# Patient Record
Sex: MALE | Race: WHITE | Employment: OTHER | URBAN - METROPOLITAN AREA
[De-identification: names, ages, dates, MRNs, and addresses within clinical notes are randomized per-mention and may not be internally consistent; named-entity substitution may affect disease eponyms.]

---

## 2018-07-30 ENCOUNTER — HOSPITAL ENCOUNTER (EMERGENCY)
Age: 78
Discharge: HOME | End: 2018-07-30
Attending: EMERGENCY MEDICINE
Payer: MEDICARE

## 2018-07-30 ENCOUNTER — APPOINTMENT (OUTPATIENT)
Dept: CT IMAGING | Age: 78
End: 2018-07-30
Attending: EMERGENCY MEDICINE
Payer: MEDICARE

## 2018-07-30 VITALS
HEIGHT: 68 IN | WEIGHT: 160 LBS | HEART RATE: 66 BPM | RESPIRATION RATE: 18 BRPM | OXYGEN SATURATION: 98 % | BODY MASS INDEX: 24.25 KG/M2 | SYSTOLIC BLOOD PRESSURE: 210 MMHG | DIASTOLIC BLOOD PRESSURE: 94 MMHG | TEMPERATURE: 97.6 F

## 2018-07-30 DIAGNOSIS — N20.0 KIDNEY STONE: Primary | ICD-10-CM

## 2018-07-30 LAB
ALBUMIN SERPL-MCNC: 3.9 G/DL (ref 3.5–5)
ALBUMIN/GLOB SERPL: 1.1 {RATIO} (ref 1.1–2.2)
ALP SERPL-CCNC: 56 U/L (ref 45–117)
ALT SERPL-CCNC: 23 U/L (ref 12–78)
ANION GAP SERPL CALC-SCNC: 9 MMOL/L (ref 5–15)
APPEARANCE UR: CLEAR
AST SERPL-CCNC: 19 U/L (ref 15–37)
BACTERIA URNS QL MICRO: NEGATIVE /HPF
BASOPHILS # BLD: 0 K/UL (ref 0–0.1)
BASOPHILS NFR BLD: 0 % (ref 0–1)
BILIRUB SERPL-MCNC: 0.7 MG/DL (ref 0.2–1)
BILIRUB UR QL: NEGATIVE
BUN SERPL-MCNC: 16 MG/DL (ref 6–20)
BUN/CREAT SERPL: 10 (ref 12–20)
CALCIUM SERPL-MCNC: 8.9 MG/DL (ref 8.5–10.1)
CHLORIDE SERPL-SCNC: 101 MMOL/L (ref 97–108)
CO2 SERPL-SCNC: 25 MMOL/L (ref 21–32)
COLOR UR: ABNORMAL
CREAT SERPL-MCNC: 1.54 MG/DL (ref 0.7–1.3)
DIFFERENTIAL METHOD BLD: ABNORMAL
EOSINOPHIL # BLD: 0 K/UL (ref 0–0.4)
EOSINOPHIL NFR BLD: 0 % (ref 0–7)
EPITH CASTS URNS QL MICRO: ABNORMAL /LPF
ERYTHROCYTE [DISTWIDTH] IN BLOOD BY AUTOMATED COUNT: 13.1 % (ref 11.5–14.5)
GLOBULIN SER CALC-MCNC: 3.6 G/DL (ref 2–4)
GLUCOSE SERPL-MCNC: 295 MG/DL (ref 65–100)
GLUCOSE UR STRIP.AUTO-MCNC: >1000 MG/DL
HCT VFR BLD AUTO: 43.4 % (ref 36.6–50.3)
HGB BLD-MCNC: 14.7 G/DL (ref 12.1–17)
HGB UR QL STRIP: NEGATIVE
HYALINE CASTS URNS QL MICRO: ABNORMAL /LPF (ref 0–5)
IMM GRANULOCYTES # BLD: 0 K/UL (ref 0–0.04)
IMM GRANULOCYTES NFR BLD AUTO: 0 % (ref 0–0.5)
KETONES UR QL STRIP.AUTO: NEGATIVE MG/DL
LEUKOCYTE ESTERASE UR QL STRIP.AUTO: NEGATIVE
LIPASE SERPL-CCNC: 156 U/L (ref 73–393)
LYMPHOCYTES # BLD: 1.2 K/UL (ref 0.8–3.5)
LYMPHOCYTES NFR BLD: 12 % (ref 12–49)
MCH RBC QN AUTO: 29.8 PG (ref 26–34)
MCHC RBC AUTO-ENTMCNC: 33.9 G/DL (ref 30–36.5)
MCV RBC AUTO: 87.9 FL (ref 80–99)
MONOCYTES # BLD: 0.6 K/UL (ref 0–1)
MONOCYTES NFR BLD: 6 % (ref 5–13)
NEUTS SEG # BLD: 8 K/UL (ref 1.8–8)
NEUTS SEG NFR BLD: 82 % (ref 32–75)
NITRITE UR QL STRIP.AUTO: NEGATIVE
NRBC # BLD: 0 K/UL (ref 0–0.01)
NRBC BLD-RTO: 0 PER 100 WBC
PH UR STRIP: 5 [PH] (ref 5–8)
PLATELET # BLD AUTO: 176 K/UL (ref 150–400)
PMV BLD AUTO: 10.9 FL (ref 8.9–12.9)
POTASSIUM SERPL-SCNC: 4.1 MMOL/L (ref 3.5–5.1)
PROT SERPL-MCNC: 7.5 G/DL (ref 6.4–8.2)
PROT UR STRIP-MCNC: NEGATIVE MG/DL
RBC # BLD AUTO: 4.94 M/UL (ref 4.1–5.7)
RBC #/AREA URNS HPF: ABNORMAL /HPF (ref 0–5)
SODIUM SERPL-SCNC: 135 MMOL/L (ref 136–145)
SP GR UR REFRACTOMETRY: 1.02 (ref 1–1.03)
UR CULT HOLD, URHOLD: NORMAL
UROBILINOGEN UR QL STRIP.AUTO: 0.2 EU/DL (ref 0.2–1)
WBC # BLD AUTO: 9.8 K/UL (ref 4.1–11.1)
WBC URNS QL MICRO: ABNORMAL /HPF (ref 0–4)

## 2018-07-30 PROCEDURE — 36415 COLL VENOUS BLD VENIPUNCTURE: CPT | Performed by: EMERGENCY MEDICINE

## 2018-07-30 PROCEDURE — 96375 TX/PRO/DX INJ NEW DRUG ADDON: CPT

## 2018-07-30 PROCEDURE — 85025 COMPLETE CBC W/AUTO DIFF WBC: CPT | Performed by: EMERGENCY MEDICINE

## 2018-07-30 PROCEDURE — 81001 URINALYSIS AUTO W/SCOPE: CPT | Performed by: EMERGENCY MEDICINE

## 2018-07-30 PROCEDURE — 83690 ASSAY OF LIPASE: CPT | Performed by: EMERGENCY MEDICINE

## 2018-07-30 PROCEDURE — 74011250636 HC RX REV CODE- 250/636: Performed by: EMERGENCY MEDICINE

## 2018-07-30 PROCEDURE — 80053 COMPREHEN METABOLIC PANEL: CPT | Performed by: EMERGENCY MEDICINE

## 2018-07-30 PROCEDURE — 99284 EMERGENCY DEPT VISIT MOD MDM: CPT

## 2018-07-30 PROCEDURE — 96374 THER/PROPH/DIAG INJ IV PUSH: CPT

## 2018-07-30 PROCEDURE — 74176 CT ABD & PELVIS W/O CONTRAST: CPT

## 2018-07-30 RX ORDER — KETOROLAC TROMETHAMINE 30 MG/ML
15 INJECTION, SOLUTION INTRAMUSCULAR; INTRAVENOUS
Status: COMPLETED | OUTPATIENT
Start: 2018-07-30 | End: 2018-07-30

## 2018-07-30 RX ORDER — HYDROMORPHONE HYDROCHLORIDE 2 MG/ML
0.5 INJECTION, SOLUTION INTRAMUSCULAR; INTRAVENOUS; SUBCUTANEOUS
Status: DISCONTINUED | OUTPATIENT
Start: 2018-07-30 | End: 2018-07-30 | Stop reason: HOSPADM

## 2018-07-30 RX ORDER — HYDROCODONE BITARTRATE AND ACETAMINOPHEN 5; 325 MG/1; MG/1
1 TABLET ORAL
Qty: 4 TAB | Refills: 0 | Status: ON HOLD | OUTPATIENT
Start: 2018-07-30 | End: 2020-02-04

## 2018-07-30 RX ORDER — ONDANSETRON 2 MG/ML
4 INJECTION INTRAMUSCULAR; INTRAVENOUS
Status: DISCONTINUED | OUTPATIENT
Start: 2018-07-30 | End: 2018-07-30 | Stop reason: HOSPADM

## 2018-07-30 RX ADMIN — KETOROLAC TROMETHAMINE 15 MG: 30 INJECTION, SOLUTION INTRAMUSCULAR at 12:54

## 2018-07-30 RX ADMIN — ONDANSETRON 4 MG: 2 INJECTION INTRAMUSCULAR; INTRAVENOUS at 12:54

## 2018-07-30 RX ADMIN — HYDROMORPHONE HYDROCHLORIDE 0.5 MG: 2 INJECTION, SOLUTION INTRAMUSCULAR; INTRAVENOUS; SUBCUTANEOUS at 12:55

## 2018-07-30 NOTE — ED NOTES
Patient's o2 sats 86%, 2 liters of oxygen placed on patient. Heart rate in the 40's non sustained patient asymptomatic atrial fibrillation, MD aware.

## 2018-07-30 NOTE — ED PROVIDER NOTES
HPI Comments: 65 y.o. male with past medical history significant for stroke and a-fib who presents from home with chief complaint of abd pain. Pt had a sudden onset of R-sided abd pain ~3 hours ago while driving which radiates down into his groin and has been worsening since onset. He states that it \"feels like if (he) could urinate it would be fine\" and he notes associated nausea. Pt had a small output of urine on arrival. No hx of kidney stones. Pt denies fever or vomiting. There are no other acute medical concerns at this time. Social hx: no tobacco use; (+) EtOH use (beer every other day) PCP: Chastity Sánhcez MD 
 
Note written by Jess Capellan, as dictated by Americo Samuels MD 12:30 PM 
 
The history is provided by the patient. No  was used. Past Medical History:  
Diagnosis Date  Stroke ([facility] [address] History reviewed. No pertinent surgical history. History reviewed. No pertinent family history. Social History Social History  Marital status: SINGLE Spouse name: N/A  
 Number of children: N/A  
 Years of education: N/A Occupational History  Not on file. Social History Main Topics  Smoking status: Not on file  Smokeless tobacco: Not on file  Alcohol use Not on file  Drug use: Not on file  Sexual activity: Not on file Other Topics Concern  Not on file Social History Narrative ALLERGIES: Ampicillin Review of Systems Constitutional: Negative for fever. HENT: Negative for congestion. Respiratory: Negative for shortness of breath. Cardiovascular: Negative for chest pain. Gastrointestinal: Positive for abdominal pain and nausea. Negative for vomiting. All other systems reviewed and are negative. Vitals:  
 07/30/18 1106 07/30/18 1254 BP: (!) 210/94 Pulse: 73 Resp: 18 Temp: 97.6 °F (36.4 °C) SpO2: 92% (!) 86% Weight: 72.6 kg (160 lb) Height: 5' 8\" (1.727 m) Physical Exam  
Constitutional: He is oriented to person, place, and time. He appears well-developed and well-nourished. No distress. HENT:  
Head: Normocephalic and atraumatic. Eyes: Conjunctivae are normal.  
Neck: Neck supple. No tracheal deviation present. Cardiovascular: Normal rate, regular rhythm and normal heart sounds. Pulmonary/Chest: Effort normal and breath sounds normal. No respiratory distress. Abdominal: Soft. He exhibits no distension. There is tenderness in the right lower quadrant. There is guarding (involuntary) and CVA tenderness (mild, R-sided). There is no rebound. Musculoskeletal: Normal range of motion. Neurological: He is alert and oriented to person, place, and time. Skin: Skin is warm and dry. Psychiatric: He has a normal mood and affect. Nursing note and vitals reviewed. Note written by Jess Bhakta, as dictated by Thais Poole MD 1:21 PM 
 
MDM 
 
65 y.o. male presents with RLQ pain and right back pain starting suddenly today. Feeling of incomplete urination but bladder is empty. No significant hydro and no hematuria. Clinically c/w stone so CT ordered and shows 2mm calculus in bladder. Pt no longer in pain. Recommended collection with filter and f/u with urology routinely to analyze. ED Course Procedures Emergency Focused Ultrasound Exam 
Limited ultrasound of the Bladder. Performed and interpreted by Thais Poole MD 
Indication: evaluation for urinary retention Transverse and Sagittal views of the bladder are obtained and calculations are performed to determine an estimated bladder volume for signs of post-renal obstruction. Findings: Bladder is not full, 45cc Interpretation: no evidence of outlet obstruction Emergency Focused Ultrasound Exam 
Limited Retroperitoneal Ultrasound of Kidneys Performed and interpreted by Thais Poole MD 
Focused abdominal ultrasound with both kidneys imaged in transverse and longitudinal planes in real-time. Indication: flank pain Findings: bilateral kidneys present, no shadowing, no anechoic areas Interpretation: no hydronephrosis visualized. no stones or cysts visualized

## 2018-07-30 NOTE — DISCHARGE INSTRUCTIONS
Kidney Stone: Care Instructions  Your Care Instructions    Kidney stones are formed when salts, minerals, and other substances normally found in the urine clump together. They can be as small as grains of sand or, rarely, as large as golf balls. While the stone is traveling through the ureter, which is the tube that carries urine from the kidney to the bladder, you will probably feel pain. The pain may be mild or very severe. You may also have some blood in your urine. As soon as the stone reaches the bladder, any intense pain should go away. If a stone is too large to pass on its own, you may need a medical procedure to help you pass the stone. The doctor has checked you carefully, but problems can develop later. If you notice any problems or new symptoms, get medical treatment right away. Follow-up care is a key part of your treatment and safety. Be sure to make and go to all appointments, and call your doctor if you are having problems. It's also a good idea to know your test results and keep a list of the medicines you take. How can you care for yourself at home? · Drink plenty of fluids, enough so that your urine is light yellow or clear like water. If you have kidney, heart, or liver disease and have to limit fluids, talk with your doctor before you increase the amount of fluids you drink. · Take pain medicines exactly as directed. Call your doctor if you think you are having a problem with your medicine. ¨ If the doctor gave you a prescription medicine for pain, take it as prescribed. ¨ If you are not taking a prescription pain medicine, ask your doctor if you can take an over-the-counter medicine. Read and follow all instructions on the label. · Your doctor may ask you to strain your urine so that you can collect your kidney stone when it passes. You can use a kitchen strainer or a tea strainer to catch the stone. Store it in a plastic bag until you see your doctor again.   Preventing future kidney stones  Some changes in your diet may help prevent kidney stones. Depending on the cause of your stones, your doctor may recommend that you:  · Drink plenty of fluids, enough so that your urine is light yellow or clear like water. If you have kidney, heart, or liver disease and have to limit fluids, talk with your doctor before you increase the amount of fluids you drink. · Limit coffee, tea, and alcohol. Also avoid grapefruit juice. · Do not take more than the recommended daily dose of vitamins C and D.  · Avoid antacids such as Gaviscon, Maalox, Mylanta, or Tums. · Limit the amount of salt (sodium) in your diet. · Eat a balanced diet that is not too high in protein. · Limit foods that are high in a substance called oxalate, which can cause kidney stones. These foods include dark green vegetables, rhubarb, chocolate, wheat bran, nuts, cranberries, and beans. When should you call for help? Call your doctor now or seek immediate medical care if:    · You cannot keep down fluids.     · Your pain gets worse.     · You have a fever or chills.     · You have new or worse pain in your back just below your rib cage (the flank area).     · You have new or more blood in your urine.    Watch closely for changes in your health, and be sure to contact your doctor if:    · You do not get better as expected. Where can you learn more? Go to http://tracey-magdalena.info/. Enter K944 in the search box to learn more about \"Kidney Stone: Care Instructions. \"  Current as of: May 12, 2017  Content Version: 11.7  © 3375-9340 [employer]. Care instructions adapted under license by [employer] (which disclaims liability or warranty for this information). If you have questions about a medical condition or this instruction, always ask your healthcare professional. [address] any warranty or liability for your use of this information.

## 2019-08-01 ENCOUNTER — APPOINTMENT (OUTPATIENT)
Dept: CT IMAGING | Age: 79
End: 2019-08-01
Attending: EMERGENCY MEDICINE
Payer: MEDICARE

## 2019-08-01 ENCOUNTER — HOSPITAL ENCOUNTER (EMERGENCY)
Age: 79
Discharge: HOME | End: 2019-08-02
Attending: EMERGENCY MEDICINE
Payer: MEDICARE

## 2019-08-01 DIAGNOSIS — N21.0 BLADDER STONES: Primary | ICD-10-CM

## 2019-08-01 DIAGNOSIS — E86.0 DEHYDRATION: ICD-10-CM

## 2019-08-01 LAB
ALBUMIN SERPL-MCNC: 3.9 G/DL (ref 3.5–5)
ALBUMIN/GLOB SERPL: 1 {RATIO} (ref 1.1–2.2)
ALP SERPL-CCNC: 67 U/L (ref 45–117)
ALT SERPL-CCNC: 22 U/L (ref 12–78)
AMYLASE SERPL-CCNC: 39 U/L (ref 25–115)
ANION GAP SERPL CALC-SCNC: 10 MMOL/L (ref 5–15)
AST SERPL-CCNC: 25 U/L (ref 15–37)
BASOPHILS # BLD: 0 K/UL (ref 0–0.1)
BASOPHILS NFR BLD: 0 % (ref 0–1)
BILIRUB SERPL-MCNC: 1 MG/DL (ref 0.2–1)
BUN SERPL-MCNC: 19 MG/DL (ref 6–20)
BUN/CREAT SERPL: 12 (ref 12–20)
CALCIUM SERPL-MCNC: 9 MG/DL (ref 8.5–10.1)
CHLORIDE SERPL-SCNC: 102 MMOL/L (ref 97–108)
CO2 SERPL-SCNC: 23 MMOL/L (ref 21–32)
CREAT SERPL-MCNC: 1.61 MG/DL (ref 0.7–1.3)
DIFFERENTIAL METHOD BLD: ABNORMAL
EOSINOPHIL # BLD: 0 K/UL (ref 0–0.4)
EOSINOPHIL NFR BLD: 0 % (ref 0–7)
ERYTHROCYTE [DISTWIDTH] IN BLOOD BY AUTOMATED COUNT: 12.9 % (ref 11.5–14.5)
GLOBULIN SER CALC-MCNC: 3.9 G/DL (ref 2–4)
GLUCOSE SERPL-MCNC: 243 MG/DL (ref 65–100)
HCT VFR BLD AUTO: 44.9 % (ref 36.6–50.3)
HGB BLD-MCNC: 15.4 G/DL (ref 12.1–17)
IMM GRANULOCYTES # BLD AUTO: 0 K/UL (ref 0–0.04)
IMM GRANULOCYTES NFR BLD AUTO: 0 % (ref 0–0.5)
LIPASE SERPL-CCNC: 79 U/L (ref 73–393)
LYMPHOCYTES # BLD: 1 K/UL (ref 0.8–3.5)
LYMPHOCYTES NFR BLD: 10 % (ref 12–49)
MCH RBC QN AUTO: 29.4 PG (ref 26–34)
MCHC RBC AUTO-ENTMCNC: 34.3 G/DL (ref 30–36.5)
MCV RBC AUTO: 85.7 FL (ref 80–99)
MONOCYTES # BLD: 0.7 K/UL (ref 0–1)
MONOCYTES NFR BLD: 8 % (ref 5–13)
NEUTS SEG # BLD: 8.1 K/UL (ref 1.8–8)
NEUTS SEG NFR BLD: 82 % (ref 32–75)
NRBC # BLD: 0 K/UL (ref 0–0.01)
NRBC BLD-RTO: 0 PER 100 WBC
PLATELET # BLD AUTO: 206 K/UL (ref 150–400)
PMV BLD AUTO: 11.3 FL (ref 8.9–12.9)
POTASSIUM SERPL-SCNC: 4.4 MMOL/L (ref 3.5–5.1)
PROT SERPL-MCNC: 7.8 G/DL (ref 6.4–8.2)
RBC # BLD AUTO: 5.24 M/UL (ref 4.1–5.7)
SODIUM SERPL-SCNC: 135 MMOL/L (ref 136–145)
WBC # BLD AUTO: 9.9 K/UL (ref 4.1–11.1)

## 2019-08-01 PROCEDURE — 96361 HYDRATE IV INFUSION ADD-ON: CPT

## 2019-08-01 PROCEDURE — 99283 EMERGENCY DEPT VISIT LOW MDM: CPT

## 2019-08-01 PROCEDURE — 36415 COLL VENOUS BLD VENIPUNCTURE: CPT

## 2019-08-01 PROCEDURE — 80053 COMPREHEN METABOLIC PANEL: CPT

## 2019-08-01 PROCEDURE — 96375 TX/PRO/DX INJ NEW DRUG ADDON: CPT

## 2019-08-01 PROCEDURE — 82150 ASSAY OF AMYLASE: CPT

## 2019-08-01 PROCEDURE — 74011250636 HC RX REV CODE- 250/636: Performed by: EMERGENCY MEDICINE

## 2019-08-01 PROCEDURE — 85025 COMPLETE CBC W/AUTO DIFF WBC: CPT

## 2019-08-01 PROCEDURE — 74176 CT ABD & PELVIS W/O CONTRAST: CPT

## 2019-08-01 PROCEDURE — 83690 ASSAY OF LIPASE: CPT

## 2019-08-01 PROCEDURE — 96374 THER/PROPH/DIAG INJ IV PUSH: CPT

## 2019-08-01 RX ORDER — ONDANSETRON 2 MG/ML
8 INJECTION INTRAMUSCULAR; INTRAVENOUS
Status: COMPLETED | OUTPATIENT
Start: 2019-08-01 | End: 2019-08-01

## 2019-08-01 RX ORDER — KETOROLAC TROMETHAMINE 30 MG/ML
30 INJECTION, SOLUTION INTRAMUSCULAR; INTRAVENOUS
Status: DISCONTINUED | OUTPATIENT
Start: 2019-08-01 | End: 2019-08-01

## 2019-08-01 RX ORDER — KETOROLAC TROMETHAMINE 30 MG/ML
15 INJECTION, SOLUTION INTRAMUSCULAR; INTRAVENOUS
Status: COMPLETED | OUTPATIENT
Start: 2019-08-01 | End: 2019-08-01

## 2019-08-01 RX ADMIN — KETOROLAC TROMETHAMINE 15 MG: 30 INJECTION, SOLUTION INTRAMUSCULAR at 23:14

## 2019-08-01 RX ADMIN — SODIUM CHLORIDE 1000 ML: 900 INJECTION, SOLUTION INTRAVENOUS at 23:20

## 2019-08-01 RX ADMIN — ONDANSETRON 8 MG: 2 INJECTION INTRAMUSCULAR; INTRAVENOUS at 23:17

## 2019-08-02 VITALS
RESPIRATION RATE: 18 BRPM | HEART RATE: 62 BPM | OXYGEN SATURATION: 95 % | SYSTOLIC BLOOD PRESSURE: 193 MMHG | HEIGHT: 68 IN | WEIGHT: 150 LBS | DIASTOLIC BLOOD PRESSURE: 84 MMHG | BODY MASS INDEX: 22.73 KG/M2 | TEMPERATURE: 98.5 F

## 2019-08-02 LAB
APPEARANCE UR: CLEAR
BACTERIA URNS QL MICRO: NEGATIVE /HPF
BILIRUB UR QL: NEGATIVE
COLOR UR: ABNORMAL
EPITH CASTS URNS QL MICRO: ABNORMAL /LPF
GLUCOSE UR STRIP.AUTO-MCNC: 500 MG/DL
HGB UR QL STRIP: ABNORMAL
HYALINE CASTS URNS QL MICRO: ABNORMAL /LPF (ref 0–5)
KETONES UR QL STRIP.AUTO: NEGATIVE MG/DL
LEUKOCYTE ESTERASE UR QL STRIP.AUTO: NEGATIVE
NITRITE UR QL STRIP.AUTO: NEGATIVE
PH UR STRIP: 5 [PH] (ref 5–8)
PROT UR STRIP-MCNC: 30 MG/DL
RBC #/AREA URNS HPF: ABNORMAL /HPF (ref 0–5)
SP GR UR REFRACTOMETRY: 1.02 (ref 1–1.03)
UA: UC IF INDICATED,UAUC: ABNORMAL
UROBILINOGEN UR QL STRIP.AUTO: 0.2 EU/DL (ref 0.2–1)
WBC URNS QL MICRO: ABNORMAL /HPF (ref 0–4)

## 2019-08-02 PROCEDURE — 81001 URINALYSIS AUTO W/SCOPE: CPT

## 2019-08-02 RX ORDER — NAPROXEN 500 MG/1
500 TABLET ORAL 2 TIMES DAILY WITH MEALS
Qty: 20 TAB | Refills: 0 | Status: ON HOLD | OUTPATIENT
Start: 2019-08-02 | End: 2020-02-04

## 2019-08-02 RX ORDER — ONDANSETRON 8 MG/1
8 TABLET, ORALLY DISINTEGRATING ORAL
Qty: 20 TAB | Refills: 0 | Status: ON HOLD | OUTPATIENT
Start: 2019-08-02 | End: 2020-02-04

## 2019-08-02 NOTE — DISCHARGE INSTRUCTIONS
Patient Education        Dehydration: Care Instructions  Your Care Instructions  Dehydration happens when your body loses too much fluid. This might happen when you do not drink enough water or you lose large amounts of fluids from your body because of diarrhea, vomiting, or sweating. Severe dehydration can be life-threatening. Water and minerals called electrolytes help put your body fluids back in balance. Learn the early signs of fluid loss, and drink more fluids to prevent dehydration. Follow-up care is a key part of your treatment and safety. Be sure to make and go to all appointments, and call your doctor if you are having problems. It's also a good idea to know your test results and keep a list of the medicines you take. How can you care for yourself at home? · To prevent dehydration, drink plenty of fluids, enough so that your urine is light yellow or clear like water. Choose water and other caffeine-free clear liquids until you feel better. If you have kidney, heart, or liver disease and have to limit fluids, talk with your doctor before you increase the amount of fluids you drink. · If you do not feel like eating or drinking, try taking small sips of water, sports drinks, or other rehydration drinks. · Get plenty of rest.  To prevent dehydration  · Add more fluids to your diet and daily routine, unless your doctor has told you not to. · During hot weather, drink more fluids. Drink even more fluids if you exercise a lot. Stay away from drinks with alcohol or caffeine. · Watch for the symptoms of dehydration. These include:  ? A dry, sticky mouth. ? Dark yellow urine, and not much of it. ? Dry and sunken eyes. ? Feeling very tired. · Learn what problems can lead to dehydration. These include:  ? Diarrhea, fever, and vomiting. ? Any illness with a fever, such as pneumonia or the flu. ?  Activities that cause heavy sweating, such as endurance races and heavy outdoor work in hot or humid weather. ? Alcohol or drug use or problems caused by quitting their use (withdrawal). ? Certain medicines, such as cold and allergy pills (antihistamines), diet pills (diuretics), and laxatives. ? Certain diseases, such as diabetes, cancer, and heart or kidney disease. When should you call for help? Call 911 anytime you think you may need emergency care. For example, call if:    · You passed out (lost consciousness).    Call your doctor now or seek immediate medical care if:    · You are confused and cannot think clearly.     · You are dizzy or lightheaded, or you feel like you may faint.     · You have signs of needing more fluids. You have sunken eyes and a dry mouth, and you pass only a little dark urine.     · You cannot keep fluids down.    Watch closely for changes in your health, and be sure to contact your doctor if:    · You are not making tears.     · Your skin is very dry and sags slowly back into place after you pinch it.     · Your mouth and eyes are very dry. Where can you learn more? Go to http://tracey-magdalena.info/. Enter A212 in the search box to learn more about \"Dehydration: Care Instructions. \"  Current as of: September 23, 2018  Content Version: 12.1  © 0091-7197 [employer]. Care instructions adapted under license by [employer] (which disclaims liability or warranty for this information). If you have questions about a medical condition or this instruction, always ask your healthcare professional. [place] [address] any warranty or liability for your use of this information. Patient Education        Oral Rehydration: Care Instructions  Your Care Instructions    Dehydration occurs when your body loses too much water. This can happen if you do not drink enough fluids or lose a lot of fluid due to diarrhea, vomiting, or sweating. Being dehydrated can cause health problems and can even be life-threatening.   To replace lost fluids, you need to drink liquid that contains special chemicals called electrolytes. Electrolytes keep your body working well. Plain water does not have electrolytes. You also need to rest to prevent more fluid loss. Replacing water and electrolytes (oral rehydration) completely takes about 36 hours. But you should feel better within a few hours. Follow-up care is a key part of your treatment and safety. Be sure to make and go to all appointments, and call your doctor if you are having problems. It's also a good idea to know your test results and keep a list of the medicines you take. How can you care for yourself at home? · Take frequent sips of a drink such as Gatorade, Powerade, or other rehydration drinks that your doctor suggests. These replace both fluid and important chemicals (electrolytes) you need for balance in your blood. · Drink 2 quarts of cool liquid over 2 to 4 hours. You should have at least 10 glasses of liquid a day to replace lost fluid. If you have kidney, heart, or liver disease and have to limit fluids, talk with your doctor before you increase the amount of fluids you drink. · Make your own drink. Measure everything carefully. The drink may not work well or may even be harmful if the amounts are off. ? 1 quart water  ? ½ teaspoon salt  ? 6 teaspoons sugar  · Do not drink liquid with caffeine, such as coffee and maine. · Do not drink any alcohol. It can make you dehydrated. · Drink plenty of fluids, enough so that your urine is light yellow or clear like water. If you have kidney, heart, or liver disease and have to limit fluids, talk with your doctor before you increase the amount of fluids you drink. When should you call for help? Call 911 anytime you think you may need emergency care. For example, call if:    · You have signs of severe dehydration, such as:  ?  You are confused or unable to stay awake.  ? You passed out (lost consciousness).    Call your doctor now or seek immediate medical care if:    · You still have signs of dehydration. You have sunken eyes and a dry mouth, and you pass only a little dark urine.     · You are dizzy or lightheaded, or you feel like you may faint.     · You are not able to keep down fluids.    Watch closely for changes in your health, and be sure to contact your doctor if:    · You do not get better as expected. Where can you learn more? Go to http://tracey-magdalena.info/. Enter I040 in the search box to learn more about \"Oral Rehydration: Care Instructions. \"  Current as of: September 23, 2018  Content Version: 12.1  © 9502-3811 [employer]. Care instructions adapted under license by [employer] (which disclaims liability or warranty for this information). If you have questions about a medical condition or this instruction, always ask your healthcare professional. [address] any warranty or liability for your use of this information.

## 2019-08-02 NOTE — ED TRIAGE NOTES
Pt reports waking up this morning with right flank pain. Abdominal pain, bloated, nausea x 3 days. LBM 7/29/19. Unable to keep anything down. Denies fever.

## 2019-08-02 NOTE — ED PROVIDER NOTES
77 y.o. male with past medical history significant for stroke, AFIB, kidney stone, presents ambulatory to the ED accompanied by a relative with chief complaint of right flank pain. Patient reports onset of right flank pain that began when he woke up this morning at 0500. The pain radiates to the right side of the abdomen and is exacerbated with palpation. Patient rates his current level of discomfort as a 10/10 in severity. He additionally complains of accompanying abdominal \"bloating\", nausea, vomiting, and inability to tolerate PO intake. Patient reports that he does not feel nauseous until he tries to eat or drink something, and then it \"comes back up. \" He also endorses decrease in urine output and constipation, with his last bowel movement 3 days ago. Patient reports that his symptoms are similar to when he had a kidney stone approximately one year ago. He specifically denies history of abdominal surgery, CAD, pulmonary disease, diarrhea, or fevers. There are no other acute medical concerns at this time. Chart Review indicates that patient was seen in the ED 7/30/18 for similar symptoms. CT abd/pelvis on that date showed a 2 mm calculus within the urinary bladder likely representative of a recently passed ureteral calculus, given the mild right-sided hydronephrosis and hydroureter. No obstructing urinary tract calculi was evident. There was also a 2 mm nonobstructing calculus at the upper pole of the right kidney. Social Hx: former Tobacco use (quit 2004), yes EtOH use (one glass of wine/evening), no Illicit Drug use PCP: All Bello MD 
 
Note written by Raymond Ferreira, as dictated by Taryn Kenny MD 10:51 PM  
 
 
The history is provided by the patient, medical records and a relative. No  was used. Past Medical History:  
Diagnosis Date  Atrial fibrillation ([address]  Stroke ([address] No past surgical history on file. No family history on file. Social History Socioeconomic History  Marital status: SINGLE Spouse name: Not on file  Number of children: Not on file  Years of education: Not on file  Highest education level: Not on file Occupational History  Not on file Social Needs  Financial resource strain: Not on file  Food insecurity:  
  Worry: Not on file Inability: Not on file  Transportation needs:  
  Medical: Not on file Non-medical: Not on file Tobacco Use  Smoking status: Former Smoker  Smokeless tobacco: Former User Quit date: 2004 Substance and Sexual Activity  Alcohol use: Yes Comment: beer every other day  Drug use: No  
 Sexual activity: Not on file Lifestyle  Physical activity:  
  Days per week: Not on file Minutes per session: Not on file  Stress: Not on file Relationships  Social connections:  
  Talks on phone: Not on file Gets together: Not on file Attends Bahai service: Not on file Active member of club or organization: Not on file Attends meetings of clubs or organizations: Not on file Relationship status: Not on file  Intimate partner violence:  
  Fear of current or ex partner: Not on file Emotionally abused: Not on file Physically abused: Not on file Forced sexual activity: Not on file Other Topics Concern  Not on file Social History Narrative  Not on file ALLERGIES: Ampicillin Review of Systems Constitutional: Negative for fever. HENT: Negative for congestion. Respiratory: Negative for shortness of breath. Gastrointestinal: Positive for abdominal distention (\"bloating\"), abdominal pain, constipation, nausea and vomiting. Negative for diarrhea. Genitourinary: Positive for decreased urine volume and flank pain. Neurological: Negative for headaches. All other systems reviewed and are negative. Vitals: 08/01/19 2058 08/02/19 0013 08/02/19 0037 BP: 193/84 Pulse: 62 Resp: 18 Temp: 98.5 °F (36.9 °C) SpO2: 92% 90% 95% Weight: 68 kg (150 lb) Height: 5' 8\" (1.727 m) Physical Exam  
Nursing note and vitals reviewed. CONSTITUTIONAL: Well-appearing; well-nourished; in no apparent distress HEAD: Normocephalic; atraumatic EYES: PERRL; EOM intact; conjunctiva and sclera are clear bilaterally. ENT: No rhinorrhea; normal pharynx with no tonsillar hypertrophy; mucous membranes pink/moist, no erythema, no exudate. NECK: Supple; non-tender; no cervical lymphadenopathy CARD: Normal S1, S2; no murmurs, rubs, or gallops. Regular rate and rhythm. RESP: Normal respiratory effort; breath sounds clear and equal bilaterally; no wheezes, rhonchi, or rales. ABD: Normal bowel sounds; non-distended; non-tender; no palpable organomegaly, no masses, no bruits. Back Exam: Normal inspection; no vertebral point tenderness, no CVA tenderness. Normal range of motion. EXT: Normal ROM in all four extremities; non-tender to palpation; no swelling or deformity; distal pulses are normal, no edema. SKIN: Warm; dry; no rash. NEURO:Alert and oriented x 3, coherent, MORENITA-XII grossly intact, sensory and motor are non-focal. 
 
 
MDM Number of Diagnoses or Management Options Bladder stones:  
Dehydration:  
Diagnosis management comments: Assessment: 25-year-old male who presents with right flank pain and abdominal pain rule out obstructive uropathy, pancreatitis, colitis, electrolyte abnormality and dehydration Plan: Lab, IV fluid, antiemetic and analgesia/CT scan of the abdomen and pelvis, p.o. challenge, serial exam/monitor and reevaluate */ Monitor and Reevaluate. Amount and/or Complexity of Data Reviewed Clinical lab tests: ordered and reviewed Tests in the radiology section of CPT®: ordered and reviewed Tests in the medicine section of CPT®: reviewed and ordered Discussion of test results with the performing providers: yes Decide to obtain previous medical records or to obtain history from someone other than the patient: yes Obtain history from someone other than the patient: yes Review and summarize past medical records: yes Discuss the patient with other providers: yes Independent visualization of images, tracings, or specimens: yes Risk of Complications, Morbidity, and/or Mortality Presenting problems: moderate Diagnostic procedures: moderate Management options: moderate Procedures Progress Note:  
Pt has been reexamined by Gabriele Aguilar MD. Pt is feeling much better. Symptoms have improved. All available results have been reviewed with pt and any available family. Pt understands sx, dx, and tx in ED. Care plan has been outlined and questions have been answered. Pt is ready to go home. Will send home on a bladder stone and dehydration instruction. Prescription Zofran and naproxen. Oral rehydration instruction. Outpatient referral with PCP as needed.  Written by Gabriele Aguilar MD,12:39 AM

## 2020-02-02 ENCOUNTER — HOSPITAL ENCOUNTER (EMERGENCY)
Age: 80
Discharge: HOME | End: 2020-02-02
Attending: EMERGENCY MEDICINE | Admitting: EMERGENCY MEDICINE
Payer: MEDICARE

## 2020-02-02 VITALS
SYSTOLIC BLOOD PRESSURE: 114 MMHG | WEIGHT: 155 LBS | BODY MASS INDEX: 23.49 KG/M2 | HEIGHT: 68 IN | TEMPERATURE: 97.8 F | OXYGEN SATURATION: 93 % | DIASTOLIC BLOOD PRESSURE: 86 MMHG | HEART RATE: 64 BPM

## 2020-02-02 DIAGNOSIS — R04.0 EPISTAXIS: Primary | ICD-10-CM

## 2020-02-02 PROCEDURE — 75810000284 HC CNTRL NASAL HEMORHRAGE SIMPLE

## 2020-02-02 PROCEDURE — 74011250637 HC RX REV CODE- 250/637: Performed by: EMERGENCY MEDICINE

## 2020-02-02 PROCEDURE — 99284 EMERGENCY DEPT VISIT MOD MDM: CPT

## 2020-02-02 RX ORDER — AMOXICILLIN AND CLAVULANATE POTASSIUM 875; 125 MG/1; MG/1
1 TABLET, FILM COATED ORAL 2 TIMES DAILY
Qty: 10 TAB | Refills: 0 | Status: ON HOLD | OUTPATIENT
Start: 2020-02-02 | End: 2020-02-04

## 2020-02-02 RX ORDER — WATER FOR INJECTION,STERILE
VIAL (ML) INJECTION
Status: DISCONTINUED
Start: 2020-02-02 | End: 2020-02-02 | Stop reason: HOSPADM

## 2020-02-02 RX ORDER — OXYMETAZOLINE HCL 0.05 %
2 SPRAY, NON-AEROSOL (ML) NASAL
Status: COMPLETED | OUTPATIENT
Start: 2020-02-02 | End: 2020-02-02

## 2020-02-02 RX ADMIN — Medication 2 SPRAY: at 08:14

## 2020-02-02 NOTE — ED NOTES
Patient does not appear to be in any acute distress/shows no evidence of clinical instability at this time. Provider has reviewed discharge instructions with the patient/family. The patient/family verbalized understanding instructions as well as need for follow up for any further symptoms. 
  
Discharge papers given, education provided, and any questions answered. Patient discharged by provider. No bleeding att his time

## 2020-02-02 NOTE — DISCHARGE INSTRUCTIONS
Patient Education        Nosebleeds: Care Instructions  Your Care Instructions    Nosebleeds are common, especially if you have colds or allergies. Many things can cause a nosebleed. Some nosebleeds stop on their own with pressure. Others need packing. Some get cauterized (sealed). If you have gauze or other packing materials in your nose, you will need to follow up with your doctor to have the packing removed. You may need more treatment if you get nosebleeds a lot. The doctor has checked you carefully, but problems can develop later. If you notice any problems or new symptoms, get medical treatment right away. Follow-up care is a key part of your treatment and safety. Be sure to make and go to all appointments, and call your doctor if you are having problems. It's also a good idea to know your test results and keep a list of the medicines you take. How can you care for yourself at home? · If you get another nosebleed:  ? Sit up and tilt your head slightly forward. This keeps blood from going down your throat. ? Use your thumb and index finger to pinch your nose shut for 10 minutes. Use a clock. Do not check to see if the bleeding has stopped before the 10 minutes are up. If the bleeding has not stopped, pinch your nose shut for another 10 minutes. ? When the bleeding has stopped, try not to pick, rub, or blow your nose for 12 hours. Avoiding these things helps keep your nose from bleeding again. · If your doctor prescribed antibiotics, take them as directed. Do not stop taking them just because you feel better. You need to take the full course of antibiotics. To prevent nosebleeds  · Do not blow your nose too hard. · Try not to lift or strain after a nosebleed. · Raise your head on a pillow while you sleep. · Put a thin layer of a saline- or water-based nasal gel, such as NasoGel, inside your nose. Put it on the septum, which divides your nostrils.  This will prevent dryness that can cause nosebleeds. · Use a vaporizer or humidifier to add moisture to your bedroom. Follow the directions for cleaning the machine. · Do not use aspirin, ibuprofen (Advil, Motrin), or naproxen (Aleve) for 36 to 48 hours after a nosebleed unless your doctor tells you to. You can use acetaminophen (Tylenol) for pain relief. · Talk to your doctor about stopping any other medicines you are taking. Some medicines may make you more likely to get a nosebleed. · Do not use cold medicines or nasal sprays without first talking to your doctor. They can make your nose dry. When should you call for help? Call 911 anytime you think you may need emergency care. For example, call if:    · You passed out (lost consciousness).    Call your doctor now or seek immediate medical care if:    · You get another nosebleed and your nose is still bleeding after you have applied pressure 3 times for 10 minutes each time (30 minutes total).     · There is a lot of blood running down the back of your throat even after you pinch your nose and tilt your head forward.     · You have a fever.     · You have sinus pain.    Watch closely for changes in your health, and be sure to contact your doctor if:    · You get nosebleeds often, even if they stop.     · You do not get better as expected. Where can you learn more? Go to http://tracey-magdalena.info/. Enter S156 in the search box to learn more about \"Nosebleeds: Care Instructions. \"  Current as of: June 26, 2019  Content Version: 12.2  © 3035-6713 [employer]. Care instructions adapted under license by [employer] (which disclaims liability or warranty for this information). If you have questions about a medical condition or this instruction, always ask your healthcare professional. [address] any warranty or liability for your use of this information.

## 2020-02-02 NOTE — ED NOTES
Pt is now bleeding out of right nostril and around nasal clamp and spitting blood. Clamp still in placed. Dr. Maddie Zuluaga notified. EENT cart at bedside

## 2020-02-02 NOTE — ED NOTES
Dr. Ruthie Moreland at bedside. Placed Rhinorocket in left nostril. Bleeding controlled at this time.

## 2020-02-02 NOTE — ED TRIAGE NOTES
Patient arrives to ED for complaints of epistaxis that started about 4 am 
 
Patient takes aspirin and plavix daily

## 2020-02-02 NOTE — ED NOTES
Rhinorocket was removed per pt request, nose cont to bleed att his time. Dr. Lupillo Juárez notified and at bedside to insert another Rhinorocket in the left nostril

## 2020-02-02 NOTE — ED PROVIDER NOTES
The history is provided by the patient. No  was used. Epistaxis This is a recurrent problem. The current episode started 3 to 5 hours ago. The problem occurs rarely. The problem has not changed since onset. The problem is associated with anticoagulants. The bleeding has been from the left nare. He has tried applying pressure for the symptoms. The treatment provided no relief. His past medical history does not include bleeding disorder, colds, sinus problems, frequent nosebleeds, cancer, HTN, allergies or trauma. Past Medical History:  
Diagnosis Date  Atrial fibrillation ([facility] [address]  Stroke ([facility] No past surgical history on file. No family history on file. Social History Socioeconomic History  Marital status: SINGLE Spouse name: Not on file  Number of children: Not on file  Years of education: Not on file  Highest education level: Not on file Occupational History  Not on file Social Needs  Financial resource strain: Not on file  Food insecurity:  
  Worry: Not on file Inability: Not on file  Transportation needs:  
  Medical: Not on file Non-medical: Not on file Tobacco Use  Smoking status: Former Smoker  Smokeless tobacco: Former User Quit date: 2004 Substance and Sexual Activity  Alcohol use: Yes Comment: beer every other day  Drug use: No  
 Sexual activity: Not on file Lifestyle  Physical activity:  
  Days per week: Not on file Minutes per session: Not on file  Stress: Not on file Relationships  Social connections:  
  Talks on phone: Not on file Gets together: Not on file Attends Voodoo service: Not on file Active member of club or organization: Not on file Attends meetings of clubs or organizations: Not on file Relationship status: Not on file  Intimate partner violence:  
  Fear of current or ex partner: Not on file Emotionally abused: Not on file Physically abused: Not on file Forced sexual activity: Not on file Other Topics Concern  Not on file Social History Narrative  Not on file ALLERGIES: Ampicillin and Iodine Review of Systems Constitutional: Negative for activity change, chills and fever. HENT: Positive for nosebleeds. Negative for sore throat, trouble swallowing and voice change. Eyes: Negative for visual disturbance. Respiratory: Negative for shortness of breath. Cardiovascular: Negative for chest pain and palpitations. Gastrointestinal: Negative for abdominal pain, constipation, diarrhea and nausea. Genitourinary: Negative for difficulty urinating, dysuria, hematuria and urgency. Musculoskeletal: Negative for back pain, neck pain and neck stiffness. Skin: Negative for color change. Allergic/Immunologic: Negative for immunocompromised state. Neurological: Negative for dizziness, seizures, syncope, weakness, light-headedness, numbness and headaches. Psychiatric/Behavioral: Negative for behavioral problems, confusion, hallucinations, self-injury and suicidal ideas. Vitals:  
 02/02/20 7995 BP: 162/72 Pulse: 64 Temp: 97.8 °F (36.6 °C) SpO2: 95% Weight: 70.3 kg (155 lb) Height: 5' 8\" (1.727 m) Physical Exam 
Vitals signs and nursing note reviewed. Constitutional:   
   General: He is not in acute distress. Appearance: He is well-developed. He is not diaphoretic. HENT:  
   Head: Atraumatic. Nose:  
   Left Nostril: Epistaxis present. Neck:  
   Trachea: No tracheal deviation. Cardiovascular:  
   Comments: Warm and well perfused Pulmonary:  
   Effort: Pulmonary effort is normal. No respiratory distress. Musculoskeletal: Normal range of motion. Skin: 
   General: Skin is warm and dry. Neurological:  
   Mental Status: He is alert.   
   Coordination: Coordination normal.  
Psychiatric:     
 Behavior: Behavior normal.     
   Thought Content: Thought content normal.     
   Judgment: Judgment normal.  
 
  
 
MDM This is a 18-year-old male with past medical history, review of systems, physical exam as above, presenting with complaints of epistaxis. Patient states bleeding began approximately 4 hours prior to arrival, from the left nare. He endorses a similar episode 2 weeks ago, that resolved spontaneously. He endorses the use of aspirin and Plavix, denies digital manipulation. He states he has been providing pressure, without significant improvement. Physical exam is remarkable for well-appearing elderly male, in no acute distress, noted to have self packed his left nare, mild blood evidence in the posterior pharynx, patient no acute distress, with mild hypertension. Suspect combination of dry air, hypertension, anticoagulants, with likely anterior epistaxis. Plan to provide pressure, and observe prior to attempting placement of nasal tampon. We will reassess, and make a disposition. Procedures 8:22 AM 
Rhino Rocket placed. 10:06 AM 
Nasal tampon removed at patient's request, started to rebleed, nasal tampon replaced. Will discharge home with oral antibiotics for prophylaxis, ENT follow-up, return precautions given.

## 2020-02-04 ENCOUNTER — APPOINTMENT (OUTPATIENT)
Dept: CT IMAGING | Age: 80
DRG: 244 | End: 2020-02-04
Attending: NURSE PRACTITIONER
Payer: MEDICARE

## 2020-02-04 ENCOUNTER — HOSPITAL ENCOUNTER (INPATIENT)
Age: 80
LOS: 2 days | Discharge: HOME | DRG: 244 | End: 2020-02-06
Attending: EMERGENCY MEDICINE | Admitting: INTERNAL MEDICINE
Payer: MEDICARE

## 2020-02-04 DIAGNOSIS — I63.9 CEREBROVASCULAR ACCIDENT (CVA), UNSPECIFIED MECHANISM (HCC): ICD-10-CM

## 2020-02-04 DIAGNOSIS — I45.9 HEART BLOCK: ICD-10-CM

## 2020-02-04 DIAGNOSIS — H53.9 VISUAL AURA: ICD-10-CM

## 2020-02-04 DIAGNOSIS — R42 VERTIGO: Primary | ICD-10-CM

## 2020-02-04 DIAGNOSIS — R26.81 UNSTEADY GAIT: ICD-10-CM

## 2020-02-04 PROBLEM — I48.91 A-FIB (HCC): Status: ACTIVE | Noted: 2020-02-04

## 2020-02-04 PROBLEM — Z86.73 HISTORY OF CVA (CEREBROVASCULAR ACCIDENT): Status: ACTIVE | Noted: 2020-02-04

## 2020-02-04 PROBLEM — H53.8 BLURRY VISION: Status: ACTIVE | Noted: 2020-02-04

## 2020-02-04 PROBLEM — I44.1 SECOND DEGREE AV BLOCK: Status: ACTIVE | Noted: 2020-02-04

## 2020-02-04 LAB
ANION GAP SERPL CALC-SCNC: 8 MMOL/L (ref 5–15)
BASOPHILS # BLD: 0 K/UL (ref 0–0.1)
BASOPHILS NFR BLD: 0 % (ref 0–1)
BUN SERPL-MCNC: 32 MG/DL (ref 6–20)
BUN/CREAT SERPL: 20 (ref 12–20)
CALCIUM SERPL-MCNC: 8.8 MG/DL (ref 8.5–10.1)
CHLORIDE SERPL-SCNC: 102 MMOL/L (ref 97–108)
CO2 SERPL-SCNC: 27 MMOL/L (ref 21–32)
COMMENT, HOLDF: NORMAL
CREAT SERPL-MCNC: 1.59 MG/DL (ref 0.7–1.3)
DIFFERENTIAL METHOD BLD: ABNORMAL
EOSINOPHIL # BLD: 0.1 K/UL (ref 0–0.4)
EOSINOPHIL NFR BLD: 1 % (ref 0–7)
ERYTHROCYTE [DISTWIDTH] IN BLOOD BY AUTOMATED COUNT: 13.3 % (ref 11.5–14.5)
GLUCOSE BLD STRIP.AUTO-MCNC: 127 MG/DL (ref 65–100)
GLUCOSE SERPL-MCNC: 118 MG/DL (ref 65–100)
HCT VFR BLD AUTO: 35.9 % (ref 36.6–50.3)
HGB BLD-MCNC: 12.1 G/DL (ref 12.1–17)
IMM GRANULOCYTES # BLD AUTO: 0 K/UL (ref 0–0.04)
IMM GRANULOCYTES NFR BLD AUTO: 0 % (ref 0–0.5)
INR PPP: 1 (ref 0.9–1.1)
LYMPHOCYTES # BLD: 2.2 K/UL (ref 0.8–3.5)
LYMPHOCYTES NFR BLD: 25 % (ref 12–49)
MCH RBC QN AUTO: 30.6 PG (ref 26–34)
MCHC RBC AUTO-ENTMCNC: 33.7 G/DL (ref 30–36.5)
MCV RBC AUTO: 90.9 FL (ref 80–99)
MONOCYTES # BLD: 1 K/UL (ref 0–1)
MONOCYTES NFR BLD: 12 % (ref 5–13)
NEUTS SEG # BLD: 5.3 K/UL (ref 1.8–8)
NEUTS SEG NFR BLD: 62 % (ref 32–75)
NRBC # BLD: 0 K/UL (ref 0–0.01)
NRBC BLD-RTO: 0 PER 100 WBC
PLATELET # BLD AUTO: 195 K/UL (ref 150–400)
PMV BLD AUTO: 11.7 FL (ref 8.9–12.9)
POTASSIUM SERPL-SCNC: 4 MMOL/L (ref 3.5–5.1)
PROTHROMBIN TIME: 10.3 SEC (ref 9–11.1)
RBC # BLD AUTO: 3.95 M/UL (ref 4.1–5.7)
SAMPLES BEING HELD,HOLD: NORMAL
SERVICE CMNT-IMP: ABNORMAL
SODIUM SERPL-SCNC: 137 MMOL/L (ref 136–145)
WBC # BLD AUTO: 8.5 K/UL (ref 4.1–11.1)

## 2020-02-04 PROCEDURE — 65660000000 HC RM CCU STEPDOWN

## 2020-02-04 PROCEDURE — 85610 PROTHROMBIN TIME: CPT

## 2020-02-04 PROCEDURE — 99218 HC RM OBSERVATION: CPT

## 2020-02-04 PROCEDURE — 93005 ELECTROCARDIOGRAM TRACING: CPT

## 2020-02-04 PROCEDURE — 85025 COMPLETE CBC W/AUTO DIFF WBC: CPT

## 2020-02-04 PROCEDURE — 74011250637 HC RX REV CODE- 250/637: Performed by: EMERGENCY MEDICINE

## 2020-02-04 PROCEDURE — 99285 EMERGENCY DEPT VISIT HI MDM: CPT

## 2020-02-04 PROCEDURE — 70450 CT HEAD/BRAIN W/O DYE: CPT

## 2020-02-04 PROCEDURE — 80048 BASIC METABOLIC PNL TOTAL CA: CPT

## 2020-02-04 PROCEDURE — 74011250636 HC RX REV CODE- 250/636: Performed by: INTERNAL MEDICINE

## 2020-02-04 PROCEDURE — 82962 GLUCOSE BLOOD TEST: CPT

## 2020-02-04 RX ORDER — ASPIRIN 325 MG
325 TABLET ORAL
Status: COMPLETED | OUTPATIENT
Start: 2020-02-04 | End: 2020-02-04

## 2020-02-04 RX ORDER — ACETAMINOPHEN 650 MG/1
650 SUPPOSITORY RECTAL
Status: DISCONTINUED | OUTPATIENT
Start: 2020-02-04 | End: 2020-02-06 | Stop reason: HOSPADM

## 2020-02-04 RX ORDER — ENOXAPARIN SODIUM 100 MG/ML
40 INJECTION SUBCUTANEOUS EVERY 24 HOURS
Status: DISCONTINUED | OUTPATIENT
Start: 2020-02-04 | End: 2020-02-06 | Stop reason: HOSPADM

## 2020-02-04 RX ORDER — LISINOPRIL 10 MG/1
10 TABLET ORAL
COMMUNITY

## 2020-02-04 RX ORDER — ASPIRIN 81 MG/1
81 TABLET ORAL DAILY
COMMUNITY
End: 2020-02-21

## 2020-02-04 RX ORDER — SIMVASTATIN 40 MG/1
40 TABLET, FILM COATED ORAL
COMMUNITY

## 2020-02-04 RX ORDER — ACETAMINOPHEN 325 MG/1
650 TABLET ORAL
Status: DISCONTINUED | OUTPATIENT
Start: 2020-02-04 | End: 2020-02-06 | Stop reason: HOSPADM

## 2020-02-04 RX ADMIN — ENOXAPARIN SODIUM 40 MG: 40 INJECTION SUBCUTANEOUS at 20:53

## 2020-02-04 RX ADMIN — ASPIRIN 325 MG: 325 TABLET, FILM COATED ORAL at 16:38

## 2020-02-04 NOTE — ED NOTES
TRANSFER - OUT REPORT: 
 
Verbal report given to moi pimentel(name) on Doneen Loser  being transferred to 504(unit) for routine progression of care Report consisted of patients Situation, Background, Assessment and  
Recommendations(SBAR). Information from the following report(s) SBAR, ED Summary and MAR was reviewed with the receiving nurse. Lines:  
Peripheral IV 02/04/20 Left Antecubital (Active) Site Assessment Clean, dry, & intact 2/4/2020  2:50 PM  
Phlebitis Assessment 0 2/4/2020  2:50 PM  
Infiltration Assessment 0 2/4/2020  2:50 PM  
Dressing Status Clean, dry, & intact 2/4/2020  2:50 PM  
Dressing Type Transparent 2/4/2020  2:50 PM  
Hub Color/Line Status Green 2/4/2020  2:50 PM  
  
 
Opportunity for questions and clarification was provided. Patient transported with: 
 Registered nurse

## 2020-02-04 NOTE — ED PROVIDER NOTES
Pt c/o dizziness with vision change that began at approximately 1200 today. Pt has a right sided facial droop. Pt notes he was playing golf at the time of onset. Pt is taking Plavix and Aspirin. Previous hx of stroke. Code S level 1 called in triage. I have evaluated the patient as the Provider in Triage. I have reviewed His vital signs and the triage nurse assessment. I have talked with the patient and any available family and advised that I am the provider in triage and have ordered the appropriate study to initiate their work up based on the clinical presentation during my assessment. I have advised that the patient will be accommodated in the Main ED as soon as possible. I have also requested to contact the triage nurse or myself immediately if the patient experiences any changes in their condition during this brief waiting period. Note written by Jess Mcfarlane, as dictated by Layo Orellana DNP 2:32 PM 
 
Patient is a 56-year-old male who presents with vertigo that began prior to arrival.  He was evaluated after a code stroke was called in triage per the NP. During my evaluation, patient is asymptomatic. Denies any focal neurological deficits. Reports he did not notice his facial droop until it was mentioned to him in triage today. Family at bedside who denies his face looks any different. Reports that symptoms today were associated with head movement. No recent illness, no recent travel, no recent sick contacts. Does report that he was seen by ENT recently for epistaxis subsequent procedure. Past Medical History:  
Diagnosis Date  Atrial fibrillation ([facility] [address]  Stroke ([facility] [address] No past surgical history on file. No family history on file. Social History Socioeconomic History  Marital status: SINGLE Spouse name: Not on file  Number of children: Not on file  Years of education: Not on file  Highest education level: Not on file Occupational History  Not on file Social Needs  Financial resource strain: Not on file  Food insecurity:  
  Worry: Not on file Inability: Not on file  Transportation needs:  
  Medical: Not on file Non-medical: Not on file Tobacco Use  Smoking status: Former Smoker  Smokeless tobacco: Former User Quit date: 2004 Substance and Sexual Activity  Alcohol use: Yes Comment: beer every other day  Drug use: No  
 Sexual activity: Not on file Lifestyle  Physical activity:  
  Days per week: Not on file Minutes per session: Not on file  Stress: Not on file Relationships  Social connections:  
  Talks on phone: Not on file Gets together: Not on file Attends Buddhist service: Not on file Active member of club or organization: Not on file Attends meetings of clubs or organizations: Not on file Relationship status: Not on file  Intimate partner violence:  
  Fear of current or ex partner: Not on file Emotionally abused: Not on file Physically abused: Not on file Forced sexual activity: Not on file Other Topics Concern  Not on file Social History Narrative  Not on file ALLERGIES: Ampicillin and Iodine Review of Systems Constitutional: Negative for chills and fever. HENT: Negative for congestion, drooling, ear discharge, ear pain, facial swelling, nosebleeds, postnasal drip, rhinorrhea, sinus pressure, tinnitus and voice change. Eyes: Negative for pain and itching. Respiratory: Negative for choking and stridor. Cardiovascular: Negative for leg swelling. Gastrointestinal: Negative for abdominal pain and rectal pain. Endocrine: Negative for heat intolerance and polyphagia. Genitourinary: Negative for enuresis and genital sores. Musculoskeletal: Negative for arthralgias and joint swelling. Skin: Negative for color change. Allergic/Immunologic: Negative for immunocompromised state. Neurological: Positive for dizziness and facial asymmetry. Negative for tremors, syncope, speech difficulty, weakness, light-headedness, numbness and headaches. Hematological: Negative for adenopathy. Psychiatric/Behavioral: Negative for dysphoric mood and sleep disturbance. There were no vitals filed for this visit. Physical Exam 
Vitals signs and nursing note reviewed. Constitutional:   
   General: He is not in acute distress. Appearance: Normal appearance. He is well-developed. He is not ill-appearing or toxic-appearing. HENT:  
   Head: Normocephalic and atraumatic. Right Ear: External ear normal.  
   Left Ear: External ear normal.  
   Nose: Nose normal.  
   Mouth/Throat:  
   Mouth: Mucous membranes are moist.  
Eyes:  
   Extraocular Movements: Extraocular movements intact. Conjunctiva/sclera: Conjunctivae normal.  
   Pupils: Pupils are equal, round, and reactive to light. Comments: No nystagmus. Neck: Musculoskeletal: Normal range of motion and neck supple. Cardiovascular:  
   Rate and Rhythm: Regular rhythm. Pulses: Normal pulses. Heart sounds: Normal heart sounds. Pulmonary:  
   Effort: Pulmonary effort is normal. No respiratory distress. Breath sounds: Normal breath sounds. Abdominal:  
   General: There is no distension. Palpations: Abdomen is soft. Tenderness: There is no abdominal tenderness. Musculoskeletal: Normal range of motion. General: No deformity. Skin: 
   General: Skin is warm and dry. Neurological:  
   Mental Status: He is alert and oriented to person, place, and time. Cranial Nerves: No cranial nerve deficit. Sensory: No sensory deficit. Motor: No weakness. Coordination: Coordination normal.  
   Gait: Gait normal.  
Psychiatric:     
   Behavior: Behavior normal.  
 
  
 
MDM Number of Diagnoses or Management Options Cerebrovascular accident (CVA), unspecified mechanism [facility]):  
Vertigo:  
Diagnosis management comments: Patient did not believe his face looked any different at time of my evaluation. No other focal neurological deficits noted. Unable to reproduce vertigo symptoms that patient describes from earlier in the day. No evidence of acute abnormalities on CT scan of head. Patient is allergic to iodine, will hold on CTA until discussion with tele neurology. Patient resting in no distress at this time. Per discussion with neurology, he believes that this is peripheral in etiology. Likely BPPV. Meclizine as needed if symptoms recur. Given history of CVA, would like patient admitted for further work-up with MRI and MRA. Discussed with patient and family member at bedside who agreed with plan for admission for further studies. Amount and/or Complexity of Data Reviewed Discussion of test results with the performing providers: yes Decide to obtain previous medical records or to obtain history from someone other than the patient: yes Obtain history from someone other than the patient: yes Review and summarize past medical records: yes Discuss the patient with other providers: yes Independent visualization of images, tracings, or specimens: yes Critical Care Total time providing critical care: 30-74 minutes Patient Progress Patient progress: stable ED Course as of Feb 04 1646 Tue Feb 04, 2020 1646 Spoke with Dr Azevedo Cos- teleneurology. Recommend MRI brain, MRA of head and neck - no contrast needed as pt allergic to IV dye. Would like admitted for further work up. [AL] ED Course User Index [AL] Keaton Hidalgo MD  
 
 
Procedures Hospitalist Perfect Serve for Admission 4:47 PM 
 
ED Room Number: ER10/10 Patient Name and age:  Ulysses Forester [de-identified] y.o.  male Working Diagnosis: 1. Vertigo 2. Cerebrovascular accident (CVA), unspecified mechanism ([facility] [address] Readmission: no 
 Isolation Requirements:  no 
Recommended Level of Care:  telemetry Code Status:  Full Code Department:[facility] ED - [phone] Other: pt with intermittent vertigo. teleneurology thinks peripheral vertigo but would like admission for further workup. Allergic to IV dye. Recommend MRI brain, MRA head and neck without contrast for further work up. Patient is being admitted to the hospital.  The results of their tests and reasons for their admission have been discussed with them and/or available family. They convey agreement and understanding for the need to be admitted and for their admission diagnosis.

## 2020-02-04 NOTE — Clinical Note
TRANSFER - OUT REPORT:     Verbal report given to: Shun RN. Report consisted of patient's Situation, Background, Assessment and   Recommendations(SBAR). Opportunity for questions and clarification was provided. Patient transported with a Cardiac Cath Tech / Patient Care Tech. Patient transported to: [facility]

## 2020-02-04 NOTE — ED TRIAGE NOTES
Pt brought in by grandkong for sudden dizziness with vision change while playing golf that started at 1200 today. +right facial droop. Code Stroke called. FSBS 127. H/o CVA and TIA.

## 2020-02-04 NOTE — H&P
[facility] [address] [facility], [facility] [address] 
[phone] Admission History and Physical 
 
 
NAME:  Marck Verdugo :   1940 MRN:  [id number] PCP:  Elliot Hillman MD  
 
Date/Time:  2020 Subjective: CHIEF COMPLAINT: Unsteady gait, blurry vision, bilateral neck tingling HISTORY OF PRESENT ILLNESS:    
Mr. Kate Ledezma is a [de-identified] y.o.  male who is admitted with unsteady gait. Mr. Kate Ledezma with past medical history of CVA, atrial fibrillation presented to ER complaining of feeling of dizzy blurry vision tingling on both sides of the neck and unsteady gait which started today. Patient was playing golf this afternoon and after swinging she felt dizzy and unsteady in his gait. Later on noted to have blurry vision and seeing some sparkles. Denies weakness, slurred speech. Patient was evaluated by telemetry neurology and recommended admission and CVA work-up. Past Medical History:  
Diagnosis Date  Atrial fibrillation ([facility] [address]  Stroke ([facility] [address] No past surgical history on file. Social History Tobacco Use  Smoking status: Former Smoker  Smokeless tobacco: Former User Quit date:  Substance Use Topics  Alcohol use: Yes Comment: beer every other day Family History Adopted: Yes Allergies Allergen Reactions  Ampicillin Nausea and Vomiting  Iodine Hives, Itching and Nausea and Vomiting Prior to Admission medications Medication Sig Start Date End Date Taking? Authorizing Provider  
amoxicillin-clavulanate (AUGMENTIN) 875-125 mg per tablet Take 1 Tab by mouth two (2) times a day for 5 days. 20  Shayan Rodriguez MD  
naproxen (NAPROSYN) 500 mg tablet Take 1 Tab by mouth two (2) times daily (with meals).  19   Henna Rankin MD  
ondansetron (ZOFRAN ODT) 8 mg disintegrating tablet Take 1 Tab by mouth every eight (8) hours as needed for Nausea. 8/2/19   Jose Tomlinson MD  
HYDROcodone-acetaminophen [facility]) 5-325 mg per tablet Take 1 Tab by mouth every six (6) hours as needed for Pain. Max Daily Amount: 4 Tabs. 7/30/18   Guerda Blevins MD  
dipyridamole-aspirin (AGGRENOX) 200-25 mg per SR capsule Take 1 Cap by mouth two (2) times a day. Jon Vyas MD  
clopidogrel (PLAVIX) 75 mg tablet Take  by mouth daily. Jon Vyas MD  
SIMVASTATIN PO Take  by mouth. Jon Vyas MD  
LISINOPRIL PO Take  by mouth. Jon Vyas MD  
cholecalciferol (VITAMIN D3) 1,000 unit tablet Take  by mouth daily. Jon Vays MD  
 
 
 
Review of Systems: 
(bold if positive, if negative) Gen:  Eyes:  ENT:  CVS:  Pulm:  GI:   
:   
MS:  Skin:  Psych:  Endo:   
Hem:  Renal:   
Neuro:  tingling,  
 
 
  
Objective: VITALS:   
Vital signs reviewed; most recent are: 
 
Visit Vitals /45 Pulse 67 Resp 23 Wt 73.3 kg (161 lb 9.6 oz) SpO2 91% BMI 24.57 kg/m² SpO2 Readings from Last 6 Encounters:  
02/04/20 91% 02/02/20 93% 08/02/19 95% 07/30/18 98% 03/27/13 93% No intake or output data in the 24 hours ending 02/04/20 8596 Exam:  
 
Physical Exam: 
 
Gen:  Well-developed, well-nourished, in no acute distress HEENT:  Pink conjunctivae, PERRL, hearing intact to voice, moist mucous membranes Neck:  Supple, without masses, thyroid non-tender Resp:  No accessory muscle use, clear breath sounds without wheezes rales or rhonchi 
Card:  Grade 2/6 systolic murmur, normal S1, S2 without thrills, bruits or peripheral edema Abd:  Soft, non-tender, non-distended, normoactive bowel sounds are present, no palpable organomegaly and no detectable hernias Lymph:  No cervical or inguinal adenopathy Musc:  No cyanosis or clubbing Skin:  No rashes or ulcers, skin turgor is good Neuro:  Cranial nerves are grossly intact, no focal motor weakness, follows commands appropriately Psych:  Good insight, oriented to person, place and time, alert Labs: 
 
Recent Labs 02/04/20 
1550 WBC 8.5 HGB 12.1 HCT 35.9*  
 Recent Labs 02/04/20 
1550   
K 4.0  
 CO2 27 * BUN 32* CREA 1.59* CA 8.8 Lab Results Component Value Date/Time Glucose (POC) 127 (H) 02/04/2020 02:34 PM  
 Glucose (POC) 105 09/22/2010 11:37 AM  
 
No results for input(s): PH, PCO2, PO2, HCO3, FIO2 in the last 72 hours. Recent Labs 02/04/20 
1550 INR 1.0 Telemetry reviewed:   normal sinus rhythm Assessment/Plan: 1. Blurry vision (2/4/2020)/ Unsteady gait (2/4/2020). History of CVA (cerebrovascular accident) (2/4/2020). Admit to remote telemetry. Check MRI and MRA of the brain, neck. Continue aspirin and Plavix. Check lipid panel and A1c. Consult neurology and PT/OT 2. Chronic A-fib ([address] (2/4/2020). Not on rate controlling or anticoagulant. 3.  Hyperlipidemia. Continue statin and check lipid panel. Addendum: 
telemetry called and informed us bradycardia and second degree block noted. Check 12 lead EKG. Transfer to telemetry and consult cardiology Previous medical records reviewed Risk of deterioration: medium Total time spent with patient: 50 Minutes Care Plan discussed with: Patient, Family, Nursing Staff and >50% of time spent in counseling and coordination of care Discussed:  Care Plan Prophylaxis:  Lovenox Probable Disposition:  Home w/Family 
        
___________________________________________________ Attending Physician: Berna Ramon MD

## 2020-02-04 NOTE — Clinical Note
A Bovie was used. Blend setting: blend. Mode: monopolar. Coagulation Settin. Cut Settin. Site (pad location): lateral thigh. Laterality: left.

## 2020-02-04 NOTE — Clinical Note
TRANSFER - IN REPORT:     Verbal report received from: Benjy Smith. Report consisted of patient's Situation, Background, Assessment and   Recommendations(SBAR). Opportunity for questions and clarification was provided. Assessment completed upon patient's arrival to unit and care assumed. Patient transported with a Cardiac Cath Tech / Patient Care Tech.

## 2020-02-05 ENCOUNTER — HOSPITAL ENCOUNTER (OUTPATIENT)
Dept: MRI IMAGING | Age: 80
Discharge: HOME | DRG: 244 | End: 2020-02-05
Attending: INTERNAL MEDICINE | Admitting: INTERNAL MEDICINE
Payer: MEDICARE

## 2020-02-05 ENCOUNTER — APPOINTMENT (OUTPATIENT)
Dept: NON INVASIVE DIAGNOSTICS | Age: 80
DRG: 244 | End: 2020-02-05
Attending: INTERNAL MEDICINE
Payer: MEDICARE

## 2020-02-05 ENCOUNTER — APPOINTMENT (OUTPATIENT)
Dept: MRI IMAGING | Age: 80
DRG: 244 | End: 2020-02-05
Attending: INTERNAL MEDICINE
Payer: MEDICARE

## 2020-02-05 LAB
ATRIAL RATE: 79 BPM
ATRIAL RATE: 81 BPM
AV VELOCITY RATIO: 0.27
AV VTI RATIO: 0.3
CALCULATED P AXIS, ECG09: 53 DEGREES
CALCULATED R AXIS, ECG10: 14 DEGREES
CALCULATED R AXIS, ECG10: 21 DEGREES
CALCULATED T AXIS, ECG11: 27 DEGREES
CALCULATED T AXIS, ECG11: 39 DEGREES
CHOLEST SERPL-MCNC: 136 MG/DL
DIAGNOSIS, 93000: NORMAL
DIAGNOSIS, 93000: NORMAL
ECHO AO ASC DIAM: 3.64 CM
ECHO AO ROOT DIAM: 3.05 CM
ECHO AV AREA PEAK VELOCITY: 1.1 CM2
ECHO AV AREA VTI: 1.3 CM2
ECHO AV AREA/BSA PEAK VELOCITY: 0.6 CM2/M2
ECHO AV AREA/BSA VTI: 0.7 CM2/M2
ECHO AV MEAN GRADIENT: 26.4 MMHG
ECHO AV MEAN VELOCITY: 2.33 M/S
ECHO AV PEAK GRADIENT: 57.9 MMHG
ECHO AV PEAK VELOCITY: 380.38 CM/S
ECHO AV VTI: 70.26 CM
ECHO EST RA PRESSURE: 8 MMHG
ECHO IVC SNIFF: 2.38 CM
ECHO LA AREA 4C: 14.7 CM2
ECHO LA MAJOR AXIS: 4.21 CM
ECHO LA TO AORTIC ROOT RATIO: 1.38
ECHO LA VOL 2C: 33.53 ML (ref 18–58)
ECHO LA VOL 4C: 35.17 ML (ref 18–58)
ECHO LA VOL BP: 36.1 ML (ref 18–58)
ECHO LA VOL/BSA BIPLANE: 19.34 ML/M2 (ref 16–28)
ECHO LA VOLUME INDEX A2C: 17.96 ML/M2 (ref 16–28)
ECHO LA VOLUME INDEX A4C: 18.84 ML/M2 (ref 16–28)
ECHO LV E' LATERAL VELOCITY: 8.02 CENTIMETER/SECOND
ECHO LV E' SEPTAL VELOCITY: 8.1 CENTIMETER/SECOND
ECHO LV EDV TEICHHOLZ: 0.52 ML
ECHO LV ESV TEICHHOLZ: 0.24 ML
ECHO LV INTERNAL DIMENSION DIASTOLIC: 4.44 CM (ref 4.2–5.9)
ECHO LV INTERNAL DIMENSION SYSTOLIC: 3.2 CM
ECHO LV IVSD: 1.13 CM (ref 0.6–1)
ECHO LV MASS 2D: 175.7 G (ref 88–224)
ECHO LV MASS INDEX 2D: 94.1 G/M2 (ref 49–115)
ECHO LV POSTERIOR WALL DIASTOLIC: 0.89 CM (ref 0.6–1)
ECHO LVOT DIAM: 2.28 CM
ECHO LVOT PEAK GRADIENT: 4.3 MMHG
ECHO LVOT PEAK VELOCITY: 104.13 CM/S
ECHO LVOT SV: 91.1 ML
ECHO LVOT VTI: 22.33 CM
ECHO MV A VELOCITY: 93.54 CM/S
ECHO MV AREA PHT: 2.9 CM2
ECHO MV E DECELERATION TIME (DT): 260.5 MS
ECHO MV E VELOCITY: 80.29 CM/S
ECHO MV E/A RATIO: 0.86
ECHO MV PRESSURE HALF TIME (PHT): 75.6 MS
ECHO PULMONARY ARTERY SYSTOLIC PRESSURE (PASP): 41.8 MMHG
ECHO PV MAX VELOCITY: 104.72 CM/S
ECHO PV PEAK GRADIENT: 4.4 MMHG
ECHO RIGHT VENTRICULAR SYSTOLIC PRESSURE (RVSP): 41.8 MMHG
ECHO RV INTERNAL DIMENSION: 5.22 CM
ECHO RV TAPSE: 2.27 CM (ref 1.5–2)
ECHO TV REGURGITANT MAX VELOCITY: 290.76 CM/S
ECHO TV REGURGITANT PEAK GRADIENT: 33.8 MMHG
EST. AVERAGE GLUCOSE BLD GHB EST-MCNC: 189 MG/DL
HBA1C MFR BLD: 8.2 % (ref 4–5.6)
HDLC SERPL-MCNC: 43 MG/DL
HDLC SERPL: 3.2 {RATIO} (ref 0–5)
LDLC SERPL CALC-MCNC: 46.8 MG/DL (ref 0–100)
LIPID PROFILE,FLP: ABNORMAL
LVFS 2D: 27.91 %
LVOT MG: 2.39 MMHG
LVOT MV: 0.72 CM/S
LVSV (TEICH): 25.95 ML
MV DEC SLOPE: 3.08
PULMONARY ARTERY END DIASTOLIC PRESSURE: 9 MMHG
PULMONARY ARTERY MEAN PRESURE: 19.9 MMHG
PV END DIASTOLIC VELOCITY: 0.5 MMHG
Q-T INTERVAL, ECG07: 386 MS
Q-T INTERVAL, ECG07: 418 MS
QRS DURATION, ECG06: 96 MS
QRS DURATION, ECG06: 98 MS
QTC CALCULATION (BEZET), ECG08: 388 MS
QTC CALCULATION (BEZET), ECG08: 410 MS
TRIGL SERPL-MCNC: 231 MG/DL (ref ?–150)
VENTRICULAR RATE, ECG03: 58 BPM
VENTRICULAR RATE, ECG03: 61 BPM
VLDLC SERPL CALC-MCNC: 46.2 MG/DL

## 2020-02-05 PROCEDURE — 83036 HEMOGLOBIN GLYCOSYLATED A1C: CPT

## 2020-02-05 PROCEDURE — 65660000000 HC RM CCU STEPDOWN

## 2020-02-05 PROCEDURE — 97165 OT EVAL LOW COMPLEX 30 MIN: CPT

## 2020-02-05 PROCEDURE — 97161 PT EVAL LOW COMPLEX 20 MIN: CPT

## 2020-02-05 PROCEDURE — 97535 SELF CARE MNGMENT TRAINING: CPT

## 2020-02-05 PROCEDURE — 80061 LIPID PANEL: CPT

## 2020-02-05 PROCEDURE — 36415 COLL VENOUS BLD VENIPUNCTURE: CPT

## 2020-02-05 PROCEDURE — 70551 MRI BRAIN STEM W/O DYE: CPT

## 2020-02-05 PROCEDURE — 70547 MR ANGIOGRAPHY NECK W/O DYE: CPT

## 2020-02-05 PROCEDURE — 77010033678 HC OXYGEN DAILY

## 2020-02-05 PROCEDURE — 97116 GAIT TRAINING THERAPY: CPT

## 2020-02-05 PROCEDURE — 74011250637 HC RX REV CODE- 250/637: Performed by: INTERNAL MEDICINE

## 2020-02-05 PROCEDURE — 93306 TTE W/DOPPLER COMPLETE: CPT

## 2020-02-05 PROCEDURE — 70544 MR ANGIOGRAPHY HEAD W/O DYE: CPT

## 2020-02-05 PROCEDURE — 74011250637 HC RX REV CODE- 250/637: Performed by: NURSE PRACTITIONER

## 2020-02-05 RX ORDER — LISINOPRIL 5 MG/1
10 TABLET ORAL DAILY
Status: DISCONTINUED | OUTPATIENT
Start: 2020-02-05 | End: 2020-02-06 | Stop reason: HOSPADM

## 2020-02-05 RX ORDER — ASPIRIN 81 MG/1
81 TABLET ORAL DAILY
Status: DISCONTINUED | OUTPATIENT
Start: 2020-02-05 | End: 2020-02-06 | Stop reason: HOSPADM

## 2020-02-05 RX ORDER — SIMVASTATIN 20 MG/1
40 TABLET, FILM COATED ORAL
Status: DISCONTINUED | OUTPATIENT
Start: 2020-02-05 | End: 2020-02-05

## 2020-02-05 RX ORDER — ATORVASTATIN CALCIUM 20 MG/1
40 TABLET, FILM COATED ORAL
Status: DISCONTINUED | OUTPATIENT
Start: 2020-02-05 | End: 2020-02-06 | Stop reason: HOSPADM

## 2020-02-05 RX ORDER — CLOPIDOGREL BISULFATE 75 MG/1
75 TABLET ORAL
Status: DISCONTINUED | OUTPATIENT
Start: 2020-02-05 | End: 2020-02-06 | Stop reason: HOSPADM

## 2020-02-05 RX ADMIN — ASPIRIN 81 MG: 81 TABLET, COATED ORAL at 08:25

## 2020-02-05 RX ADMIN — LISINOPRIL 10 MG: 5 TABLET ORAL at 08:25

## 2020-02-05 RX ADMIN — ATORVASTATIN CALCIUM 40 MG: 20 TABLET, FILM COATED ORAL at 21:46

## 2020-02-05 NOTE — PROGRESS NOTES
PHYSICAL THERAPY EVALUATION/DISCHARGE Patient: Boo Lott (66 y.o. male) Date: 2/5/2020 Primary Diagnosis: Vertigo [R42] Blurry vision [H53.8] Second degree AV block [I44.1] Procedure(s) (LRB): 
INSERT PPM DUAL (N/A) Precautions: universal    
 
 
ASSESSMENT Based on the objective data described below, the patient presents with independent with ambulation without assistive device and independent with all functional mobility. Patient at his base line level of function. Reviewed sign and symptoms of stroke with the patient and verbalized understanding. Reviewed all safety precaution and home exercise program with the patient, verbalized understanding, clear to go home per Physical Therapy perspective. Skilled physical therapy is not indicated at this time. Functional Outcome Measure: The patient scored Total: 56/56 on the Field Balance Assessment which is indicative of low fall risk. Other factors to consider for discharge: none Further skilled acute physical therapy is not indicated at this time. PLAN : 
Recommendation for discharge: (in order for the patient to meet his/her long term goals) No skilled physical therapy/ follow up rehabilitation needs identified at this time. This discharge recommendation: 
Has been made in collaboration with the attending provider and/or case management IF patient discharges home will need the following DME: none SUBJECTIVE:  
Patient stated I feel fine now.  OBJECTIVE DATA SUMMARY:  
HISTORY:   
Past Medical History:  
Diagnosis Date  Atrial fibrillation ([facility] [address]  Stroke ([facility] [address] No past surgical history on file. Prior level of function: Independent community ambulator without assistive device. Personal factors and/or comorbidities impacting plan of care:  
 
Home Situation Home Environment: Private residence # Steps to Enter: 2 One/Two Story Residence: One story Living Alone: No 
 Support Systems: Family member(s), Friends \ neighbors Patient Expects to be Discharged to[de-identified] Private residence Current DME Used/Available at Home: None EXAMINATION/PRESENTATION/DECISION MAKING:  
Critical Behavior: 
Neurologic State: Alert, Eyes open spontaneously Orientation Level: Oriented X4 Cognition: Appropriate decision making, Appropriate for age attention/concentration, Appropriate safety awareness, Follows commands Hearing: Auditory Auditory Impairment: Hard of hearing, bilateral 
 
Range Of Motion: 
AROM: Within functional limits PROM: Within functional limits Strength:   
Strength: Within functional limits Tone & Sensation:  
  
  
  
  
  
  
  
  
  
   
Coordination: 
Coordination: Within functional limits Vision:  
  
Functional Mobility: 
Bed Mobility: 
Rolling: Independent Supine to Sit: Independent Sit to Supine: Independent Scooting: Independent Transfers: 
Sit to Stand: Independent Stand to Sit: Independent Stand Pivot Transfers: Independent Bed to Chair: Independent Balance:  
Sitting: Intact Standing: Intact; Without support Ambulation/Gait Training: 
Distance (ft): 200 Feet (ft) Ambulation - Level of Assistance: Independent Gait Description (WDL): Within defined limits Therapeutic Exercises:  
 Instructed patient to continue active range of motion exercise on both legs while up on chair or on bed. Functional Measure Field Balance Test: 
 
Sitting to Standin Standing Unsupported: 4 Sitting with Back Unsupported: 4 Standing to Sittin Transfers: 4 Standing Unsupported with Eyes Closed: 4 Standing Unsupported with Feet Together: 4 Reach Forward with Outstretched Arm: 4  Object: 4 Turn to Look Over Shoulders: 4 Turn 360 Degrees: 4 Alternate Foot on Step/Stool: 4 Standing Unsupported One Foot in Front: 4 Stand on One Le Total: 56/56 56=Maximum possible score;  
0-20=High fall risk 21-40=Moderate fall risk 41-56=Low fall risk Physical Therapy Evaluation Charge Determination History Examination Presentation Decision-Making LOW Complexity : Zero comorbidities / personal factors that will impact the outcome / POC LOW Complexity : 1-2 Standardized tests and measures addressing body structure, function, activity limitation and / or participation in recreation  LOW Complexity : Stable, uncomplicated  Other outcome measures ryder balance  LOW Based on the above components, the patient evaluation is determined to be of the following complexity level: LOW Pain Ratin/10 Activity Tolerance:  
Good Please refer to the flowsheet for vital signs taken during this treatment. After treatment patient left in no apparent distress:  
Sitting in chair, Call bell within reach and Caregiver / family present COMMUNICATION/EDUCATION:  
The patients plan of care was discussed with: Occupational Therapist and Registered Nurse. Patient was educated regarding his deficit(s) of facial droop as this relates to his diagnosis of TIA. He demonstrated Excellent understanding as evidenced by recall. Patient and/or family was verbally educated on the BE FAST acronym for signs/symptoms of CVA and TIA. Informed patient to refer to the Stroke Binder for further BE FAST information. All questions answered with patient indicating good understanding. Fall prevention education was provided and the patient/caregiver indicated understanding. Thank you for this referral. 
Celeste Canela PT,WCC. Time Calculation: 28 mins

## 2020-02-05 NOTE — PROGRESS NOTES
1800: Notified by telemetry that patient is in 2nd AV block type 1 and 2. Message passed along to Dr. Shabnam Stern; MD placed transfer orders for telemetry. 1820: EKG done. Faxed to Dr. Shabnam Stern 1825: Report given to Allen Salcedo on [facility]. 1830: Notified by telemetry that patient is creeping into 3rd degree heart block. Strip shows extra P waves. Dr. Shabnam Stern notified. Cardiology consult made stat. 1835: Pt connected to defibrillator and pads. 1945: Spoke to Dr. Dominguez Damon 2010: Pt transferred to [facility], room 334. Report given to Audrey Irizarry.

## 2020-02-05 NOTE — ROUTINE PROCESS
Bedside and Verbal shift change report given to Imer Solis RN (oncoming nurse) by Liz Rodriguez RN (offgoing nurse). Report included the following information SBAR, Kardex, Intake/Output, MAR, Accordion, Recent Results, Cardiac Rhythm 2nd AVB Mobitz2 and Alarm Parameters .

## 2020-02-05 NOTE — PROGRESS NOTES
Reason for Admission:   Vertigo, blurry vision, second degree AV block RUR Score:        11 Plan for utilizing home health:      Per PT/OT recommendations. Pt has no prior HH hx. 
                 
Current Advanced Directive/Advance Care Plan: Not on file. Pt has deferred at this time. Transition of Care Plan:                   
CM met with pt to begin d/c planning. Pt lives with his grandson, Colton Granger, and teenage great grandson in a one story house with two entry steps. PTA pt reports being independent with ADLs, and he drives. Pt does not own any DME at home. He has a good support system. Preferred pharmacy is [facility] in [place]. Pt was provided information on [facility]. His PCP is Dr. Damián Dunbar. 1. Pacemaker insertion planned for 7 am on 2/6/20 2. PT/OT to evaluate 3. CM will follow and assist with any d/c needs as identified. 4. Pt will d/c home when medically stable with outpatient f/u Care Management Interventions PCP Verified by CM: Yes(Dr. Damián Dunbar) Palliative Care Criteria Met (RRAT>21 & CHF Dx)?: No 
Discharge Durable Medical Equipment: No 
Physical Therapy Consult: Yes Occupational Therapy Consult: Yes Speech Therapy Consult: No 
Current Support Network: Relative's Home Confirm Follow Up Transport: Family Discharge Location Discharge Placement: Home Carol Ojeda LCSW

## 2020-02-05 NOTE — PROGRESS NOTES
1900: Report given by YASMANI Brand. Will give report to night shift nurse. Room remains dirty patient will arrive once room is cleaned.

## 2020-02-05 NOTE — PROGRESS NOTES
Problem: Falls - Risk of 
Goal: *Absence of Falls Description Document Johanny Solorio Fall Risk and appropriate interventions in the flowsheet. Outcome: Progressing Towards Goal 
Note: Fall Risk Interventions: 
  
 
Mentation Interventions: Adequate sleep, hydration, pain control, Bed/chair exit alarm, Door open when patient unattended, Evaluate medications/consider consulting pharmacy, Eyeglasses and hearing aids, Increase mobility, More frequent rounding, Reorient patient, Room close to nurse's station, Toileting rounds, Update white board Medication Interventions: Assess postural VS orthostatic hypotension, Bed/chair exit alarm, Evaluate medications/consider consulting pharmacy, Patient to call before getting OOB, Teach patient to arise slowly Problem: Patient Education: Go to Patient Education Activity Goal: Patient/Family Education Outcome: Progressing Towards Goal 
  
Problem: Patient Education: Go to Patient Education Activity Goal: Patient/Family Education Outcome: Progressing Towards Goal 
  
Problem: TIA/CVA Stroke: 0-24 hours Goal: Off Pathway (Use only if patient is Off Pathway) Outcome: Progressing Towards Goal 
Goal: Activity/Safety Outcome: Progressing Towards Goal 
Goal: Consults, if ordered Outcome: Progressing Towards Goal 
Goal: Diagnostic Test/Procedures Outcome: Progressing Towards Goal 
Goal: Nutrition/Diet Outcome: Progressing Towards Goal 
Goal: Discharge Planning Outcome: Progressing Towards Goal 
Goal: Medications Outcome: Progressing Towards Goal 
Goal: Respiratory Outcome: Progressing Towards Goal 
Goal: Treatments/Interventions/Procedures Outcome: Progressing Towards Goal 
Goal: Minimize risk of bleeding post-thrombolytic infusion Outcome: Progressing Towards Goal 
Goal: Monitor for complications post-thrombolytic infusion Outcome: Progressing Towards Goal 
Goal: Psychosocial 
Outcome: Progressing Towards Goal 
Goal: *Hemodynamically stable Outcome: Progressing Towards Goal 
Goal: *Neurologically stable Description Absence of additional neurological deficits Outcome: Progressing Towards Goal 
Goal: *Verbalizes anxiety and depression are reduced or absent Outcome: Progressing Towards Goal 
Goal: *Absence of Signs of Aspiration on Current Diet Outcome: Progressing Towards Goal 
Goal: *Absence of deep venous thrombosis signs and symptoms(Stroke Metric) Outcome: Progressing Towards Goal 
Goal: *Ability to perform ADLs and demonstrates progressive mobility and function Outcome: Progressing Towards Goal 
Goal: *Stroke education started(Stroke Metric) Outcome: Progressing Towards Goal 
Goal: *Dysphagia screen performed(Stroke Metric) Outcome: Progressing Towards Goal 
Goal: *Rehab consulted(Stroke Metric) Outcome: Progressing Towards Goal

## 2020-02-05 NOTE — CONSULTS
[facility]: [address] [facility] [address] [facility] [phone] Name:   Sammy Mao Medical record #: [id number] Admission Date: 2/4/2020 Who Consulted: Margarito Moreland MD  
 
Reason for Consult: TIA/CVA workup HISTORY OF PRESENT ILLNESS:  
 
This is a [de-identified] y.o. male who is admitted for TIA/CVA workup. Patient presented on 2/4 with unsteady gait, blurred vision, dizziness and bilateral neck tingling that started while playing golf. Patient denied weakness or slurred speech. Patient has a history of Afib and is currently not on anticoagulation. The Neurology Service is asked to evaluate for CVA workup. Neuro-imaging:  
 
CT Head:  
INDICATION: dizziness, right facial droop 
  
COMPARISON: None. 
  
CONTRAST: None. 
  
TECHNIQUE: Unenhanced CT of the head was performed using 5 mm images. Brain and 
bone windows were generated. CT dose reduction was achieved through use of a 
standardized protocol tailored for this examination and automatic exposure 
control for dose modulation.   
  
FINDINGS: 
Vertebral vascular calcifications. Sulcal and ventricular prominence. . There is 
no intracranial hemorrhage, extra-axial collection, mass, mass effect or midline 
shift. The basilar cisterns are open. No acute infarct is identified. The bone 
windows demonstrate no abnormalities. The visualized portions of the paranasal 
sinuses and mastoid air cells are clear. 
  
IMPRESSION: No acute cranial process MRI: ordered, pending 
  
 
EKG:  Sinus rhythm with 2nd degree AV block Care Plan discussed with: 
Patient x Family x  
RN Care Manager Consultant/Specialist:    
 
 
Thank you for allowing the Neurology Service the pleasure of participating in the care of your patient. This patient will be discussed with my collaborating care team physician,  Dr. Serina Schmitt and he may have further recommendations regarding this patient's care Ulysses Resides, AGACNP student 
==================== Attending Attestation:  
 
 
 
NEUROLOGY ATTENDING ADDENDUM: 
 
February 5, 2020 Pt personally seen and examined. Chart reviewed [de-identified] y.o. male with hx of prior Stroke (2004/ [place], [place]), TIA (few years later). Discussed with pt and Grandson who was with him at time of symptoms. They were playing golf and patient says at some point during the game he was looking down at the golf ball and it looked like it was \"swirling\" a little and had sparkling, colorful lights. When he stood up, looked ahead, he could see the trees on the golf course but instead of seeing leaves, saw similar colorful blotches. He continued playing but had progressive unsteadiness, not spinning, when he was walking the course and ultimately needed to hold onto golf cart to walk straight. He then develped some warm/ hot sensation up both sides of his head and decided he needed to come to ER for evaluation. Pt/ grandson denies having any facial droop, slurred speech, extremity numbness or weakness. He says symptoms were resolved when he got to ER. Since being here he was found to have 2nd AV block/ bradycardia and plans are in place to do pacemaker implant in the morning. There was some discussion of a new dx of A fib during this admission (pt denies any prior dx) but I discussed with his RN and am told that he doesn't have A Fib, he has the 2nd degree AV block. MRI Brain, MRA Brain and MRA Neck completed. I reviewed the images on PACS. MRI Brain shows minimal chronic small vessel ischemic changes, no hydrocephalus, no evidence of remote/ old stroke, no diffusion abnormalities to suggest recent (acute or subacute) stroke.   Prelim MRA Brain and MRA neck show no flow no flow in distal right MCA, right MCA filling via communicating artery collaterals, no evidence of intracranial flow-limiting stenosis or intracranial occlusion, no aneurysm, multifocal atherosclerosis. MRA neck shows unchagned right ICA occlusion (seen on MRA Neck in 2010). Left ICA with mild stenosis, bilateral vertebrals patent. Patient Vitals for the past 24 hrs: 
 Temp Pulse Resp BP SpO2  
02/05/20 1625 98.2 °F (36.8 °C) 60 13 132/53 92 % 02/05/20 1506  (!) 57     
02/05/20 1237    90/60   
02/05/20 1206    90/60   
02/05/20 1030 98 °F (36.7 °C) (!) 47 13 90/60 92 % 02/05/20 0745 98 °F (36.7 °C) 62 13 117/51 92 % 02/05/20 0706  60     
02/05/20 0530  69 (!) 33 116/67 92 % 02/05/20 0500  (!) 54 21 122/49 93 % 02/05/20 0430 97.8 °F (36.6 °C) 65 25 118/47 92 % 02/05/20 0400  67 18 114/55 92 % 02/05/20 0330  (!) 52 17 100/42 (!) 88 % 02/05/20 0300  (!) 53 17 104/48 95 % 02/05/20 0230  (!) 55 17 107/41 95 % 02/05/20 0200  (!) 52 20 (!) 89/46   
02/05/20 0100  (!) 51 18 101/58 95 % 02/05/20 0030  (!) 54 17 92/54 97 % 02/05/20 0000    91/65   
02/04/20 2343 97.8 °F (36.6 °C) (!) 57 19 103/52 96 % 02/04/20 2330  (!) 55 18 96/49   
02/04/20 2327  (!) 53     
02/04/20 2300  (!) 52 17 117/50 96 % 02/04/20 2230  (!) 47 18 92/52 (!) 89 % 02/04/20 2200  63 21 110/54 92 % 02/04/20 2130  (!) 55 19 109/55 92 % 02/04/20 2100  (!) 51 20 99/62 95 % 02/04/20 2030 98.6 °F (37 °C) (!) 58 18 114/47 94 % 02/04/20 2016  (!) 52    General:   Alert, cooperative, no acute distress. Lungs:   Not examined Heart: 
Abdominal:  Not examiend Not examined NEUROLOGICAL EXAM: 
  
Mental Status: Oriented to time, place and person. Fully attentive. No aphasia. Full fund of knowledge. Normal recent and remote memory. Cranial Nerves:   Visual Fields:  normal in all quadrants in both eyes. EOM: no nystagmus. Facial movements:  symmetric, no ptosis Facial sensation:  intact to LT on both sides. Hearing:  normal.   
  
Language:  no dysarthria, no aphasia, normal fluency, normal repetition. Tongue: midline. Soft palate: not examined  SCMs: normal, symmetric. Reflexes:   1+ biceps, 3+ left patellar, 2+ right patellar, 1+ achilles (bilateral) Sensory:   LT, prick intact in all extremities Cerebellar:  No resting, no postural tremors, normal finger nose finger. No pronator drift Motor:           LUExt: 5/ 5               RUExt: 5/ 5 LLExt: 5/ 5                RLExt: 5/ 5 Gait:   Not tested Impression/ Plan 
 
[de-identified] y.o. male with episode of visual changes (colorful spots in vision, not seeing stars/ white spot) in both sides of vision, while playing golf. Then developed unsteady gait followed by warm sensation on both sides of head. Denies having any headache with this. Denies any prior hx of seizure. 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 D/w him that I can't account for the unsteady gait and it may have been due to his bradycardia. No acute stroke seen on Brain MRI. MRA Brain and Neck shows OLD right ICA occlusion (dx in 2010) for which the patient has been taking ASA + Plavix. Pt to have pacemaker implant in AM. No further neurology workup planned Thank you for the consultation. Signed By: Aster Wagoner MD   
 February 5, 2020 Assessment/Plan: 1. Transient Ischemic Attack, r/o Stroke: · Continue ASA 81 mg · Will need ASA at discharge · Continue Clopidogrel 75 mg HS · Neurochecks:  Every 4 hours · Blood Sugar Goal:  140-180 · BP Goal: Less than 180/105 for 24 hours · Telemetry for at least 24 hours · Change Simvastatin 40 mg to Atorvastatin 40 mg daily- LDL 46.8 Stroke work up · A1C: 8.2 · LDL:  46.8 · MRI: pending · Echo: complete, results pending Risk factors for stroke include:   HTN, CVA,  HLD, Tobacco use (former) , ETOH: 12 pk beer/ month wine daily with dinner , physical inactivity · Discussed with patient, about diet, decreasing red meat and excessive fat · Discussed alcohol intake, states he has 1/2 glass of wine daily and occasional beer consumption · Discussed with patient about regular exercise and states he plays golf 2x/wk · Discussed signs/symptoms of stroke and when to call 911 
· 2. Mobility:  
· Has  been OOB. · PT/OT signed off 3. Diet:   
· Does not need SLP · Regular cardiac diet 4. VTE Prophylaxes: · Lovenox Review of Systems: 10 point ROS was performed. Pertinent positives listed in HPI. Negative ROS is as follows. Pt denies: angina, palpitations, paresthesias, weakness, vision loss, slurred speech, aphasia, confusion, fever, chills, falls, headache, diplopia, back pain, neck pain, prior episodes of vertigo, hallucinations, new medications or dosage changes. Physical Exam 
 
 
Allergies: Allergies Allergen Reactions  Ampicillin Nausea and Vomiting  Iodine Hives, Itching and Nausea and Vomiting Outpatient Meds No current facility-administered medications on file prior to encounter. Current Outpatient Medications on File Prior to Encounter Medication Sig Dispense Refill  lisinopril (PRINIVIL, ZESTRIL) 10 mg tablet Take 10 mg by mouth daily.  simvastatin (ZOCOR) 40 mg tablet Take 40 mg by mouth nightly.  aspirin delayed-release 81 mg tablet Take 81 mg by mouth daily.  clopidogrel (PLAVIX) 75 mg tablet Take 75 mg by mouth nightly. Inpatient Meds Current Facility-Administered Medications Medication Dose Route Frequency Provider Last Rate Last Dose  aspirin delayed-release tablet 81 mg  81 mg Oral DAILY Bob Sosa MD   81 mg at 02/05/20 0825  clopidogreL (PLAVIX) tablet 75 mg  75 mg Oral QHS Bob Sosa MD      
 lisinopril (PRINIVIL, ZESTRIL) tablet 10 mg  10 mg Oral DAILY Ian Love Ellison MD   10 mg at 02/05/20 0825  
 atorvastatin (LIPITOR) tablet 40 mg  40 mg Oral QHS Xiomara REAVES NP      
 acetaminophen (TYLENOL) tablet 650 mg  650 mg Oral Q4H PRN Bob Sosa MD      
 Or  
 acetaminophen (TYLENOL) solution 650 mg  650 mg Per NG tube Q4H PRN Bob Sosa MD      
 Or  
 acetaminophen (TYLENOL) suppository 650 mg  650 mg Rectal Q4H PRN Bob Sosa MD      
 enoxaparin (LOVENOX) injection 40 mg  40 mg SubCUTAneous Q24H Flory Harrington MD   Stopped at 02/05/20 2100 Past Medical History:  
Diagnosis Date  Atrial fibrillation ([facility] [address]  Stroke ([facility] [address] No past surgical history on file. family history is not on file. He was adopted. reports that he has quit smoking. He quit smokeless tobacco use about 16 years ago. He reports current alcohol use. He reports that he does not use drugs. Lab Results (last 24 hrs) Recent Results (from the past 24 hour(s)) LIPID PANEL Collection Time: 02/05/20 12:42 AM  
Result Value Ref Range LIPID PROFILE Cholesterol, total 136 <200 MG/DL Triglyceride 231 (H) <150 MG/DL  
 HDL Cholesterol 43 MG/DL  
 LDL, calculated 46.8 0 - 100 MG/DL VLDL, calculated 46.2 MG/DL  
 CHOL/HDL Ratio 3.2 0.0 - 5.0 HEMOGLOBIN A1C WITH EAG Collection Time: 02/05/20 12:42 AM  
Result Value Ref Range Hemoglobin A1c 8.2 (H) 4.0 - 5.6 % Est. average glucose 189 mg/dL ECHO ADULT COMPLETE Collection Time: 02/05/20 12:38 PM  
Result Value Ref Range LA Volume 36.1 18 - 58 mL  
 LV E' Lateral Velocity 8.02 centimeter/second LV E' Septal Velocity 8.10 centimeter/second Tapse 2.27 (A) 1.5 - 2.0 cm Ao Root D 3.05 cm  
 AO ASC D 3.64 cm Aortic Valve Systolic Peak Velocity 453.98 cm/s AoV VTI 70.26 cm Aortic Valve Area by Continuity of Peak Velocity 1.1 cm2 Aortic Valve Area by Continuity of VTI 1.3 cm2 AoV PG 57.9 mmHg  LVIDd 4.44 4.2 - 5.9 cm  
 LVPWd 0.89 0.6 - 1.0 cm  
 LVIDs 3.20 cm IVSd 1.13 (A) 0.6 - 1.0 cm  
 LVOT d 2.28 cm  
 LVOT Peak Velocity 104.13 cm/s LVOT Peak Gradient 4.3 mmHg LVOT VTI 22.33 cm  
 MVA (PHT) 2.9 cm2  
 MV A Terrance 93.54 cm/s  
 MV E Terrance 80.29 cm/s  
 MV E/A 0.86 Left Atrium to Aortic Root Ratio 1.38   
 RVIDd 5.22 cm Aortic Valve Systolic Mean Gradient 49.9 mmHg Inferior Vena Cava Diameter Sniffing 2.38 cm  
 LA Vol 4C 35.17 18 - 58 mL  
 LA Vol 2C 33.53 18 - 58 mL  
 LA Area 4C 14.7 cm2 LV Mass .7 88 - 224 g LV Mass AL Index 94.1 49 - 115 g/m2 RVSP 41.8 mmHg Est. RA Pressure 8.0 mmHg Mitral Valve E Wave Deceleration Time 260.5 ms  
 Mitral Valve Pressure Half-time 75.6 ms Left Atrium Major Axis 4.21 cm Triscuspid Valve Regurgitation Peak Gradient 33.8 mmHg Pulmonic Valve Max Velocity 104.72 cm/s LVOT SV 91.1 ml  
 TR Max Velocity 290.76 cm/s  
 LA Vol Index 19.34 16 - 28 ml/m2 PASP 41.8 mmHg LA Vol Index 17.96 16 - 28 ml/m2 LA Vol Index 18.84 16 - 28 ml/m2 KARINE/BSA Pk Terrance 0.6 cm2/m2 KARINE/BSA VTI 0.7 cm2/m2 Left Ventricular Fractional Shortening by 2D 03.910846714 % Left Ventricular Outflow Tract Mean Gradient 2.3153244344107 mmHg Left Ventricular Outflow Tract Mean Velocity 3.28001904046284 cm/s PV End Diastolic Velocity 0.5 mmHg Mitral Valve Deceleration Tarrant 5.2390768228545 AV Velocity Ratio 0.27 AV VTI Ratio 0.3 Aortic valve mean velocity 2.3720792140819 m/s Pulmonary Artery Mean Presure 19.9 mmHg PI End Diastolic Pressure 9.0 mmHg Left Ventricular End Diastolic Volume by Teichholz Method 5.6901563505 mL Left Ventricular End Systolic Volume by Teichholz Method 0.62868730422 mL Left Ventricular Stroke Volume by Teichholz Method 15.520173537 mL  
 PV peak gradient 4.4 mmHg

## 2020-02-05 NOTE — CONSULTS
HISTORY OF PRESENTING ILLNESS Christine Lezama is a [de-identified] y.o. male with history of paroxysmal atrial fibrillation, stroke presenting with blurred vision/unsteady gait who is undergoing neurological evaluation. Telemetry has shown sinus bradycardia as well as episodes of second-degree type I AV block with junctional escape rhythm as well as rhythm strips highly suspicious for A-V dissociation. His heart rate has been bradycardic as well at times with heart rates in the 40s to 50s. Currently he is not on anticoagulation. An echocardiogram is pending. ACTIVE PROBLEM LIST Patient Active Problem List  
 Diagnosis Date Noted  Blurry vision 02/04/2020  Unsteady gait 02/04/2020  A-fib ([facility] [address] 02/04/2020  
 History of CVA (cerebrovascular accident) 02/04/2020  Second degree AV block 02/04/2020 MEDICATIONS Current Facility-Administered Medications Medication Dose Route Frequency  aspirin delayed-release tablet 81 mg  81 mg Oral DAILY  clopidogreL (PLAVIX) tablet 75 mg  75 mg Oral QHS  lisinopril (PRINIVIL, ZESTRIL) tablet 10 mg  10 mg Oral DAILY  simvastatin (ZOCOR) tablet 40 mg  40 mg Oral QHS  acetaminophen (TYLENOL) tablet 650 mg  650 mg Oral Q4H PRN Or  
 acetaminophen (TYLENOL) solution 650 mg  650 mg Per NG tube Q4H PRN Or  
 acetaminophen (TYLENOL) suppository 650 mg  650 mg Rectal Q4H PRN  
 enoxaparin (LOVENOX) injection 40 mg  40 mg SubCUTAneous Q24H  
  
 
 
 PAST MEDICAL HISTORY Past Medical History:  
Diagnosis Date  Atrial fibrillation ([facility] [address]  Stroke ([facility] [address] PAST SURGICAL HISTORY No past surgical history on file. ALLERGIES Allergies Allergen Reactions  Ampicillin Nausea and Vomiting  Iodine Hives, Itching and Nausea and Vomiting FAMILY HISTORY Family History Adopted: Yes  
 negative for cardiac disease SOCIAL HISTORY Social History Socioeconomic History  Marital status: SINGLE Spouse name: Not on file  Number of children: Not on file  Years of education: Not on file  Highest education level: Not on file Tobacco Use  Smoking status: Former Smoker  Smokeless tobacco: Former User Quit date: 2004 Substance and Sexual Activity  Alcohol use: Yes Comment: beer every other day  Drug use: No  
 
 
 
MEDICATIONS Current Facility-Administered Medications Medication Dose Route Frequency  aspirin delayed-release tablet 81 mg  81 mg Oral DAILY  clopidogreL (PLAVIX) tablet 75 mg  75 mg Oral QHS  lisinopril (PRINIVIL, ZESTRIL) tablet 10 mg  10 mg Oral DAILY  simvastatin (ZOCOR) tablet 40 mg  40 mg Oral QHS  acetaminophen (TYLENOL) tablet 650 mg  650 mg Oral Q4H PRN Or  
 acetaminophen (TYLENOL) solution 650 mg  650 mg Per NG tube Q4H PRN Or  
 acetaminophen (TYLENOL) suppository 650 mg  650 mg Rectal Q4H PRN  
 enoxaparin (LOVENOX) injection 40 mg  40 mg SubCUTAneous Q24H I have reviewed the nurses notes, vitals, problem list, allergy list, medical history, family, social history and medications. REVIEW OF SYMPTOMS General: Pt denies excessive weight gain or loss. Pt is able to conduct ADL's HEENT: Denies blurred vision, headaches, hearing loss, epistaxis and difficulty swallowing. Respiratory: Denies cough, congestion, shortness of breath, LARA, wheezing or stridor. Cardiovascular: Denies precordial pain, palpitations, edema or PND Gastrointestinal: Denies poor appetite, indigestion, abdominal pain or blood in stool Genitourinary: Denies hematuria, dysuria, increased urinary frequency Musculoskeletal: Denies joint pain or swelling from muscles or joints Neurologic: Denies tremor, paresthesias, headache, or sensory motor disturbance Psychiatric: Denies confusion, insomnia, depression Integumentray: Denies rash, itching or ulcers. Hematologic: Denies easy bruising, bleeding PHYSICAL EXAMINATION Vitals:  
 02/05/20 0530 02/05/20 8214 02/05/20 5453 02/05/20 0745 BP: 116/67   117/51 Pulse: 69  60 62 Resp: (!) 33   13 Temp:    98 °F (36.7 °C) SpO2: 92%   92% Weight:  158 lb 11.7 oz (72 kg) General: Well developed, in no acute distress. HEENT: No jaundice, oral mucosa moist, no oral ulcers Neck: Supple, no stiffness, no lymphadenopathy, supple Heart:  Normal S1/S2 negative S3 or S4. Regular, no murmur, gallop or rub, no jugular venous distention Respiratory: Clear bilaterally x 4, no wheezing or rales Abdomen:   Soft, non-tender, bowel sounds are active.  
Extremities:  No edema, normal cap refill, no cyanosis. Musculoskeletal: No clubbing, no deformities Neuro: A&Ox3, speech clear, gait stable, cooperative, no focal neurologic deficits Skin: Skin color is normal. No rashes or lesions. Non diaphoretic, moist. 
Vascular: 2+ pulses symmetric in all extremities DIAGNOSTIC DATA Telemetry: Sinus rhythm with episodes of Wenckebach as well as A-V dissociation LABORATORY DATA Lab Results Component Value Date/Time WBC 8.5 02/04/2020 03:50 PM  
 Hemoglobin (POC) 13.3 09/21/2010 12:56 PM  
 HGB 12.1 02/04/2020 03:50 PM  
 Hematocrit (POC) 39 09/21/2010 12:56 PM  
 HCT 35.9 (L) 02/04/2020 03:50 PM  
 PLATELET 785 79/66/0588 03:50 PM  
 MCV 90.9 02/04/2020 03:50 PM  
  
Lab Results Component Value Date/Time  Sodium 137 02/04/2020 03:50 PM  
 Potassium 4.0 02/04/2020 03:50 PM  
 Chloride 102 02/04/2020 03:50 PM  
 CO2 27 02/04/2020 03:50 PM  
 Anion gap 8 02/04/2020 03:50 PM  
 Glucose 118 (H) 02/04/2020 03:50 PM  
 BUN 32 (H) 02/04/2020 03:50 PM  
 Creatinine 1.59 (H) 02/04/2020 03:50 PM  
 BUN/Creatinine ratio 20 02/04/2020 03:50 PM  
 GFR est AA 51 (L) 02/04/2020 03:50 PM  
 GFR est non-AA 42 (L) 02/04/2020 03:50 PM  
 Calcium 8.8 02/04/2020 03:50 PM  
 Bilirubin, total 1.0 08/01/2019 09:23 PM  
 AST (SGOT) 25 08/01/2019 09:23 PM  
 Alk. phosphatase 67 08/01/2019 09:23 PM  
 Protein, total 7.8 08/01/2019 09:23 PM  
 Albumin 3.9 08/01/2019 09:23 PM  
 Globulin 3.9 08/01/2019 09:23 PM  
 A-G Ratio 1.0 (L) 08/01/2019 09:23 PM  
 ALT (SGPT) 22 08/01/2019 09:23 PM  
  
 
 
 ASSESSMENT 1. Bradycardia A. Symptomatic 2. CVA 3. Atrial fibrillation A. Paroxysmal 
 B. CHADSVASC 4 
4. Dyslipidemia PLAN Recommend dual-chamber pacemaker implantation. Hopeful for procedure tomorrow at 7 AM.  Will confirm EP lab availability. N.p.o. after midnight. Consider anticoagulation post procedure for CVA risk reduction. Thank you, Alex Jones MD and Dr. Nickolas Chang for involving me in the care of this extraordinarily pleasant male. Please do not hesitate to contact me for further questions/concerns. Miley Nelson MD 
Cardiac Electrophysiology / Cardiology [address] [place] [address], [address] [place] [address], [address] [place] 
[phone] / [phone] Fax   [phone] / [phone] Fax

## 2020-02-05 NOTE — PROGRESS NOTES
[facility]: MED RECONCILIATION Comments/Recommendations:  
Patient able to confirm name, , allergies, and preferred pharmacy Patient reports compliance to medications Medications added:  
 
Aspirin 81mg PO daily Medications removed: 
 
Augmentin 875/125 - 1 tab PO BID Vitamin D3 1000 units PO daily Aggrenox 200/25 - 1 cap PO BID Norco 5/325 - 1 tab PO Q6hrs prn Naproxen 500mg PO BID with meals Zofran ODT 8mg PO Q8hrs prn Medications adjusted: 
 
None Information obtained from: Patient, Rx Query Allergies: Ampicillin and Iodine Prior to Admission Medications:  
Prior to Admission Medications Prescriptions Last Dose Informant Taking?  
aspirin delayed-release 81 mg tablet 2020 at am  Yes Sig: Take 81 mg by mouth daily. clopidogrel (PLAVIX) 75 mg tablet 2/3/2020 at pm  Yes Sig: Take 75 mg by mouth nightly. lisinopril (PRINIVIL, ZESTRIL) 10 mg tablet 2020 at am  Yes Sig: Take 10 mg by mouth daily. simvastatin (ZOCOR) 40 mg tablet 2/3/2020 at pm  Yes Sig: Take 40 mg by mouth nightly. Facility-Administered Medications: None Theo White, Pharm. D. [address] [place] [address] Dr Reva De La Garza

## 2020-02-05 NOTE — PROGRESS NOTES
OCCUPATIONAL THERAPY EVALUATION/DISCHARGE Patient: Mary Alexander (39 y.o. male) Date: 2/5/2020 Primary Diagnosis: Vertigo [R42] Blurry vision [H53.8] Second degree AV block [I44.1] Procedure(s) (LRB): 
INSERT PPM DUAL (N/A) Precautions:     
 
ASSESSMENT Based on the objective data described below, the patient presents at baseline with self-care and mobility. No deficits noted with vision, weakness, coordination or balance. Feel pt is safe to return home once medically cleared. Pt educated on BE FAST and given hand-out. No skilled OT services noted at this time. Current Level of Function (ADLs/self-care): independent Functional Outcome Measure: The patient scored 66/66 on the Fugl-Hudson outcome measure which is indicative of no impairment. Other factors to consider for discharge: none ntoed PLAN : 
Recommend with staff:  
 
Recommendation for discharge: (in order for the patient to meet his/her long term goals) No skilled occupational therapy/ follow up rehabilitation needs identified at this time. This discharge recommendation: A follow-up discussion with the attending provider and/or case management is planned IF patient discharges home will need the following DME: none SUBJECTIVE:  
Patient stated I feel fine.  OBJECTIVE DATA SUMMARY:  
HISTORY:  
Past Medical History:  
Diagnosis Date  Atrial fibrillation ([facility] [address]  Stroke ([facility] [address] No past surgical history on file. Prior Level of Function/Environment/Context: independent, pt plays golf Expanded or extensive additional review of patient history:  
Home Situation Home Environment: Private residence # Steps to Enter: 2 One/Two Story Residence: One story Living Alone: No 
Support Systems: Family member(s), Friends \ neighbors Patient Expects to be Discharged to[de-identified] Private residence Current DME Used/Available at Home: None EXAMINATION OF PERFORMANCE DEFICITS: 
Cognitive/Behavioral Status: Neurologic State: Alert Orientation Level: Oriented X4 Cognition: Appropriate decision making Safety/Judgement: Awareness of environment Skin: intact Edema: none noted Hearing: Auditory Auditory Impairment: Hard of hearing, bilateral 
 
Vision/Perceptual:   
    
   intact Range of Motion: 
 
AROM: Within functional limits PROM: Within functional limits Strength: 
 
Strength: Within functional limits Coordination: 
Coordination: Within functional limits Fine Motor Skills-Upper: Left Intact; Right Intact Gross Motor Skills-Upper: Left Intact; Right Intact Tone & Sensation: 
intact Balance: 
Sitting: Intact Standing: Intact; Without support Functional Mobility and Transfers for ADLs: 
Bed Mobility: 
Rolling: Independent Supine to Sit: Independent Sit to Supine: Independent Scooting: Independent Transfers: 
Sit to Stand: Independent Stand to Sit: Independent Bed to Chair: Independent Bathroom Mobility: Independent Toilet Transfer : Total assistance ADL Assessment: 
Feeding: Independent Oral Facial Hygiene/Grooming: Independent Bathing: Independent Upper Body Dressing: Independent Lower Body Dressing: Independent Toileting: Independent ADL Intervention and task modifications: 
  
 
Cognitive Retraining Safety/Judgement: Awareness of environment Functional Measure: 
Fugl-Hudson Assessment of Motor Recovery after Stroke:  
 
Reflex Activity Flexors/Biceps/Fingers: Can be elicited Extensors/Triceps: Can be elicited Reflex Subtotal: 4 Volitional Movement Within Synergies Shoulder Retraction: Full Shoulder Elevation: Full Shoulder Abduction (90 degrees): Full Shoulder External Rotation: Full Elbow Flexion: Full Forearm Supination: Full Shoulder Adduction/Internal Rotation: Full Elbow Extension: Full Forearm Pronation: Full Subtotal: 18 
 
 Volitional Movement Mixing Synergies Hand to Lumbar Spine: Full Shoulder Flexion (0-90 degrees): Full Pronation-Supination: Full Subtotal: 6 Volitional Movement With Little or No Synergy Shoulder Abduction (0-90 degrees): Full Shoulder Flexion ( degrees): Full Pronation/Supination: Full Subtotal : 6 Normal Reflex Activity Biceps, Triceps, Finger Flexors: Full Subtotal : 2 Upper Extremity Total  
Upper Extremity Total: 36 Wrist 
Stability at 15 Degree Dorsiflexion: Full Repeated Dorsiflexion/ Volar Flexion: Full Stability at 15 Degree Dorsiflexion: Full Repeated Dorsiflexion/ Volar Flexion: Full Circumduction: Full Wrist Total: 10 Hand Mass Flexion: Full Mass Extension: Full Grasp A: Full Grasp B: Full Grasp C: Full Grasp D: Full Grasp E: Full Hand Total: 14 
 
Coordination/Speed Tremor: None Dysmetria: None Time: <1s Coordination/Speed Total : 6 Total A-D Total A-D (Motor Function): 22/77 This is a reliable/valid measure of arm function after a neurological event. It has established value to characterize functional status and for measuring spontaneous and therapy-induced recovery; tests proximal and distal motor functions. Fugl-Hudson Assessment  UE scores recorded between five and 30 days post neurologic event can be used to predict UE recovery at six months post neurologic event. Severe = 0-21 points Moderately Severe = 22-33 points Moderate = 34-47 points Mild = 48-66 points HECTOR Guthrie, FRANKO Freitas, & CRISTY Mathews (1992). Measurement of motor recovery after stroke: Outcome assessment and sample size requirements. Stroke, 23, pp. 4068-3883.  
-------------------------------------------------------------------------------------------------------------------------------------------------------------------- MCID: 
Stroke: Kayli Caballero et al, 2001; n = 171; mean age 79 (6) years; assessed within 16 (12) days of stroke, Acute Stroke) FMA Motor Scores from Admission to Discharge 10 point increase in FMA Upper Extremity = 1.5 change in discharge FIM  
10 point increase in FMA Lower Extremity = 1.9 change in discharge FIM 
MDC:  
Stroke:  
Treasure Kern et al, 2008, n = 14, mean age = 59.9 (14.6) years, assessed on average 14 (6.5) months post stroke, Chronic Stroke) FMA = 5.2 points for the Upper Extremity portion of the assessment Occupational Therapy Evaluation Charge Determination History Examination Decision-Making LOW Complexity : Brief history review  LOW Complexity : 1-3 performance deficits relating to physical, cognitive , or psychosocial skils that result in activity limitations and / or participation restrictions  LOW Complexity : No comorbidities that affect functional and no verbal or physical assistance needed to complete eval tasks Based on the above components, the patient evaluation is determined to be of the following complexity level: LOW Pain Rating: 
No c/o of pain Activity Tolerance:  
Good Please refer to the flowsheet for vital signs taken during this treatment. After treatment patient left in no apparent distress:   
sitting on EOB 
 
COMMUNICATION/EDUCATION:  
The patients plan of care was discussed with: Physical Therapist and Registered Nurse. Thank you for this referral. 
PETR Olea/L Time Calculation: 23 mins

## 2020-02-05 NOTE — PROGRESS NOTES
[facility] [address] 
[address] [facility][1], [address] [place] [address] 
[phone] Medical Progress Note NAME: Kiersten Joseph :  1940 MRM:  [id number] Date/Time: 2020  3:32 PM 
 
 
  
Subjective: Chief Complaint:  F/u blurred vision ROS: 
(bold if positive, if negative) Tolerating PT  Tolerating Diet Objective:  
 
 
Vitals:  
 
 
  
Last 24hrs VS reviewed since prior progress note. Most recent are: 
 
Visit Vitals BP 90/60 Pulse (!) 57 Temp 98 °F (36.7 °C) Resp 13 Ht 5' 8\" (1.727 m) Wt 73.3 kg (161 lb 9.6 oz) SpO2 92% BMI 24.57 kg/m² SpO2 Readings from Last 6 Encounters:  
20 92% 20 93% 19 95% 18 98% 13 93% O2 Flow Rate (L/min): 2 l/min Intake/Output Summary (Last 24 hours) at 2020 1532 Last data filed at 2020 1350 Gross per 24 hour Intake 600 ml Output 750 ml Net -150 ml Exam:  
 
Physical Exam: 
 
Gen:  Well-developed, well-nourished, in no acute distress HEENT:  Pink conjunctivae, PERRL, hearing intact to voice, moist mucous membranes Neck:  Supple, without masses, thyroid non-tender Resp:  No accessory muscle use, clear breath sounds without wheezes rales or rhonchi 
Card:  No murmurs, normal S1, S2 without thrills, bruits or peripheral edema Abd:  Soft, non-tender, non-distended, normoactive bowel sounds are present Musc:  No cyanosis or clubbing Skin:  No rashes or ulcers, skin turgor is good Neuro:  Cranial nerves 3-12 are grossly intact,  strength is 5/5 bilaterally and dorsi / plantarflexion is 5/5 bilaterally, follows commands appropriately Psych:  Good insight, oriented to person, place and time, alert Medications Reviewed: (see below) Lab Data Reviewed: (see below) 
 
______________________________________________________________________ Medications:  
 
Current Facility-Administered Medications Medication Dose Route Frequency  aspirin delayed-release tablet 81 mg  81 mg Oral DAILY  clopidogreL (PLAVIX) tablet 75 mg  75 mg Oral QHS  lisinopril (PRINIVIL, ZESTRIL) tablet 10 mg  10 mg Oral DAILY  atorvastatin (LIPITOR) tablet 40 mg  40 mg Oral QHS  acetaminophen (TYLENOL) tablet 650 mg  650 mg Oral Q4H PRN Or  
 acetaminophen (TYLENOL) solution 650 mg  650 mg Per NG tube Q4H PRN Or  
 acetaminophen (TYLENOL) suppository 650 mg  650 mg Rectal Q4H PRN  
 enoxaparin (LOVENOX) injection 40 mg  40 mg SubCUTAneous Q24H Lab Review:  
 
Recent Labs 02/04/20 
1550 WBC 8.5 HGB 12.1 HCT 35.9*  
 Recent Labs 02/04/20 
1550   
K 4.0  
 CO2 27 * BUN 32* CREA 1.59* CA 8.8 INR 1.0 No components found for: [place] Assessment / Plan:  
 
Blurry vision / Unsteady gait / History of CVA (cerebrovascular accident): Check MRI and MRA of the brain, neck. Continue aspirin and Plavix. Check lipid panel and A1c. Consult neurology and PT/OT 
  
Bradycardia: plan for ppm tomorrow CKD3: suspect he may have CKD; last lab work was in 2018 Type 2 diabetes mellitus: new diagnosis. A1c 8.2. Will need an oral agent prior to discharge. Chronic A-fib ([address] (2/4/2020). Not on rate controlling or anticoagulant.  
  
  Hyperlipidemia. Continue statin and check lipid panel. Total time spent with patient: 30 Minutes Care Plan discussed with: Patient Discussed:  Care Plan Prophylaxis:  Lovenox Disposition:  Home w/Family 
        
___________________________________________________ Attending Physician: Martín Khan MD

## 2020-02-05 NOTE — PROGRESS NOTES
Bedside shift change report given to Katie Pettit (oncoming nurse) by Elyssa Newton (offgoing nurse). Report included the following information SBAR, Kardex, ED Summary, Intake/Output, MAR, Recent Results and Cardiac Rhythm 2nd degree heart block. 1730 Pt was taken to MRI. 1845 Pt returned from MRI. Bedside shift change report given to Jluis Wynn (oncoming nurse) by Franck Kimbroguh (offgoing nurse). Report included the following information SBAR, Kardex, ED Summary, Intake/Output, MAR, Recent Results and Cardiac Rhythm Sinus matias with 2nd degree heart block.

## 2020-02-06 ENCOUNTER — APPOINTMENT (OUTPATIENT)
Dept: GENERAL RADIOLOGY | Age: 80
DRG: 244 | End: 2020-02-06
Attending: INTERNAL MEDICINE
Payer: MEDICARE

## 2020-02-06 VITALS
RESPIRATION RATE: 18 BRPM | BODY MASS INDEX: 24.49 KG/M2 | DIASTOLIC BLOOD PRESSURE: 70 MMHG | HEART RATE: 88 BPM | OXYGEN SATURATION: 98 % | WEIGHT: 161.6 LBS | TEMPERATURE: 98.4 F | HEIGHT: 68 IN | SYSTOLIC BLOOD PRESSURE: 136 MMHG

## 2020-02-06 PROBLEM — E11.9 TYPE 2 DIABETES MELLITUS WITHOUT COMPLICATION (HCC): Chronic | Status: ACTIVE | Noted: 2020-02-06

## 2020-02-06 PROBLEM — Z86.73 HISTORY OF CVA (CEREBROVASCULAR ACCIDENT): Chronic | Status: ACTIVE | Noted: 2020-02-04

## 2020-02-06 PROCEDURE — 33208 INSRT HEART PM ATRIAL & VENT: CPT | Performed by: INTERNAL MEDICINE

## 2020-02-06 PROCEDURE — A4565 SLINGS: HCPCS | Performed by: INTERNAL MEDICINE

## 2020-02-06 PROCEDURE — C1785 PMKR, DUAL, RATE-RESP: HCPCS | Performed by: INTERNAL MEDICINE

## 2020-02-06 PROCEDURE — 0JH606Z INSERTION OF PACEMAKER, DUAL CHAMBER INTO CHEST SUBCUTANEOUS TISSUE AND FASCIA, OPEN APPROACH: ICD-10-PCS | Performed by: INTERNAL MEDICINE

## 2020-02-06 PROCEDURE — 02HK3JZ INSERTION OF PACEMAKER LEAD INTO RIGHT VENTRICLE, PERCUTANEOUS APPROACH: ICD-10-PCS | Performed by: INTERNAL MEDICINE

## 2020-02-06 PROCEDURE — C1898 LEAD, PMKR, OTHER THAN TRANS: HCPCS | Performed by: INTERNAL MEDICINE

## 2020-02-06 PROCEDURE — 74011000250 HC RX REV CODE- 250: Performed by: INTERNAL MEDICINE

## 2020-02-06 PROCEDURE — 77010033678 HC OXYGEN DAILY

## 2020-02-06 PROCEDURE — C1893 INTRO/SHEATH, FIXED,NON-PEEL: HCPCS | Performed by: INTERNAL MEDICINE

## 2020-02-06 PROCEDURE — 74011000272 HC RX REV CODE- 272: Performed by: INTERNAL MEDICINE

## 2020-02-06 PROCEDURE — 77030033138 HC SUT PGA STRATFX J&J -B: Performed by: INTERNAL MEDICINE

## 2020-02-06 PROCEDURE — 77030037713 HC CLOSR DEV INCIS ZIP STRY -B: Performed by: INTERNAL MEDICINE

## 2020-02-06 PROCEDURE — 74011250636 HC RX REV CODE- 250/636: Performed by: INTERNAL MEDICINE

## 2020-02-06 PROCEDURE — 71045 X-RAY EXAM CHEST 1 VIEW: CPT

## 2020-02-06 PROCEDURE — 77030018673: Performed by: INTERNAL MEDICINE

## 2020-02-06 PROCEDURE — 99153 MOD SED SAME PHYS/QHP EA: CPT | Performed by: INTERNAL MEDICINE

## 2020-02-06 PROCEDURE — 99152 MOD SED SAME PHYS/QHP 5/>YRS: CPT | Performed by: INTERNAL MEDICINE

## 2020-02-06 PROCEDURE — 77030038613 HC SUT PDS STRATA SPIRL J&J -B: Performed by: INTERNAL MEDICINE

## 2020-02-06 PROCEDURE — 77030018729 HC ELECTRD DEFIB PAD CARD -B: Performed by: INTERNAL MEDICINE

## 2020-02-06 PROCEDURE — 74011250637 HC RX REV CODE- 250/637: Performed by: INTERNAL MEDICINE

## 2020-02-06 PROCEDURE — 02H63JZ INSERTION OF PACEMAKER LEAD INTO RIGHT ATRIUM, PERCUTANEOUS APPROACH: ICD-10-PCS | Performed by: INTERNAL MEDICINE

## 2020-02-06 DEVICE — PACEMAKER
Type: IMPLANTABLE DEVICE | Status: FUNCTIONAL
Brand: ACCOLADE™ MRI EL DR

## 2020-02-06 DEVICE — ENDOCARDIAL STEROID-ELUTING MR CONDITIONAL STYLET DELIVERED PACE/SENSE LEAD
Type: IMPLANTABLE DEVICE | Status: FUNCTIONAL
Brand: INGEVITY™ MRI

## 2020-02-06 RX ORDER — DOXYCYCLINE 100 MG/1
100 TABLET ORAL 2 TIMES DAILY
Qty: 14 TAB | Refills: 0 | Status: SHIPPED | OUTPATIENT
Start: 2020-02-06 | End: 2020-02-13

## 2020-02-06 RX ORDER — LIDOCAINE HYDROCHLORIDE AND EPINEPHRINE 10; 10 MG/ML; UG/ML
30 INJECTION, SOLUTION INFILTRATION; PERINEURAL
Status: DISCONTINUED | OUTPATIENT
Start: 2020-02-06 | End: 2020-02-06 | Stop reason: HOSPADM

## 2020-02-06 RX ORDER — HYDROCODONE BITARTRATE AND ACETAMINOPHEN 5; 325 MG/1; MG/1
1 TABLET ORAL
Status: DISCONTINUED | OUTPATIENT
Start: 2020-02-06 | End: 2020-02-06 | Stop reason: HOSPADM

## 2020-02-06 RX ORDER — CEFAZOLIN SODIUM 1 G/3ML
INJECTION, POWDER, FOR SOLUTION INTRAMUSCULAR; INTRAVENOUS AS NEEDED
Status: DISCONTINUED | OUTPATIENT
Start: 2020-02-06 | End: 2020-02-06 | Stop reason: HOSPADM

## 2020-02-06 RX ORDER — MIDAZOLAM HYDROCHLORIDE 1 MG/ML
INJECTION, SOLUTION INTRAMUSCULAR; INTRAVENOUS AS NEEDED
Status: DISCONTINUED | OUTPATIENT
Start: 2020-02-06 | End: 2020-02-06 | Stop reason: HOSPADM

## 2020-02-06 RX ORDER — GENTAMICIN SULFATE 80 MG/100ML
80 INJECTION, SOLUTION INTRAVENOUS ONCE
Status: COMPLETED | OUTPATIENT
Start: 2020-02-06 | End: 2020-02-06

## 2020-02-06 RX ORDER — BUPIVACAINE HYDROCHLORIDE 5 MG/ML
10 INJECTION, SOLUTION EPIDURAL; INTRACAUDAL ONCE
Status: COMPLETED | OUTPATIENT
Start: 2020-02-06 | End: 2020-02-06

## 2020-02-06 RX ORDER — FENTANYL CITRATE 50 UG/ML
INJECTION, SOLUTION INTRAMUSCULAR; INTRAVENOUS AS NEEDED
Status: DISCONTINUED | OUTPATIENT
Start: 2020-02-06 | End: 2020-02-06 | Stop reason: HOSPADM

## 2020-02-06 RX ORDER — HEPARIN SODIUM 200 [USP'U]/100ML
INJECTION, SOLUTION INTRAVENOUS
Status: COMPLETED | OUTPATIENT
Start: 2020-02-06 | End: 2020-02-06

## 2020-02-06 RX ORDER — SODIUM CHLORIDE 0.9 % (FLUSH) 0.9 %
5-40 SYRINGE (ML) INJECTION AS NEEDED
Status: DISCONTINUED | OUTPATIENT
Start: 2020-02-06 | End: 2020-02-06 | Stop reason: HOSPADM

## 2020-02-06 RX ORDER — CEFAZOLIN SODIUM 1 G/3ML
2 INJECTION, POWDER, FOR SOLUTION INTRAMUSCULAR; INTRAVENOUS ONCE
Status: DISCONTINUED | OUTPATIENT
Start: 2020-02-06 | End: 2020-02-06

## 2020-02-06 RX ORDER — ACETAMINOPHEN 325 MG/1
650 TABLET ORAL
Status: DISCONTINUED | OUTPATIENT
Start: 2020-02-06 | End: 2020-02-06 | Stop reason: HOSPADM

## 2020-02-06 RX ORDER — ONDANSETRON 2 MG/ML
INJECTION INTRAMUSCULAR; INTRAVENOUS AS NEEDED
Status: DISCONTINUED | OUTPATIENT
Start: 2020-02-06 | End: 2020-02-06 | Stop reason: HOSPADM

## 2020-02-06 RX ORDER — DIPHENHYDRAMINE HYDROCHLORIDE 50 MG/ML
INJECTION, SOLUTION INTRAMUSCULAR; INTRAVENOUS AS NEEDED
Status: DISCONTINUED | OUTPATIENT
Start: 2020-02-06 | End: 2020-02-06 | Stop reason: HOSPADM

## 2020-02-06 RX ORDER — SODIUM CHLORIDE 0.9 % (FLUSH) 0.9 %
5-40 SYRINGE (ML) INJECTION EVERY 8 HOURS
Status: DISCONTINUED | OUTPATIENT
Start: 2020-02-06 | End: 2020-02-06 | Stop reason: HOSPADM

## 2020-02-06 RX ORDER — ONDANSETRON 2 MG/ML
4 INJECTION INTRAMUSCULAR; INTRAVENOUS
Status: DISCONTINUED | OUTPATIENT
Start: 2020-02-06 | End: 2020-02-06 | Stop reason: HOSPADM

## 2020-02-06 RX ADMIN — ASPIRIN 81 MG: 81 TABLET, COATED ORAL at 10:26

## 2020-02-06 RX ADMIN — LISINOPRIL 10 MG: 5 TABLET ORAL at 10:26

## 2020-02-06 NOTE — PROGRESS NOTES
Spiritual Care Partner Volunteer visited patient in [facility] on 2/6/20. Documented by:Chaplain Barry Beth., MS., [address] ([id number])

## 2020-02-06 NOTE — PROGRESS NOTES
Carlos Eduardo [facility] [place] [address] 
[address] 
[phone] Medical Progress Note NAME: William Hudson :  1940 MRM:  [id number] Date/Time: 2020  8:49 AM 
 
 
  
Subjective: Chief Complaint:  Pain: chest, sore, at PM site, with swelling ROS: 
(bold if positive, if negative) Chest Pain Tolerating PT  Tolerating Diet Objective:  
 
 
Vitals:  
 
 
  
Last 24hrs VS reviewed since prior progress note. Most recent are: 
 
Visit Vitals /70 (BP 1 Location: Right arm, BP Patient Position: At rest) Pulse 71 Temp 98 °F (36.7 °C) Resp 18 Ht 5' 8\" (1.727 m) Wt 73.3 kg (161 lb 9.6 oz) SpO2 95% BMI 24.57 kg/m² SpO2 Readings from Last 6 Encounters:  
20 95% 20 93% 19 95% 18 98% 13 93% O2 Flow Rate (L/min): 2 l/min Intake/Output Summary (Last 24 hours) at 2020 1004 Last data filed at 2020 9016 Gross per 24 hour Intake 640 ml Output 450 ml Net 190 ml Exam:  
 
Physical Exam: 
 
Gen:  Well-developed, well-nourished, elderly, in no acute distress HEENT:  Pink conjunctivae, PERRL, hearing intact to voice, moist mucous membranes Neck:  Supple, without masses, thyroid non-tender Resp:  No accessory muscle use, clear breath sounds without wheezes rales or rhonchi 
Card:  No murmurs, normal S1, S2 without thrills, bruits or peripheral edema Abd:  Soft, non-tender, non-distended, normoactive bowel sounds are present, no palpable organomegaly and no detectable hernias Lymph:  No cervical or inguinal adenopathy Musc:  No cyanosis or clubbing Skin:  No rashes or ulcers, skin turgor is good Neuro:  Cranial nerves are grossly intact, no focal motor weakness, follows commands appropriately Psych:  Good insight, oriented to person, place and time, alert Telemetry reviewed:   paced Medications Reviewed: (see below) Lab Data Reviewed: (see below) 
 
______________________________________________________________________ Medications:  
 
Current Facility-Administered Medications Medication Dose Route Frequency  lidocaine-EPINEPHrine (XYLOCAINE) 1 %-1:100,000 injection 300 mg  30 mL SubCUTAneous Multiple  ceFAZolin (ANCEF) 2 g in sterile water (preservative free) 20 mL IV syringe  2 g IntraVENous ONCE  
 sodium chloride (NS) flush 5-40 mL  5-40 mL IntraVENous Q8H  
 sodium chloride (NS) flush 5-40 mL  5-40 mL IntraVENous PRN  
 acetaminophen (TYLENOL) tablet 650 mg  650 mg Oral Q4H PRN  
 HYDROcodone-acetaminophen (NORCO) 5-325 mg per tablet 1 Tab  1 Tab Oral Q4H PRN  
 ondansetron (ZOFRAN) injection 4 mg  4 mg IntraVENous Q4H PRN  
 aspirin delayed-release tablet 81 mg  81 mg Oral DAILY  clopidogreL (PLAVIX) tablet 75 mg  75 mg Oral QHS  lisinopril (PRINIVIL, ZESTRIL) tablet 10 mg  10 mg Oral DAILY  atorvastatin (LIPITOR) tablet 40 mg  40 mg Oral QHS  acetaminophen (TYLENOL) tablet 650 mg  650 mg Oral Q4H PRN Or  
 acetaminophen (TYLENOL) solution 650 mg  650 mg Per NG tube Q4H PRN Or  
 acetaminophen (TYLENOL) suppository 650 mg  650 mg Rectal Q4H PRN  
 enoxaparin (LOVENOX) injection 40 mg  40 mg SubCUTAneous Q24H Lab Review:  
 
Recent Labs 02/04/20 
1550 WBC 8.5 HGB 12.1 HCT 35.9*  
 Recent Labs 02/04/20 
1550   
K 4.0  
 CO2 27 * BUN 32* CREA 1.59* CA 8.8 INR 1.0 Lab Results Component Value Date/Time Glucose (POC) 127 (H) 02/04/2020 02:34 PM  
 Glucose (POC) 105 09/22/2010 11:37 AM  
 Glucose (POC) 112 (H) 09/22/2010 07:31 AM  
 Glucose (POC) 176 (H) 09/21/2010 09:33 PM  
 Glucose (POC) 167 (H) 09/21/2010 12:56 PM  
 
No results for input(s): PH, PCO2, PO2, HCO3, FIO2 in the last 72 hours. Recent Labs 02/04/20 
1550 INR 1.0 No results found for: SDES No results found for: CULT Assessment: Principal Problem: 
  A-fib ([address] (2/4/2020) Active Problems: 
  Blurry vision (2/4/2020) Unsteady gait (2/4/2020) History of CVA (cerebrovascular accident) (2/4/2020) Second degree AV block (2/4/2020) Type 2 diabetes mellitus without complication ([address] (5/5/6336) Plan:  
 
Principal Problem: 
  A-fib ([address] (2/4/2020)/Second degree AV block (2/4/2020) 
 - s/p PPM 
 - not on anticoagulation, not recommended by EP 
 - CXR okay, home later after lunch if no further hematoma development Active Problems: 
  DM 2 without complications 
 - send out on metformin Blurry vision (2/4/2020)/Unsteady gait (2/4/2020) - no CVA on MRI 
 - cleared by PT/OT 
 - suspect due to rhythm issues 
 - no further work up per Neurology History of CVA (cerebrovascular accident) (2/4/2020) - continue ASA/Plavix Total time spent in patient care: 35 minutes Care Plan discussed with: Patient, Family, Care Manager, Nursing Staff and Biju Lutz NP Discussed:  Code Status, Care Plan and D/C Planning Prophylaxis:  Lovenox Disposition:  Home w/Family 
        
___________________________________________________ Attending Physician: Rudy Bolivar MD

## 2020-02-06 NOTE — ADVANCED PRACTICE NURSE
Pt examined upon return from EP lab. Visit Vitals /70 (BP 1 Location: Right arm, BP Patient Position: At rest) Pulse 71 Temp 98 °F (36.7 °C) Resp 18 Ht 5' 8\" (1.727 m) Wt 161 lb 9.6 oz (73.3 kg) SpO2 95% BMI 24.57 kg/m² No pain at [facility] iste Xray w/o issue. Site flat, non tender. No bleeding or ecchymosis. Home later today Post procedure abx e scribed to pharmacy of record. Updated Dr. Viki Love.

## 2020-02-06 NOTE — DISCHARGE INSTRUCTIONS
HOSPITALIST DISCHARGE INSTRUCTIONS  NAME: Aminta Frost   :  1940   MRN:  [id number]     Date/Time:  2020 12:21 PM    ADMIT DATE: 2020     DISCHARGE DATE: 2020     DISCHARGE DIAGNOSIS:  Slow heart rate    MEDICATIONS:  · It is important that you take the medication exactly as they are prescribed. · Keep your medication in the bottles provided by the pharmacist and keep a list of the medication names, dosages, and times to be taken in your wallet. · Do not take other medications without consulting your doctor. Pain Management: per above medications    What to do at Home    Recommended diet:  Cardiac Diet    Recommended activity: See surgical instructions    If you experience any of the following symptoms then please call your primary care physician or return to the emergency room if you cannot get hold of your doctor:  Fever, chills, nausea, vomiting, diarrhea, change in mentation, falling, bleeding, shortness of breath    Follow Up: Follow-up Information     Follow up With Specialties Details Why Contact Harl Carrel, MD Cardiology In 2 weeks office will call with date/time of follow up.  [address] [place] [address] 99 [address] Mago Martin MD Internal Medicine Go on 2020 For hospital follow up appointment at 1:10PM Jasvir Reyna [facility]  [phone]      [facility], In-Home Clinical Assessments   [facility]  [phone]            Information obtained by :  I understand that if any problems occur once I am at home I am to contact my physician. I understand and acknowledge receipt of the instructions indicated above.                                                                                                                                            Physician's or R.N.'s Signature Date/Time                                                                                                                                              Patient or Representative Signature                                                          Date/Time              Pacemaker  Discharge Instructions    Please make sure you have received your Temporary Pacemaker identification card with your discharge instructions      MEDICATIONS         Take only the medications prescribed to you at discharge. ACTIVITY         Return to your normal activity, except as noted below. o Do not lift anything heavier than 10 pounds for 4 weeks with the affected arm. This is how long it takes the muscles to heal, and the leads inside your heart to stabilize their position. o Do not reach above your head with the affected arm for 4 weeks, doing so increases the risk of lead dislodgement.    o It is, however, important to move the affected arm to prevent shoulder stiffness and locking. o Avoid tight clothes or unnecessary pressure over your incision (such as bra straps or seat belts). If it is tender or sensitive to clothing, cover the incision with a soft dressing or pad.  o Questions about driving are individualized and should be discussed with one of the EP Physicians prior to discharge. SHOWERING         Leave the bandage over your incision for 14 days after the Pacemaker implant. You bandage will be removed in clinic in 14 days.  It is important to keep the bandaged area clean and dry. You may shower around the site until the bandage is removed in clinic. Thereafter, you may shower after the bandage is removed, washing it gently with soap and water. Do not apply any lotions, powders, or perfumes to the incision line.  Avoid submerging your incision in water (tub baths, hot tubs, or swimming) for four weeks.  Underneath the dressing.   o If you have white steri-strips over your incision (underneath the gauze dressing), they will curl up at the end and fall off, usually within 10 days. Do not pull them off.  - OR -   o You may have a different type of closure for the incision. If Dermabond Adhesive was used to close your incision, you will receive a separate instruction sheet. DISCHARGE PRECAUTIONS         Record your temperature every day, at the same time, for 3 weeks after your implant. A temperature of 100.5 F, or higher, can be the first sign of infection. This should be reported to your Doctor immediately.  You can have an MRI after 6 weeks. You must be aware that any strong magnet or magnetic field can affect your Pacemaker. In general, be careful of metal detectors, heavy machinery, and any area where arc-welding is performed. Avoid metal detectors such as the ones in security checkpoints at [facility] or [address] [place] [address]. When approaching a security checkpoint show your Pacemaker ID Card to security personnel and ask to be hand searched.  Always tell your doctor or dentist that you have a Pacemaker. Antibiotics may be prescribed before certain procedures.  If you use a cell phone, hold it on the opposite side from where your Pacemaker is implanted.  Your temporary identification will be given to you with these instructions. Keep your Pacemaker card in your wallet or on your person at all times. You should receive your permanent card in 8 weeks. If you do not receive your permanent card, please call the office at [phone]. TAKING YOUR PULSE         Take your pulse the same time every day, preferably in the morning.  Sit down and rest for 5 minutes prior to taking your pulse.  Take your pulse for 1 full minute, use a clock or stop watch with a second hand.  To feel your pulse, use the first two fingers of one hand; place them on the thumb side of the wrist of the opposite hand. The pulse will be steady, regular and throbbing.        Call the EP Lab Doctors if your pulse is less than 40 beats per minute. SYMPTOMS THAT NEED TO BE REPORTED IMMEDIATELY         Temperature more than 100.4 F     Redness or warmth at the incision site, or pain for longer than the first 5 days after the implant.  Drainage from the incision site.  Swelling around the incision site.  Shortness of breath.  Rapid heart rate or palpitations.  Dizziness, lightheadedness, fainting.  Slow pulse below 40 beats per minute.  REMEMBER: If you feel something is an emergency or cannot be handled over the phone, call 911 or go to the closest emergency room. Justin Sal MD  Cardiac Electrophysiology / Cardiology    [address]  [address] [facility], [address] [facility], [address], [facility] [address]         Heriberto Banegas  [phone] / [phone] Fax       [phone] / [phone] Fax    [address] Hospital/ED Visit Follow-Up Instructions/Information    You may have a in home follow up visit set up with Piotr Colmenares or may wish to contact Piotr Colmenares to set-up a visit:    What are we? [facility] is an in-home urgent care service staffed with emergency trained medical teams. We come to your home in a vehicle stocked with medical supplies and technology. An ER physician is always available if needed. When? As a part of your hospital follow-up, an appointment has been/ or can be set up for us to come see you. Usually, this will be 24-72 hours after you leave the hospital or as needed. [facility] is open 9am-9pm, 7 days a week, 365 days a year, including holidays. Why? We know that you cannot always get to your doctor after being in the hospital and that your doctor is not always available when you need them.  Once your workup is complete, we'll call in your prescriptions, update your family doctor, and handle billing with your insurance so you can focus on feeling better, faster without leaving home. How much? We accept most major health insurance plans, including Medicaid, Medicare, and Medicare Advantage [address] [facility], [facility] [place], [employer], and [employer]. We also accept: credit, debit, health savings account (HSA), health reimbursement account (HRA) and flexible spending account (FSA) payments. [employer]'s prices compare to conventional urgent care facilities, but we bring the care to you. How to reach us? Getting care is easy- use our mobile gasper ([employer]), website ([email or web address]) or call us [phone].

## 2020-02-06 NOTE — PROGRESS NOTES
Plan: 1. The plan is for pt.to likely be discharged home today. 2. \"Grandson\" in pt.'s room will transport pt home when pt is discharged. 3.Pt has no skilled therapy needs identified. 4.Typically,pt golfs,cuts his own grass and is a veery active [de-identified]year old. 5. Information on [facility] placed on pt.'s AVS. 6.I am requesting for CM specialist to please make a PCP follow-up for pt. 7.Second Medicare letter signed by pt and placed in pt.'s chart to be scanned into his EMR. 8.Pt will also follow-up with Dr Joie Butcher . 9. For any additional discharge needs,please call me @ [phone] Yobany Carballo

## 2020-02-06 NOTE — DISCHARGE SUMMARY
Physician Discharge Summary Patient ID: Venancio Leavitt 
[id number] 
79 y.o. 
1940 Admit date: 2/4/2020 Discharge date: 2/6/2020 Admission Diagnoses: Vertigo [R42] Blurry vision [H53.8] Second degree AV block [I44.1] Discharge Diagnoses:  Principal Diagnosis A-fib ([address] Principal Problem: 
  A-fib ([address] (2/4/2020) Active Problems: 
  Blurry vision (2/4/2020) Unsteady gait (2/4/2020) History of CVA (cerebrovascular accident) (2/4/2020) Second degree AV block (2/4/2020) Type 2 diabetes mellitus without complication ([address] (0/3/1603) Resolved Problems: 
Problem List as of 2/6/2020 Date Reviewed: 2/4/2020 Codes Class Noted - Resolved Type 2 diabetes mellitus without complication (HCC) (Chronic) ICD-10-CM: E11.9 ICD-9-CM: 250.00  2/6/2020 - Present Blurry vision ICD-10-CM: H53.8 ICD-9-CM: 368.8  2/4/2020 - Present Unsteady gait ICD-10-CM: R26.81 
ICD-9-CM: 781.2  2/4/2020 - Present * (Principal) A-fib [facility]) ICD-10-CM: I48.91 
ICD-9-CM: 427.31  2/4/2020 - Present History of CVA (cerebrovascular accident) (Chronic) ICD-10-CM: K33.18 
ICD-9-CM: V12.54  2/4/2020 - Present Second degree AV block ICD-10-CM: I44.1 ICD-9-CM: 426.13  2/4/2020 - Present RESOLVED: Vertigo ICD-10-CM: U83 ICD-9-CM: 780.4  2/4/2020 - 2/4/2020 Hospital Course:  
Mr. Katelynn Mccullough was admitted to the Hospitalist Service on the 3rd floor for treatment of symptomatic bradycardia. He had persistent bradycardia. He was evaluated by EP and underwent PPM without problems. He was evaluated by Neurology for unsteady gait and blurred vision, stroke work up was not revealing and symptoms were attributed to his bradycardia. He was discharged home on 2/6/2020 in improved condition. PCP: Lance Tiwari MD 
 
Consults: Cardiology and Neurology Discharge Exam: See my Progress Note from today. Disposition: home Patient Instructions:  
Current Discharge Medication List  
  
START taking these medications Details  
doxycycline monohydrate (ADOXA) 100 mg tablet Take 1 Tab by mouth two (2) times a day for 7 days. Indications: post PPM 
Qty: 14 Tab, Refills: 0 CONTINUE these medications which have NOT CHANGED Details  
lisinopril (PRINIVIL, ZESTRIL) 10 mg tablet Take 10 mg by mouth daily. simvastatin (ZOCOR) 40 mg tablet Take 40 mg by mouth nightly. aspirin delayed-release 81 mg tablet Take 81 mg by mouth daily. clopidogrel (PLAVIX) 75 mg tablet Take 75 mg by mouth nightly. Activity: Activity as tolerated, restrictions per EP Diet: Cardiac Diet Wound Care: Keep wound clean and dry Follow-up Information Follow up With Specialties Details Why Contact Info Varinder Goel MD Cardiology In 2 weeks office will call with date/time of follow up.  [address] [place] [address] [address] [place] [address] 
[phone] Margaret Dey MD Internal Medicine Go on 2/18/2020 For hospital follow up appointment at 1:10PM [address] [place] [address] 
[phone] [facility], In-Home Clinical Assessments   [facility] That Comes To Your Home [email or web address] [phone]  
  
 
 
35 minutes were spent on this discharge.  
 
Signed: 
Margarita Bejarano MD 
2/6/2020 
12:22 PM

## 2020-02-06 NOTE — PROGRESS NOTES
I have reviewed discharge instructions with the patient. The patient verbalized understanding. Discharge medications reviewed with patient and appropriate educational materials and side effects teaching were provided. Pacemaker education provided to patient and PM card given to patient. Current Discharge Medication List  
  
START taking these medications Details  
doxycycline monohydrate (ADOXA) 100 mg tablet Take 1 Tab by mouth two (2) times a day for 7 days. Indications: post PPM 
Qty: 14 Tab, Refills: 0 CONTINUE these medications which have NOT CHANGED Details  
lisinopril (PRINIVIL, ZESTRIL) 10 mg tablet Take 10 mg by mouth daily. simvastatin (ZOCOR) 40 mg tablet Take 40 mg by mouth nightly. aspirin delayed-release 81 mg tablet Take 81 mg by mouth daily. clopidogrel (PLAVIX) 75 mg tablet Take 75 mg by mouth nightly.

## 2020-02-06 NOTE — PROGRESS NOTES
0759 AM 
 
 
TRANSFER - IN REPORT: 
 
Verbal report received from [facility] RN(name) on David Wilson  being received from EP(unit) for routine progression of care Report consisted of patients Situation, Background, Assessment and  
Recommendations(SBAR). Information from the following report(s) Procedure Summary was reviewed with the receiving nurse. Opportunity for questions and clarification was provided. Assessment completed upon patients arrival to unit and care assumed. 9785 Patient arrived with pressure dressing - RN from procedure verbalized patient had some 'puffiness' at site. After assessment in CLPO pressure dressing removed Small hematoma noted just distal to incision site. Manual pressure held for 10 min 2556 Hematoma resolved Site soft Placed 5lb sandbag to site VSS Monitoring 9:00 AM 
 
Site intact Soft Non tender CXR complete Granddaughter at bedside and updated 9:01 AM 
 
TRANSFER - OUT REPORT: 
 
Verbal report given to [facility] RN on David Wilson  being transferred to [facility](unit) for routine progression of care Report consisted of patients Situation, Background, Assessment and  
Recommendations(SBAR). Information from the following report(s) Procedure Summary and Cardiac Rhythm PACED - PVCs was reviewed with the receiving nurse. Lines:  
Peripheral IV 02/04/20 Left Antecubital (Active) Site Assessment Clean, dry, & intact 2/6/2020  8:21 AM  
Phlebitis Assessment 0 2/6/2020  8:21 AM  
Infiltration Assessment 0 2/6/2020  8:21 AM  
Dressing Status Clean 2/6/2020  8:21 AM  
Dressing Type Transparent 2/6/2020  8:21 AM  
Hub Color/Line Status Green;Capped 2/6/2020  2:22 AM  
Action Taken Open ports on tubing capped 2/5/2020  4:00 PM  
Alcohol Cap Used Yes 2/6/2020  2:22 AM  
  
 
Opportunity for questions and clarification was provided. Patient transported with: 
 Monitor Registered Nurse

## 2020-02-06 NOTE — PROCEDURES
Cardiac Electrophysiology Report PATIENT INFORMATION Patient Name: Samreen Gilbert  MRN: [id number]                 Study Date: 2020 YOB: 1940   Age: [de-identified] y.o. Gender: male Procedure:  Ann Damon Referring Physician:  Shane Kyle MD and Dr. Krishna Reynoso STAFF Duty Name Electrophysiologist Shelton Gray MD  
Monitor Kori Luo RN Circulator Emile Martines RN  
 
 
PATIENT HISTORY Samreen Gilbert is a [de-identified] y.o. male with history of paroxysmal atrial fibrillation, stroke/carotid occlusion presenting with blurred vision/unsteady gait who is undergoing neurological evaluation. Telemetry has shown sinus bradycardia as well as episodes of second-degree type I AV block with junctional escape rhythm as well as rhythm strips highly suspicious for A-V dissociation. His heart rate has been bradycardic as well at times with heart rates in the 40s to 50s. Currently he is not on anticoagulation. He is now referred for pacemaker implantation. PROCEDURE The patient was brought to the Cardiac Electrophysiology laboratory in a post-absorptive, fasting state. Informed consent was obtained. A peripheral IV was in place. Continuous electrocardiographic, blood pressure, O2 saturation and  CO2 monitoring was initiated. Intravenous antibiotics were administered pre-operatively. Self-adhesive cardioversion patches were positioned on the chest.  Conscious sedation was effectuated according to protocol. The patient was then prepped and draped in the usual sterile fashion. A left subclavian venogram was performed and demonstrated patency of the vein. A 50/50 mixture of lidocaine (1%) with epinephrine and bupivicaine (0.5%) was utilized for local anesthesia. An incision was performed medial to the left delto-pectoral groove.  Sharp and blunt dissection was carried down to the level of the pre-pectoralis fascia. Hemostasis was maintained with electrocautery. Extra-thoracic, subclavian venous access was obtained twice and sheaths were placed using the modified Seldinger technique. Permanent pace/sense leads were advanced under fluoroscopic guidance and positioned in the right atrium and ventricle where stable pacing and sensing characteristics were encountered. The sheaths were peeled away and the leads were anchored to the pre-pectoralis fascia using O-ethibond non-absorbable suture material. A device pocket was then fashioned and flushed copiously with antibiotic solution. A pacemaker generator was connected to the leads and the generator was placed into the pocket. The device was secured to the pocket using O-ethibond, non-absorbable suture material. The pocket was then closed in three layers using 2-O PDS, 3-O monocryl absorbable suture material and a zipline. The skin was closed using a sub-cuticular technique. A bio-occlusive dressing were applied to the skin. The patient remained hemodynamically stable, tolerated the procedure well and was transferred in stable condition. There were no immediate complications encountered during the procedure. There was minimal blood loss and no specimen were removed. LEAD & GENERATOR DATA  Model # Serial # 29 [facility] [id number] Atrial Lead [place] [id number] Ventricular Lead [place] [id number] PACE/SENSE DATA Sensed Wave (mV) Threshold (V) Impedance (Ohms) Atrium 2.5 1.5 649 Ventricle 12.2 0.5 1266 FINAL PROGRAMMING Mode Lower Rate (ppm) Upper Rate (ppm) DDDR 60 130 MEDICATION SUMMARY Medication Route Unit Total  
Gentamycin IV mg 80 Ancef IV grams 2 Fentanyl IV micrograms 50 Versed IV grams 2 RADIOLOGY SUMMARY Total  
Fluoro Time (minutes) 2.7 Dose Area Product (mGy) 32 CONCLUSIONS 1. Successful implantation of a dual-chamber pacemaker. 2. Doxycycline 100 mg po tid x 7 days 3. CXR now 4. Wound check in EP clinic in 14 days. 5. Follow up in EP clinic in 3 month or earlier if necessary. 6. Follow up with  Lance Tiwari MD and Dr. Jasiel Wylie as scheduled. Thank you, Lance Tiwari MD and Dr. Ángela Patel for involving me in the care of this extraordinarily pleasant male. Gavin Acosta MD 
Cardiac Electrophysiology / Cardiology [address], [address] [place], [facility] [address] Betty De La Rosa.        Heriberto Banegas 
[phone] / [phone] Fax      [phone] / [phone] Fax

## 2020-02-06 NOTE — PROGRESS NOTES
Bedside and Verbal shift change report given to Yayo Hooper RN (oncoming nurse) by Esvin Alanis RN (offgoing nurse). Report included the following information SBAR and Kardex.

## 2020-02-06 NOTE — PROGRESS NOTES
Bedside and Verbal shift change report given to Bonnie Aparicio, RN (oncoming nurse) by Marvin De Anda RN (offgoing nurse). Report included the following information SBAR, Kardex, MAR and Recent Results. 0700: Patient in cath lab

## 2020-02-06 NOTE — PROGRESS NOTES
TRANSFER - IN REPORT: 
 
Verbal report received from Timi Rhodes RN(name) on Mary Alexander  being received from Cath Lab(unit) for routine progression of care Report consisted of patients Situation, Background, Assessment and  
Recommendations(SBAR). Information from the following report(s) SBAR, Kardex, Procedure Summary, MAR and Recent Results was reviewed with the receiving nurse. Opportunity for questions and clarification was provided. Assessment completed upon patients arrival to unit and care assumed. 0930: Patient resting quietly in bed. Assessed cath insertion site with transferring RN. Bruising noted around site flat. Oxygen sats 83% on room air put on 3L NC sats 95%

## 2020-02-06 NOTE — ADT AUTH CERT NOTES
attending pn 2/6 by Krsitina Cuevas  
 
   
Review Status Review Entered In Primary 2/6/2020 12:26  
   
Criteria Review Assessment: 
  
Principal Problem: 
  A-fib ([address] (2/4/2020) 
  Active Problems: 
  Blurry vision (2/4/2020) 
  
  Unsteady gait (2/4/2020) 
  History of CVA (cerebrovascular accident) (2/4/2020) 
  Second degree AV block (2/4/2020)   
  Type 2 diabetes mellitus without complication ([address] (0/5/8732)   
  
  
  
Plan: 
  
Principal Problem: 
  A-fib (HCC) (2/4/2020)/Second degree AV block (2/4/2020) 
            - s/p PPM 
            - not on anticoagulation, not recommended by EP 
            - CXR okay, home later after lunch if no further hematoma development 
  
Active Problems: 
  DM 2 without complications 
            - send out on metformin 
  
  Blurry vision (2/4/2020)/Unsteady gait (2/4/2020) - no CVA on MRI 
            - cleared by PT/OT 
            - suspect due to rhythm issues 
            - no further work up per Neurology 
  
  History of CVA (cerebrovascular accident) (2/4/2020) - continue ASA/Plavix 
  
  
Total time spent in patient care: 35 minutes Care Plan discussed with: Patient, Family, Care Manager, Nursing Staff and Nitesh Araujo NP 
  
Discussed:  Code Status, Care Plan and D/C Planning 
  
Prophylaxis:  Lovenox 
  
Disposition:  Home w/Family 
                                                                                     
   
   
Pacemaker Procedure 2/6 - report by Kristina Cuevas  
 
   
Review Status Review Entered In Primary 2/6/2020 12:24  
   
Criteria Review Cardiac Electrophysiology Report 
  
  
PATIENT INFORMATION 
  
Patient Name: Ada Rehman                   MRN: [id number]                          Study Date: 2/6/2020 
  
 YOB: 1940                        Age: [de-identified] y.o. Gender: male   
  
Procedure:  Neeraj Cheng 
  
Referring Physician:  Baron Vyas MD and Dr. Manuel Peralta 
Duty     Name Electrophysiologist      Cyrus Ferguson MD 
Monitor            Shirlene Ulloa RN Circulator         Dinh Gee, YASMANI 
  
  
PATIENT HISTORY 
  
Michelle kate [de-identified] y. o. male with history of paroxysmal atrial fibrillation, stroke/carotid occlusion presenting with blurred vision/unsteady gait who is undergoing neurological evaluation.  Telemetry has shown sinus bradycardia as well as episodes of second-degree type I AV block with junctional escape rhythm as well as rhythm strips highly suspicious for A-V dissociation.  His heart rate has been bradycardic as well at times with heart rates in the 40s to 50s.  Currently he is not on anticoagulation. He is now referred for pacemaker implantation.  
  
  
PROCEDURE 
  
The patient was brought to the Cardiac Electrophysiology laboratory in a post-absorptive, fasting state. Informed consent was obtained. A peripheral IV was in place. Continuous electrocardiographic, blood pressure, O2 saturation and  CO2 monitoring was initiated. Intravenous antibiotics were administered pre-operatively. Self-adhesive cardioversion patches were positioned on the chest.  Conscious sedation was effectuated according to protocol. The patient was then prepped and draped in the usual sterile fashion. A left subclavian venogram was performed and demonstrated patency of the vein. A 50/50 mixture of lidocaine (1%) with epinephrine and bupivicaine (0.5%) was utilized for local anesthesia. An incision was performed medial to the left delto-pectoral groove. Sharp and blunt dissection was carried down to the level of the pre-pectoralis fascia.  Hemostasis was maintained with electrocautery. Extra-thoracic, subclavian venous access was obtained twice and sheaths were placed using the modified Seldinger technique. Permanent pace/sense leads were advanced under fluoroscopic guidance and positioned in the right atrium and ventricle where stable pacing and sensing characteristics were encountered. The sheaths were peeled away and the leads were anchored to the pre-pectoralis fascia using O-ethibond non-absorbable suture material. A device pocket was then fashioned and flushed copiously with antibiotic solution. A pacemaker generator was connected to the leads and the generator was placed into the pocket. The device was secured to the pocket using O-ethibond, non-absorbable suture material. The pocket was then closed in three layers using 2-O PDS, 3-O monocryl absorbable suture material and a zipline. The skin was closed using a sub-cuticular technique. A bio-occlusive dressing were applied to the skin. The patient remained hemodynamically stable, tolerated the procedure well and was transferred in stable condition. There were no immediate complications encountered during the procedure. There was minimal blood loss and no specimen were removed.   
  
  
LEAD & GENERATOR DATA 
  
                Model #           Serial # 10 [facility]    [id number] Atrial Lead       [address]         4248    [id number] Ventricular Lead          [employer]         1935    [id number] 
  
  
PACE/SENSE DATA 
  
             Sensed Wave (mV)     Threshold (V)  Impedance (Ohms) Atrium  2.5       1.5       649 Ventricle          12.2     0.5       1266 
  
  
FINAL PROGRAMMING 
  
Mode   Lower Rate (ppm)       Upper Rate (ppm) DDDR  60        130 
  
  
MEDICATION SUMMARY 
  
Medication       Route   Unit      Total 
Gentamycin     IV         mg       80 Ancef   IV         grams  2 Fentanyl          IV         micrograms     50 Versed IV         grams  2 
   
  RADIOLOGY SUMMARY 
  
             Total 
Fluoro Time (minutes) 2.7 Dose Area Product (mGy)      32 
  
  
CONCLUSIONS 
  
1. Successful implantation of a dual-chamber pacemaker. 2. Doxycycline 100 mg po tid x 7 days 3. CXR now 4. Wound check in EP clinic in 14 days. 5. Follow up in EP clinic in 3 month or earlier if necessary. 6. Follow up with  Dwayne Martin MD and Dr. Ned Morales as scheduled.  
  
  
 Thank you, Dwayne Martin MD and Dr. Sanjuan Curling for involving me in the care of this extraordinarily pleasant male.  
Ebony Lopez MD 
Cardiac Electrophysiology / Cardiology

## 2020-02-10 ENCOUNTER — TELEPHONE (OUTPATIENT)
Dept: CARDIOLOGY CLINIC | Age: 80
End: 2020-02-10

## 2020-02-10 NOTE — TELEPHONE ENCOUNTER
Returned patient call, ID verified using two patient identifiers. Advised patient that per LAITH Abreu NP he may remove his sling, and once he has completed his course of antibiotics he may have an occasional alcoholic beverage. Patient states he has some bruising and swelling at implant site. Dressing is intact per patient. Suggested patient use ice packs to site for 10 minutes at a time 5-6 times per day. Reviewed arm restrictions with patient. He will contact the office with any further questions or concerns.

## 2020-02-10 NOTE — TELEPHONE ENCOUNTER
Patient had a pacemaker put it with Dr. Phillip Millan and has a few after care questions. Such as how long is he to wear a sling, when is he able to consume alcohol and a few other concerns. Please advise.     Phone: [phone]

## 2020-02-12 ENCOUNTER — TELEPHONE (OUTPATIENT)
Dept: CARDIOLOGY CLINIC | Age: 80
End: 2020-02-12

## 2020-02-12 NOTE — TELEPHONE ENCOUNTER
Patient stated that at about 5:30 this morning he bent over and his nose started to bleed and was previously advised to call Dr. Chalo Yee if this happened again. Please advise.     Phone #: [phone]  Thanks

## 2020-02-12 NOTE — TELEPHONE ENCOUNTER
Called patient, ID verified using two patient identifiers. Patient states his hose has been bleeding since 5:30 am this morning, bleeding has slowed with use of Afrin nasal spray and applying pressure. He has seen Dr. Lily Saleem and had cauterization done, he plans to call Dr. Lily Saleem this am @ 8:15 am when his office opens. He wanted Dr. Ramin Benitez to be aware of nose bleed. Informed patient that I would relay information to Dr. Ramin Benitez. Patient will keep us updated on what Dr. Lily Saleem advises.

## 2020-02-21 ENCOUNTER — TELEPHONE (OUTPATIENT)
Dept: CARDIOLOGY CLINIC | Age: 80
End: 2020-02-21

## 2020-02-21 ENCOUNTER — CLINICAL SUPPORT (OUTPATIENT)
Dept: CARDIOLOGY CLINIC | Age: 80
End: 2020-02-21

## 2020-02-21 ENCOUNTER — OFFICE VISIT (OUTPATIENT)
Dept: CARDIOLOGY CLINIC | Age: 80
End: 2020-02-21

## 2020-02-21 DIAGNOSIS — I48.0 PAROXYSMAL ATRIAL FIBRILLATION (HCC): ICD-10-CM

## 2020-02-21 DIAGNOSIS — Z95.0 PACEMAKER: Primary | ICD-10-CM

## 2020-02-21 DIAGNOSIS — Z95.0 CARDIAC PACEMAKER IN SITU: Primary | ICD-10-CM

## 2020-02-21 DIAGNOSIS — Z51.89 VISIT FOR WOUND CHECK: ICD-10-CM

## 2020-02-21 NOTE — PROGRESS NOTES
Patient presents for wound check post-device implantation (dual chamber pacemaker). The dressing was removed and the site was inspected. The site appeared to be well-healing without ecchymosis/tenderness/erythema. Denies pain, fevers, discharge. Device interrogation demonstrates normal functioning, with episodes of AF, longest lasting 48 hours with controlled ventricular rates. 91% . Plan:    Stop plavix/ASA. Start Eliqius 2.5mg (creatinine and age) for adequate stroke risk reduction and follow up as planned. Continue follow up in device clinic as planned.        Sharath Inman NP

## 2020-02-21 NOTE — TELEPHONE ENCOUNTER
Called patient, ID verified using two patient identifiers. Advised patient that per LAITH Montano NP she would like him to contact his insurance company to see what anticoagulant they will cover and  she will be glad to change the prescription. Patient will contact his insurance company and call the office on Monday with his findings.

## 2020-02-21 NOTE — TELEPHONE ENCOUNTER
Patient states that his medications were changed at his appointment today and he went to  the new medication and it is nearly $200 a month. Patient states that he can not afford that and would like to know if there is something else he can take. Please advise.     Phone: [phone]

## 2020-02-24 NOTE — TELEPHONE ENCOUNTER
Called patient, ID verified using two patient identifiers. Patient states he contacted his insurance company and Eliquis and Xarelto cost about the same. So he has called [facility] [place] and they have sent him the forms to fill out for patient assistance. We will provide patient with samples of Eliquis 2.5 mg when he is in the office on Wednesday 2/26/20. He will complete his portion of the patient assistance forms and bring it with him to his appointment and  I will complete our part and have LAITH Lopez NP sign it. Patient verbalizes understanding and is agreeable to this plan.

## 2020-02-26 ENCOUNTER — DOCUMENTATION ONLY (OUTPATIENT)
Dept: CARDIOLOGY CLINIC | Age: 80
End: 2020-02-26

## 2020-03-10 ENCOUNTER — DOCUMENTATION ONLY (OUTPATIENT)
Dept: CARDIOLOGY CLINIC | Age: 80
End: 2020-03-10

## 2020-03-10 NOTE — PROGRESS NOTES
Received letter from [employer] patient assistance foundation stating that patient is not eligible for assistance with his Eliquis because the product is covered by insurance. Application Case # [id number].

## 2020-03-11 ENCOUNTER — TELEPHONE (OUTPATIENT)
Dept: CARDIOLOGY CLINIC | Age: 80
End: 2020-03-11

## 2020-03-11 NOTE — TELEPHONE ENCOUNTER
Called patient, ID verified using two patient identifiers. Patient states he was not approved for patient assistance for his Eliquis. It would cost him $196 for 90 day supply and he can't afford that. I ask him to call his insurance and see if the cost of either Xarelto or Pradaxa would be more affordable for him, patient states he will call tomorrow and let us know what he finds out. He is aware that Coumadin is also an option but he prefers to use that as a last resort.

## 2020-03-11 NOTE — TELEPHONE ENCOUNTER
Patient is calling to see if an alternative medication for Eliquis can be prescribed due to the cost. He stated that he was not approved for financial assistance and cannot afford to pay for Eliquis. Please advise.     Phone #: [phone]  Thanks

## 2020-03-12 ENCOUNTER — TELEPHONE (OUTPATIENT)
Dept: CARDIOLOGY CLINIC | Age: 80
End: 2020-03-12

## 2020-03-12 NOTE — TELEPHONE ENCOUNTER
Patient was calling back to let you know that he spoke with the insurance and that the copay for xerelto 20 mg once daily would be $16.50 and the pradaxa 110 mg would $46.38 for a 90 day supply. Patient states that he would prefer the xerelto. Please advise.     Phone: [phone]

## 2020-03-13 DIAGNOSIS — I48.0 PAROXYSMAL ATRIAL FIBRILLATION (HCC): Primary | ICD-10-CM

## 2020-03-13 DIAGNOSIS — I48.0 PAROXYSMAL ATRIAL FIBRILLATION (HCC): ICD-10-CM

## 2020-03-13 NOTE — TELEPHONE ENCOUNTER
Nikolay Spangler MD  You 19 minutes ago (11:33 AM)      He needs an updated metabolic panel. If his CrCl is <50 he will need xarelto 15 mg daily; if CrCl > 50, then he will need 20 mg daily         Called patient, ID verified using two patient identifiers. Informed patient that Dr. Sven Yap is ok with him switching to Xarelto but he needs to have labs done to check his kidney function to determine the appropriate dose of Xarelto. Patient agreeable to this plan, lab slip faxed to Dr. Briseida Campos office and patient will go get lab done next Tuesday. Patient verbalizes understanding of all information and is aware that once we receive lab results we will send in prescription for Xarelto.

## 2020-03-19 LAB
BUN SERPL-MCNC: 15 MG/DL (ref 8–27)
BUN/CREAT SERPL: 15 (ref 10–24)
CALCIUM SERPL-MCNC: 9.4 MG/DL (ref 8.6–10.2)
CHLORIDE SERPL-SCNC: 100 MMOL/L (ref 96–106)
CO2 SERPL-SCNC: 24 MMOL/L (ref 20–29)
CREAT SERPL-MCNC: 1.03 MG/DL (ref 0.76–1.27)
GLUCOSE SERPL-MCNC: 176 MG/DL (ref 65–99)
POTASSIUM SERPL-SCNC: 4.3 MMOL/L (ref 3.5–5.2)
SODIUM SERPL-SCNC: 140 MMOL/L (ref 134–144)

## 2020-03-20 NOTE — TELEPHONE ENCOUNTER
Called patient, no answer. Left message to have patient return call to [phone]. BMP results from 3/19/20 reviewed by . Patient to discontinue Eliquis and start Xarelto 20 mg once daily with dinner.

## 2020-03-20 NOTE — TELEPHONE ENCOUNTER
Received call from patient, ID verified using two patient identifiers. Informed patient that Dr. Cristy Moran is recommending Xarelto 20 mg once daily with dinner to replace Eliquis. Patient verbalizes understanding of all information and will  prescription today.

## 2020-03-23 NOTE — TELEPHONE ENCOUNTER
Pt states the insurance told him to speak with Dr. Braxton Hooper about getting on a tier 2 medication. Patient only has enough of Eliquis to last him until 3/26.  Please advise      [facility]:[phone]

## 2020-03-23 NOTE — TELEPHONE ENCOUNTER
Called patient, ID verified using two patient identifiers. Patient states his Xarelto is going to cost him $290 per month. Insurance company is recommending a tier 2 medication. Will provide patient with 30 day free card and samples of Xarelto until able to have Dr. Milind Addison review medication options for patient. Patient agreeable to this plan and will  samples tomorrow. Requested Prescriptions     Signed Prescriptions Disp Refills    rivaroxaban (Xarelto) 20 mg tab tablet 28 Tab 0     Sig: Take 1 Tab by mouth daily (with dinner). Authorizing Provider: Forest Duane     Ordering User: Kia Hearn     Samples provided to patient per verbal order Dr. Phong Juarez.

## 2020-03-24 ENCOUNTER — TELEPHONE (OUTPATIENT)
Dept: CARDIOLOGY CLINIC | Age: 80
End: 2020-03-24

## 2020-03-24 NOTE — TELEPHONE ENCOUNTER
Patient came to office to  Xarelto samples and free 30 day card. I will mail him patient assistance application forms to complete for Xarelto. Patient agreeable to this plan. Prescription printed to send with patient assistance forms.

## 2020-03-25 ENCOUNTER — TELEPHONE (OUTPATIENT)
Dept: CARDIOLOGY CLINIC | Age: 80
End: 2020-03-25

## 2020-03-25 NOTE — TELEPHONE ENCOUNTER
Called [employer] and spoke with Jace,patient ID verified using two patient identifiers. Reviewed cost of anticoagulant medications with her. Patient has not meet his deductible that is why cost of Eliquis and Xarelto were so expensive. He still has $149 of his deductible to meet. Once the deductible is met Eliquis is $47/month, Xarelto is $47/month, Pradaxa is $100/month and Warfarin is $4.86/month. Once patient has met the deductible we can ask for a tier reduction for Eliquis or Xarelto and the cost will be $15-$20/month. Called patient, ID verified using two patient identifiers. Informed him of above information. He currently has samples of Xarelto. He will see how he tolerates the Xarelto compared to the Eliquis and contact our office in a few weeks to let us know which medication he prefers. And then he will  a prescription for either the Eliquis or Xarelto, he states he can afford the one time cost of $190.

## 2020-03-25 NOTE — TELEPHONE ENCOUNTER
----- Message from Anu Ervin MD sent at 3/25/2020  3:03 PM EDT -----  Please have him contact his insurance company to compare cost of coumadin, pradaxa, eliquis and xarelto.   ----- Message -----  From: Seema Langston LPN  Sent: 7/21/3525   4:10 PM EDT  To: Beatriz Iyer NP, Anu Ervin MD    Called patient, ID verified using two patient identifiers. Patient states his Xarelto is going to cost him $290 per month. Insurance company is recommending a tier 2 medication. Will provide patient with 30 day free card and samples of Xarelto until able to have Dr. Nisha Ware review medication options for patient. Patient agreeable to this plan and will  samples tomorrow.    Thank you,  Sammi

## 2020-05-11 ENCOUNTER — TELEPHONE (OUTPATIENT)
Dept: CARDIOLOGY CLINIC | Age: 80
End: 2020-05-11

## 2020-05-13 ENCOUNTER — VIRTUAL VISIT (OUTPATIENT)
Dept: CARDIOLOGY CLINIC | Age: 80
End: 2020-05-13

## 2020-05-13 ENCOUNTER — OFFICE VISIT (OUTPATIENT)
Dept: CARDIOLOGY CLINIC | Age: 80
End: 2020-05-13

## 2020-05-13 DIAGNOSIS — Z95.0 PACEMAKER: Primary | ICD-10-CM

## 2020-05-13 DIAGNOSIS — Z95.0 CARDIAC PACEMAKER IN SITU: Primary | ICD-10-CM

## 2020-05-13 DIAGNOSIS — I44.1 SECOND DEGREE AV BLOCK: ICD-10-CM

## 2020-05-13 DIAGNOSIS — I48.0 PAROXYSMAL ATRIAL FIBRILLATION (HCC): ICD-10-CM

## 2020-05-13 DIAGNOSIS — E11.9 TYPE 2 DIABETES MELLITUS WITHOUT COMPLICATION, WITHOUT LONG-TERM CURRENT USE OF INSULIN (HCC): ICD-10-CM

## 2020-05-13 NOTE — PROGRESS NOTES
VIRTUAL VISIT DOCUMENTATION Pursuant to the emergency declaration under the [facility], [facility] waiver authority and the [employer] Act, this Virtual  Visit was conducted, with patient's consent, to reduce the patient's risk of exposure to COVID-19 and provide continuity of care for an established patient. Services were provided through audio synchronous discussion virtually to substitute for in-person clinic visit. HISTORY OF PRESENTING ILLNESS Jake Bowman is a [de-identified] y.o. male who was seen by synchronous (real-time) audio-video technology on 5/13/2020 with history of paroxysmal atrial fibrillation, stroke/carotid occlusion who had presented with blurred vision/unsteady gait and was undergoing neurological evaluation. 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  His heart rate had been bradycardic as well at times with heart rates in the 40s to 50s.  He underwent dual-chamber pacemaker implantation and now presents for follow-up. Interrogation revealed normal device functioning. He was noted to have an episode of nonsustained ventricular tachycardia lasting up to 15 seconds. However he denies chest pain, fatigue; in fact he reports he has lost weight and has an elevated energy level since his procedure. His incision site has healed well. ACTIVE PROBLEM LIST Patient Active Problem List  
 Diagnosis Date Noted  Type 2 diabetes mellitus without complication ([facility] 63/20/3163  Blurry vision 02/04/2020  Unsteady gait 02/04/2020  A-fib ([address] 02/04/2020  
 History of CVA (cerebrovascular accident) 02/04/2020  Second degree AV block 02/04/2020 PAST MEDICAL HISTORY Past Medical History:  
Diagnosis Date  Atrial fibrillation ([address]  Stroke ([facility] PAST SURGICAL HISTORY Past Surgical History:  
Procedure Laterality Date  [place] NEW/RPLCMT PRM PM W/TRANSV ELTRD ATRIAL&VENT N/A 2/6/2020 INSERT PPM DUAL performed by Sincere Friedman MD at [address] [facility] LAB ALLERGIES Allergies Allergen Reactions  Ampicillin Nausea and Vomiting  Iodine Hives, Itching and Nausea and Vomiting FAMILY HISTORY Family History Adopted: Yes  
 negative for cardiac disease SOCIAL HISTORY Social History Socioeconomic History  Marital status: SINGLE Spouse name: Not on file  Number of children: Not on file  Years of education: Not on file  Highest education level: Not on file Tobacco Use  Smoking status: Former Smoker  Smokeless tobacco: Former User Quit date: 2004 Substance and Sexual Activity  Alcohol use: Yes Comment: beer every other day  Drug use: No  
 
 
 
MEDICATIONS Current Outpatient Medications Medication Sig  
 rivaroxaban (Xarelto) 20 mg tab tablet Take 1 Tab by mouth daily (with dinner).  lisinopril (PRINIVIL, ZESTRIL) 10 mg tablet Take 10 mg by mouth daily.  simvastatin (ZOCOR) 40 mg tablet Take 40 mg by mouth nightly. No current facility-administered medications for this visit. I have reviewed the nurses notes, vitals, problem list, allergy list, medical history, family, social history and medications. REVIEW OF SYMPTOMS General: Pt denies excessive weight gain or loss. Pt is able to conduct ADL's HEENT: Denies blurred vision, headaches, hearing loss, epistaxis and difficulty swallowing. Respiratory: Denies cough, congestion, shortness of breath, LARA, wheezing or stridor. Cardiovascular: Denies precordial pain, palpitations, edema or PND Gastrointestinal: Denies poor appetite, indigestion, abdominal pain or blood in stool Genitourinary: Denies hematuria, dysuria, increased urinary frequency Musculoskeletal: Denies joint pain or swelling from muscles or joints Neurologic: Denies tremor, paresthesias, headache, or sensory motor disturbance Psychiatric: Denies confusion, insomnia, depression Integumentray: Denies rash, itching or ulcers. Hematologic: Denies easy bruising, bleeding PHYSICAL EXAMINATION Due to this being a TeleHealth evaluation, PE was not performed DIAGNOSTIC DATA LABORATORY DATA Lab Results Component Value Date/Time WBC 8.5 02/04/2020 03:50 PM  
 Hemoglobin (POC) 13.3 09/21/2010 12:56 PM  
 HGB 12.1 02/04/2020 03:50 PM  
 Hematocrit (POC) 39 09/21/2010 12:56 PM  
 HCT 35.9 (L) 02/04/2020 03:50 PM  
 PLATELET 257 16/31/9102 03:50 PM  
 MCV 90.9 02/04/2020 03:50 PM  
  
Lab Results Component Value Date/Time Sodium 140 03/18/2020 08:59 AM  
 Potassium 4.3 03/18/2020 08:59 AM  
 Chloride 100 03/18/2020 08:59 AM  
 CO2 24 03/18/2020 08:59 AM  
 Anion gap 8 02/04/2020 03:50 PM  
 Glucose 176 (H) 03/18/2020 08:59 AM  
 BUN 15 03/18/2020 08:59 AM  
 Creatinine 1.03 03/18/2020 08:59 AM  
 BUN/Creatinine ratio 15 03/18/2020 08:59 AM  
 GFR est AA 79 03/18/2020 08:59 AM  
 GFR est non-AA 68 03/18/2020 08:59 AM  
 Calcium 9.4 03/18/2020 08:59 AM  
 Bilirubin, total 1.0 08/01/2019 09:23 PM  
 AST (SGOT) 25 08/01/2019 09:23 PM  
 Alk. phosphatase 67 08/01/2019 09:23 PM  
 Protein, total 7.8 08/01/2019 09:23 PM  
 Albumin 3.9 08/01/2019 09:23 PM  
 Globulin 3.9 08/01/2019 09:23 PM  
 A-G Ratio 1.0 (L) 08/01/2019 09:23 PM  
 ALT (SGPT) 22 08/01/2019 09:23 PM  
  
 
 
 ASSESSMENT 1. Pacemaker A. [employer] B. Dual chamber C. Left sided 2. Bradycardia 3. Diabetes mellitus 4. Atrial fibrillation A. Paroxysmal 
5. NSVT ICD-10-CM ICD-9-CM 1. Pacemaker Z95.0 V45.01   
2. Paroxysmal atrial fibrillation (HCC) I48.0 427.31   
3.  Type 2 diabetes mellitus without complication, without long-term current use of insulin (HCC) E11.9 250.00   
4. Second degree AV block I44.1 426.13 No orders of the defined types were placed in this encounter. PLAN Continue follow-up in device clinic We discussed the expected course, resolution and complications of the diagnosis(es) in detail. Medication risks, benefits, costs, interactions, and alternatives were discussed as indicated. I advised him to contact the office if his condition worsens, changes or fails to improve as anticipated. He expressed understanding with the diagnosis(es) and plan FOLLOW-UP  
 
1 year Patient was made aware and verbalized understanding that an appointment will be scheduled for them for a virtual visit and/or office visit within the above time frame. Patient understanding his/her responsibility to call and change time/date if he/she so chooses. Thank you, Kamryn Kruse MD and Dr. Jayden Saxena for allowing me to participate in the care of this extraordinarily pleasant male. Please do not hesitate to contact me for further questions/concerns. Christine Haywood MD 
Cardiac Electrophysiology / Cardiology [address] [place] [address], [address] [place] [address], [address] 
[phone] / [phone] Fax  [phone] / [phone] Fax Greater than 20 minutes was spent in direct audio patient care, planning and chart review. This visit was conducted using [employer] telemedicine services. 77

## 2020-05-14 ENCOUNTER — TELEPHONE (OUTPATIENT)
Dept: CARDIOLOGY CLINIC | Age: 80
End: 2020-05-14

## 2020-05-14 NOTE — TELEPHONE ENCOUNTER
Called patient, ID verified using two patient identifiers. Informed patient that per [employer] Patient [employer] he does not qualify for any assistance with his Xarelto. Because he has insurance coverage for the Xarelto he does not meet the eligibility requirements for assistance at this time. Patient verbalizes understanding of all information. We discussed Coumadin/Warfarin as an affordable option, patient aware of dietary restrictions and need for monitoring of PT/INR if he decides to switch to Coumadin. Patient states he will try to find a way to afford the Xarelto. He will contact the office with any further questions or concerns.

## 2020-08-20 ENCOUNTER — OFFICE VISIT (OUTPATIENT)
Dept: CARDIOLOGY CLINIC | Age: 80
End: 2020-08-20
Payer: MEDICARE

## 2020-08-20 DIAGNOSIS — Z95.0 PACEMAKER: Primary | ICD-10-CM

## 2020-08-20 PROCEDURE — 93296 REM INTERROG EVL PM/IDS: CPT

## 2020-08-20 PROCEDURE — 93294 REM INTERROG EVL PM/LDLS PM: CPT

## 2020-09-02 ENCOUNTER — TELEPHONE (OUTPATIENT)
Dept: CARDIOLOGY CLINIC | Age: 80
End: 2020-09-02

## 2020-09-02 NOTE — TELEPHONE ENCOUNTER
Patient stated that when he was golfing this morning he started to feel dizzy. He checked his pulse and it was 62. Please advise.     Phone #: [phone]  Thanks

## 2020-09-03 ENCOUNTER — TELEPHONE (OUTPATIENT)
Dept: CARDIOLOGY CLINIC | Age: 80
End: 2020-09-03

## 2020-09-03 NOTE — TELEPHONE ENCOUNTER
PTIDx2 stated he had 2 episodes of dizziness yesterday during golf. States he drinks only water while golfing. He is feeling better today and will call back if dizziness returns.

## 2020-12-04 ENCOUNTER — TELEPHONE (OUTPATIENT)
Dept: CARDIOLOGY CLINIC | Age: 80
End: 2020-12-04

## 2020-12-04 NOTE — TELEPHONE ENCOUNTER
Pt. Called to discuss results letter. He just wanted to make sure he was ok. Informed him if remote showed concerning results he would be called. He expressed appreciation. No further action.

## 2021-01-01 ENCOUNTER — APPOINTMENT (OUTPATIENT)
Dept: CT IMAGING | Age: 81
DRG: 603 | End: 2021-01-01
Attending: PHYSICIAN ASSISTANT
Payer: MEDICARE

## 2021-01-01 ENCOUNTER — TELEPHONE (OUTPATIENT)
Dept: CARDIOLOGY CLINIC | Age: 81
End: 2021-01-01

## 2021-01-01 ENCOUNTER — APPOINTMENT (OUTPATIENT)
Dept: VASCULAR SURGERY | Age: 81
DRG: 603 | End: 2021-01-01
Attending: INTERNAL MEDICINE
Payer: MEDICARE

## 2021-01-01 ENCOUNTER — OFFICE VISIT (OUTPATIENT)
Dept: CARDIOLOGY CLINIC | Age: 81
End: 2021-01-01
Payer: MEDICARE

## 2021-01-01 ENCOUNTER — CLINICAL SUPPORT (OUTPATIENT)
Dept: CARDIOLOGY CLINIC | Age: 81
End: 2021-01-01
Payer: MEDICARE

## 2021-01-01 ENCOUNTER — APPOINTMENT (OUTPATIENT)
Dept: GENERAL RADIOLOGY | Age: 81
DRG: 603 | End: 2021-01-01
Attending: PHYSICIAN ASSISTANT
Payer: MEDICARE

## 2021-01-01 ENCOUNTER — HOSPITAL ENCOUNTER (EMERGENCY)
Age: 81
Discharge: HOME | End: 2021-11-19
Attending: EMERGENCY MEDICINE
Payer: MEDICARE

## 2021-01-01 ENCOUNTER — HOSPITAL ENCOUNTER (INPATIENT)
Age: 81
LOS: 1 days | Discharge: HOME | DRG: 603 | End: 2021-11-26
Attending: STUDENT IN AN ORGANIZED HEALTH CARE EDUCATION/TRAINING PROGRAM | Admitting: INTERNAL MEDICINE
Payer: MEDICARE

## 2021-01-01 ENCOUNTER — OFFICE VISIT (OUTPATIENT)
Dept: CARDIOLOGY CLINIC | Age: 81
End: 2021-01-01

## 2021-01-01 ENCOUNTER — DOCUMENTATION ONLY (OUTPATIENT)
Dept: CARDIOLOGY CLINIC | Age: 81
End: 2021-01-01

## 2021-01-01 VITALS
OXYGEN SATURATION: 95 % | WEIGHT: 162.4 LBS | RESPIRATION RATE: 16 BRPM | HEART RATE: 80 BPM | BODY MASS INDEX: 24.61 KG/M2 | HEIGHT: 68 IN | DIASTOLIC BLOOD PRESSURE: 62 MMHG | SYSTOLIC BLOOD PRESSURE: 108 MMHG

## 2021-01-01 VITALS
OXYGEN SATURATION: 93 % | TEMPERATURE: 97.7 F | SYSTOLIC BLOOD PRESSURE: 113 MMHG | DIASTOLIC BLOOD PRESSURE: 73 MMHG | RESPIRATION RATE: 18 BRPM | HEIGHT: 68 IN | WEIGHT: 162 LBS | HEART RATE: 88 BPM | BODY MASS INDEX: 24.55 KG/M2

## 2021-01-01 VITALS
DIASTOLIC BLOOD PRESSURE: 90 MMHG | TEMPERATURE: 97.7 F | HEART RATE: 92 BPM | HEIGHT: 68 IN | OXYGEN SATURATION: 95 % | SYSTOLIC BLOOD PRESSURE: 159 MMHG | WEIGHT: 162 LBS | BODY MASS INDEX: 24.55 KG/M2 | RESPIRATION RATE: 18 BRPM

## 2021-01-01 DIAGNOSIS — Z95.0 CARDIAC PACEMAKER IN SITU: Primary | ICD-10-CM

## 2021-01-01 DIAGNOSIS — I44.1 SECOND DEGREE AV BLOCK: ICD-10-CM

## 2021-01-01 DIAGNOSIS — I47.29 NSVT (NONSUSTAINED VENTRICULAR TACHYCARDIA): ICD-10-CM

## 2021-01-01 DIAGNOSIS — L03.115 CELLULITIS OF RIGHT LOWER LEG: Primary | ICD-10-CM

## 2021-01-01 DIAGNOSIS — E11.9 TYPE 2 DIABETES MELLITUS WITHOUT COMPLICATION, WITHOUT LONG-TERM CURRENT USE OF INSULIN (HCC): ICD-10-CM

## 2021-01-01 DIAGNOSIS — I48.0 PAROXYSMAL ATRIAL FIBRILLATION (HCC): ICD-10-CM

## 2021-01-01 DIAGNOSIS — L03.115 CELLULITIS OF RIGHT LOWER EXTREMITY: Primary | ICD-10-CM

## 2021-01-01 LAB
ANION GAP SERPL CALC-SCNC: 3 MMOL/L (ref 5–15)
ANION GAP SERPL CALC-SCNC: 6 MMOL/L (ref 5–15)
APPEARANCE SNV: ABNORMAL
BACTERIA SPEC CULT: NORMAL
BACTERIA SPEC CULT: NORMAL
BASOPHILS # BLD: 0 K/UL (ref 0–0.1)
BASOPHILS # BLD: 0 K/UL (ref 0–0.1)
BASOPHILS # BLD: 0.1 K/UL (ref 0–0.1)
BASOPHILS NFR BLD: 0 % (ref 0–1)
BASOPHILS NFR BLD: 1 % (ref 0–1)
BASOPHILS NFR BLD: 1 % (ref 0–1)
BNP SERPL-MCNC: 573 PG/ML
BODY FLD TYPE: NORMAL
BUN SERPL-MCNC: 14 MG/DL (ref 6–20)
BUN SERPL-MCNC: 15 MG/DL (ref 6–20)
BUN SERPL-MCNC: 25 MG/DL (ref 6–20)
BUN/CREAT SERPL: 15 (ref 12–20)
BUN/CREAT SERPL: 16 (ref 12–20)
CALCIUM SERPL-MCNC: 8.5 MG/DL (ref 8.5–10.1)
CALCIUM SERPL-MCNC: 9.4 MG/DL (ref 8.5–10.1)
CHLORIDE SERPL-SCNC: 104 MMOL/L (ref 97–108)
CHLORIDE SERPL-SCNC: 106 MMOL/L (ref 97–108)
CO2 SERPL-SCNC: 24 MMOL/L (ref 21–32)
CO2 SERPL-SCNC: 28 MMOL/L (ref 21–32)
COLOR SNV: ABNORMAL
COMMENT, HOLDF: NORMAL
COMMENT, HOLDF: NORMAL
CREAT SERPL-MCNC: 0.88 MG/DL (ref 0.7–1.3)
CREAT SERPL-MCNC: 1.02 MG/DL (ref 0.7–1.3)
CREAT SERPL-MCNC: 1.21 MG/DL (ref 0.7–1.3)
CRP SERPL-MCNC: 3.34 MG/DL (ref 0–0.6)
CRP SERPL-MCNC: 4.77 MG/DL (ref 0–0.6)
CRYSTALS FLD MICRO: NORMAL
DIFFERENTIAL METHOD BLD: ABNORMAL
EOSINOPHIL # BLD: 0 K/UL (ref 0–0.4)
EOSINOPHIL # BLD: 0.1 K/UL (ref 0–0.4)
EOSINOPHIL # BLD: 0.3 K/UL (ref 0–0.4)
EOSINOPHIL NFR BLD: 0 % (ref 0–7)
EOSINOPHIL NFR BLD: 1 % (ref 0–7)
EOSINOPHIL NFR BLD: 5 % (ref 0–7)
EOSINOPHIL NFR FLD MANUAL: 0 %
ERYTHROCYTE [DISTWIDTH] IN BLOOD BY AUTOMATED COUNT: 13.2 % (ref 11.5–14.5)
ERYTHROCYTE [DISTWIDTH] IN BLOOD BY AUTOMATED COUNT: 13.2 % (ref 11.5–14.5)
ERYTHROCYTE [DISTWIDTH] IN BLOOD BY AUTOMATED COUNT: 13.3 % (ref 11.5–14.5)
ERYTHROCYTE [SEDIMENTATION RATE] IN BLOOD: 37 MM/HR (ref 0–20)
GLUCOSE BLD STRIP.AUTO-MCNC: 114 MG/DL (ref 65–117)
GLUCOSE BLD STRIP.AUTO-MCNC: 119 MG/DL (ref 65–117)
GLUCOSE BLD STRIP.AUTO-MCNC: 138 MG/DL (ref 65–117)
GLUCOSE BLD STRIP.AUTO-MCNC: 138 MG/DL (ref 65–117)
GLUCOSE BLD STRIP.AUTO-MCNC: 147 MG/DL (ref 65–117)
GLUCOSE BLD STRIP.AUTO-MCNC: 150 MG/DL (ref 65–117)
GLUCOSE BLD STRIP.AUTO-MCNC: 154 MG/DL (ref 65–117)
GLUCOSE BLD STRIP.AUTO-MCNC: 155 MG/DL (ref 65–117)
GLUCOSE BLD STRIP.AUTO-MCNC: 156 MG/DL (ref 65–117)
GLUCOSE BLD STRIP.AUTO-MCNC: 157 MG/DL (ref 65–117)
GLUCOSE BLD STRIP.AUTO-MCNC: 168 MG/DL (ref 65–117)
GLUCOSE BLD STRIP.AUTO-MCNC: 171 MG/DL (ref 65–117)
GLUCOSE BLD STRIP.AUTO-MCNC: 171 MG/DL (ref 65–117)
GLUCOSE BLD STRIP.AUTO-MCNC: 194 MG/DL (ref 65–117)
GLUCOSE BLD STRIP.AUTO-MCNC: 224 MG/DL (ref 65–117)
GLUCOSE SERPL-MCNC: 155 MG/DL (ref 65–100)
GLUCOSE SERPL-MCNC: 159 MG/DL (ref 65–100)
GRAM STN SPEC: NORMAL
GRAM STN SPEC: NORMAL
HCT VFR BLD AUTO: 37.7 % (ref 36.6–50.3)
HCT VFR BLD AUTO: 41.5 % (ref 36.6–50.3)
HCT VFR BLD AUTO: 43.5 % (ref 36.6–50.3)
HGB BLD-MCNC: 12.7 G/DL (ref 12.1–17)
HGB BLD-MCNC: 14.1 G/DL (ref 12.1–17)
HGB BLD-MCNC: 14.6 G/DL (ref 12.1–17)
IMM GRANULOCYTES # BLD AUTO: 0.1 K/UL (ref 0–0.04)
IMM GRANULOCYTES NFR BLD AUTO: 1 % (ref 0–0.5)
LYMPHOCYTES # BLD: 1.5 K/UL (ref 0.8–3.5)
LYMPHOCYTES # BLD: 1.6 K/UL (ref 0.8–3.5)
LYMPHOCYTES # BLD: 1.8 K/UL (ref 0.8–3.5)
LYMPHOCYTES NFR BLD: 18 % (ref 12–49)
LYMPHOCYTES NFR BLD: 22 % (ref 12–49)
LYMPHOCYTES NFR BLD: 23 % (ref 12–49)
LYMPHOCYTES NFR FLD: 72 %
MCH RBC QN AUTO: 29.6 PG (ref 26–34)
MCH RBC QN AUTO: 29.7 PG (ref 26–34)
MCH RBC QN AUTO: 29.9 PG (ref 26–34)
MCHC RBC AUTO-ENTMCNC: 33.6 G/DL (ref 30–36.5)
MCHC RBC AUTO-ENTMCNC: 33.7 G/DL (ref 30–36.5)
MCHC RBC AUTO-ENTMCNC: 34 G/DL (ref 30–36.5)
MCV RBC AUTO: 87.9 FL (ref 80–99)
MCV RBC AUTO: 88.1 FL (ref 80–99)
MCV RBC AUTO: 88.3 FL (ref 80–99)
MESOTHL CELL NFR FLD: 24 %
MONOCYTES # BLD: 0.7 K/UL (ref 0–1)
MONOCYTES # BLD: 1 K/UL (ref 0–1)
MONOCYTES # BLD: 1 K/UL (ref 0–1)
MONOCYTES NFR BLD: 10 % (ref 5–13)
MONOCYTES NFR BLD: 11 % (ref 5–13)
MONOCYTES NFR BLD: 12 % (ref 5–13)
MONOS+MACROS NFR FLD: 4 %
NEUTROPHILS NFR FLD: 0 %
NEUTS SEG # BLD: 4 K/UL (ref 1.8–8)
NEUTS SEG # BLD: 5.1 K/UL (ref 1.8–8)
NEUTS SEG # BLD: 6.2 K/UL (ref 1.8–8)
NEUTS SEG NFR BLD: 60 % (ref 32–75)
NEUTS SEG NFR BLD: 65 % (ref 32–75)
NEUTS SEG NFR BLD: 68 % (ref 32–75)
NRBC # BLD: 0 K/UL (ref 0–0.01)
NRBC BLD-RTO: 0 PER 100 WBC
OTHER CELL,FOTHC: 0 %
PLATELET # BLD AUTO: 238 K/UL (ref 150–400)
PLATELET # BLD AUTO: 243 K/UL (ref 150–400)
PLATELET # BLD AUTO: 292 K/UL (ref 150–400)
PMV BLD AUTO: 10.3 FL (ref 8.9–12.9)
PMV BLD AUTO: 10.5 FL (ref 8.9–12.9)
PMV BLD AUTO: 9.9 FL (ref 8.9–12.9)
POTASSIUM SERPL-SCNC: 4 MMOL/L (ref 3.5–5.1)
POTASSIUM SERPL-SCNC: 4.4 MMOL/L (ref 3.5–5.1)
RBC # BLD AUTO: 4.27 M/UL (ref 4.1–5.7)
RBC # BLD AUTO: 4.72 M/UL (ref 4.1–5.7)
RBC # BLD AUTO: 4.94 M/UL (ref 4.1–5.7)
RBC # SNV: >100 /CU MM
SAMPLES BEING HELD,HOLD: NORMAL
SAMPLES BEING HELD,HOLD: NORMAL
SERVICE CMNT-IMP: ABNORMAL
SERVICE CMNT-IMP: NORMAL
SERVICE CMNT-IMP: NORMAL
SODIUM SERPL-SCNC: 135 MMOL/L (ref 136–145)
SODIUM SERPL-SCNC: 136 MMOL/L (ref 136–145)
SPECIMEN SOURCE FLD: ABNORMAL
TOTAL CELLS COUNTED SPEC: 25
URATE SERPL-MCNC: 5.4 MG/DL (ref 3.5–7.2)
VANCOMYCIN SERPL-MCNC: 17.2 UG/ML
VANCOMYCIN SERPL-MCNC: 8.3 UG/ML
WBC # BLD AUTO: 6.7 K/UL (ref 4.1–11.1)
WBC # BLD AUTO: 8 K/UL (ref 4.1–11.1)
WBC # BLD AUTO: 8.9 K/UL (ref 4.1–11.1)
WBC # SNV: 60 /CU MM (ref 0–150)

## 2021-01-01 PROCEDURE — 36415 COLL VENOUS BLD VENIPUNCTURE: CPT

## 2021-01-01 PROCEDURE — G8427 DOCREV CUR MEDS BY ELIG CLIN: HCPCS | Performed by: NURSE PRACTITIONER

## 2021-01-01 PROCEDURE — G0378 HOSPITAL OBSERVATION PER HR: HCPCS

## 2021-01-01 PROCEDURE — 74011250637 HC RX REV CODE- 250/637: Performed by: EMERGENCY MEDICINE

## 2021-01-01 PROCEDURE — 84550 ASSAY OF BLOOD/URIC ACID: CPT

## 2021-01-01 PROCEDURE — 96376 TX/PRO/DX INJ SAME DRUG ADON: CPT

## 2021-01-01 PROCEDURE — 73610 X-RAY EXAM OF ANKLE: CPT

## 2021-01-01 PROCEDURE — 74011636637 HC RX REV CODE- 636/637: Performed by: INTERNAL MEDICINE

## 2021-01-01 PROCEDURE — 96375 TX/PRO/DX INJ NEW DRUG ADDON: CPT

## 2021-01-01 PROCEDURE — 85025 COMPLETE CBC W/AUTO DIFF WBC: CPT

## 2021-01-01 PROCEDURE — 82962 GLUCOSE BLOOD TEST: CPT

## 2021-01-01 PROCEDURE — 74011250636 HC RX REV CODE- 250/636: Performed by: INTERNAL MEDICINE

## 2021-01-01 PROCEDURE — 97165 OT EVAL LOW COMPLEX 30 MIN: CPT | Performed by: OCCUPATIONAL THERAPIST

## 2021-01-01 PROCEDURE — 74011000250 HC RX REV CODE- 250: Performed by: INTERNAL MEDICINE

## 2021-01-01 PROCEDURE — 83880 ASSAY OF NATRIURETIC PEPTIDE: CPT

## 2021-01-01 PROCEDURE — 93280 PM DEVICE PROGR EVAL DUAL: CPT | Performed by: INTERNAL MEDICINE

## 2021-01-01 PROCEDURE — 74011250636 HC RX REV CODE- 250/636: Performed by: STUDENT IN AN ORGANIZED HEALTH CARE EDUCATION/TRAINING PROGRAM

## 2021-01-01 PROCEDURE — 80202 ASSAY OF VANCOMYCIN: CPT

## 2021-01-01 PROCEDURE — 85652 RBC SED RATE AUTOMATED: CPT

## 2021-01-01 PROCEDURE — 74011250637 HC RX REV CODE- 250/637: Performed by: INTERNAL MEDICINE

## 2021-01-01 PROCEDURE — 82565 ASSAY OF CREATININE: CPT

## 2021-01-01 PROCEDURE — 99282 EMERGENCY DEPT VISIT SF MDM: CPT

## 2021-01-01 PROCEDURE — 86140 C-REACTIVE PROTEIN: CPT

## 2021-01-01 PROCEDURE — 93971 EXTREMITY STUDY: CPT

## 2021-01-01 PROCEDURE — 84520 ASSAY OF UREA NITROGEN: CPT

## 2021-01-01 PROCEDURE — G8420 CALC BMI NORM PARAMETERS: HCPCS | Performed by: NURSE PRACTITIONER

## 2021-01-01 PROCEDURE — 97116 GAIT TRAINING THERAPY: CPT

## 2021-01-01 PROCEDURE — 89060 EXAM SYNOVIAL FLUID CRYSTALS: CPT

## 2021-01-01 PROCEDURE — G8536 NO DOC ELDER MAL SCRN: HCPCS | Performed by: NURSE PRACTITIONER

## 2021-01-01 PROCEDURE — 87070 CULTURE OTHR SPECIMN AEROBIC: CPT

## 2021-01-01 PROCEDURE — 74011000250 HC RX REV CODE- 250: Performed by: STUDENT IN AN ORGANIZED HEALTH CARE EDUCATION/TRAINING PROGRAM

## 2021-01-01 PROCEDURE — 80048 BASIC METABOLIC PNL TOTAL CA: CPT

## 2021-01-01 PROCEDURE — 93296 REM INTERROG EVL PM/IDS: CPT | Performed by: INTERNAL MEDICINE

## 2021-01-01 PROCEDURE — 1101F PT FALLS ASSESS-DOCD LE1/YR: CPT | Performed by: NURSE PRACTITIONER

## 2021-01-01 PROCEDURE — 96365 THER/PROPH/DIAG IV INF INIT: CPT

## 2021-01-01 PROCEDURE — 89050 BODY FLUID CELL COUNT: CPT

## 2021-01-01 PROCEDURE — 97161 PT EVAL LOW COMPLEX 20 MIN: CPT

## 2021-01-01 PROCEDURE — 99284 EMERGENCY DEPT VISIT MOD MDM: CPT

## 2021-01-01 PROCEDURE — 93296 REM INTERROG EVL PM/IDS: CPT | Performed by: NURSE PRACTITIONER

## 2021-01-01 PROCEDURE — 65270000029 HC RM PRIVATE

## 2021-01-01 PROCEDURE — 93294 REM INTERROG EVL PM/LDLS PM: CPT | Performed by: INTERNAL MEDICINE

## 2021-01-01 PROCEDURE — 93294 REM INTERROG EVL PM/LDLS PM: CPT | Performed by: NURSE PRACTITIONER

## 2021-01-01 PROCEDURE — 96366 THER/PROPH/DIAG IV INF ADDON: CPT

## 2021-01-01 PROCEDURE — G8432 DEP SCR NOT DOC, RNG: HCPCS | Performed by: NURSE PRACTITIONER

## 2021-01-01 PROCEDURE — 99214 OFFICE O/P EST MOD 30 MIN: CPT | Performed by: NURSE PRACTITIONER

## 2021-01-01 PROCEDURE — 97535 SELF CARE MNGMENT TRAINING: CPT | Performed by: OCCUPATIONAL THERAPIST

## 2021-01-01 PROCEDURE — 73700 CT LOWER EXTREMITY W/O DYE: CPT

## 2021-01-01 RX ORDER — VANCOMYCIN 1.75 GRAM/500 ML IN 0.9 % SODIUM CHLORIDE INTRAVENOUS
1750
Status: COMPLETED | OUTPATIENT
Start: 2021-01-01 | End: 2021-01-01

## 2021-01-01 RX ORDER — PREDNISONE 20 MG/1
TABLET ORAL
Qty: 9 TABLET | Refills: 0 | Status: SHIPPED | OUTPATIENT
Start: 2021-01-01

## 2021-01-01 RX ORDER — COLCHICINE 0.6 MG/1
0.6 TABLET ORAL DAILY
Status: DISCONTINUED | OUTPATIENT
Start: 2021-01-01 | End: 2021-01-01

## 2021-01-01 RX ORDER — TRAMADOL HYDROCHLORIDE 50 MG/1
50 TABLET ORAL
Qty: 30 TABLET | Refills: 0 | Status: SHIPPED | OUTPATIENT
Start: 2021-01-01 | End: 2021-01-01

## 2021-01-01 RX ORDER — ACETAMINOPHEN 325 MG/1
650 TABLET ORAL
Status: DISCONTINUED | OUTPATIENT
Start: 2021-01-01 | End: 2021-01-01 | Stop reason: HOSPADM

## 2021-01-01 RX ORDER — CLOTRIMAZOLE AND BETAMETHASONE DIPROPIONATE 10; .64 MG/G; MG/G
CREAM TOPICAL 2 TIMES DAILY
COMMUNITY

## 2021-01-01 RX ORDER — PREDNISONE 20 MG/1
40 TABLET ORAL
Status: DISCONTINUED | OUTPATIENT
Start: 2021-01-01 | End: 2021-01-01 | Stop reason: HOSPADM

## 2021-01-01 RX ORDER — RIVAROXABAN 20 MG/1
TABLET, FILM COATED ORAL
Qty: 30 TAB | Refills: 0 | OUTPATIENT
Start: 2021-01-01

## 2021-01-01 RX ORDER — SODIUM CHLORIDE 0.9 % (FLUSH) 0.9 %
5-40 SYRINGE (ML) INJECTION AS NEEDED
Status: DISCONTINUED | OUTPATIENT
Start: 2021-01-01 | End: 2021-01-01 | Stop reason: HOSPADM

## 2021-01-01 RX ORDER — DOXYCYCLINE HYCLATE 100 MG
100 TABLET ORAL 2 TIMES DAILY
Qty: 14 TABLET | Refills: 0 | Status: SHIPPED | OUTPATIENT
Start: 2021-01-01 | End: 2021-01-01

## 2021-01-01 RX ORDER — DEXTROSE 50 % IN WATER (D50W) INTRAVENOUS SYRINGE
12.5-25 AS NEEDED
Status: DISCONTINUED | OUTPATIENT
Start: 2021-01-01 | End: 2021-01-01 | Stop reason: HOSPADM

## 2021-01-01 RX ORDER — HYDROCODONE BITARTRATE AND ACETAMINOPHEN 5; 325 MG/1; MG/1
1 TABLET ORAL
Status: DISCONTINUED | OUTPATIENT
Start: 2021-01-01 | End: 2021-01-01 | Stop reason: HOSPADM

## 2021-01-01 RX ORDER — INSULIN LISPRO 100 [IU]/ML
INJECTION, SOLUTION INTRAVENOUS; SUBCUTANEOUS
Status: DISCONTINUED | OUTPATIENT
Start: 2021-01-01 | End: 2021-01-01 | Stop reason: HOSPADM

## 2021-01-01 RX ORDER — LANOLIN ALCOHOL/MO/W.PET/CERES
65 CREAM (GRAM) TOPICAL
COMMUNITY
End: 2021-01-01

## 2021-01-01 RX ORDER — FAMOTIDINE 20 MG/1
20 TABLET, FILM COATED ORAL 2 TIMES DAILY
Status: DISCONTINUED | OUTPATIENT
Start: 2021-01-01 | End: 2021-01-01 | Stop reason: HOSPADM

## 2021-01-01 RX ORDER — DIPHENHYDRAMINE HCL 25 MG
25 CAPSULE ORAL ONCE
Status: COMPLETED | OUTPATIENT
Start: 2021-01-01 | End: 2021-01-01

## 2021-01-01 RX ORDER — DOXYCYCLINE HYCLATE 100 MG
100 TABLET ORAL
Status: COMPLETED | OUTPATIENT
Start: 2021-01-01 | End: 2021-01-01

## 2021-01-01 RX ORDER — COLCHICINE 0.6 MG/1
0.6 TABLET ORAL ONCE
Status: COMPLETED | OUTPATIENT
Start: 2021-01-01 | End: 2021-01-01

## 2021-01-01 RX ORDER — RIVAROXABAN 20 MG/1
TABLET, FILM COATED ORAL
Qty: 90 TAB | Refills: 3 | Status: SHIPPED | OUTPATIENT
Start: 2021-01-01 | End: 2021-01-01

## 2021-01-01 RX ORDER — DIPHENHYDRAMINE HCL 25 MG
25 CAPSULE ORAL
Status: DISCONTINUED | OUTPATIENT
Start: 2021-01-01 | End: 2021-01-01 | Stop reason: HOSPADM

## 2021-01-01 RX ORDER — MAGNESIUM SULFATE 100 %
4 CRYSTALS MISCELLANEOUS AS NEEDED
Status: DISCONTINUED | OUTPATIENT
Start: 2021-01-01 | End: 2021-01-01 | Stop reason: HOSPADM

## 2021-01-01 RX ORDER — NALOXONE HYDROCHLORIDE 0.4 MG/ML
0.4 INJECTION, SOLUTION INTRAMUSCULAR; INTRAVENOUS; SUBCUTANEOUS AS NEEDED
Status: DISCONTINUED | OUTPATIENT
Start: 2021-01-01 | End: 2021-01-01 | Stop reason: HOSPADM

## 2021-01-01 RX ORDER — MELATONIN
1000 DAILY
COMMUNITY
End: 2021-01-01

## 2021-01-01 RX ORDER — LISINOPRIL 5 MG/1
10 TABLET ORAL
Status: DISCONTINUED | OUTPATIENT
Start: 2021-01-01 | End: 2021-01-01 | Stop reason: HOSPADM

## 2021-01-01 RX ORDER — COLCHICINE 0.6 MG/1
0.6 CAPSULE ORAL ONCE
Status: DISCONTINUED | OUTPATIENT
Start: 2021-01-01 | End: 2021-01-01

## 2021-01-01 RX ORDER — CEPHALEXIN 500 MG/1
500 CAPSULE ORAL 4 TIMES DAILY
Qty: 28 CAPSULE | Refills: 0 | Status: SHIPPED | OUTPATIENT
Start: 2021-01-01 | End: 2021-01-01

## 2021-01-01 RX ORDER — ONDANSETRON 2 MG/ML
4 INJECTION INTRAMUSCULAR; INTRAVENOUS
Status: DISCONTINUED | OUTPATIENT
Start: 2021-01-01 | End: 2021-01-01 | Stop reason: HOSPADM

## 2021-01-01 RX ORDER — METRONIDAZOLE 250 MG/1
500 TABLET ORAL EVERY 12 HOURS
Status: DISCONTINUED | OUTPATIENT
Start: 2021-01-01 | End: 2021-01-01 | Stop reason: HOSPADM

## 2021-01-01 RX ORDER — DOXYCYCLINE 100 MG/1
100 TABLET ORAL 2 TIMES DAILY WITH MEALS
Qty: 10 TABLET | Refills: 0 | Status: SHIPPED | OUTPATIENT
Start: 2021-01-01 | End: 2021-01-01

## 2021-01-01 RX ORDER — KETOROLAC TROMETHAMINE 30 MG/ML
15 INJECTION, SOLUTION INTRAMUSCULAR; INTRAVENOUS EVERY 6 HOURS
Status: DISCONTINUED | OUTPATIENT
Start: 2021-01-01 | End: 2021-01-01

## 2021-01-01 RX ORDER — DOXYCYCLINE 100 MG/1
100 TABLET ORAL 2 TIMES DAILY WITH MEALS
Status: ON HOLD | COMMUNITY
End: 2021-01-01 | Stop reason: SDUPTHER

## 2021-01-01 RX ORDER — METRONIDAZOLE 500 MG/100ML
500 INJECTION, SOLUTION INTRAVENOUS EVERY 12 HOURS
Status: DISCONTINUED | OUTPATIENT
Start: 2021-01-01 | End: 2021-01-01

## 2021-01-01 RX ORDER — SODIUM CHLORIDE 0.9 % (FLUSH) 0.9 %
5-40 SYRINGE (ML) INJECTION EVERY 8 HOURS
Status: DISCONTINUED | OUTPATIENT
Start: 2021-01-01 | End: 2021-01-01 | Stop reason: HOSPADM

## 2021-01-01 RX ORDER — GLIPIZIDE 5 MG/1
5 TABLET ORAL
COMMUNITY
Start: 2021-01-01

## 2021-01-01 RX ADMIN — METRONIDAZOLE 500 MG: 500 INJECTION, SOLUTION INTRAVENOUS at 22:07

## 2021-01-01 RX ADMIN — Medication 10 ML: at 06:22

## 2021-01-01 RX ADMIN — VANCOMYCIN HYDROCHLORIDE 750 MG: 750 INJECTION, POWDER, LYOPHILIZED, FOR SOLUTION INTRAVENOUS at 10:01

## 2021-01-01 RX ADMIN — Medication 10 ML: at 14:09

## 2021-01-01 RX ADMIN — WATER 2 G: 1 INJECTION INTRAMUSCULAR; INTRAVENOUS; SUBCUTANEOUS at 08:44

## 2021-01-01 RX ADMIN — DIPHENHYDRAMINE HYDROCHLORIDE 25 MG: 25 CAPSULE ORAL at 10:50

## 2021-01-01 RX ADMIN — WATER 2 G: 1 INJECTION INTRAMUSCULAR; INTRAVENOUS; SUBCUTANEOUS at 20:35

## 2021-01-01 RX ADMIN — KETOROLAC TROMETHAMINE 15 MG: 30 INJECTION, SOLUTION INTRAMUSCULAR at 14:34

## 2021-01-01 RX ADMIN — WATER 2 G: 1 INJECTION INTRAMUSCULAR; INTRAVENOUS; SUBCUTANEOUS at 20:07

## 2021-01-01 RX ADMIN — METRONIDAZOLE 500 MG: 250 TABLET ORAL at 08:45

## 2021-01-01 RX ADMIN — PREDNISONE 40 MG: 20 TABLET ORAL at 08:45

## 2021-01-01 RX ADMIN — Medication 10 ML: at 22:06

## 2021-01-01 RX ADMIN — METRONIDAZOLE 500 MG: 500 INJECTION, SOLUTION INTRAVENOUS at 14:08

## 2021-01-01 RX ADMIN — Medication 5 ML: at 13:26

## 2021-01-01 RX ADMIN — COLCHICINE 0.6 MG: 0.6 TABLET, FILM COATED ORAL at 17:01

## 2021-01-01 RX ADMIN — WATER 2 G: 1 INJECTION INTRAMUSCULAR; INTRAVENOUS; SUBCUTANEOUS at 09:07

## 2021-01-01 RX ADMIN — Medication 10 ML: at 14:25

## 2021-01-01 RX ADMIN — METRONIDAZOLE 500 MG: 250 TABLET ORAL at 09:32

## 2021-01-01 RX ADMIN — KETOROLAC TROMETHAMINE 15 MG: 30 INJECTION, SOLUTION INTRAMUSCULAR at 00:02

## 2021-01-01 RX ADMIN — METRONIDAZOLE 500 MG: 250 TABLET ORAL at 20:38

## 2021-01-01 RX ADMIN — Medication 10 ML: at 20:38

## 2021-01-01 RX ADMIN — VANCOMYCIN HYDROCHLORIDE 1750 MG: 10 INJECTION, POWDER, LYOPHILIZED, FOR SOLUTION INTRAVENOUS at 11:37

## 2021-01-01 RX ADMIN — VANCOMYCIN HYDROCHLORIDE 750 MG: 750 INJECTION, POWDER, LYOPHILIZED, FOR SOLUTION INTRAVENOUS at 20:07

## 2021-01-01 RX ADMIN — VANCOMYCIN HYDROCHLORIDE 1250 MG: 1.25 INJECTION, POWDER, LYOPHILIZED, FOR SOLUTION INTRAVENOUS at 20:34

## 2021-01-01 RX ADMIN — METRONIDAZOLE 500 MG: 250 TABLET ORAL at 20:34

## 2021-01-01 RX ADMIN — KETOROLAC TROMETHAMINE 15 MG: 30 INJECTION, SOLUTION INTRAMUSCULAR at 17:15

## 2021-01-01 RX ADMIN — Medication 10 ML: at 05:52

## 2021-01-01 RX ADMIN — WATER 2 G: 1 INJECTION INTRAMUSCULAR; INTRAVENOUS; SUBCUTANEOUS at 09:31

## 2021-01-01 RX ADMIN — Medication 10 ML: at 22:00

## 2021-01-01 RX ADMIN — WATER 2 G: 1 INJECTION INTRAMUSCULAR; INTRAVENOUS; SUBCUTANEOUS at 08:45

## 2021-01-01 RX ADMIN — WATER 2 G: 1 INJECTION INTRAMUSCULAR; INTRAVENOUS; SUBCUTANEOUS at 11:36

## 2021-01-01 RX ADMIN — WATER 2 G: 1 INJECTION INTRAMUSCULAR; INTRAVENOUS; SUBCUTANEOUS at 20:38

## 2021-01-01 RX ADMIN — RIVAROXABAN 20 MG: 20 TABLET, FILM COATED ORAL at 17:01

## 2021-01-01 RX ADMIN — KETOROLAC TROMETHAMINE 15 MG: 30 INJECTION, SOLUTION INTRAMUSCULAR at 17:01

## 2021-01-01 RX ADMIN — METRONIDAZOLE 500 MG: 250 TABLET ORAL at 20:07

## 2021-01-01 RX ADMIN — RIVAROXABAN 20 MG: 20 TABLET, FILM COATED ORAL at 17:15

## 2021-01-01 RX ADMIN — VANCOMYCIN HYDROCHLORIDE 1250 MG: 1.25 INJECTION, POWDER, LYOPHILIZED, FOR SOLUTION INTRAVENOUS at 12:35

## 2021-01-01 RX ADMIN — FAMOTIDINE 20 MG: 20 TABLET, FILM COATED ORAL at 10:52

## 2021-01-01 RX ADMIN — COLCHICINE 0.6 MG: 0.6 TABLET, FILM COATED ORAL at 10:00

## 2021-01-01 RX ADMIN — WATER 2 G: 1 INJECTION INTRAMUSCULAR; INTRAVENOUS; SUBCUTANEOUS at 22:06

## 2021-01-01 RX ADMIN — Medication 10 ML: at 17:15

## 2021-01-01 RX ADMIN — INSULIN LISPRO 2 UNITS: 100 INJECTION, SOLUTION INTRAVENOUS; SUBCUTANEOUS at 17:01

## 2021-01-01 RX ADMIN — COLCHICINE 0.6 MG: 0.6 TABLET, FILM COATED ORAL at 08:45

## 2021-01-01 RX ADMIN — METRONIDAZOLE 500 MG: 500 INJECTION, SOLUTION INTRAVENOUS at 09:07

## 2021-01-01 RX ADMIN — KETOROLAC TROMETHAMINE 15 MG: 30 INJECTION, SOLUTION INTRAMUSCULAR at 05:20

## 2021-01-01 RX ADMIN — RIVAROXABAN 20 MG: 20 TABLET, FILM COATED ORAL at 17:14

## 2021-01-01 RX ADMIN — COLCHICINE 0.6 MG: 0.6 TABLET, FILM COATED ORAL at 09:32

## 2021-01-01 RX ADMIN — RIVAROXABAN 20 MG: 20 TABLET, FILM COATED ORAL at 22:06

## 2021-01-01 RX ADMIN — LISINOPRIL 10 MG: 5 TABLET ORAL at 20:38

## 2021-01-01 RX ADMIN — DOXYCYCLINE HYCLATE 100 MG: 100 TABLET, COATED ORAL at 13:12

## 2021-01-14 ENCOUNTER — TELEPHONE (OUTPATIENT)
Dept: CARDIOLOGY CLINIC | Age: 81
End: 2021-01-14

## 2021-01-14 NOTE — TELEPHONE ENCOUNTER
Patient calling to state he has been exposed to covid and is quarantined, would like to know if there is anything he needs to do or Dr. Emily Denver needs to do regarding his device.     Phone: [phone]

## 2021-01-14 NOTE — TELEPHONE ENCOUNTER
Returned call to patient, ID verified using two patient identifiers. Patient is in quarantine due to being exposed to a friend who has tested positive for COVID-19. Advised patient that there is nothing special he needs to do just keep his remote box plugged in so that his home monitoring continues and follow up as scheduled. Patient states he will contact the office if he test positive for COVID-19.

## 2021-01-15 RX ORDER — RIVAROXABAN 20 MG/1
TABLET, FILM COATED ORAL
Qty: 90 TAB | Refills: 0 | Status: SHIPPED | OUTPATIENT
Start: 2021-01-15 | End: 2021-01-01

## 2021-02-25 NOTE — LETTER
3/9/2021 8:26 AM 
 
Mr. Corina Pedro [address] [facility] Dear Patient, We have received your recent remote monitor check of your implanted device scheduled on  2/25/2021. Your remaining estimated battery life is   and your device is working normally & appropriately. Your next remote monitor check is scheduled for 9/2/2021. Your next clinic/office check is scheduled for Wednesday, 5/26/2021 at 1:30pm. 
 
Please be advised that due to [address] there is NO  PARKING. We kindly request that you keep this in mind when planning your arrival time. Thank you for helping us keep timely appointments for everyone. If you have any questions, please call the Pacemaker/ICD clinic at the [place] location at [phone]. Sincerely, 
 
Nedra TUBBS, RN Cardiac Device Clinic Coordinator [facility] [address] [place] [address] 
[phone]

## 2021-04-22 NOTE — TELEPHONE ENCOUNTER
Patient calling about prescription for zarelto and stated the pharmacy did not receive it, please contact him back at [phone].

## 2021-04-22 NOTE — TELEPHONE ENCOUNTER
Requested Prescriptions     Signed Prescriptions Disp Refills    Xarelto 20 mg tab tablet 90 Tab 3     Sig: TAKE 1 TABLET BY MOUTH ONCE DAILY WITH SUPPER     Authorizing Provider: Noel Granger User: Sapna Rinaldi     Last office visit 5/13/20, next office visit 5/26/21. Refills per verbal order from Dr. Yohana Lincoln. Called patient, ID verified using two patient identifiers. Informed patient that the prescription request was received at 8pm last night while no one was in the office. The refill has been sent and received by his pharmacy. Patient verbalized understanding and will call with any other questions.

## 2021-05-28 NOTE — PROGRESS NOTES
HISTORY OF PRESENTING ILLNESS      Jose Smith is a 79 y.o. male  with history of paroxysmal atrial fibrillation, stroke/carotid occlusion who had presented with blurred vision/unsteady gait and was undergoing neurological evaluation. 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 His heart rate had been bradycardic as well at times with heart rates in the 40s to 50s. He underwent dual-chamber pacemaker and experienced symptom improved post procedure. He was last seen in 5/2020. Device interrogation shows normal functioning with 3%, rate controlled AF.  He is tolerating his Xarelto, discussed importance of taking this with a meal.       PAST MEDICAL HISTORY     Past Medical History:   Diagnosis Date    Atrial fibrillation ([address]     Stroke [facility])            PAST SURGICAL HISTORY     Past Surgical History:   Procedure Laterality Date    [place] INS NEW/RPLCMT PRM PM W/TRANSV ELTRD ATRIAL&VENT N/A 2/6/2020    INSERT PPM DUAL performed by Teodora Gray MD at [facility]          ALLERGIES     Allergies   Allergen Reactions    Ampicillin Nausea and Vomiting    Iodine Hives, Itching and Nausea and Vomiting          FAMILY HISTORY     Family History   Adopted: Yes    negative for cardiac disease       SOCIAL HISTORY     Social History     Socioeconomic History    Marital status: SINGLE     Spouse name: Not on file    Number of children: Not on file    Years of education: Not on file    Highest education level: Not on file   Tobacco Use    Smoking status: Former Smoker    Smokeless tobacco: Former User     Quit date: 2004   Substance and Sexual Activity    Alcohol use: Yes     Comment: beer every other day    Drug use: No     Social Determinants of Health     Financial Resource Strain:     Difficulty of Paying Living Expenses:    Food Insecurity:     [place]ried About [address] [facility] the Last Year:    [address] in the Last Year:    Transportation Needs:     Lack of Transportation (Medical):  Lack of Transportation (Non-Medical):    Physical Activity:     Days of Exercise per Week:     Minutes of Exercise per Session:    Stress:     Feeling of Stress :    Social Connections:     Frequency of Communication with Friends and Family:     Frequency of Social Gatherings with Friends and Family:     Attends Orthodox Services:     Active Member of Clubs or Organizations:     Attends Club or Organization Meetings:     Marital Status:          MEDICATIONS     Current Outpatient Medications   Medication Sig    rivaroxaban (Xarelto) 20 mg tab tablet TAKE 1 TABLET BY MOUTH ONCE DAILY WITH SUPPER    lisinopril (PRINIVIL, ZESTRIL) 10 mg tablet Take 10 mg by mouth daily.  simvastatin (ZOCOR) 40 mg tablet Take 40 mg by mouth nightly. No current facility-administered medications for this visit. I have reviewed the nurses notes, vitals, problem list, allergy list, medical history, family, social history and medications. REVIEW OF SYMPTOMS      General: Pt denies excessive weight gain or loss. Pt is able to conduct ADL's  HEENT: Denies blurred vision, headaches, hearing loss, epistaxis and difficulty swallowing. Respiratory: Denies cough, congestion, shortness of breath, LARA, wheezing or stridor. Cardiovascular: Denies precordial pain, palpitations, edema or PND  Gastrointestinal: Denies poor appetite, indigestion, abdominal pain or blood in stool  Genitourinary: Denies hematuria, dysuria, increased urinary frequency  Musculoskeletal: Denies joint pain or swelling from muscles or joints  Neurologic: Denies tremor, paresthesias, headache, or sensory motor disturbance  Psychiatric: Denies confusion, insomnia, depression  Integumentray: Denies rash, itching or ulcers.   Hematologic: Denies easy bruising, bleeding       PHYSICAL EXAMINATION      Vitals: see vitals section  General: Well developed, in no acute distress. HEENT: No jaundice, oral mucosa moist, no oral ulcers  Neck: Supple, no stiffness, no lymphadenopathy, supple  Heart:  Normal S1/S2 negative S3 or S4. Regular, no murmur, gallop or rub, no jugular venous distention  Respiratory: Clear bilaterally x 4, no wheezing or rales  Abdomen:   Soft, non-tender, bowel sounds are active. Extremities:  No edema, normal cap refill, no cyanosis. Musculoskeletal: No clubbing, no deformities  Neuro: A&Ox3, speech clear, gait stable, cooperative, no focal neurologic deficits  Skin: Skin color is normal. No rashes or lesions. Non diaphoretic, moist.  Vascular: 2+ pulses symmetric in all extremities       DIAGNOSTIC DATA      EKG:        LABORATORY DATA      Lab Results   Component Value Date/Time    WBC 8.5 02/04/2020 03:50 PM    Hemoglobin (POC) 13.3 09/21/2010 12:56 PM    HGB 12.1 02/04/2020 03:50 PM    Hematocrit (POC) 39 09/21/2010 12:56 PM    HCT 35.9 (L) 02/04/2020 03:50 PM    PLATELET 049 91/68/3989 03:50 PM    MCV 90.9 02/04/2020 03:50 PM      Lab Results   Component Value Date/Time    Sodium 140 03/18/2020 08:59 AM    Potassium 4.3 03/18/2020 08:59 AM    Chloride 100 03/18/2020 08:59 AM    CO2 24 03/18/2020 08:59 AM    Anion gap 8 02/04/2020 03:50 PM    Glucose 176 (H) 03/18/2020 08:59 AM    BUN 15 03/18/2020 08:59 AM    Creatinine 1.03 03/18/2020 08:59 AM    BUN/Creatinine ratio 15 03/18/2020 08:59 AM    GFR est AA 79 03/18/2020 08:59 AM    GFR est non-AA 68 03/18/2020 08:59 AM    Calcium 9.4 03/18/2020 08:59 AM    Bilirubin, total 1.0 08/01/2019 09:23 PM    Alk. phosphatase 67 08/01/2019 09:23 PM    Protein, total 7.8 08/01/2019 09:23 PM    Albumin 3.9 08/01/2019 09:23 PM    Globulin 3.9 08/01/2019 09:23 PM    A-G Ratio 1.0 (L) 08/01/2019 09:23 PM    ALT (SGPT) 22 08/01/2019 09:23 PM           ASSESSMENT      1.  Pacemaker              [employer]              B. Dual chamber              C. Left sided  2. Bradycardia  3. Diabetes mellitus  4. Atrial fibrillation              A. Paroxysmal  5. NSVT          PLAN     Continue to monitor per device clinic, continue anticoagulation and additional medical therapy. He has upcoming GI workup (colonoscopy/endoscopy) for which he can hold Xarelto for 48 hours prior. ICD-10-CM ICD-9-CM    1. Cardiac pacemaker in situ  Z95.0 V45.01    2. Paroxysmal atrial fibrillation (HCC)  I48.0 427.31    3. Second degree AV block  I44.1 426.13    4. Type 2 diabetes mellitus without complication, without long-term current use of insulin (HCC)  E11.9 250.00    5. NSVT (nonsustained ventricular tachycardia) ([facility])  I47.2 427.1      No orders of the defined types were placed in this encounter. FOLLOW-UP   1 year    Thank you, Elenore Frankel, MD and Dr. Jose Salmon for allowing me to participate in the care of this extraordinarily pleasant male. Please do not hesitate to contact me for further questions/concerns.        MARLYS Moon [facility] [address].  [address] [place] [address], [facility], [address] [place] [address], [facility]  [phone] / [phone] Fax   [phone] / [phone] Fax

## 2021-05-28 NOTE — LETTER
5/28/2021 Patient: Keaton Mobley YOB: 1940 Date of Visit: 5/28/2021 Marlys Thompson MD 
[address] [address] Via Fax: [phone] Dear Marlys Thompson MD, Thank you for referring Mr. Axel Dunbar to [address] [place] [address] for evaluation. My notes for this consultation are attached. If you have questions, please do not hesitate to call me. I look forward to following your patient along with you. Sincerely, Ibeth Sánchez NP

## 2021-05-28 NOTE — PROGRESS NOTES
Room EP 3  Visit Vitals  /62 (BP 1 Location: Left upper arm, BP Patient Position: Sitting)   Pulse 80   Resp 16   Ht 5' 8\" (1.727 m)   Wt 162 lb 6.4 oz (73.7 kg)   SpO2 95%   BMI 24.69 kg/m²     Chest pain: no  Shortness of breath: no  Edema: no  Palpitations, Skipped beats, Rapid heartbeat: no  Dizziness: no  Fatigue:no     New diagnosis/Surgeries: dark stools having a colonoscopy  ER/Hospitalizations: no    Refills:no

## 2021-07-21 NOTE — TELEPHONE ENCOUNTER
Patient requesting a call back today in regards to a medication price increase.      PHONE: [phone] none

## 2021-07-21 NOTE — TELEPHONE ENCOUNTER
Returned patient call, ID verified using two patient identifiers. Patient called because he is currently in the donut hole with medicare and he currently can't afford his Xarelto. He states that [facility] said the Rebekah Armstrong will cost him almost $300 for a 90 day supply and he just can't afford that. He said he's previously tried eliquis but that make him itch all over. [employer] website and phone number provided to the patient to see if he qualifies for their assistance. Patient states he currently only has 4 tablets left. Advised patient that if we have samples in the office I'm sure we could provide him with some until he gets it figured out. Patient stated he will call back once he contacts [employer]. Will await his call.

## 2021-07-22 NOTE — TELEPHONE ENCOUNTER
Raven Starr 6 hours ago (9:26 AM)   [facility]     Patient called and would like a call back from the nurse about some Xarelto samples medication. Please give him a call back.        Phone [phone]        Returned patient call, ID verified using two patient identifiers. Patient states he currently working with Social Security to see if they can provide some assistance. Notified patient that I can have sample at the office for him to . Patient verbalized understanding and will come by the office tomorrow to pick them up. Requested Prescriptions     Signed Prescriptions Disp Refills    rivaroxaban (Xarelto) 20 mg tab tablet 28 Tablet 0     Sig: Take 1 Tablet by mouth daily. Authorizing Provider: Apollo Luis     Ordering User: Vinh DIAZ     Samples per verbal order from Dr. Mary Ellen Noble.

## 2021-07-22 NOTE — TELEPHONE ENCOUNTER
Patient called and would like a call back from the nurse about some Xarelto samples medication. Please give him a call back.       Phone [phone]

## 2021-09-02 NOTE — LETTER
9/3/2021 8:49 AM    Mr. Hayder Villagran  [address] [facility] [address]      Dear Mr. Hayder Villagran,    We have received your recent remote monitor check of your implanted device on  9/2/2021. Your remaining estimated battery life is 11 years and your device is working normally & appropriately. Your next remote monitor check is scheduled for  12/9/2021. You do not need to report to the office as this occurs during the night from your home. Please make sure to keep your home monitor plugged into power and within 10 feet of where you sleep. Your next clinic/office check is scheduled for Thursday, 6/2/2022 at 9:00 am.  You will have your device checked then see the provider. Please bring a complete list of your medications with strengths and dosages to this appointment. Also, be prepared to discuss any symptoms you may be having since your last visit. Please plan to arrive 10 minutes early to allow time for check-in. [employer] is COSED once again, due to [address]. If you have difficulty walking from the parking lot, please arrange to have someone drop you off to allow time to check into the office. You are allowed to have one visitor accompany you to your appointment and no children under the age of 13 are allowed. Masks are mandatory while in the building. If you have any questions, please call the Pacemaker/ICD clinic at the [facility] location at [phone]. We appreciate you staying remotely connected! Sincerely,    Tabitha BUSBYN, RN  Cardiac Device Clinic Coordinator  [facility].S. [address].  [address], [id number] [place] [address]  [phone]

## 2021-11-19 NOTE — DISCHARGE INSTRUCTIONS
Thank you for allowing us to provide you with medical care today. We realize that you have many choices for your emergency care needs. We thank you for choosing [facility]. Please choose us in the future for any continued health care needs. We hope we addressed all of your medical concerns. We strive to provide excellent quality care in the Emergency Department. Anything less than excellent does not meet our expectations. The exam and treatment you received in the Emergency Department were for an emergent problem and are not intended as complete care. It is important that you follow up with a doctor, nurse practitioner, or physician's assistant for ongoing care. If your symptoms worsen or you do not improve as expected and you are unable to reach your usual health care provider, you should return to the Emergency Department. We are available 24 hours a day. Take this sheet with you when you go to your follow-up visit. If you have any problem arranging the follow-up visit, contact the Emergency Department immediately. Make an appointment your family doctor for follow up of this visit. Return to the ER if you are unable to be seen in a timely manner.

## 2021-11-19 NOTE — ED TRIAGE NOTES
Pt arrives to ED with complaints of RLE pain, redness, swelling and fever that started Wednesday. Pt able to ambulate but is in pain. Pt reports diarrhea but denies vomiting. PT denies CP, SOB. Pt was told to come here by PCP for possible cellulitis.

## 2021-11-19 NOTE — TELEPHONE ENCOUNTER
Patient called because he went to get his prescription Xarelto and it was $380.00 and he said he can't afford that every 90 days. Patient also said that the insurance company won't pay for the medicine until 12/12/21. He also said that he is not out of the donut hole $6000 more to spend in order to come out. Patient would like to discuss a new medicine with the provider that doesn't cost as much. Patient said he only has 2 tablets left.     [phone]

## 2021-11-19 NOTE — ED PROVIDER NOTES
58-year-old female history of A. fib, stroke presents to the emergency department chief complaint right lower extremity swelling, erythema, fever. The symptoms began 2 days ago. No nausea or vomiting. He was advised by his doctor to come to the emergency department for evaluation for potential cellulitis. He has been dealing with dry cracked skin on his foot that his podiatrist has been helping him with. The history is provided by the patient and medical records. Leg Pain   This is a new problem. The current episode started 2 days ago. The problem occurs constantly. The problem has been gradually worsening. The pain is present in the right lower leg. The pain is moderate. Pertinent negatives include no numbness, full range of motion, no stiffness, no tingling, no itching, no back pain and no neck pain. He has tried nothing for the symptoms. Leg Swelling   Pertinent negatives include no numbness, full range of motion, no stiffness, no tingling, no itching, no back pain and no neck pain.         Past Medical History:   Diagnosis Date    Atrial fibrillation ([address]     Stroke [facility])        Past Surgical History:   Procedure Laterality Date    [place] INS NEW/RPLCMT PRM PM W/TRANSV ELTRD ATRIAL&VENT N/A 2020    INSERT PPM DUAL performed by Kimmy Trotter MD at [facility]         Family History:   Adopted: Yes       Social History     Socioeconomic History    Marital status: SINGLE     Spouse name: Not on file    Number of children: Not on file    Years of education: Not on file    Highest education level: Not on file   Occupational History    Not on file   Tobacco Use    Smoking status: Former Smoker     Packs/day: 3.00     Years: 50.00     Pack years: 150.00     Quit date: 2004     Years since quittin.4    Smokeless tobacco: Former User     Quit date:    Substance and Sexual Activity    Alcohol use: Yes     Comment: beer every other day    Drug use: No    Sexual activity: Not on file   Other Topics Concern    Not on file   Social History Narrative    Not on file     Social Determinants of Health     Financial Resource Strain:     Difficulty of Paying Living Expenses: Not on file   Food Insecurity:     Worried About [address] in the Last Year: Not on file    Ciera [employer] Food in the Last Year: Not on file   Transportation Needs:     Lack of Transportation (Medical): Not on file    Lack of Transportation (Non-Medical): Not on file   Physical Activity:     Days of Exercise per Week: Not on file    Minutes of Exercise per Session: Not on file   Stress:     Feeling of Stress : Not on file   Social Connections:     Frequency of Communication with Friends and Family: Not on file    Frequency of Social Gatherings with Friends and Family: Not on file    Attends Religion Services: Not on file    Active Member of [address] [place] [address] or Organizations: Not on file    Attends Club or Organization Meetings: Not on file    Marital Status: Not on file   Intimate Partner Violence:     Fear of Current or Ex-Partner: Not on file    Emotionally Abused: Not on file    Physically Abused: Not on file    Sexually Abused: Not on file   Housing Stability:     Unable to Pay for Housing in the Last Year: Not on file    Number of [place] in the Last Year: Not on file    Unstable Housing in the Last Year: Not on file         ALLERGIES: Ampicillin and Iodine    Review of Systems   Constitutional: Negative for fatigue and fever. HENT: Negative for sneezing and sore throat. Respiratory: Negative for cough and shortness of breath. Cardiovascular: Positive for leg swelling. Negative for chest pain. Gastrointestinal: Negative for abdominal pain, diarrhea, nausea and vomiting. Genitourinary: Negative for difficulty urinating and dysuria. Musculoskeletal: Negative for arthralgias, back pain, myalgias, neck pain and stiffness. Skin: Positive for rash.  Negative for color change and itching. Neurological: Negative for tingling, weakness, numbness and headaches. Psychiatric/Behavioral: Negative for agitation and behavioral problems. Vitals:    11/19/21 1233   BP: 113/73   Pulse: 88   Resp: 18   Temp: 97.7 °F (36.5 °C)   SpO2: 93%   Weight: 73.5 kg (162 lb)   Height: 5' 8\" (1.727 m)            Physical Exam  Vitals and nursing note reviewed. Constitutional:       General: He is not in acute distress. Appearance: Normal appearance. He is well-developed. He is not ill-appearing, toxic-appearing or diaphoretic. HENT:      Head: Normocephalic and atraumatic. Nose: Nose normal.      Mouth/Throat:      Mouth: Mucous membranes are moist.      Pharynx: Oropharynx is clear. Eyes:      Extraocular Movements: Extraocular movements intact. Conjunctiva/sclera: Conjunctivae normal.      Pupils: Pupils are equal, round, and reactive to light. Cardiovascular:      Rate and Rhythm: Normal rate and regular rhythm. Pulses: Normal pulses. Heart sounds: No murmur heard. Pulmonary:      Effort: Pulmonary effort is normal. No respiratory distress. Breath sounds: Normal breath sounds. No wheezing. Chest:      Chest wall: No mass or tenderness. Abdominal:      General: There is no distension. Palpations: Abdomen is soft. Tenderness: There is no abdominal tenderness. There is no guarding or rebound. Musculoskeletal:         General: No swelling, tenderness, deformity or signs of injury. Normal range of motion. Cervical back: Normal range of motion and neck supple. No rigidity. No muscular tenderness. Right lower leg: No tenderness. Edema (Swelling in the anterior portion of the right lower leg with overlying erythematous changes and tenderness with dry cracked skin on the foot. There is no focal abscess) present. Left lower leg: No tenderness. No edema. Skin:     General: Skin is warm and dry.       Capillary Refill: Capillary refill takes less than 2 seconds. Neurological:      General: No focal deficit present. Mental Status: He is alert and oriented to person, place, and time. Psychiatric:         Mood and Affect: Mood normal.         Behavior: Behavior normal.          MDM  Number of Diagnoses or Management Options  Diagnosis management comments: 79-year-old male presents as above with cellulitis of the right lower extremity. Appropriate for trial of outpatient therapy. Will prescribe doxycycline with instructions to follow-up with primary care, return if needed.          Procedures

## 2021-11-19 NOTE — TELEPHONE ENCOUNTER
Returned patient call, ID verified using two patient identifiers. Advised patient that we can provide him with samples of Xarelto 20 mg until he is able to get his medication on 12/12/21. Patient states he is still having a difficult time affording the $ 380 every 3 months for the Xarelto. He still ha $4000 to pay out of pocket. Also advised the patient that I would provide a brochure for the [employer] program that may be able to assist him with the cost of his Xarelto as well. Patient will  samples and brochure on Monday 12/22/21. Also suggested that patient discuss more affordable options with his insurance company. Patient verbalizes understanding of all information. Requested Prescriptions     Signed Prescriptions Disp Refills    rivaroxaban (Xarelto) 20 mg tab tablet 28 Tablet 0     Sig: Take 1 Tablet by mouth daily (with dinner). Authorizing Provider: Symone Blanchard     Ordering User: Adelso MORE     Samples provided per verbal order Dr. Grant Parr.

## 2021-11-22 PROBLEM — L03.90 CELLULITIS: Status: ACTIVE | Noted: 2021-01-01

## 2021-11-22 NOTE — PROGRESS NOTES
[address] Dosing Services: Antimicrobial Stewardship Daily Doc 11/22/2021    Consult for antibiotic dosing of Vancomycin + cefepime+ flagyl by Dr. Stefanie Jimenez  Indication: Cellulitis  Day of Therapy: 1  - OP Tx failure doxycycline 100 mg Q12 hrs    Ht Readings from Last 1 Encounters:   11/22/21 172.7 cm (68\")        Wt Readings from Last 1 Encounters:   11/22/21 73.5 kg (162 lb)      Vancomycin therapy:  Current maintenance dose: vancomycin 1750 mg (~23 mg/kg) loading dose  Last level: Initial dosing  Dose calculated to approximate a           a. Target AUC/GUERO of 400-500          b. Trough of 10-15 mcg/mL     Plan: Will start vancomycin 1250 mg Q124 hours maintenance dose for predicted  Css trough 14.1 based on empiric calculated kinetics. Plan for a steady state level prior to 3rd dose. Dose administration notes:   Doses given appropriately as scheduled    Date Dose & Interval Measured (mcg/mL) Extrapolated (mcg/mL)   ? ? ? ?   ? ? ? ?   ? ? ? ? Non-Kinetic Antimicrobial Dosing Regimen:   Current Regimen:  Cefepime- consult to dose  Recommendation: Will start cefepime 2 g Q12 hrs per protocol for CrCl 30-60 ml/min  Dose administration notes:   Doses given appropriately as scheduled    Non-Kinetic Antimicrobial Dosing Regimen:   Current Regimen: Metronidazole- consult to dose  Recommendation: Will start flagyl 500 mg Q12 hrs per protocol based on indication  Dose administration notes:   Doses given appropriately as scheduled    Other Antimicrobial   (not dosed by pharmacist) CTX 2 g x 1 ER   Cultures None   Serum Creatinine Lab Results   Component Value Date/Time    Creatinine 1.02 11/22/2021 11:23 AM    Creatinine (POC) 0.8 09/21/2010 12:56 PM         Creatinine Clearance Estimated Creatinine Clearance: 54 mL/min (based on SCr of 1.02 mg/dL).      Temp Temp: 97.3 °F (36.3 °C)       WBC Lab Results   Component Value Date/Time    WBC 8.0 11/22/2021 11:23 AM        Procalcitonin No results found for: PCT     For Antifungals, Metronidazole and Nafcillin: Lab Results   Component Value Date/Time    ALT (SGPT) 22 08/01/2019 09:23 PM    AST (SGOT) 25 08/01/2019 09:23 PM    Alk.  phosphatase 67 08/01/2019 09:23 PM    Bilirubin, total 1.0 08/01/2019 09:23 PM        Pharmacist Maddy Grover, PHARMD

## 2021-11-22 NOTE — PROGRESS NOTES
CARE MANAGEMENT INITIAL ASSESSEMENT      NAME:   Cornelius Cordova   :     1940   MRN:     [id number]       Emergency Contact:  Extended Emergency Contact Information  Primary Emergency Contact: [address] Phone: [phone]  Mobile Phone: [phone]  Relation: Other Relative    Advance Directive:  Full Code, does not have an advance directive. Yesica Coy Healthcare Decision Maker: Sruthi Jackson princess- [phone]    Reason for Admission:  Mr. Jennifer Chew is a 79 y.o. male with history that includes AFIB and CVA  who was emergently admitted for:  cellulitis    Patient Active Problem List   Diagnosis Code    Blurry vision H53.8    Unsteady gait R26.81    A-fib ([facility] [address] I48.91    History of CVA (cerebrovascular accident) Z80.78    Second degree AV block I44.1    Type 2 diabetes mellitus without complication ([facility] [address] Y20.3    Cellulitis L03.90       Assessment: In person with patient. RUR:  N/A OBS  Risk Level:  N/A  Value-based purchasing:   No  Bundle patient:  No    Residency:  Private residence  Exterior Steps:  2  Interior Steps:  None    Lives With:  Other family member(s) grandson and great grandson    Prior functioning:  Independent.   Patient requires assistance with:  N/A    Prior DME required:  None    DME available:  None    Rehab history:  None    Discharge Concerns:  None      Insurer:  Payor: Ashley Sood / Plan: [facility] [employer] MEDICARE CHOICE PPO/PFFS / Product Type: Managed Care Medicare /     PCP: Gypsy Jaimes MD   Name of Practice:  [facility] [address]    Address:         [address] [place] [address]   Phone:          [phone]   Current patient: Yes   Approximate date of last visit: 21   Access to virtual PCP visits:   No    Pharmacy:  [facility]           [address], [place]           [phone]    **Pt denies any problems obtaining/taking medications**     COVID-19 vaccination status:  Fully vaccinated in March    DC Transport:  Family      Transition of care plan:  Home with outpatient follow-up     Comments:   Pt admitted as OBS on 11/22/21 for right lower leg cellulitis. CM met with Pt to complete initial assessment. Pt states that he lives at home with his grandson and great grandson. Pt has no hx of HH, home O2, or equipment. Pt is independent with ADLs and ambulation. Pt denies any usual problems with either, however, Pt reports pain with ambulation for the past 3 days. Discharge plan is for Pt to return home. Pt states he will drive himself home.  _____________________________________  Ronaldoeileen Connellid, [address] [place] [address] Drive Management  11/22/2021   4:33 PM      Care Management Interventions  PCP Verified by CM:  Yes Eder Correa MD)  Mode of Transport at Discharge: Self  Transition of Care Consult (CM Consult): Discharge Planning  MyChart Signup: No  Discharge Durable Medical Equipment: No  Physical Therapy Consult: Yes  Occupational Therapy Consult: Yes  Speech Therapy Consult: No  Support Systems: Other Family Member(s)  Confirm Follow Up Transport: Self  Discharge Location  Discharge Placement: Home with outpatient services

## 2021-11-22 NOTE — PROGRESS NOTES
OCCUPATIONAL THERAPY EVALUATION/DISCHARGE  Patient: Haleigh Godoy (69 y.o. male)  Date: 11/22/2021  Primary Diagnosis: Cellulitis [L03.90]       Precautions:   Fall    ASSESSMENT  Based on the objective data described below, the patient presents with good safety awareness, no LOB, but with difficulty WBing on RLE and at an overall supervision to mod I level with LE ADLs, toileting and functional mobility following admission for worsening RLE cellulitis. Pt with improved mobility and decreased pain using RW. Prior to admission pt was independent, active, weight lifting/exer several times a week and driving. Based on above no further skilled OT required at this time. Educated pt and family member on home safety and ADL/IADL modifications to accommodate for RLE pain and decreased mobility and both verbalized good understanding. Current Level of Function (ADLs/self-care): supervision to mod I level with LE ADLs, toileting and functional mobility     Functional Outcome Measure: The patient scored 65/100 on the Barthel Index outcome measure. Other factors to consider for discharge: see above     PLAN :  Recommend with staff: amb with RW to and from bathroom for toileting and up to chair for all meals    Recommendation for discharge: (in order for the patient to meet his/her long term goals)  No skilled occupational therapy/ follow up rehabilitation needs identified at this time. This discharge recommendation:  Has not yet been discussed the attending provider and/or case management    IF patient discharges home will need the following DME: TBD       SUBJECTIVE:   Patient stated I am ok, my leg just really hurts.     OBJECTIVE DATA SUMMARY:   HISTORY:   Past Medical History:   Diagnosis Date    Atrial fibrillation ([address]     Stroke [facility])      Past Surgical History:   Procedure Laterality Date    [place] INS NEW/RPLCMT PRM PM W/TRANSV ELTRD ATRIAL&VENT N/A 2/6/2020    INSERT PPM DUAL performed by Ivette Estrada MD RODRIGO at [facility] LAB       Prior Level of Function/Environment/Context:  independent, active, weight lifting/exer several times a week and driving  Expanded or extensive additional review of patient history:   Home Situation  Home Environment: Private residence  # Steps to Enter: 2  Rails to Enter: No  One/Two Story Residence: One story  Living Alone: No  Support Systems: Child(ron), Other Family Member(s) (grandson and great grandson live with pt)  Patient Expects to be Discharged to[de-identified] House  Current DME Used/Available at Home: Grab bars (rubber bath mat)  Tub or Shower Type: Tub/Shower combination    Hand dominance: Right    EXAMINATION OF PERFORMANCE DEFICITS:  Cognitive/Behavioral Status:  Neurologic State: Alert; Appropriate for age  Orientation Level: Oriented X4  Cognition: Appropriate decision making; Appropriate for age attention/concentration; Appropriate safety awareness; Follows commands  Perception: Appears intact  Perseveration: No perseveration noted  Safety/Judgement: Awareness of environment; Driving appropriateness; Fall prevention; Good awareness of safety precautions; Home safety; Insight into deficits    Hearing: Auditory  Auditory Impairment: Hard of hearing, bilateral, Hearing aid(s)  Hearing Aids/Status: Bilateral, At bedside, With patient    Vision/Perceptual:    Acuity: Within Defined Limits    Corrective Lenses: Glasses    Range of Motion:  AROM: Within functional limits  PROM: Within functional limits                      Strength:  Strength: Within functional limits                Coordination:  Coordination: Within functional limits  Fine Motor Skills-Upper: Left Intact; Right Intact    Gross Motor Skills-Upper: Left Intact; Right Intact    Tone & Sensation:  Tone: Normal                         Balance:  Sitting: Intact  Standing: Intact;  With support (RW)    Functional Mobility and Transfers for ADLs:  Bed Mobility:  Rolling: Modified independent  Supine to Sit: Modified independent  Sit to Supine: Modified independent  Scooting: Modified independent    Transfers:  Sit to Stand: Modified independent  Stand to Sit: Modified independent  Bed to Chair: Modified independent  Bathroom Mobility: Supervision/set up (to [facility]- supervision amb with RW to bathroom)  Toilet Transfer : Modified independent  Assistive Device : Gait Belt; Walker, rolling    ADL Assessment and Lea:  Feeding: Independent    Oral Facial Hygiene/Grooming: Supervision (standing at sink)    Bathing: Modified independent    Upper Body Dressing: Independent    Lower Body Dressing: Modified independent    Toileting: Modified independent    Cognitive Retraining  Safety/Judgement: Awareness of environment; Driving appropriateness; Fall prevention; Good awareness of safety precautions; Home safety; Insight into deficits    Educated pt and family member on home safety and ADL/IADL modifications to accommodate for RLE pain and decreased mobility and both verbalized good understanding. Functional Measure:    Barthel Index:  Bathin  Bladder: 10  Bowels: 10  Groomin  Dressing: 10  Feeding: 10  Mobility: 0  Stairs: 0  Toilet Use: 5  Transfer (Bed to Chair and Back): 10  Total: 65/100      The Barthel ADL Index: Guidelines  1. The index should be used as a record of what a patient does, not as a record of what a patient could do. 2. The main aim is to establish degree of independence from any help, physical or verbal, however minor and for whatever reason. 3. The need for supervision renders the patient not independent. 4. A patient's performance should be established using the best available evidence. Asking the patient, friends/relatives and nurses are the usual sources, but direct observation and common sense are also important. However direct testing is not needed. 5. Usually the patient's performance over the preceding 24-48 hours is important, but occasionally longer periods will be relevant.   6. Middle categories imply that the patient supplies over 50 per cent of the effort. 7. Use of aids to be independent is allowed. Score Interpretation (from [facility])    Independent   60-79 Minimally independent   40-59 Partially dependent   20-39 Very dependent   <20 Totally dependent     -Mynor Otero., Barthel, D.W. (1965). Functional evaluation: the Barthel Index. [address] ([address] (). The Barthel activities of daily living index: self-reporting versus actual performance in the old (> or = 75 years). Journal of [address] [place] [address] 45(7), [facility], JIDANIA, Elli Tiwari., Neri Dukes. (1999). Measuring the change in disability after inpatient rehabilitation; comparison of the responsiveness of the Barthel Index and Functional Weakley Measure. Journal of Neurology, Neurosurgery, and Psychiatry, 66(4), 939-129. Maylin Vigil, N.J.A, BREANNE Rollins, & Inez Vasquez, M.A. (2004) Assessment of post-stroke quality of life in cost-effectiveness studies: The usefulness of the Barthel Index and the EuroQoL-5D.  Quality of Life Research, 15, 91-66       Occupational Therapy Evaluation Charge Determination   History Examination Decision-Making   LOW Complexity : Brief history review  LOW Complexity : 1-3 performance deficits relating to physical, cognitive , or psychosocial skils that result in activity limitations and / or participation restrictions  LOW Complexity : No comorbidities that affect functional and no verbal or physical assistance needed to complete eval tasks       Based on the above components, the patient evaluation is determined to be of the following complexity level: LOW   Pain Ratin/10    Activity Tolerance:   Fair    After treatment patient left in no apparent distress:    Supine in bed, Call bell within reach and Caregiver / family present    COMMUNICATION/EDUCATION:   The patients plan of care was discussed with: Registered nurse and Case management.      Thank you for this referral.  Ortiz High, OT  Time Calculation: 23 mins

## 2021-11-22 NOTE — H&P
[address], [place], [address]  [phone]    Admission History and Physical      NAME:  Daphney Bassett   :   1940   MRN:  [id number]     PCP:  Js Kunz MD     Date/Time:  2021         Subjective:     CHIEF COMPLAINT: \"My leg just wasn't getting better\"     HISTORY OF PRESENT ILLNESS:     Mr. Judy Najera is a 79 y.o.  male with PMH of a-fib, CVA on Xarelto admitted for RLE cellulitis. Pt states he presenting with fevers/chills and RLE erythema to the ED. Was given PO doxy and discharged to home. Despite several days of PO doxy, no improvement. Denies fevers/chills currently     Past Medical History:   Diagnosis Date    Atrial fibrillation ([facility] [address]     St[facility])         Past Surgical History:   Procedure Laterality Date    [place] INS NEW/RPLCMT PRM PM W/TRANSV ELTRD ATRIAL&VENT N/A 2020    INSERT PPM DUAL [facility]       Social History     Tobacco Use    Smoking status: Former Smoker     Packs/day: 3.00     Years: 50.00     Pack years: 150.00     Quit date: 2004     Years since quittin.4    Smokeless tobacco: Former User     Quit date:    Substance Use Topics    Alcohol use: Yes     Comment: beer every other day        Family History   Adopted: Yes        Allergies   Allergen Reactions    Ampicillin Nausea and Vomiting    Iodine Hives, Itching and Nausea and Vomiting        Prior to Admission medications    Medication Sig Start Date End Date Taking? Authorizing Provider   doxycycline (ADOXA) 100 mg tablet Take 100 mg by mouth two (2) times daily (with meals). Yes Provider, Historical   clotrimazole-betamethasone (LOTRISONE) topical cream Apply  to affected area two (2) times a day. Yes Provider, Historical   rivaroxaban (Xarelto) 20 mg tab tablet Take 1 Tablet by mouth daily (with dinner).  21  Yes Harinder Stout MD   glipiZIDE (GLUCOTROL) 5 mg tablet Take 5 mg by mouth daily (with dinner). 5/7/21  Yes Provider, Historical   lisinopril (PRINIVIL, ZESTRIL) 10 mg tablet Take 10 mg by mouth nightly. Yes Provider, Historical   simvastatin (ZOCOR) 40 mg tablet Take 40 mg by mouth nightly. Yes Provider, Historical         Review of Systems:  (bold if positive, if negative)    Gen:  Eyes:  ENT:  CVS:  Pulm:  GI:    :    MS:  Skin: see below Psych:  Endo:    Hem:  Renal:    Neuro:     RLE swelling, warmth, tenderness        Objective:      VITALS:    Vital signs reviewed; most recent are:    Visit Vitals  /67 (BP 1 Location: Left upper arm, BP Patient Position: Supine)   Pulse 96   Temp 97.3 °F (36.3 °C)   Resp 24   Ht 5' 8\" (1.727 m)   Wt 73.5 kg (162 lb)   SpO2 92%   BMI 24.63 kg/m²     SpO2 Readings from Last 6 Encounters:   11/22/21 92%   11/19/21 93%   05/28/21 95%   02/06/20 98%   02/02/20 93%   08/02/19 95%        No intake or output data in the 24 hours ending 11/22/21 1641         Exam:     Physical Exam:    Gen:  Well-developed, well-nourished, in no acute distress  HEENT:  Pink conjunctivae, PERRL, hearing intact to voice, moist mucous membranes  Neck:  Supple, without masses, thyroid non-tender  Resp:  No accessory muscle use, clear breath sounds without wheezes rales or rhonchi  Card:  No murmurs, normal S1, S2 without thrills, bruits or peripheral edema  Abd:  Soft, non-tender, non-distended, normoactive bowel sounds are present, no palpable organomegaly  Lymph:  No cervical adenopathy  Musc:  No cyanosis or clubbing  Skin:  No rashes or ulcers, skin turgor is good  Neuro:  Cranial nerves 3-12 are grossly intact,  strength is 5/5 bilaterally, dorsi / plantarflexion strength is 5/5 bilaterally, follows commands appropriately  Psych:  Alert with good insight. Oriented to person, place, and time  RLE with diffuse soft tissue swelling, warmth and erythema. Mild skin denudation on the plantar aspect of the R foot.         Labs:    Recent Labs 11/22/21  1123   WBC 8.0   HGB 14.1   HCT 41.5        Recent Labs     11/22/21  1123   *   K 4.0      CO2 28   *   BUN 15   CREA 1.02   CA 9.4     No components found for: GLPOC  No results for input(s): PH, PCO2, PO2, HCO3, FIO2 in the last 72 hours. No results for input(s): INR, INREXT in the last 72 hours. Assessment/Plan:     Cellulitis - RLE. No e/o DVT. No overt ulcerations to suspect osteomyelitis. No recent trauma   -continue IV antibiotics   -elevated RLE   -dopplers negative for DVT   -consider gentle diuresis if in the AM if no improvement in swelling with elevation and antibiotics    A-fib   -not on rate controlling meds  -continue Xarelto     H/o CVA   -on Xarelto as above   -PT. OT while in house   -on statin; resume at discharge     DM   -ISS   -BG checks AC TID and qHS   -hold glipizide in house due to potential for hypoglycemia   -continue ACE I     Surrogate decision maker: Son     Total time spent with patient: [phone] discussed with: Patient and Family    Discussed:  Care Plan    Prophylaxis:  Xarelto     Probable Disposition:  Home w/Family           ___________________________________________________    Attending Physician: Antolin Canales MD

## 2021-11-22 NOTE — PROGRESS NOTES
[facility]: MED RECONCILIATION    Comments/Recommendations:   PTA medications confirmed with patient at bedside who is a good historian. Note patient currently receiving xarelto 20 mg tablets as sample from his cardiologist until Rx coverage 12/12/21. He has had difficulty affording both xarelto and apixaban in the past due to insurance changes and high copay. Doxy last dose this AM  Patient takes maintenance meds in the evening  Patient counseled to use hospital supply for medications (home xarelto at bedside w/ belongings)    Information obtained from: Patient, RxQuery, Chart review (outpatient cardiology notes)    Allergies: Ampicillin and Iodine    Prior to Admission Medications:     Medication Documentation Review Audit       Reviewed by MARISOL GoldenD (Pharmacist) on 11/22/21 at 1227      Medication Sig Documenting Provider Last Dose Status Taking? clotrimazole-betamethasone (LOTRISONE) topical cream Apply  to affected area two (2) times a day. Provider, Historical 11/21/2021 Active Yes   doxycycline (ADOXA) 100 mg tablet Take 100 mg by mouth two (2) times daily (with meals). Provider, Historical 11/22/2021 AM Active Yes   glipiZIDE (GLUCOTROL) 5 mg tablet Take 5 mg by mouth daily (with dinner). Provider, Historical 11/21/2021 Active Yes   lisinopril (PRINIVIL, ZESTRIL) 10 mg tablet Take 10 mg by mouth nightly. Provider, Historical 11/21/2021 Active Yes   rivaroxaban (Xarelto) 20 mg tab tablet Take 1 Tablet by mouth daily (with dinner). Lg Maynard MD 11/21/2021 Active Yes   simvastatin (ZOCOR) 40 mg tablet Take 40 mg by mouth nightly.  Provider, Historical 11/21/2021 Active Yes                  Ila Hemphill, PHARMD   Contact: [phone]

## 2021-11-22 NOTE — ED TRIAGE NOTES
Pt arrives to ED for worsening right lower leg cellulitis . Pt was seen here Thursday and sent home on doxycyline but states it has gotten worse. Per pt, it has been ongoing for a few weeks but \"low grade\". Denies fevers currently.

## 2021-11-22 NOTE — ED PROVIDER NOTES
Chief Complaint   Patient presents with    Skin Infection     This is an 79-year-old male with a history of atrial fibrillation on Xarelto, he has a pacemaker in place, presents today with worsening pain, redness and swelling in his right leg that started just over a week ago. He was seen in the department 3 days ago for right lower extremity cellulitis and started on doxycycline which he has been taking without any improvement, the swelling and pain have progressed to involve the more proximal right leg and knee as well as the ankle and foot. No weakness or numbness in the distal right lower extremity. He denies any antecedent trauma. No analgesics taken prior to arrival.  No history of venous thromboembolic events or history of cellulitis requiring hospitalization in the past.  Denies any history of diabetes. Has had intermittent fevers at home, no chest pain, shortness of breath, or vomiting, has had a sore throat and some diarrhea. No other systemic complaints. Pain is moderate in nature, worse with weightbearing.       Past Medical History:   Diagnosis Date    Atrial fibrillation ([address]     Stroke [facility])        Past Surgical History:   Procedure Laterality Date    NC INS NEW/RPLCMT PRM PM W/TRANSV ELTRD ATRIAL&VENT N/A 2020    INSERT PPM DUAL performed by Otis Mcallister MD at [facility] LAB         Family History:   Adopted: Yes       Social History     Socioeconomic History    Marital status: SINGLE     Spouse name: Not on file    Number of children: Not on file    Years of education: Not on file    Highest education level: Not on file   Occupational History    Not on file   Tobacco Use    Smoking status: Former Smoker     Packs/day: 3.00     Years: 50.00     Pack years: 150.00     Quit date: 2004     Years since quittin.4    Smokeless tobacco: Former User     Quit date:    Substance and Sexual Activity    Alcohol use: Yes     Comment: beer every other day    Drug use: No    Sexual activity: Not on file   Other Topics Concern    Not on file   Social History Narrative    Not on file     Social Determinants of Health     Financial Resource Strain:     Difficulty of Paying Living Expenses: Not on file   Food Insecurity:     Worried About Running Out of Food in the Last Year: Not on file    Ciera of Food in the Last Year: Not on file   Transportation Needs:     Lack of Transportation (Medical): Not on file    Lack of Transportation (Non-Medical): Not on file   Physical Activity:     Days of Exercise per Week: Not on file    Minutes of Exercise per Session: Not on file   Stress:     Feeling of Stress : Not on file   Social Connections:     Frequency of Communication with Friends and Family: Not on file    Frequency of Social Gatherings with Friends and Family: Not on file    Attends Mandaen Services: Not on file    Active Member of [address] [place] [address] [employer] or Organizations: Not on file    Attends Club or Organization Meetings: Not on file    Marital Status: Not on file   Intimate Partner Violence:     Fear of Current or Ex-Partner: Not on file    Emotionally Abused: Not on file    Physically Abused: Not on file    Sexually Abused: Not on file   Housing Stability:     Unable to Pay for Housing in the Last Year: Not on file    Number of [place] in the Last Year: Not on file    Unstable Housing in the Last Year: Not on file         ALLERGIES: Ampicillin and Iodine    Review of Systems   Constitutional: Positive for fever. HENT: Negative for rhinorrhea. Eyes: Negative for redness. Respiratory: Negative for shortness of breath. Cardiovascular: Negative for chest pain. Gastrointestinal: Positive for diarrhea. Negative for abdominal pain. Genitourinary: Negative for difficulty urinating. Musculoskeletal: Positive for myalgias. Skin: Positive for color change and rash. Neurological: Negative for headaches. Psychiatric/Behavioral: Negative for confusion. Vitals:    11/22/21 1111   BP: 116/80   Pulse: 80   Resp: 18   Temp: 97.3 °F (36.3 °C)   SpO2: 95%   Weight: 73.5 kg (162 lb)   Height: 5' 8\" (1.727 m)            Physical Exam  General:  Awake and alert, NAD  HEENT:  NC/AT, equal pupils, moist mucous membranes  Neck:   Normal inspection, full range of motion  Cardiac:  RRR, no murmurs  Respiratory:  Clear bilaterally, no wheezes, rales, rhonchi  Abdomen:  Soft and nontender, nondistended  Extremities: Warm and well perfused, +erythema, warmth to palpation and tenderness across the pretibial soft tissue of the right leg with soft tissue swelling throughout the right leg down to the foot and ankle, no necrotic change or areas of fluctuance or induration to suggest abscess, compartments soft throughout, NV intact distally  Neuro:  Moving all extremities symmetrically without gross motor deficit  Skin:   See above    RESULTS  Recent Results (from the past 12 hour(s))   SAMPLES BEING HELD    Collection Time: 11/22/21 11:23 AM   Result Value Ref Range    SAMPLES BEING HELD 2SST,1RED     COMMENT        Add-on orders for these samples will be processed based on acceptable specimen integrity and analyte stability, which may vary by analyte. CBC WITH AUTOMATED DIFF    Collection Time: 11/22/21 11:23 AM   Result Value Ref Range    WBC 8.0 4.1 - 11.1 K/uL    RBC 4.72 4.10 - 5.70 M/uL    HGB 14.1 12.1 - 17.0 g/dL    HCT 41.5 36.6 - 50.3 %    MCV 87.9 80.0 - 99.0 FL    MCH 29.9 26.0 - 34.0 PG    MCHC 34.0 30.0 - 36.5 g/dL    RDW 13.2 11.5 - 14.5 %    PLATELET 744 437 - 293 K/uL    MPV 10.3 8.9 - 12.9 FL    NRBC 0.0 0  WBC    ABSOLUTE NRBC 0.00 0.00 - 0.01 K/uL    NEUTROPHILS 65 32 - 75 %    LYMPHOCYTES 22 12 - 49 %    MONOCYTES 12 5 - 13 %    EOSINOPHILS 0 0 - 7 %    BASOPHILS 0 0 - 1 %    IMMATURE GRANULOCYTES 1 (H) 0.0 - 0.5 %    ABS. NEUTROPHILS 5.1 1.8 - 8.0 K/UL    ABS. LYMPHOCYTES 1.8 0.8 - 3.5 K/UL    ABS. MONOCYTES 1.0 0.0 - 1.0 K/UL    ABS.  EOSINOPHILS 0.0 0.0 - 0.4 K/UL    ABS. BASOPHILS 0.0 0.0 - 0.1 K/UL    ABS. IMM. GRANS. 0.1 (H) 0.00 - 0.04 K/UL    DF AUTOMATED     METABOLIC PANEL, BASIC    Collection Time: 11/22/21 11:23 AM   Result Value Ref Range    Sodium 135 (L) 136 - 145 mmol/L    Potassium 4.0 3.5 - 5.1 mmol/L    Chloride 104 97 - 108 mmol/L    CO2 28 21 - 32 mmol/L    Anion gap 3 (L) 5 - 15 mmol/L    Glucose 155 (H) 65 - 100 mg/dL    BUN 15 6 - 20 MG/DL    Creatinine 1.02 0.70 - 1.30 MG/DL    BUN/Creatinine ratio 15 12 - 20      GFR est AA >60 >60 ml/min/1.73m2    GFR est non-AA >60 >60 ml/min/1.73m2    Calcium 9.4 8.5 - 10.1 MG/DL        IMAGING  No results found. Procedures - none unless documented below    ED course: Labs reviewed. Failed outpatient therapy with doxycycline, returns with worsening erythema, pain and soft tissue swelling in the right leg. IV Rocephin/vancomycin ordered, needs admission for IV antimicrobials. Takes Xarelto for atrial fibrillation with good compliance, doubt concomitant DVT. Will admit for continued management, discussed with the hospitalist.    Hospitalist Jonnie Serve for Admission  11:21 AM    ED Room Number:   ER20/20  Patient Name and age:  Rusty Paget 79 y.o.  male  Working Diagnosis:     1. Cellulitis of right lower extremity      COVID suspicion:   no  Code Status:    Full Code  Readmission:    N/A  Isolation Requirements:  no  Recommended Level of Care: med/surg  Department:    [address] [place] [address] ED - [phone]  Other:     Failed outpatient therapy with doxycycline, returns with worsening erythema, pain and soft tissue swelling in the right leg. IV Rocephin/vancomycin ordered, needs admission for IV antimicrobials. Takes Xarelto for atrial fibrillation with good compliance, doubt concomitant DVT.

## 2021-11-23 NOTE — PROGRESS NOTES
PHYSICAL THERAPY EVALUATION/DISCHARGE  Patient: Omid Martinez (36 y.o. male)  Date: 11/23/2021  Primary Diagnosis: Cellulitis [L03.90]       Precautions: skin      ASSESSMENT  Based on the objective data described below, the patient presents with distal RLE pain, erythema, and swelling after admission 11/22/21 with RLE cellulitis that has failed outpatient antibiotic treatment. Pt reports working out in home gym several times a week and states he is glad to be ambulatory in hallways today. He benefits for RW in order to offload RLE for pain management however demonstrates independent ambulation with and without device. He scores within low fall risk category and demonstrates appropriate safety awareness. Functional Outcome Measure: The patient scored 28 on the Tinetti outcome measure which is indicative of low fall risk. Other factors to consider for discharge: none additional     Further skilled acute physical therapy is not indicated at this time. PLAN :  Recommendation for discharge: (in order for the patient to meet his/her long term goals)  No skilled physical therapy/ follow up rehabilitation needs identified at this time. This discharge recommendation:  Has been made in collaboration with the attending provider and/or case management    IF patient discharges home will need the following DME: RW for pain management vs. No assistive device       SUBJECTIVE:   Patient stated It feels really good to be walking around.     Pt received ambulatory in room without assistive device, independently managing IV pole, agreeable to PT and cleared by RN.     OBJECTIVE DATA SUMMARY:   HISTORY:    Past Medical History:   Diagnosis Date    Atrial fibrillation ([address]     Stroke [facility])      Past Surgical History:   Procedure Laterality Date    CA INS NEW/RPLCMT PRM PM W/TRANSV ELTRD ATRIAL&VENT N/A 2/6/2020    INSERT PPM DUAL performed by Lila Kennedy MD at [facility] CATH LAB       Prior level of function: independent at community level, drives private vehicle, works out at home gym several times/week. Personal factors and/or comorbidities impacting plan of care: as above    Home Situation  Home Environment: Private residence  # Steps to Enter: 2  Rails to Enter: No  One/Two Story Residence: One story  Living Alone: No  Support Systems: Other Family Member(s)  Patient Expects to be Discharged to[de-identified] House  Current DME Used/Available at Home: Grab bars (rubber bath mat)  Tub or Shower Type: Tub/Shower combination    EXAMINATION/PRESENTATION/DECISION MAKING:   Critical Behavior:  Neurologic State: Alert  Orientation Level: Oriented X4  Cognition: Appropriate decision making  Safety/Judgement: Awareness of environment, Driving appropriateness, Fall prevention, Good awareness of safety precautions, Home safety, Insight into deficits  Hearing: Auditory  Auditory Impairment: Hard of hearing, bilateral, Hearing aid(s)  Hearing Aids/Status: Bilateral, At bedside, With patient  Skin:  Erythema RLE  Edema: +RLE  Range Of Motion:  AROM: Within functional limits           PROM: Within functional limits           Strength:    Strength:  Within functional limits                    Tone & Sensation:   Tone: Normal                              Coordination:  Coordination: Within functional limits     Functional Mobility:  Bed Mobility:  Rolling: Independent  Supine to Sit: Independent  Sit to Supine: Independent  Scooting: Independent  Transfers:  Sit to Stand: Independent  Stand to Sit: Independent        Bed to Chair: Independent              Balance:   Sitting: Intact  Standing: Intact  Ambulation/Gait Training:  Distance (ft): 400 Feet (ft)  Assistive Device: Walker, rolling; Gait belt  Ambulation - Level of Assistance: Modified independent        Gait Abnormalities: Antalgic            40' without assistive device with independence, +managing IV pole                               Functional Measure:  Tinetti test:    Sitting Balance: 1  Arises: 2  Attempts to Rise: 2  Immediate Standing Balance: 2  Standing Balance: 2  Nudged: 2  Eyes Closed: 1  Turn 360 Degrees - Continuous/Discontinuous: 1  Turn 360 Degrees - Steady/Unsteady: 1  Sitting Down: 2  Balance Score: 16 Balance total score  Indication of Gait: 1  R Step Length/Height: 1  L Step Length/Height: 1  R Foot Clearance: 1  L Foot Clearance: 1  Step Symmetry: 1  Step Continuity: 1  Path: 2  Trunk: 2  Walking Time: 1  Gait Score: 12 Gait total score  Total Score: 28/28 Overall total score         Tinetti Tool Score Risk of Falls  <19 = High Fall Risk  19-24 = Moderate Fall Risk  25-28 = Low Fall Risk  Tinetti ME. Performance-Oriented Assessment of Mobility Problems in Elderly Patients. [address]; [id number].  (Scoring Description: PT Bulletin Feb. 10, 1993)    Older adults: Hannah Mancilla et al, 2009; n = 1000 [facility] elderly evaluated with ABC, LUCRECIA, ADL, and IADL)  · Mean LUCRECIA score for males aged 69-68 years = 26.21(3.40)  · Mean LUCRECIA score for females age 69-68 years = 25.16(4.30)  · Mean LUCRECIA score for males over 80 years = 23.29(6.02)  · Mean LUCRECIA score for females over 80 years = 17.20(8.32)            Physical Therapy Evaluation Charge Determination   History Examination Presentation Decision-Making   HIGH Complexity :3+ comorbidities / personal factors will impact the outcome/ POC  HIGH Complexity : 4+ Standardized tests and measures addressing body structure, function, activity limitation and / or participation in recreation  LOW Complexity : Stable, uncomplicated  Other outcome measures barthel  LOW       Based on the above components, the patient evaluation is determined to be of the following complexity level: LOW     Pain Ratin/10 RLe (NPS)  Offered rest, repositioning, elevation, education    Activity Tolerance:   Good      After treatment patient left in no apparent distress:   Supine in bed, Call bell within reach, Side rails x 3 and RLE elevated on 3 ana    COMMUNICATION/EDUCATION:   The patients plan of care was discussed with: Registered nurse and Case management. Fall prevention education was provided and the patient/caregiver indicated understanding., Patient/family have participated as able in goal setting and plan of care. and Patient/family agree to work toward stated goals and plan of care.     Thank you for this referral.  Phillip Dakins, PT, DPT   Time Calculation: 27 mins

## 2021-11-23 NOTE — PROGRESS NOTES
11/23/2021  Case Management Progress Note    11:44 AM  Patient is 79year old male admitted 11/22 with cellulitis  Patient does not have an RUR score due to observation status, letter given on admission  Covid test: none this admission   Chart reviewed--patient discussed at [facility] rounds  Per MD at rounds patient is not quite ready for discharge yet, but likely will be within the next day or two. He did work with PT and has been cleared by them for skilled needs, however noted consult that he could benefit from a walker. Sent referral for this. Per initial assessment patient lives with family and plans to drive himself home on discharge. Transition of Care Plan   1. Continue medical management/treatment  2. Home with family assistance when ready   3. Referral sent for rolling walker  4. Patient will transport self home   5.  CM will continue to follow    SYDNI Jaquez

## 2021-11-23 NOTE — PROGRESS NOTES
TRANSFER - IN REPORT:    Verbal report received from Burak(name) on Edger Paget  being received from ED(unit) for routine progression of care      Report consisted of patients Situation, Background, Assessment and   Recommendations(SBAR). Information from the following report(s) SBAR, Kardex, Intake/Output, MAR and Recent Results was reviewed with the receiving nurse. Opportunity for questions and clarification was provided. Assessment completed upon patients arrival to unit and care assumed.

## 2021-11-23 NOTE — ED NOTES
TRANSFER - OUT REPORT:    Verbal report given to [facility]-ER RN on Edger Paget  being transferred to 5th floor for routine progression of care       Report consisted of patients Situation, Background, Assessment and   Recommendations(SBAR). Information from the following report(s) SBAR, Kardex, ED Summary and MAR was reviewed with the receiving nurse. Lines:   Peripheral IV 11/22/21 Right Antecubital (Active)   Site Assessment Clean, dry, & intact 11/22/21 1132   Dressing Status Clean, dry, & intact 11/22/21 1132   Hub Color/Line Status Pink; Flushed 11/22/21 1132   Action Taken Open ports on tubing capped 11/22/21 1132   Alcohol Cap Used Yes 11/22/21 1132        Opportunity for questions and clarification was provided.       Patient transported with:   [employer]

## 2021-11-23 NOTE — PROGRESS NOTES
Bedside shift change report given to Ezra Loaiza (oncoming nurse) by Matt Reinoso (offgoing nurse). Report included the following information SBAR, Kardex, Intake/Output, MAR and Recent Results.

## 2021-11-23 NOTE — PROGRESS NOTES
[facility] [address]  [address]  [phone]      Medical Progress Note      NAME: Jyotsna Weir   :  1940  MRM:  [id number]    Date/Time: 2021  2:37 PM         Subjective:     Chief Complaint:  \"I'm okay\"     Pt seen and examined. No complaints. RLE less swollen per his report and does appear less erythematous     ROS:  (bold if positive, if negative)           Objective:       Vitals:          Last 24hrs VS reviewed since prior progress note.  Most recent are:    Visit Vitals  /65 (BP 1 Location: Right upper arm, BP Patient Position: At rest)   Pulse 93   Temp 98.2 °F (36.8 °C)   Resp 19   Ht 5' 8\" (1.727 m)   Wt 73.5 kg (162 lb)   SpO2 90%   BMI 24.63 kg/m²     SpO2 Readings from Last 6 Encounters:   21 90%   21 93%   21 95%   20 98%   20 93%   19 95%            Intake/Output Summary (Last 24 hours) at 2021 1437  Last data filed at 2021 1613  Gross per 24 hour   Intake 650 ml   Output 0 ml   Net 650 ml          Exam:     Physical Exam:    Gen:  Well-developed, well-nourished, in no acute distress  HEENT:  Pink conjunctivae, PERRL, hearing intact to voice, moist mucous membranes  Neck:  Supple, without masses, thyroid non-tender  Resp:  No accessory muscle use, clear breath sounds without wheezes rales or rhonchi  Card:  No murmurs, normal S1, S2 without thrills, bruits or peripheral edema  Abd:  Soft, non-tender, non-distended, normoactive bowel sounds are present  Musc:  No cyanosis or clubbing  Skin: RLE erythema, warmth and swelling improving   Neuro:  Cranial nerves 3-12 are grossly intact,  strength is 5/5 bilaterally and dorsi / plantarflexion is 5/5 bilaterally, follows commands appropriately            Medications Reviewed: (see below)    Lab Data Reviewed: (see below)    ______________________________________________________________________    Medications:     Current Facility-Administered Medications   Medication Dose Route Frequency    [START ON 11/24/2021] Vancomycin - Please draw random level with AM lab (11/24/21).  Thank you!!   Other ONCE    metroNIDAZOLE (FLAGYL) tablet 500 mg  500 mg Oral Q12H    sodium chloride (NS) flush 5-40 mL  5-40 mL IntraVENous Q8H    sodium chloride (NS) flush 5-40 mL  5-40 mL IntraVENous PRN    acetaminophen (TYLENOL) tablet 650 mg  650 mg Oral Q4H PRN    HYDROcodone-acetaminophen (NORCO) 5-325 mg per tablet 1 Tablet  1 Tablet Oral Q4H PRN    naloxone (NARCAN) injection 0.4 mg  0.4 mg IntraVENous PRN    ondansetron (ZOFRAN) injection 4 mg  4 mg IntraVENous Q4H PRN    lisinopriL (PRINIVIL, ZESTRIL) tablet 10 mg  10 mg Oral QHS    rivaroxaban (XARELTO) tablet 20 mg  20 mg Oral DAILY WITH DINNER    insulin lispro (HUMALOG) injection   SubCUTAneous AC&HS    glucose chewable tablet 16 g  4 Tablet Oral PRN    dextrose (D50W) injection syrg 12.5-25 g  12.5-25 g IntraVENous PRN    glucagon (GLUCAGEN) injection 1 mg  1 mg IntraMUSCular PRN    vancomycin (VANCOCIN) 1,250 mg in 0.9% sodium chloride 250 mL (VIAL-MATE)  1,250 mg IntraVENous Q24H    cefepime (MAXIPIME) 2 g in sterile water (preservative free) 10 mL IV syringe  2 g IntraVENous Q12H            Lab Review:     Recent Labs     11/23/21  0212 11/22/21  1123   WBC 8.9 8.0   HGB 12.7 14.1   HCT 37.7 41.5    243     Recent Labs     11/23/21  0212 11/22/21  1123    135*   K 4.4 4.0    104   CO2 24 28   * 155*   BUN 14 15   CREA 0.88 1.02   CA 8.5 9.4     No components found for: GLPOC         Assessment / Plan:   RLE cellulitis - improving with IV antibiotics  -continue IV antibiotics  -elevated RLE   -LE dopplers without e/o DVT     H/o afib   -continue Xarelto     DM   -ISS   -BG checks AC TID and qHS   -continue lisinopril for renoprotective effects     Total time spent with patient: 30 895 [address] discussed with: Patient    Discussed: Care Plan    Prophylaxis:  xarelto    Disposition:  Home w/Family           ___________________________________________________    Attending Physician: Duarte Allred MD

## 2021-11-24 NOTE — PROGRESS NOTES
Carlos Eduardo [facility] [place] [address]  [address]  [phone]      Medical Progress Note      NAME: Artemio Abdul   :  1940  MRM:  [id number]    Date/Time: 2021  2:37 PM         Subjective:     Chief Complaint:  \"I'm okay\"     Pt seen and examined. No complaints. ROS:  (bold if positive, if negative)           Objective:       Vitals:          Last 24hrs VS reviewed since prior progress note.  Most recent are:    Visit Vitals  BP 90/60 (BP 1 Location: Left upper arm, BP Patient Position: At rest)   Pulse 79   Temp 98.6 °F (37 °C)   Resp 18   Ht 5' 8\" (1.727 m)   Wt 73.5 kg (162 lb)   SpO2 90%   BMI 24.63 kg/m²     SpO2 Readings from Last 6 Encounters:   21 90%   21 93%   21 95%   20 98%   20 93%   19 95%            Intake/Output Summary (Last 24 hours) at 2021 1556  Last data filed at 2021 0825  Gross per 24 hour   Intake 310 ml   Output 500 ml   Net -190 ml          Exam:     Physical Exam:    Gen:  Well-developed, well-nourished, in no acute distress  HEENT:  Pink conjunctivae, PERRL, hearing intact to voice, moist mucous membranes  Neck:  Supple, without masses, thyroid non-tender  Resp:  No accessory muscle use, clear breath sounds without wheezes rales or rhonchi  Card:  No murmurs, normal S1, S2 without thrills, bruits or peripheral edema  Abd:  Soft, non-tender, non-distended, normoactive bowel sounds are present  Musc:  No cyanosis or clubbing  Skin: RLE erythema, warmth and swelling improving   Neuro:  Cranial nerves 3-12 are grossly intact,  strength is 5/5 bilaterally and dorsi / plantarflexion is 5/5 bilaterally, follows commands appropriately              Medications Reviewed: (see below)    Lab Data Reviewed: (see below)    ______________________________________________________________________    Medications:     Current Facility-Administered Medications   Medication Dose Route Frequency    vancomycin (VANCOCIN) 750 mg in 0.9% sodium chloride 250 mL (VIAL-MATE)  750 mg IntraVENous Q12H    ketorolac (TORADOL) injection 15 mg  15 mg IntraVENous Q6H    colchicine tablet 0.6 mg  0.6 mg Oral DAILY    colchicine (MITIGARE) capsule 0.6 mg  0.6 mg Oral ONCE    metroNIDAZOLE (FLAGYL) tablet 500 mg  500 mg Oral Q12H    sodium chloride (NS) flush 5-40 mL  5-40 mL IntraVENous Q8H    sodium chloride (NS) flush 5-40 mL  5-40 mL IntraVENous PRN    acetaminophen (TYLENOL) tablet 650 mg  650 mg Oral Q4H PRN    HYDROcodone-acetaminophen (NORCO) 5-325 mg per tablet 1 Tablet  1 Tablet Oral Q4H PRN    naloxone (NARCAN) injection 0.4 mg  0.4 mg IntraVENous PRN    ondansetron (ZOFRAN) injection 4 mg  4 mg IntraVENous Q4H PRN    lisinopriL (PRINIVIL, ZESTRIL) tablet 10 mg  10 mg Oral QHS    rivaroxaban (XARELTO) tablet 20 mg  20 mg Oral DAILY WITH DINNER    insulin lispro (HUMALOG) injection   SubCUTAneous AC&HS    glucose chewable tablet 16 g  4 Tablet Oral PRN    dextrose (D50W) injection syrg 12.5-25 g  12.5-25 g IntraVENous PRN    glucagon (GLUCAGEN) injection 1 mg  1 mg IntraMUSCular PRN    cefepime (MAXIPIME) 2 g in sterile water (preservative free) 10 mL IV syringe  2 g IntraVENous Q12H            Lab Review:     Recent Labs     11/23/21  0212 11/22/21  1123   WBC 8.9 8.0   HGB 12.7 14.1   HCT 37.7 41.5    243     Recent Labs     11/23/21  0212 11/22/21  1123    135*   K 4.4 4.0    104   CO2 24 28   * 155*   BUN 14 15   CREA 0.88 1.02   CA 8.5 9.4     No components found for: GLPOC         Assessment / Plan:   RLE cellulitis - improving with IV antibiotics. As cellulitis receeded appears that did have some localization in the bilateral malleolar regions of the RLE. ?gout. Less likely joint infection as has good ROM of ankle.  ?possible the lesion on the pre-tibial region being erythema nodusum  -continue IV antibiotics  -elevated RLE   -LE dopplers without e/o DVT   -xray with effusion  -plans for MRI   -UA not elevated but not always helpful in acute gouty flare.  On IV toradol and colchicine for now   -appreciate ortho's assistance     H/o afib   -continue Xarelto     DM   -ISS   -BG checks AC TID and qHS   -hold lisinopril considering NSAIDs, contrast with MRI, etc     Total time spent with patient: 35  Minutes (evaluated at the bedside twice today)                  Care Plan discussed with: Patient, Ortho PA    Discussed:  Care Plan    Prophylaxis:  xarelto    Disposition:  Home w/Family           ___________________________________________________    Attending Physician: Maksim Kendrick MD

## 2021-11-24 NOTE — PROGRESS NOTES
11/24/2021  Case Management Progress Note    11:15 AM  Patient is 79year old male admitted 11/22 with cellulitis  Patient does not have an RUR score due to observation status, letter given on admission  Covid test: none this admission   Chart reviewed--patient discussed at [facility] rounds  Per MD at rounds patient may be ready for discharge today. He has been approved for a walker, which we will deliver to patient today. No other needs noted, patient is otherwise ok for discharge from CM standpoint. Transition of Care Plan   1. Continue medical management/treatment  2. Home today with family assistance if cleared  3. Patient will transport self home  4.  CM will continue to follow and assist.     SYDNI Ortega

## 2021-11-24 NOTE — PROGRESS NOTES
Bedside and Verbal shift change report given to [facility] (oncoming nurse) by [employer] (offgoing nurse). Report included the following information SBAR, Kardex and MAR.

## 2021-11-24 NOTE — PROGRESS NOTES
[address] Dosing Services: Antimicrobial Stewardship Daily Doc 11/24/2021    Consult for antibiotic dosing of Vancomycin + Cefepime+ Flagyl by Dr. Richter Samples  Indication: Cellulitis  Day of Therapy: 3  - OP Tx failure doxycycline 100 mg Q12 hrs    Ht Readings from Last 1 Encounters:   11/22/21 172.7 cm (68\")        Wt Readings from Last 1 Encounters:   11/22/21 73.5 kg (162 lb)      Vancomycin therapy:  Loading dose:  1750mg IV x 1 (11/22 @ 1137)  Maintenance dose: 1250mg IV Q24hrs   Last level: Random level ~14.5hrs after first maintenance dose = 8.3mcg/ml; Resulted in subtherapeutic AUC = 345    Plan: Increase Vancomycin to 750mg IV Q12hrs for predicted  and Css trough 12.9 based on empiric calculated kinetics. Plan for a steady state level prior to 3rd dose (ordered at 0400, 11/25)   Dose administration notes:   Doses given appropriately as scheduled    Dose calculated to approximate a           a. Target AUC/GUERO of 400-500          b. Trough of 10-15 mcg/mL     Date Dose & Interval Measured (mcg/mL) Extrapolated (mcg/mL)   ? ? ? ?   ? ? ? ?   ? ? ? ? Non-Kinetic Antimicrobial Dosing Regimen:   Current Regimen:  Cefepime - consult to dose --> Continue current dose  Continue 2 gm IV Q12h (CrCl 63 ml/min - borderline)    Non-Kinetic Antimicrobial Dosing Regimen:   Current Regimen: Metronidazole- consult to dose   Continue flagyl 500 mg PO Q12 hrs per protocol based on indication       Other Antimicrobial   (not dosed by pharmacist) CTX 2 g x 1 ER   Cultures None   Serum Creatinine Lab Results   Component Value Date/Time    Creatinine 0.88 11/23/2021 02:12 AM    Creatinine (POC) 0.8 09/21/2010 12:56 PM         Creatinine Clearance Estimated Creatinine Clearance: 62.6 mL/min (based on SCr of 0.88 mg/dL).      Temp Temp: 97.5 °F (36.4 °C)       WBC Lab Results   Component Value Date/Time    WBC 8.9 11/23/2021 02:12 AM        Procalcitonin No results found for: PCT     For Antifungals, Metronidazole and Nafcillin: Lab Results   Component Value Date/Time    ALT (SGPT) 22 08/01/2019 09:23 PM    AST (SGOT) 25 08/01/2019 09:23 PM    Alk.  phosphatase 67 08/01/2019 09:23 PM    Bilirubin, total 1.0 08/01/2019 09:23 PM        Pharmacist Raj Junior

## 2021-11-24 NOTE — PROGRESS NOTES
Bedside and Verbal shift change report given to Ted Fajardo (oncoming nurse) by Edin Mayers (offgoing nurse). Report included the following information SBAR, Kardex and MAR.

## 2021-11-24 NOTE — ROUTINE PROCESS
Pt has pacemaker. Faxed cardiology form to dr nelson. Waiting for it to be filled out and sent back. Mri on hold. Nurse was made aware.

## 2021-11-25 NOTE — PROGRESS NOTES
[address] Dosing Services: Antimicrobial Stewardship Daily Doc 11/25/2021    Consult for antibiotic dosing of Vancomycin + Cefepime+ Flagyl by Dr. Oliver Eddy  Indication: Cellulitis  Day of Therapy: 4  - OP Tx failure doxycycline 100 mg Q12 hrs    Ht Readings from Last 1 Encounters:   11/22/21 172.7 cm (68\")        Wt Readings from Last 1 Encounters:   11/22/21 73.5 kg (162 lb)      Vancomycin therapy:  Loading dose:  1750mg IV x 1 (11/22 @ 1137)  Maintenance dose: 750mg IV Q12hrs     Plan: Adjust to 1250mg every 24 hrs which predicts trough 12.6 mcg/mL or AUC/GUERO 438. Recheck level after 1-2 doses of new regimen    Dose administration notes:   Doses given appropriately as scheduled    Dose calculated to approximate a           a. Target AUC/GUERO of 400-500          b. Trough of 10-15 mcg/mL     Date Dose & Interval Measured (mcg/mL) AUC/GUERO   ?11/25/21 ? 750mg q 12 hrs ?17.2 ?514   ? ? ? ?   ? ? ? ? Thank you,  Clarice Claros, Pharm. D.

## 2021-11-25 NOTE — CONSULTS
ORTHOPEDIC SURGERY CONSULT    Subjective:     Date of Consultation:  2021    Referring Physician:  Dr. Vanessa Muniz is a 79 y.o. male who is being seen for right lower leg and ankle erythema and pain. He has a past medical history of DM-2, Afib, CVA, and second degree heart block with pacemaker. He reports that on Wednesday of last week he developed a cough, fever, and myalgias. The next day he woke up with right lower leg erythema, pain, and swelling. This worsened over the next few days. He was seen at [facility] on  and started on doxycycline. He had worsening swelling and erythema following this and was admitted for IV antibiotics. We have been asked to comment on ankle swelling and concern for gouty arthropathy. He denies injury, history of gout, chronic right ankle pain. He admits to previous ankle sprains in high school. No recent trauma to the right ankle.       Patient Active Problem List    Diagnosis Date Noted    Cellulitis 2021    Type 2 diabetes mellitus without complication ([address]     Blurry vision 2020    Unsteady gait 2020    A-fib ([address] 2020    History of CVA (cerebrovascular accident) 2020    Second degree AV block 2020     Family History   Adopted: Yes      Social History     Tobacco Use    Smoking status: Former Smoker     Packs/day: 3.00     Years: 50.00     Pack years: 150.00     Quit date: 2004     Years since quittin.5    Smokeless tobacco: Former User     Quit date:    Substance Use Topics    Alcohol use: Yes     Comment: beer every other day     Past Medical History:   Diagnosis Date    Atrial fibrillation [facility]     Stroke [facility])       Past Surgical History:   Procedure Laterality Date    TX INS NEW/RPLCMT PRM PM W/TRANSV ELTRD ATRIAL&VENT N/A 2020    INSERT PPM DUAL performed by Donita Langston MD at [address] [place] [address] LAB      Prior to Admission medications    Medication Sig Start Date End Date Taking? Authorizing Provider   doxycycline (ADOXA) 100 mg tablet Take 100 mg by mouth two (2) times daily (with meals). Yes Provider, Historical   clotrimazole-betamethasone (LOTRISONE) topical cream Apply  to affected area two (2) times a day. Yes Provider, Historical   rivaroxaban (Xarelto) 20 mg tab tablet Take 1 Tablet by mouth daily (with dinner). 11/19/21  Yes Maria Ines Mckeon MD   glipiZIDE (GLUCOTROL) 5 mg tablet Take 5 mg by mouth daily (with dinner). 5/7/21  Yes Provider, Historical   lisinopril (PRINIVIL, ZESTRIL) 10 mg tablet Take 10 mg by mouth nightly. Yes Provider, Historical   simvastatin (ZOCOR) 40 mg tablet Take 40 mg by mouth nightly.    Yes Provider, Historical     Current Facility-Administered Medications   Medication Dose Route Frequency    vancomycin (VANCOCIN) 750 mg in 0.9% sodium chloride 250 mL (VIAL-MATE)  750 mg IntraVENous Q12H    ketorolac (TORADOL) injection 15 mg  15 mg IntraVENous Q6H    colchicine tablet 0.6 mg  0.6 mg Oral DAILY    metroNIDAZOLE (FLAGYL) tablet 500 mg  500 mg Oral Q12H    sodium chloride (NS) flush 5-40 mL  5-40 mL IntraVENous Q8H    sodium chloride (NS) flush 5-40 mL  5-40 mL IntraVENous PRN    acetaminophen (TYLENOL) tablet 650 mg  650 mg Oral Q4H PRN    HYDROcodone-acetaminophen (NORCO) 5-325 mg per tablet 1 Tablet  1 Tablet Oral Q4H PRN    naloxone (NARCAN) injection 0.4 mg  0.4 mg IntraVENous PRN    ondansetron (ZOFRAN) injection 4 mg  4 mg IntraVENous Q4H PRN    [Held by provider] lisinopriL (PRINIVIL, ZESTRIL) tablet 10 mg  10 mg Oral QHS    rivaroxaban (XARELTO) tablet 20 mg  20 mg Oral DAILY WITH DINNER    insulin lispro (HUMALOG) injection   SubCUTAneous AC&HS    glucose chewable tablet 16 g  4 Tablet Oral PRN    dextrose (D50W) injection syrg 12.5-25 g  12.5-25 g IntraVENous PRN    glucagon (GLUCAGEN) injection 1 mg  1 mg IntraMUSCular PRN    cefepime (MAXIPIME) 2 g in sterile water (preservative free) 10 mL IV syringe 2 g IntraVENous Q12H     Allergies   Allergen Reactions    Ampicillin Nausea and Vomiting    Iodine Hives, Itching and Nausea and Vomiting        Review of Systems:  Pertinent items are noted in HPI. Objective:     Patient Vitals for the past 8 hrs:   BP Temp Pulse Resp SpO2   21 1943 (!) 115/54 97.3 °F (36.3 °C) (!) 54 18 99 %   21 1526 90/60 98.6 °F (37 °C) 79 18 90 %     Temp (24hrs), Av.1 °F (36.7 °C), Min:97.3 °F (36.3 °C), Max:98.7 °F (37.1 °C)        EXAM: GEN: Well appearing male in NAD  PSYCH:  AAO x 4  MUSC/NEURO:  Right lower leg and ankle with erythema that expands medially and laterally. There is no erythema midline of ankle or into the midfoot. There is erythema to the mid tibial level. There is a hard nodule over the anterolateral knee. There is no knee effusion. Right ankle with effusion. No pain with PROM. No pain with AROM of the ankle. TTP medial malleolus and lateral malleolus. SILT right leg and foot. +DP and PT pulses with BCR of all digits.                  Data Review   Recent Results (from the past 24 hour(s))   GLUCOSE, POC    Collection Time: 21  9:37 PM   Result Value Ref Range    Glucose (POC) 138 (H) 65 - 117 mg/dL    Performed by [employer] (Travel)    VANCOMYCIN, RANDOM    Collection Time: 21  3:13 AM   Result Value Ref Range    Vancomycin, random 8.3 UG/ML   GLUCOSE, POC    Collection Time: 21  6:52 AM   Result Value Ref Range    Glucose (POC) 157 (H) 65 - 117 mg/dL    Performed by [employer] ([employer])    URIC ACID    Collection Time: 21  9:47 AM   Result Value Ref Range    Uric acid 5.4 3.5 - 7.2 MG/DL   SED RATE (ESR)    Collection Time: 21  9:47 AM   Result Value Ref Range    Sed rate, automated 37 (H) 0 - 20 mm/hr   C REACTIVE PROTEIN, QT    Collection Time: 21  9:47 AM   Result Value Ref Range    C-Reactive protein 4.77 (H) 0.00 - 0.60 mg/dL   GLUCOSE, POC    Collection Time: 21 12:16 PM   Result Value Ref Range    Glucose (POC) 155 (H) 65 - 117 mg/dL    Performed by Breanna Cali    GLUCOSE, POC    Collection Time: 11/24/21  4:36 PM   Result Value Ref Range    Glucose (POC) 224 (H) 65 - 117 mg/dL    Performed by Breanna Cali          Assessment/Plan:   A: 1. Right lower leg and ankle cellulitis improving  2. Right ankle gout? P: 1. Continue with IV antibiotics. His PE is not concerning for a septic arthritis especially without pain on AROM or PROM of the ankle. There is an ankle effusion, but likely reactive- gouty arthropathy. I would not recommend arthrocentesis of the ankle today due to risk of seeding the joint. CRP and ESR are elevated, but not at septic arthritis levels. Uric acid serum is normal, which does not exclude a gouty arthropathy. Would recommend MRI, but with pacemaker it would take until Friday the earliest to get this done, so will proceed with CT. If there is a fluid collection would have IR aspirate and then consider I and D. If PE changes and has pain with PROM or AROM of the ankle then would proceed with arthrocentesis of the ankle tomorrow. Toradol and colchicine as ordered for gouty arthropathy. Orthopedics will see in am to check ROM. Discussed case with Dr. Henri Mills who agrees with plan.         Caro Toscano, [place]   Orthopaedic Surgery PA  [address] [facility]

## 2021-11-25 NOTE — PROGRESS NOTES
Orthopedic NP Progress Note  Post Op day: * No surgery found *    November 25, 2021 11:07 AM     Nando Ramos    Attending Physician: Treatment Team: Attending Provider: Shalini Odom MD; Consulting Provider: Shalini Odom MD; Utilization Review: Morales Brown RN; Utilization Review: Shanta No RN; Care Manager: Declan Barrios; Consulting Provider: ALFREDA Najera; Surgeon: Alma Watson MD     Vital Signs:    Patient Vitals for the past 8 hrs:   BP Temp Pulse Resp SpO2   11/25/21 0928     95 %   11/25/21 0909 126/80 98.1 °F (36.7 °C) 88 17 (!) 88 %   11/25/21 0341 120/67 97.4 °F (36.3 °C) 86 16 94 %     BMI (calculated): 24.6 (11/22/21 1111)    Intake/Output:  No intake/output data recorded. 11/23 1901 - 11/25 0700  In: 310 [P.O.:300; I.V.:10]  Out: 500 [Urine:500]    Pain Control:   Pain Assessment  Pain Scale 1: Numeric (0 - 10)  Pain Intensity 1: 0  Pain Location 1: Leg  Pain Orientation 1: Right  Pain Description 1: Aching    LAB:    Recent Labs     11/23/21  0212   HCT 37.7   HGB 12.7     Lab Results   Component Value Date/Time    Sodium 136 11/23/2021 02:12 AM    Potassium 4.4 11/23/2021 02:12 AM    Chloride 106 11/23/2021 02:12 AM    CO2 24 11/23/2021 02:12 AM    Glucose 159 (H) 11/23/2021 02:12 AM    BUN 25 (H) 11/25/2021 12:01 AM    Creatinine 1.21 11/25/2021 12:01 AM    Calcium 8.5 11/23/2021 02:12 AM       Subjective:  Nando Ramos is a 79 y.o. male with LLE cellulitis, denies pain this AM and reports leg is improving. Pt sitting up in chair playing on his computer        Objective: General: alert, cooperative, no distress. Neuro/Vascular: CNS Intact. Sensation stable. Brisk cap refill, 2+ pulses UE/LE  Musculoskeletal:  +ROM UE/LE with mild medial ankle edema/ small reactive ankle effusion,NO pain with ROM of ankle, NTTP, NVI. Erythema to tibia has improved  .     Skin: warm and dry          PT/OT:   Gait:  Gait  Gait Abnormalities: Antalgic  Ambulation - Level of Assistance: Modified independent  Distance (ft): 400 Feet (ft)  Assistive Device: Walker, rolling, Gait belt                   Assessment:    s/p     Active Problems:    Cellulitis (11/22/2021)         Plan:   -  LLE cellulitis - CT reviewed with Dr. Nick Fair, would defer to podiatry for further evaluation. Continue with IV antibiotics. His PE is not concerning for a septic arthritis especially without pain on AROM or PROM of the ankle. No plan for arthrocentesis of the ankle due to risk of seeding the joint and no clinical evidence to suggest septic jt. Ortho will sign off at this time. Dr. Nick Fair aware and agree with plan.      Signed By: Alex Walker NP    Orthopedic Nurse Practitioner

## 2021-11-25 NOTE — PROGRESS NOTES
Bedside and Verbal shift change report given to YASMANI Georges (oncoming nurse) by Ceferino Gray RN (offgoing nurse). Report included the following information SBAR, Kardex, Intake/Output, MAR and Recent Results.

## 2021-11-26 NOTE — PROGRESS NOTES
11/26/2021  Case Management Discharge Note    10:20 AM  Patient is 79year old male admitted 11/22 with cellulitis  Patient does not have an RUR score due to observation status, letter given on admission  Covid test: none this admission   Chart reviewed--patient discussed at [facility] rounds  Per MD patient is discharging today. I delivered his walker on Wednesday, and he does not have any other noted needs from CM standpoint--he is ok for discharge from us. Transition of Care Plan   1. Discharge today, order in   2. Home with family assistance   3. Patient will transport self home  4. Follow up with PCP, specialties as needed  5. Margarita Wyman given   6. OK for discharge from  standpoint.     SYDNI Bowman

## 2021-11-26 NOTE — CONSULTS
The [facility] Podiatric Surgery  H & P Note    Date: November 25, 2021  Patient: Jyotsna Weir  MR #: [id number]  CSN #: [id number]  Attending/Admitting Physician: Dr Nicholas Haley MD    Reason for Consult:  Dr Nicholas Haley MD asked me to see Jyotsna Weir for evaluation and treatment of right foot and ankle pain, redness    History of Present Illness:  Patient is a 79 y.o. male Admitted for worsening redness swelling and pain to the right lower extremity. Vascular studies have been negative for a vascular component. Orthopedics was consult it and did not recommend any further treatment. I was then contacted by the admitting team requesting assistance with management. Radiographs and CT have been ordered. Patient is on empiric antibiotics. ROS:   Constitutional: Negative for fever, chills, weight loss and malaise/fatigue. HENT: Negative for nosebleeds and congestion. Eyes: Negative for blurred vision and double vision. Respiratory: Negative for cough, shortness of breath and wheezing. Cardiovascular: Negative for chest pain, palpitations, claudication and positive for leg swelling. Gastrointestinal: Negative for heartburn, nausea, vomiting, abdominal pain and diarrhea. Genitourinary: Negative for dysuria and urgency. Musculoskeletal: Negative for myalgias and positive for joint pain. Skin: Negative for itching and rash. Positive for redness  Neurological: Negative for dizziness, focal weakness and headaches. Endo/Heme/Allergies: Negative for environmental allergies. Does bruise/bleed easily. Psychiatric/Behavioral: Negative for depression and suicidal ideas. The patient is not nervous/anxious. Blood pressure 90/63, pulse 92, temperature 98.6 °F (37 °C), resp. rate 18, height 5' 8\" (1.727 m), weight 73.5 kg (162 lb), SpO2 94 %.     Intake/Output Summary (Last 24 hours) at 11/25/2021 2201  Last data filed at 11/25/2021 1801  Gross per 24 hour   Intake 250 ml Output    Net 250 ml         Medical History:  Past Medical History:   Diagnosis Date    Atrial fibrillation ([address]     Stroke [facility])      Past Surgical History:   Procedure Laterality Date    MO INS NEW/RPLCMT PRM PM W/TRANSV ELTRD ATRIAL&VENT N/A 2020    INSERT PPM DUAL performed by Andrea Jaffe MD at [facility] CATH LAB     [unfilled]  Allergies   Allergen Reactions    Ampicillin Nausea and Vomiting    Iodine Hives, Itching and Nausea and Vomiting     Family History   Adopted: Yes     Social History     Tobacco Use   Smoking Status Former Smoker    Packs/day: 3.00    Years: 50.00    Pack years: 150.00    Quit date: 2004    Years since quittin.5   Smokeless Tobacco Former User    Quit date:      Social History     Substance and Sexual Activity   Drug Use No     Social History     Substance and Sexual Activity   Alcohol Use Yes    Comment: beer every other day       Labs:  Lab Results   Component Value Date/Time    WBC 6.7 2021 11:15 AM    HGB 14.6 2021 11:15 AM    HCT 43.5 2021 11:15 AM    MCV 88.1 2021 11:15 AM    PLATELET 023  11:15 AM     Lab Results   Component Value Date/Time    Sodium 136 2021 02:12 AM    Potassium 4.4 2021 02:12 AM    Chloride 106 2021 02:12 AM    CO2 24 2021 02:12 AM    BUN 25 (H) 2021 12:01 AM    Calcium 8.5 2021 02:12 AM      Lab Results   Component Value Date/Time    Glucose 159 (H) 2021 02:12 AM     Lab Results   Component Value Date/Time    INR 1.0 2020 03:50 PM    No components found for: PTT  Recent Labs     21  0212   *       Physical Exam:  General appearance: alert, cooperative, no distress, appears stated age    Lower Extremity Exam:  Vascular:    Dorsalis Pedis Pulse: present  Posterior Tibialis Pulse: present  Popliteal and Femoral Pulses: present  Skin Temp: warm toe warm legs to toes right and left  Extremity Edema: non pitting right lower extremity  Varicosities: present right and left    Neurological:  Deep Tendon Reflexes of Achilles and Patellar: intact right and left  Epicritic and Protective Sensations: present  Deep Pain Response: present    Dermatological:  Toenails: mycotic 1-5 right and left    Rash: absent  Erythema:        Calor: noted to right ankle    Musculoskeletal:  Gait and Station: antalgic right lower extremity with hyperpronation  Muscle Strength of Major Muscle Groups:  4/5 right and left lower extremity  Stability: diminished right and left lower extremities  Range of Motion: decreased ankle range of motion in dorsiflexion right and left  Limb Deformities: hyperpronated valgus foot type with valgus joleen position  Pain on palpation lateral and medial right subtalar joint. Imaging Modalities:   CT Right  The bones are osteopenic. 6 chronic ossicles adjacent to the medial  and lateral malleoli are likely related to prior injury. The ankle mortise is  intact. 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 There is a prominent os  trigonum. A small amount of gas within the subtalar joint is likely related to  vacuum phenomenon. There is mild osteophytosis in the subtalar joint. Mild to  moderate osteoarthritis is present in the talonavicular joint. No acute fracture  or dislocation. No evidence of osteomyelitis.     There is diffuse subcutaneous edema surrounding the ankle and visualized foot. No evidence of a focal drainable fluid collection. No subcutaneous emphysema.     IMPRESSION  Diffuse nonspecific subcutaneous edema surrounding the ankle and foot. EXAM: XR ANKLE RT MIN 3 V     INDICATION: Right ankle erythema and pain.     COMPARISON: None.     FINDINGS: Three views of the right ankle demonstrate no fracture or disruption  of the ankle mortise. There is moderate soft tissue swelling and an effusion. Ossicles are noted beneath the fibula and medial malleolus. There is an ankle  effusion.  No erosions are seen.     IMPRESSION     Acute right ankle sprain. No specific plain film evidence for infection. Vascular Studies:  Right lower extremity venous duplex negative for deep venous thrombosis or thrombophlebitis.      Left common femoral vein is thrombus free.     Incidental finding of prominent right groin lymph node.       Note: technically difficult exam due arterial calcification shadowing. Microbiological: pending    Impression:  1. Right Ankle Pain  2. Cellulitis right leg   3. Gout right subtalar joint  4. Synovitis right subtalar joint    Plan:  -Evaluation and medical management of right lower extremity redness pain and swelling. Patient signs and symptoms are consistent with acute gouty arthropathy but there is risk for septic joint and cellulitis with tenosynovitis of the posterior tibial tendon. I have discussed the patient with the admitting team and recommended a joint aspiration. Based on the appearance it's unlikely that there is a joint infection. I recommend the patient be discharged on oral antibiotic therapy as well as oral steroids to cover both and infection as well as gouty arthropathy. Patient will then follow closely in my office to review the results of the joint aspiration. The nature of the finding, probable diagnosis and likely treatment was thoroughly discussed with the patient. The options, risks, complications, alternative treatment as well as some of the differential diagnosis was discussed. The patient was thoroughly informed and all questions were answered. the patient indicated understanding and satisfaction with the discussion.     -Joint aspiration: patient was apprised of the procedure and informed consent was obtained. The right lateral subtalar joint and sinus tarsi were prepped and draped a using usual aseptic fashion. Next a local infiltrated block was performed to the sinus tarsi of the right foot using 0.5% Marcaine plane.  Next using an 18gauge needle and syringe the subtalar joint posterior facet and posterior joint were aspirated. The fluid was then collected and sent for pathology examination. A post procedure compression dressing was applied to the right lower extremity. Randy Tavarez.  JANES Ku FACFAS [facility]  Triple Board Certified Foot & Ankle Specialist  [phone] cell  11/25/2021  10:01 PM

## 2021-11-26 NOTE — PROGRESS NOTES
[address] [place] [address]  [phone]      Medical Progress Note      NAME: Darío Aquino   :  1940  MRM:  [id number]    Date/Time: 2021  2:37 PM         Subjective:     Chief Complaint:  \"I'm okay\"     Pt seen and examined. Despite no changes in antibiotics or therapy or degree of ambulation foot worsened throughout day. PT aware of plans for podiatry eval to determine need for joint aspiration     ROS:  (bold if positive, if negative)           Objective:       Vitals:          Last 24hrs VS reviewed since prior progress note.  Most recent are:    Visit Vitals  BP 90/63 (BP 1 Location: Right upper arm, BP Patient Position: At rest)   Pulse 92   Temp 98.6 °F (37 °C)   Resp 18   Ht 5' 8\" (1.727 m)   Wt 73.5 kg (162 lb)   SpO2 94%   BMI 24.63 kg/m²     SpO2 Readings from Last 6 Encounters:   21 94%   21 93%   21 95%   20 98%   20 93%   19 95%            Intake/Output Summary (Last 24 hours) at 20[address]  Last data filed at 2021 1801  Gross per 24 hour   Intake 250 ml   Output    Net 250 ml          Exam:     Physical Exam:    Gen:  Well-developed, well-nourished, in no acute distress  HEENT:  Pink conjunctivae, PERRL, hearing intact to voice, moist mucous membranes  Neck:  Supple, without masses, thyroid non-tender  Resp:  No accessory muscle use, clear breath sounds without wheezes rales or rhonchi  Card:  No murmurs, normal S1, S2 without thrills, bruits or peripheral edema  Abd:  Soft, non-tender, non-distended, normoactive bowel sounds are present  Musc:  No cyanosis or clubbing  Skin: RLE erythema, warmth and swelling improving   Neuro:  Cranial nerves 3-12 are grossly intact,  strength is 5/5 bilaterally and dorsi / plantarflexion is 5/5 bilaterally, follows commands appropriately    THIS AM         THIS AFTERNOON             Medications Reviewed: (see below)    Lab Data Reviewed: (see below)    ______________________________________________________________________    Medications:     Current Facility-Administered Medications   Medication Dose Route Frequency    vancomycin (VANCOCIN) 1,250 mg in 0.9% sodium chloride 250 mL (VIAL-MATE)  1,250 mg IntraVENous Q24H    ketorolac (TORADOL) injection 15 mg  15 mg IntraVENous Q6H    colchicine tablet 0.6 mg  0.6 mg Oral DAILY    metroNIDAZOLE (FLAGYL) tablet 500 mg  500 mg Oral Q12H    sodium chloride (NS) flush 5-40 mL  5-40 mL IntraVENous Q8H    sodium chloride (NS) flush 5-40 mL  5-40 mL IntraVENous PRN    acetaminophen (TYLENOL) tablet 650 mg  650 mg Oral Q4H PRN    HYDROcodone-acetaminophen (NORCO) 5-325 mg per tablet 1 Tablet  1 Tablet Oral Q4H PRN    naloxone (NARCAN) injection 0.4 mg  0.4 mg IntraVENous PRN    ondansetron (ZOFRAN) injection 4 mg  4 mg IntraVENous Q4H PRN    [Held by provider] lisinopriL (PRINIVIL, ZESTRIL) tablet 10 mg  10 mg Oral QHS    rivaroxaban (XARELTO) tablet 20 mg  20 mg Oral DAILY WITH DINNER    insulin lispro (HUMALOG) injection   SubCUTAneous AC&HS    glucose chewable tablet 16 g  4 Tablet Oral PRN    dextrose (D50W) injection syrg 12.5-25 g  12.5-25 g IntraVENous PRN    glucagon (GLUCAGEN) injection 1 mg  1 mg IntraMUSCular PRN    cefepime (MAXIPIME) 2 g in sterile water (preservative free) 10 mL IV syringe  2 g IntraVENous Q12H            Lab Review:     Recent Labs     11/25/21  1115 11/23/21 0212   WBC 6.7 8.9   HGB 14.6 12.7   HCT 43.5 37.7    238     Recent Labs     11/25/21  0001 11/23/21 0212   NA  --  136   K  --  4.4   CL  --  106   CO2  --  24   GLU  --  159*   BUN 25* 14   CREA 1.21 0.88   CA  --  8.5     No components found for: [place]         Assessment / Plan:   RLE cellulitis - improving with IV antibiotics. As cellulitis receeded appears that did have some localization in the bilateral malleolar regions of the RLE. ?gout.  Less likely joint infection as has good ROM of ankle. ?possible the lesion on the pre-tibial region being erythema nodusum  -continue IV antibiotics  -elevate RLE   -LE dopplers without e/o DVT   -xray with effusion  -unable to obtain MRI due to pacer   -CT showed small joint effusion => defer to podiatry re: aspiration. Considering worsening today despite broad spectrum antibiotics, toradol and cochicine, suspect he would benefit from aspiration  -UA not elevated but not always helpful in acute gouty flare. Was on IV toradol; stopped this evening due to bump in Cr  -continue colchicine for now     H/o afib   -continue Xarelto     DM   -ISS   -BG checks AC TID and qHS   -hold lisinopril     Mild increase in Cr  -toradol stopped on  11/25 at roughly 930 PM to ensure no worsening of renal function    Total time spent with patient: 35  Minutes (evaluated at the bedside twice with updates with family on 2nd visit.  Also discussions with ortho and podiatry as well as RN)                  [address] discussed with: Patient, Ortho PA, RN, podiatry, family     Discussed:  Care Plan    Prophylaxis:  xarelto    Disposition:  Home w/Family           ___________________________________________________    Attending Physician: Layo Loya MD

## 2021-11-26 NOTE — PROGRESS NOTES
Patient stated he has some redness on his torso. Seen some raised redness on upper torso to groin. Patient c/o of mild itching no pain. No distress. Dr. Marge Reynolds seen and examined patient, stated patient cleared to be d/c'd after giving benadryl and famotidine now. Discharge orders in    Discharge education, instructions, and documents given to patient and verbalized understanding. IV cannula aseptically removed. Tip intact. Patient no complaints. Waiting for transport.

## 2021-11-26 NOTE — DISCHARGE INSTRUCTIONS
HOSPITALIST DISCHARGE INSTRUCTIONS  NAME: Darío Aquino   :  1940   MRN:  [id number]     Date/Time:  2021 8:55 AM    ADMIT DATE: 2021     DISCHARGE DATE: 2021     ADMITTING DIAGNOSIS:  Cellulitis   Joint erythema (redness) => no crystals or bacterial growth on tap     DISCHARGE DIAGNOSIS:  As above     MEDICATIONS:     · It is important that you take the medication exactly as they are prescribed. · Keep your medication in the bottles provided by the pharmacist and keep a list of the medication names, dosages, and times to be taken in your wallet. · Do not take other medications without consulting your doctor. Pain Management: per above medications    What to do at Home    Recommended diet:  Diabetic Diet    Recommended activity: Activity as tolerated. Elevated lower extremity when able. Please keep dressing clean and dry     If you experience any of the following symptoms then please call your primary care physician or return to the emergency room if you cannot get hold of your doctor:  Fever, chills, nausea, vomiting, diarrhea, change in mentation, falling, bleeding, shortness of breath, chest pain     Follow Up:  Please follow-up with Dr. Geneva Garcia ([facility]) early next week. Please call to schedule if the office doesn't contact you   Gely Landaverde  Physician  [facility], [address] [place] [address]  Physician    Provider Summary    Title Resident? Provider type   DPM No Physician     Primary Contact Information    Phone Fax E-mail Address   [phone] Not available [address] [facility] [id number]       Specialties          [address] [facility]  IF NEEDED [phone], [phone]    Information obtained by :  I understand that if any problems occur once I am at home I am to contact my physician. I understand and acknowledge receipt of the instructions indicated above. Physician's or R.N.'s Signature                                                                  Date/Time                                                                                                                                              Patient or Representative Signature                                                          Date/Time

## 2021-11-26 NOTE — PROGRESS NOTES
Bedside and Verbal shift change report given to Benoit Moreau (oncoming nurse) by Gonsalo Daly RN (offgoing nurse). Report included the following information SBAR, Kardex, Intake/Output, MAR and Recent Results.

## 2021-11-30 NOTE — PROGRESS NOTES
Received fax from [facility] for MRI order. Per Loren Atkins NP set DOO 90. Faxed to [facility] fax #[phone]. Received confirmation.

## 2021-11-30 NOTE — DISCHARGE SUMMARY
Physician Discharge Summary     Patient ID:  Edger Paget  [id number]  47 y.o.  1940    Admit date: 11/22/2021    Discharge date and time: 11/26/2021    Admission Diagnoses: Cellulitis [L03.90]    Discharge Diagnoses/Hospital Course   Mr. Allison Hall is a 80 yo male with PMH of a-fib, DM admitted for RLE cellulitis. His cellulitis appeared to resolve with IV antibiotics but then seemed to localize to his ankles. His CT showed a small joint effusion and he is s/p joint aspiration. He was evaluated by podiatry who recommended treatment for gout and coverage with antibiotics.  He was placed in an [facility] [address] and will need close podiatry follow-up    PCP: Inessa Galaviz MD     Consults: orthopedic surgery, podiatry     Discharge Exam:  Visit Vitals  BP (!) 159/90   Pulse 92   Temp 97.7 °F (36.5 °C)   Resp 18   Ht 5' 8\" (1.727 m)   Wt 73.5 kg (162 lb)   SpO2 95%   BMI 24.63 kg/m²     Gen:  Well-developed, well-nourished, in no acute distress  HEENT:  Pink conjunctivae, PERRL, hearing intact to voice, moist mucous membranes  Neck:  Supple, without masses, thyroid non-tender  Resp:  No accessory muscle use, clear breath sounds without wheezes rales or rhonchi  Card:  No murmurs, normal S1, S2 without thrills, bruits or peripheral edema  Abd:  Soft, non-tender, non-distended, normoactive bowel sounds are present  Musc:  No cyanosis or clubbing  Skin: RLE erythema, warmth and swelling improving   Neuro:  Cranial nerves 3-12 are grossly intact,  strength is 5/5 bilaterally and dorsi / plantarflexion is 5/5 bilaterally, follows commands appropriately    Disposition: home     Patient Instructions:   Discharge Medication List as of 11/26/2021 10:23 AM      START taking these medications    Details   predniSONE (DELTASONE) 20 mg tablet Please take 40mg starting 11/27 x 2 days then 20mg x 3 days then 10mg x 3 days then stop, Normal, Disp-9 Tablet, R-0      traMADoL (Ultram) 50 mg tablet Take 1 Tablet by mouth every four (4) hours as needed for Pain for up to 7 days. Max Daily Amount: 300 mg., Normal, Disp-30 Tablet, R-0      cephALEXin (Keflex) 500 mg capsule Take 1 Capsule by mouth four (4) times daily for 7 days. , Normal, Disp-28 Capsule, R-0         CONTINUE these medications which have CHANGED    Details   doxycycline (ADOXA) 100 mg tablet Take 1 Tablet by mouth two (2) times daily (with meals) for 5 days. , Normal, Disp-10 Tablet, R-0         CONTINUE these medications which have NOT CHANGED    Details   clotrimazole-betamethasone (LOTRISONE) topical cream Apply  to affected area two (2) times a day., Historical Med      rivaroxaban (Xarelto) 20 mg tab tablet Take 1 Tablet by mouth daily (with dinner). , Sample, Disp-28 Tablet, R-0      glipiZIDE (GLUCOTROL) 5 mg tablet Take 5 mg by mouth daily (with dinner). , Historical Med      lisinopril (PRINIVIL, ZESTRIL) 10 mg tablet Take 10 mg by mouth nightly., Historical Med      simvastatin (ZOCOR) 40 mg tablet Take 40 mg by mouth nightly., Historical Med           Activity as tolerated; elevated LE when not in use  Diet: diabetic diet   Wound Care: keep [place] clean and dry     Follow-up with Saran Zelaya MD   Follow-up Information     Follow up With Specialties Details Why Contact Info    Saran Zelaya MD Internal Medicine   [address]  [phone]            Approximate time spent in patient care on day of discharge: 35 minutes     Signed:  Maksim Kendrick MD  11/30/2021  10:10 AM

## 2022-01-01 ENCOUNTER — APPOINTMENT (OUTPATIENT)
Dept: INTERVENTIONAL RADIOLOGY/VASCULAR | Age: 82
DRG: 871 | End: 2022-01-01
Attending: INTERNAL MEDICINE
Payer: MEDICARE

## 2022-01-01 ENCOUNTER — APPOINTMENT (OUTPATIENT)
Dept: GENERAL RADIOLOGY | Age: 82
DRG: 871 | End: 2022-01-01
Attending: INTERNAL MEDICINE
Payer: MEDICARE

## 2022-01-01 ENCOUNTER — APPOINTMENT (OUTPATIENT)
Dept: GENERAL RADIOLOGY | Age: 82
DRG: 871 | End: 2022-01-01
Attending: NURSE PRACTITIONER
Payer: MEDICARE

## 2022-01-01 ENCOUNTER — APPOINTMENT (OUTPATIENT)
Dept: GENERAL RADIOLOGY | Age: 82
DRG: 871 | End: 2022-01-01
Attending: PHYSICIAN ASSISTANT
Payer: MEDICARE

## 2022-01-01 ENCOUNTER — APPOINTMENT (OUTPATIENT)
Dept: GENERAL RADIOLOGY | Age: 82
DRG: 871 | End: 2022-01-01
Attending: RADIOLOGY
Payer: MEDICARE

## 2022-01-01 ENCOUNTER — APPOINTMENT (OUTPATIENT)
Dept: NON INVASIVE DIAGNOSTICS | Age: 82
DRG: 871 | End: 2022-01-01
Attending: INTERNAL MEDICINE
Payer: MEDICARE

## 2022-01-01 ENCOUNTER — APPOINTMENT (OUTPATIENT)
Dept: GENERAL RADIOLOGY | Age: 82
DRG: 871 | End: 2022-01-01
Attending: EMERGENCY MEDICINE
Payer: MEDICARE

## 2022-01-01 ENCOUNTER — APPOINTMENT (OUTPATIENT)
Dept: CT IMAGING | Age: 82
DRG: 871 | End: 2022-01-01
Attending: INTERNAL MEDICINE
Payer: MEDICARE

## 2022-01-01 ENCOUNTER — HOSPITAL ENCOUNTER (INPATIENT)
Age: 82
LOS: 35 days | DRG: 871 | End: 2022-02-15
Attending: EMERGENCY MEDICINE | Admitting: INTERNAL MEDICINE
Payer: MEDICARE

## 2022-01-01 ENCOUNTER — APPOINTMENT (OUTPATIENT)
Dept: ULTRASOUND IMAGING | Age: 82
DRG: 871 | End: 2022-01-01
Attending: INTERNAL MEDICINE
Payer: MEDICARE

## 2022-01-01 VITALS
DIASTOLIC BLOOD PRESSURE: 59 MMHG | OXYGEN SATURATION: 98 % | WEIGHT: 140.65 LBS | BODY MASS INDEX: 21.32 KG/M2 | HEIGHT: 68 IN | RESPIRATION RATE: 20 BRPM | SYSTOLIC BLOOD PRESSURE: 107 MMHG | TEMPERATURE: 100.9 F | HEART RATE: 87 BPM

## 2022-01-01 DIAGNOSIS — U07.1 COVID-19: ICD-10-CM

## 2022-01-01 DIAGNOSIS — J96.01 ACUTE RESPIRATORY FAILURE WITH HYPOXIA (HCC): ICD-10-CM

## 2022-01-01 DIAGNOSIS — I47.29 NSVT (NONSUSTAINED VENTRICULAR TACHYCARDIA): ICD-10-CM

## 2022-01-01 DIAGNOSIS — Z71.1 CONCERN ABOUT END OF LIFE: ICD-10-CM

## 2022-01-01 DIAGNOSIS — Z95.0 PACEMAKER: ICD-10-CM

## 2022-01-01 DIAGNOSIS — B95.61 MSSA BACTEREMIA: ICD-10-CM

## 2022-01-01 DIAGNOSIS — U07.1 PNEUMONIA DUE TO COVID-19 VIRUS: ICD-10-CM

## 2022-01-01 DIAGNOSIS — R63.0 POOR APPETITE: ICD-10-CM

## 2022-01-01 DIAGNOSIS — M25.559 HIP PAIN: ICD-10-CM

## 2022-01-01 DIAGNOSIS — J15.211 PNEUMONIA DUE TO METHICILLIN SUSCEPTIBLE STAPHYLOCOCCUS AUREUS (MSSA), UNSPECIFIED LATERALITY, UNSPECIFIED PART OF LUNG (HCC): ICD-10-CM

## 2022-01-01 DIAGNOSIS — Z51.5 PALLIATIVE CARE ENCOUNTER: ICD-10-CM

## 2022-01-01 DIAGNOSIS — R53.1 WEAKNESS GENERALIZED: ICD-10-CM

## 2022-01-01 DIAGNOSIS — Z71.89 ADVANCED CARE PLANNING/COUNSELING DISCUSSION: ICD-10-CM

## 2022-01-01 DIAGNOSIS — Z71.89 DNR (DO NOT RESUSCITATE) DISCUSSION: ICD-10-CM

## 2022-01-01 DIAGNOSIS — R09.02 HYPOXIA: Primary | ICD-10-CM

## 2022-01-01 DIAGNOSIS — J12.82 PNEUMONIA DUE TO COVID-19 VIRUS: ICD-10-CM

## 2022-01-01 DIAGNOSIS — Z71.89 GOALS OF CARE, COUNSELING/DISCUSSION: ICD-10-CM

## 2022-01-01 DIAGNOSIS — R78.81 MSSA BACTEREMIA: ICD-10-CM

## 2022-01-01 LAB
ALBUMIN SERPL-MCNC: 1.9 G/DL (ref 3.5–5)
ALBUMIN SERPL-MCNC: 2 G/DL (ref 3.5–5)
ALBUMIN SERPL-MCNC: 2 G/DL (ref 3.5–5)
ALBUMIN SERPL-MCNC: 2.3 G/DL (ref 3.5–5)
ALBUMIN SERPL-MCNC: 2.3 G/DL (ref 3.5–5)
ALBUMIN SERPL-MCNC: 2.5 G/DL (ref 3.5–5)
ALBUMIN SERPL-MCNC: 2.5 G/DL (ref 3.5–5)
ALBUMIN SERPL-MCNC: 2.6 G/DL (ref 3.5–5)
ALBUMIN SERPL-MCNC: 2.7 G/DL (ref 3.5–5)
ALBUMIN SERPL-MCNC: 2.8 G/DL (ref 3.5–5)
ALBUMIN SERPL-MCNC: 3 G/DL (ref 3.5–5)
ALBUMIN SERPL-MCNC: 3.3 G/DL (ref 3.5–5)
ALBUMIN/GLOB SERPL: 0.6 {RATIO} (ref 1.1–2.2)
ALBUMIN/GLOB SERPL: 0.7 {RATIO} (ref 1.1–2.2)
ALBUMIN/GLOB SERPL: 0.8 {RATIO} (ref 1.1–2.2)
ALBUMIN/GLOB SERPL: 0.9 {RATIO} (ref 1.1–2.2)
ALBUMIN/GLOB SERPL: 1 {RATIO} (ref 1.1–2.2)
ALP SERPL-CCNC: 103 U/L (ref 45–117)
ALP SERPL-CCNC: 44 U/L (ref 45–117)
ALP SERPL-CCNC: 44 U/L (ref 45–117)
ALP SERPL-CCNC: 48 U/L (ref 45–117)
ALP SERPL-CCNC: 49 U/L (ref 45–117)
ALP SERPL-CCNC: 50 U/L (ref 45–117)
ALP SERPL-CCNC: 51 U/L (ref 45–117)
ALP SERPL-CCNC: 52 U/L (ref 45–117)
ALP SERPL-CCNC: 52 U/L (ref 45–117)
ALP SERPL-CCNC: 53 U/L (ref 45–117)
ALP SERPL-CCNC: 54 U/L (ref 45–117)
ALP SERPL-CCNC: 55 U/L (ref 45–117)
ALP SERPL-CCNC: 56 U/L (ref 45–117)
ALP SERPL-CCNC: 56 U/L (ref 45–117)
ALP SERPL-CCNC: 60 U/L (ref 45–117)
ALP SERPL-CCNC: 60 U/L (ref 45–117)
ALP SERPL-CCNC: 65 U/L (ref 45–117)
ALP SERPL-CCNC: 67 U/L (ref 45–117)
ALP SERPL-CCNC: 76 U/L (ref 45–117)
ALT SERPL-CCNC: 111 U/L (ref 12–78)
ALT SERPL-CCNC: 21 U/L (ref 12–78)
ALT SERPL-CCNC: 22 U/L (ref 12–78)
ALT SERPL-CCNC: 23 U/L (ref 12–78)
ALT SERPL-CCNC: 23 U/L (ref 12–78)
ALT SERPL-CCNC: 24 U/L (ref 12–78)
ALT SERPL-CCNC: 26 U/L (ref 12–78)
ALT SERPL-CCNC: 26 U/L (ref 12–78)
ALT SERPL-CCNC: 27 U/L (ref 12–78)
ALT SERPL-CCNC: 28 U/L (ref 12–78)
ALT SERPL-CCNC: 29 U/L (ref 12–78)
ALT SERPL-CCNC: 29 U/L (ref 12–78)
ALT SERPL-CCNC: 31 U/L (ref 12–78)
ALT SERPL-CCNC: 32 U/L (ref 12–78)
ALT SERPL-CCNC: 33 U/L (ref 12–78)
ALT SERPL-CCNC: 62 U/L (ref 12–78)
ALT SERPL-CCNC: 70 U/L (ref 12–78)
ALT SERPL-CCNC: 88 U/L (ref 12–78)
ALT SERPL-CCNC: 91 U/L (ref 12–78)
ALT SERPL-CCNC: 96 U/L (ref 12–78)
ALT SERPL-CCNC: 96 U/L (ref 12–78)
AMORPH CRY URNS QL MICRO: ABNORMAL
ANION GAP SERPL CALC-SCNC: 2 MMOL/L (ref 5–15)
ANION GAP SERPL CALC-SCNC: 3 MMOL/L (ref 5–15)
ANION GAP SERPL CALC-SCNC: 4 MMOL/L (ref 5–15)
ANION GAP SERPL CALC-SCNC: 5 MMOL/L (ref 5–15)
ANION GAP SERPL CALC-SCNC: 6 MMOL/L (ref 5–15)
ANION GAP SERPL CALC-SCNC: 7 MMOL/L (ref 5–15)
ANION GAP SERPL CALC-SCNC: 8 MMOL/L (ref 5–15)
APPEARANCE UR: ABNORMAL
APTT PPP: 26.4 SEC (ref 22.1–31)
ARTERIAL PATENCY WRIST A: YES
AST SERPL-CCNC: 13 U/L (ref 15–37)
AST SERPL-CCNC: 14 U/L (ref 15–37)
AST SERPL-CCNC: 16 U/L (ref 15–37)
AST SERPL-CCNC: 17 U/L (ref 15–37)
AST SERPL-CCNC: 18 U/L (ref 15–37)
AST SERPL-CCNC: 18 U/L (ref 15–37)
AST SERPL-CCNC: 20 U/L (ref 15–37)
AST SERPL-CCNC: 21 U/L (ref 15–37)
AST SERPL-CCNC: 21 U/L (ref 15–37)
AST SERPL-CCNC: 25 U/L (ref 15–37)
AST SERPL-CCNC: 27 U/L (ref 15–37)
AST SERPL-CCNC: 28 U/L (ref 15–37)
AST SERPL-CCNC: 31 U/L (ref 15–37)
AST SERPL-CCNC: 38 U/L (ref 15–37)
AST SERPL-CCNC: 40 U/L (ref 15–37)
AST SERPL-CCNC: 43 U/L (ref 15–37)
AST SERPL-CCNC: 51 U/L (ref 15–37)
AST SERPL-CCNC: 54 U/L (ref 15–37)
AST SERPL-CCNC: 54 U/L (ref 15–37)
ATRIAL RATE: 56 BPM
ATRIAL RATE: 66 BPM
BACTERIA SPEC CULT: ABNORMAL
BACTERIA SPEC CULT: ABNORMAL
BACTERIA SPEC CULT: NORMAL
BACTERIA SPEC CULT: NORMAL
BACTERIA URNS QL MICRO: NEGATIVE /HPF
BASE DEFICIT BLDA-SCNC: 4.2 MMOL/L
BASOPHILS # BLD: 0 K/UL (ref 0–0.1)
BASOPHILS # BLD: 0.1 K/UL (ref 0–0.1)
BASOPHILS NFR BLD: 0 % (ref 0–1)
BASOPHILS NFR BLD: 1 % (ref 0–1)
BDY SITE: ABNORMAL
BILIRUB DIRECT SERPL-MCNC: 0.3 MG/DL (ref 0–0.2)
BILIRUB SERPL-MCNC: 0.5 MG/DL (ref 0.2–1)
BILIRUB SERPL-MCNC: 0.6 MG/DL (ref 0.2–1)
BILIRUB SERPL-MCNC: 0.7 MG/DL (ref 0.2–1)
BILIRUB SERPL-MCNC: 0.7 MG/DL (ref 0.2–1)
BILIRUB SERPL-MCNC: 0.9 MG/DL (ref 0.2–1)
BILIRUB SERPL-MCNC: 1 MG/DL (ref 0.2–1)
BILIRUB SERPL-MCNC: 1.1 MG/DL (ref 0.2–1)
BILIRUB SERPL-MCNC: 1.1 MG/DL (ref 0.2–1)
BILIRUB SERPL-MCNC: 1.2 MG/DL (ref 0.2–1)
BILIRUB SERPL-MCNC: 1.2 MG/DL (ref 0.2–1)
BILIRUB SERPL-MCNC: 1.3 MG/DL (ref 0.2–1)
BILIRUB SERPL-MCNC: 1.4 MG/DL (ref 0.2–1)
BILIRUB SERPL-MCNC: 1.5 MG/DL (ref 0.2–1)
BILIRUB SERPL-MCNC: 1.6 MG/DL (ref 0.2–1)
BILIRUB UR QL: NEGATIVE
BNP SERPL-MCNC: 1853 PG/ML
BNP SERPL-MCNC: 308 PG/ML
BNP SERPL-MCNC: 437 PG/ML
BNP SERPL-MCNC: 473 PG/ML
BNP SERPL-MCNC: 578 PG/ML
BUN SERPL-MCNC: 18 MG/DL (ref 6–20)
BUN SERPL-MCNC: 20 MG/DL (ref 6–20)
BUN SERPL-MCNC: 20 MG/DL (ref 6–20)
BUN SERPL-MCNC: 21 MG/DL (ref 6–20)
BUN SERPL-MCNC: 23 MG/DL (ref 6–20)
BUN SERPL-MCNC: 24 MG/DL (ref 6–20)
BUN SERPL-MCNC: 25 MG/DL (ref 6–20)
BUN SERPL-MCNC: 25 MG/DL (ref 6–20)
BUN SERPL-MCNC: 26 MG/DL (ref 6–20)
BUN SERPL-MCNC: 27 MG/DL (ref 6–20)
BUN SERPL-MCNC: 27 MG/DL (ref 6–20)
BUN SERPL-MCNC: 28 MG/DL (ref 6–20)
BUN SERPL-MCNC: 28 MG/DL (ref 6–20)
BUN SERPL-MCNC: 29 MG/DL (ref 6–20)
BUN SERPL-MCNC: 29 MG/DL (ref 6–20)
BUN SERPL-MCNC: 31 MG/DL (ref 6–20)
BUN SERPL-MCNC: 32 MG/DL (ref 6–20)
BUN SERPL-MCNC: 33 MG/DL (ref 6–20)
BUN SERPL-MCNC: 39 MG/DL (ref 6–20)
BUN/CREAT SERPL: 23 (ref 12–20)
BUN/CREAT SERPL: 24 (ref 12–20)
BUN/CREAT SERPL: 27 (ref 12–20)
BUN/CREAT SERPL: 30 (ref 12–20)
BUN/CREAT SERPL: 31 (ref 12–20)
BUN/CREAT SERPL: 32 (ref 12–20)
BUN/CREAT SERPL: 32 (ref 12–20)
BUN/CREAT SERPL: 33 (ref 12–20)
BUN/CREAT SERPL: 34 (ref 12–20)
BUN/CREAT SERPL: 34 (ref 12–20)
BUN/CREAT SERPL: 35 (ref 12–20)
BUN/CREAT SERPL: 36 (ref 12–20)
BUN/CREAT SERPL: 36 (ref 12–20)
BUN/CREAT SERPL: 38 (ref 12–20)
BUN/CREAT SERPL: 39 (ref 12–20)
BUN/CREAT SERPL: 39 (ref 12–20)
BUN/CREAT SERPL: 40 (ref 12–20)
BUN/CREAT SERPL: 40 (ref 12–20)
BUN/CREAT SERPL: 41 (ref 12–20)
CALCIUM SERPL-MCNC: 7.4 MG/DL (ref 8.5–10.1)
CALCIUM SERPL-MCNC: 7.8 MG/DL (ref 8.5–10.1)
CALCIUM SERPL-MCNC: 7.9 MG/DL (ref 8.5–10.1)
CALCIUM SERPL-MCNC: 8 MG/DL (ref 8.5–10.1)
CALCIUM SERPL-MCNC: 8.2 MG/DL (ref 8.5–10.1)
CALCIUM SERPL-MCNC: 8.3 MG/DL (ref 8.5–10.1)
CALCIUM SERPL-MCNC: 8.4 MG/DL (ref 8.5–10.1)
CALCIUM SERPL-MCNC: 8.4 MG/DL (ref 8.5–10.1)
CALCIUM SERPL-MCNC: 8.5 MG/DL (ref 8.5–10.1)
CALCIUM SERPL-MCNC: 8.6 MG/DL (ref 8.5–10.1)
CALCIUM SERPL-MCNC: 8.6 MG/DL (ref 8.5–10.1)
CALCIUM SERPL-MCNC: 8.7 MG/DL (ref 8.5–10.1)
CALCIUM SERPL-MCNC: 8.8 MG/DL (ref 8.5–10.1)
CALCIUM SERPL-MCNC: 9 MG/DL (ref 8.5–10.1)
CALCULATED P AXIS, ECG09: -94 DEGREES
CALCULATED R AXIS, ECG10: -48 DEGREES
CALCULATED R AXIS, ECG10: 76 DEGREES
CALCULATED T AXIS, ECG11: -65 DEGREES
CALCULATED T AXIS, ECG11: 95 DEGREES
CHLORIDE SERPL-SCNC: 100 MMOL/L (ref 97–108)
CHLORIDE SERPL-SCNC: 101 MMOL/L (ref 97–108)
CHLORIDE SERPL-SCNC: 104 MMOL/L (ref 97–108)
CHLORIDE SERPL-SCNC: 108 MMOL/L (ref 97–108)
CHLORIDE SERPL-SCNC: 95 MMOL/L (ref 97–108)
CHLORIDE SERPL-SCNC: 96 MMOL/L (ref 97–108)
CHLORIDE SERPL-SCNC: 97 MMOL/L (ref 97–108)
CHLORIDE SERPL-SCNC: 98 MMOL/L (ref 97–108)
CHLORIDE SERPL-SCNC: 99 MMOL/L (ref 97–108)
CO2 SERPL-SCNC: 23 MMOL/L (ref 21–32)
CO2 SERPL-SCNC: 24 MMOL/L (ref 21–32)
CO2 SERPL-SCNC: 25 MMOL/L (ref 21–32)
CO2 SERPL-SCNC: 27 MMOL/L (ref 21–32)
CO2 SERPL-SCNC: 28 MMOL/L (ref 21–32)
CO2 SERPL-SCNC: 29 MMOL/L (ref 21–32)
CO2 SERPL-SCNC: 30 MMOL/L (ref 21–32)
CO2 SERPL-SCNC: 31 MMOL/L (ref 21–32)
CO2 SERPL-SCNC: 32 MMOL/L (ref 21–32)
CO2 SERPL-SCNC: 33 MMOL/L (ref 21–32)
CO2 SERPL-SCNC: 33 MMOL/L (ref 21–32)
CO2 SERPL-SCNC: 34 MMOL/L (ref 21–32)
CO2 SERPL-SCNC: 34 MMOL/L (ref 21–32)
COLOR UR: ABNORMAL
COMMENT, HOLDF: NORMAL
COVID-19 RAPID TEST, COVR: ABNORMAL
COVID-19 RAPID TEST, COVR: DETECTED
CREAT SERPL-MCNC: 0.56 MG/DL (ref 0.7–1.3)
CREAT SERPL-MCNC: 0.59 MG/DL (ref 0.7–1.3)
CREAT SERPL-MCNC: 0.62 MG/DL (ref 0.7–1.3)
CREAT SERPL-MCNC: 0.65 MG/DL (ref 0.7–1.3)
CREAT SERPL-MCNC: 0.67 MG/DL (ref 0.7–1.3)
CREAT SERPL-MCNC: 0.69 MG/DL (ref 0.7–1.3)
CREAT SERPL-MCNC: 0.7 MG/DL (ref 0.7–1.3)
CREAT SERPL-MCNC: 0.76 MG/DL (ref 0.7–1.3)
CREAT SERPL-MCNC: 0.78 MG/DL (ref 0.7–1.3)
CREAT SERPL-MCNC: 0.78 MG/DL (ref 0.7–1.3)
CREAT SERPL-MCNC: 0.8 MG/DL (ref 0.7–1.3)
CREAT SERPL-MCNC: 0.8 MG/DL (ref 0.7–1.3)
CREAT SERPL-MCNC: 0.82 MG/DL (ref 0.7–1.3)
CREAT SERPL-MCNC: 0.82 MG/DL (ref 0.7–1.3)
CREAT SERPL-MCNC: 0.84 MG/DL (ref 0.7–1.3)
CREAT SERPL-MCNC: 0.84 MG/DL (ref 0.7–1.3)
CREAT SERPL-MCNC: 0.86 MG/DL (ref 0.7–1.3)
CREAT SERPL-MCNC: 0.87 MG/DL (ref 0.7–1.3)
CREAT SERPL-MCNC: 0.87 MG/DL (ref 0.7–1.3)
CREAT SERPL-MCNC: 0.88 MG/DL (ref 0.7–1.3)
CREAT SERPL-MCNC: 0.88 MG/DL (ref 0.7–1.3)
CREAT SERPL-MCNC: 0.94 MG/DL (ref 0.7–1.3)
CREAT SERPL-MCNC: 1.03 MG/DL (ref 0.7–1.3)
CREAT SERPL-MCNC: 1.13 MG/DL (ref 0.7–1.3)
CREAT SERPL-MCNC: 1.2 MG/DL (ref 0.7–1.3)
CREAT SERPL-MCNC: 1.64 MG/DL (ref 0.7–1.3)
CRP SERPL-MCNC: 0.35 MG/DL (ref 0–0.6)
CRP SERPL-MCNC: 0.37 MG/DL (ref 0–0.6)
CRP SERPL-MCNC: 0.75 MG/DL (ref 0–0.6)
CRP SERPL-MCNC: 1.04 MG/DL (ref 0–0.6)
CRP SERPL-MCNC: 1.18 MG/DL (ref 0–0.6)
CRP SERPL-MCNC: 3.18 MG/DL (ref 0–0.6)
CRP SERPL-MCNC: 5.4 MG/DL (ref 0–0.6)
CRP SERPL-MCNC: 6.63 MG/DL (ref 0–0.6)
CRP SERPL-MCNC: <0.29 MG/DL (ref 0–0.6)
D DIMER PPP FEU-MCNC: 0.21 MG/L FEU (ref 0–0.65)
D DIMER PPP FEU-MCNC: 0.23 MG/L FEU (ref 0–0.65)
D DIMER PPP FEU-MCNC: 0.25 MG/L FEU (ref 0–0.65)
D DIMER PPP FEU-MCNC: 0.27 MG/L FEU (ref 0–0.65)
D DIMER PPP FEU-MCNC: 0.38 MG/L FEU (ref 0–0.65)
D DIMER PPP FEU-MCNC: 0.53 MG/L FEU (ref 0–0.65)
D DIMER PPP FEU-MCNC: 0.55 MG/L FEU (ref 0–0.65)
D DIMER PPP FEU-MCNC: 0.72 MG/L FEU (ref 0–0.65)
D DIMER PPP FEU-MCNC: 0.84 MG/L FEU (ref 0–0.65)
D DIMER PPP FEU-MCNC: 1.16 MG/L FEU (ref 0–0.65)
D DIMER PPP FEU-MCNC: 1.71 MG/L FEU (ref 0–0.65)
DIAGNOSIS, 93000: NORMAL
DIAGNOSIS, 93000: NORMAL
DIFFERENTIAL METHOD BLD: ABNORMAL
ECHO AO ASC DIAM: 3.3 CM
ECHO AO ASC DIAM: 3.5 CM
ECHO AO ASCENDING AORTA INDEX: 1.82 CM/M2
ECHO AO ASCENDING AORTA INDEX: 1.97 CM/M2
ECHO AV AREA PEAK VELOCITY: 0.8 CM2
ECHO AV AREA PEAK VELOCITY: 0.9 CM2
ECHO AV AREA PEAK VELOCITY: 0.9 CM2
ECHO AV AREA PEAK VELOCITY: 1 CM2
ECHO AV AREA PEAK VELOCITY: 1 CM2
ECHO AV AREA PEAK VELOCITY: 1.1 CM2
ECHO AV AREA VTI: 0.9 CM2
ECHO AV AREA VTI: 0.9 CM2
ECHO AV MEAN GRADIENT: 19 MMHG
ECHO AV MEAN GRADIENT: 36 MMHG
ECHO AV MEAN VELOCITY: 2 M/S
ECHO AV MEAN VELOCITY: 2.8 M/S
ECHO AV PEAK GRADIENT: 34 MMHG
ECHO AV PEAK GRADIENT: 59 MMHG
ECHO AV PEAK VELOCITY: 2.9 M/S
ECHO AV PEAK VELOCITY: 3.8 M/S
ECHO AV VELOCITY RATIO: 0.28
ECHO AV VTI: 58.7 CM
ECHO AV VTI: 77.7 CM
ECHO EST RA PRESSURE: 8 MMHG
ECHO LA DIAMETER INDEX: 1.55 CM/M2
ECHO LA DIAMETER INDEX: 1.85 CM/M2
ECHO LA DIAMETER: 2.8 CM
ECHO LA DIAMETER: 3.3 CM
ECHO LA VOL 2C: 17 ML (ref 18–58)
ECHO LA VOL 2C: 31 ML (ref 18–58)
ECHO LA VOL 4C: 18 ML (ref 18–58)
ECHO LA VOL 4C: 28 ML (ref 18–58)
ECHO LA VOL BP: 18 ML (ref 18–58)
ECHO LA VOL BP: 18 ML (ref 18–58)
ECHO LA VOL BP: 30 ML (ref 18–58)
ECHO LA VOL BP: 30 ML (ref 18–58)
ECHO LA VOLUME AREA LENGTH: 20 ML
ECHO LA VOLUME AREA LENGTH: 35 ML
ECHO LA VOLUME INDEX A2C: 10 ML/M2 (ref 16–34)
ECHO LA VOLUME INDEX A2C: 17 ML/M2 (ref 16–34)
ECHO LA VOLUME INDEX A4C: 10 ML/M2 (ref 16–34)
ECHO LA VOLUME INDEX A4C: 15 ML/M2 (ref 16–34)
ECHO LA VOLUME INDEX AREA LENGTH: 11 ML/M2 (ref 16–34)
ECHO LA VOLUME INDEX AREA LENGTH: 19 ML/M2 (ref 16–34)
ECHO LV E' LATERAL VELOCITY: 10 CM/S
ECHO LV E' SEPTAL VELOCITY: 11 CM/S
ECHO LV EDV A2C: 112 ML
ECHO LV EDV A2C: 69 ML
ECHO LV EDV A4C: 119 ML
ECHO LV EDV A4C: 72 ML
ECHO LV EDV BP: 116 ML (ref 67–155)
ECHO LV EDV BP: 116 ML (ref 67–155)
ECHO LV EDV BP: 71 ML (ref 67–155)
ECHO LV EDV BP: 71 ML (ref 67–155)
ECHO LV EDV INDEX A4C: 40 ML/M2
ECHO LV EDV INDEX A4C: 66 ML/M2
ECHO LV EDV NDEX A2C: 39 ML/M2
ECHO LV EDV NDEX A2C: 62 ML/M2
ECHO LV EJECTION FRACTION A2C: 55 %
ECHO LV EJECTION FRACTION A2C: 56 %
ECHO LV EJECTION FRACTION A4C: 48 %
ECHO LV EJECTION FRACTION A4C: 61 %
ECHO LV EJECTION FRACTION BIPLANE: 51 % (ref 55–100)
ECHO LV EJECTION FRACTION BIPLANE: 51 % (ref 55–100)
ECHO LV EJECTION FRACTION BIPLANE: 58 % (ref 55–100)
ECHO LV EJECTION FRACTION BIPLANE: 58 % (ref 55–100)
ECHO LV ESV A2C: 30 ML
ECHO LV ESV A2C: 50 ML
ECHO LV ESV A4C: 28 ML
ECHO LV ESV A4C: 62 ML
ECHO LV ESV BP: 30 ML (ref 22–58)
ECHO LV ESV BP: 57 ML (ref 22–58)
ECHO LV ESV INDEX A2C: 17 ML/M2
ECHO LV ESV INDEX A2C: 28 ML/M2
ECHO LV ESV INDEX A4C: 16 ML/M2
ECHO LV ESV INDEX A4C: 34 ML/M2
ECHO LV ESV INDEX BP: 17 ML/M2
ECHO LV ESV INDEX BP: 31 ML/M2
ECHO LV FRACTIONAL SHORTENING: 33 % (ref 28–44)
ECHO LV FRACTIONAL SHORTENING: 39 % (ref 28–44)
ECHO LV INTERNAL DIMENSION DIASTOLE INDEX: 2.54 CM/M2
ECHO LV INTERNAL DIMENSION DIASTOLE INDEX: 2.58 CM/M2
ECHO LV INTERNAL DIMENSION DIASTOLIC: 4.6 CM (ref 4.2–5.9)
ECHO LV INTERNAL DIMENSION DIASTOLIC: 4.6 CM (ref 4.2–5.9)
ECHO LV INTERNAL DIMENSION SYSTOLIC INDEX: 1.57 CM/M2
ECHO LV INTERNAL DIMENSION SYSTOLIC INDEX: 1.71 CM/M2
ECHO LV INTERNAL DIMENSION SYSTOLIC: 2.8 CM
ECHO LV INTERNAL DIMENSION SYSTOLIC: 3.1 CM
ECHO LV IVSD: 0.8 CM (ref 0.6–1)
ECHO LV IVSD: 0.8 CM (ref 0.6–1)
ECHO LV MASS 2D: 117.9 G (ref 88–224)
ECHO LV MASS 2D: 117.9 G (ref 88–224)
ECHO LV MASS INDEX 2D: 65.1 G/M2 (ref 49–115)
ECHO LV MASS INDEX 2D: 66.2 G/M2 (ref 49–115)
ECHO LV POSTERIOR WALL DIASTOLIC: 0.8 CM (ref 0.6–1)
ECHO LV POSTERIOR WALL DIASTOLIC: 0.8 CM (ref 0.6–1)
ECHO LV RELATIVE WALL THICKNESS RATIO: 0.35
ECHO LV RELATIVE WALL THICKNESS RATIO: 0.35
ECHO LVOT AREA: 3.8 CM2
ECHO LVOT AREA: 4.2 CM2
ECHO LVOT AV VTI INDEX: 0.23
ECHO LVOT DIAM: 2.2 CM
ECHO LVOT DIAM: 2.3 CM
ECHO LVOT MEAN GRADIENT: 2 MMHG
ECHO LVOT PEAK GRADIENT: 3 MMHG
ECHO LVOT PEAK GRADIENT: 3 MMHG
ECHO LVOT PEAK GRADIENT: 4 MMHG
ECHO LVOT PEAK VELOCITY: 0.8 M/S
ECHO LVOT PEAK VELOCITY: 0.8 M/S
ECHO LVOT PEAK VELOCITY: 1 M/S
ECHO LVOT STROKE VOLUME INDEX: 42 ML/M2
ECHO LVOT SV: 74.7 ML
ECHO LVOT VTI: 18 CM
ECHO MV A VELOCITY: 0.76 M/S
ECHO MV E DECELERATION TIME (DT): 182.9 MS
ECHO MV E VELOCITY: 0.92 M/S
ECHO MV E/A RATIO: 1.21
ECHO MV E/E' LATERAL: 9.2
ECHO MV E/E' RATIO (AVERAGED): 8.78
ECHO MV E/E' SEPTAL: 8.36
ECHO PULMONARY ARTERY END DIASTOLIC PRESSURE: 11 MMHG
ECHO PULMONARY ARTERY END DIASTOLIC PRESSURE: 16 MMHG
ECHO PV MAX VELOCITY: 1.6 M/S
ECHO PV MAX VELOCITY: 2.3 M/S
ECHO PV MAX VELOCITY: 2.9 M/S
ECHO PV MEAN GRADIENT: 21 MMHG
ECHO PV MEAN VELOCITY: 2.2 M/S
ECHO PV PEAK GRADIENT: 12 MMHG
ECHO PV PEAK GRADIENT: 22 MMHG
ECHO PV PEAK GRADIENT: 34 MMHG
ECHO PV REGURGITANT MAX VELOCITY: 1.6 M/S
ECHO PV REGURGITANT MAX VELOCITY: 2 M/S
ECHO RIGHT VENTRICULAR SYSTOLIC PRESSURE (RVSP): 76 MMHG
ECHO RV FREE WALL PEAK S': 11 CM/S
ECHO RV INTERNAL DIMENSION: 4 CM
ECHO RV INTERNAL DIMENSION: 5 CM
ECHO RV TAPSE: 1.1 CM (ref 1.5–2)
ECHO RV TAPSE: 1.5 CM (ref 1.5–2)
ECHO TV REGURGITANT MAX VELOCITY: 2.64 M/S
ECHO TV REGURGITANT MAX VELOCITY: 4.13 M/S
ECHO TV REGURGITANT PEAK GRADIENT: 28 MMHG
ECHO TV REGURGITANT PEAK GRADIENT: 69 MMHG
EOSINOPHIL # BLD: 0 K/UL (ref 0–0.4)
EOSINOPHIL NFR BLD: 0 % (ref 0–7)
EPITH CASTS URNS QL MICRO: ABNORMAL /LPF
ERYTHROCYTE [DISTWIDTH] IN BLOOD BY AUTOMATED COUNT: 13.2 % (ref 11.5–14.5)
ERYTHROCYTE [DISTWIDTH] IN BLOOD BY AUTOMATED COUNT: 13.3 % (ref 11.5–14.5)
ERYTHROCYTE [DISTWIDTH] IN BLOOD BY AUTOMATED COUNT: 13.4 % (ref 11.5–14.5)
ERYTHROCYTE [DISTWIDTH] IN BLOOD BY AUTOMATED COUNT: 13.5 % (ref 11.5–14.5)
ERYTHROCYTE [DISTWIDTH] IN BLOOD BY AUTOMATED COUNT: 13.6 % (ref 11.5–14.5)
ERYTHROCYTE [DISTWIDTH] IN BLOOD BY AUTOMATED COUNT: 14 % (ref 11.5–14.5)
ERYTHROCYTE [DISTWIDTH] IN BLOOD BY AUTOMATED COUNT: 14.2 % (ref 11.5–14.5)
ERYTHROCYTE [DISTWIDTH] IN BLOOD BY AUTOMATED COUNT: 14.5 % (ref 11.5–14.5)
ERYTHROCYTE [DISTWIDTH] IN BLOOD BY AUTOMATED COUNT: 14.5 % (ref 11.5–14.5)
ERYTHROCYTE [DISTWIDTH] IN BLOOD BY AUTOMATED COUNT: 14.6 % (ref 11.5–14.5)
ERYTHROCYTE [DISTWIDTH] IN BLOOD BY AUTOMATED COUNT: 14.7 % (ref 11.5–14.5)
ERYTHROCYTE [DISTWIDTH] IN BLOOD BY AUTOMATED COUNT: 14.7 % (ref 11.5–14.5)
ERYTHROCYTE [DISTWIDTH] IN BLOOD BY AUTOMATED COUNT: 15.1 % (ref 11.5–14.5)
ERYTHROCYTE [DISTWIDTH] IN BLOOD BY AUTOMATED COUNT: 15.7 % (ref 11.5–14.5)
ERYTHROCYTE [DISTWIDTH] IN BLOOD BY AUTOMATED COUNT: 15.9 % (ref 11.5–14.5)
ERYTHROCYTE [DISTWIDTH] IN BLOOD BY AUTOMATED COUNT: 16 % (ref 11.5–14.5)
ERYTHROCYTE [DISTWIDTH] IN BLOOD BY AUTOMATED COUNT: 16.1 % (ref 11.5–14.5)
ERYTHROCYTE [DISTWIDTH] IN BLOOD BY AUTOMATED COUNT: 16.3 % (ref 11.5–14.5)
ERYTHROCYTE [DISTWIDTH] IN BLOOD BY AUTOMATED COUNT: 16.6 % (ref 11.5–14.5)
EST. AVERAGE GLUCOSE BLD GHB EST-MCNC: 160 MG/DL
FERRITIN SERPL-MCNC: 759 NG/ML (ref 26–388)
FERRITIN SERPL-MCNC: 788 NG/ML (ref 26–388)
FIBRINOGEN PPP-MCNC: 204 MG/DL (ref 200–475)
FIBRINOGEN PPP-MCNC: 670 MG/DL (ref 200–475)
FIO2 ON VENT: 100 %
FOLATE SERPL-MCNC: 6.6 NG/ML (ref 5–21)
FOLATE SERPL-MCNC: 7.8 NG/ML (ref 5–21)
GAS FLOW.O2 O2 DELIVERY SYS: 55 L/MIN
GLOBULIN SER CALC-MCNC: 2.2 G/DL (ref 2–4)
GLOBULIN SER CALC-MCNC: 2.7 G/DL (ref 2–4)
GLOBULIN SER CALC-MCNC: 2.7 G/DL (ref 2–4)
GLOBULIN SER CALC-MCNC: 2.8 G/DL (ref 2–4)
GLOBULIN SER CALC-MCNC: 2.8 G/DL (ref 2–4)
GLOBULIN SER CALC-MCNC: 2.9 G/DL (ref 2–4)
GLOBULIN SER CALC-MCNC: 3.1 G/DL (ref 2–4)
GLOBULIN SER CALC-MCNC: 3.2 G/DL (ref 2–4)
GLOBULIN SER CALC-MCNC: 3.3 G/DL (ref 2–4)
GLOBULIN SER CALC-MCNC: 3.4 G/DL (ref 2–4)
GLOBULIN SER CALC-MCNC: 3.4 G/DL (ref 2–4)
GLOBULIN SER CALC-MCNC: 3.5 G/DL (ref 2–4)
GLOBULIN SER CALC-MCNC: 3.6 G/DL (ref 2–4)
GLOBULIN SER CALC-MCNC: 3.7 G/DL (ref 2–4)
GLOBULIN SER CALC-MCNC: 3.9 G/DL (ref 2–4)
GLOBULIN SER CALC-MCNC: 4 G/DL (ref 2–4)
GLOBULIN SER CALC-MCNC: 4.4 G/DL (ref 2–4)
GLOBULIN SER CALC-MCNC: 4.5 G/DL (ref 2–4)
GLOBULIN SER CALC-MCNC: 4.8 G/DL (ref 2–4)
GLUCOSE BLD STRIP.AUTO-MCNC: 106 MG/DL (ref 65–117)
GLUCOSE BLD STRIP.AUTO-MCNC: 118 MG/DL (ref 65–117)
GLUCOSE BLD STRIP.AUTO-MCNC: 119 MG/DL (ref 65–117)
GLUCOSE BLD STRIP.AUTO-MCNC: 121 MG/DL (ref 65–117)
GLUCOSE BLD STRIP.AUTO-MCNC: 123 MG/DL (ref 65–117)
GLUCOSE BLD STRIP.AUTO-MCNC: 124 MG/DL (ref 65–117)
GLUCOSE BLD STRIP.AUTO-MCNC: 124 MG/DL (ref 65–117)
GLUCOSE BLD STRIP.AUTO-MCNC: 125 MG/DL (ref 65–117)
GLUCOSE BLD STRIP.AUTO-MCNC: 126 MG/DL (ref 65–117)
GLUCOSE BLD STRIP.AUTO-MCNC: 127 MG/DL (ref 65–117)
GLUCOSE BLD STRIP.AUTO-MCNC: 134 MG/DL (ref 65–117)
GLUCOSE BLD STRIP.AUTO-MCNC: 136 MG/DL (ref 65–117)
GLUCOSE BLD STRIP.AUTO-MCNC: 137 MG/DL (ref 65–117)
GLUCOSE BLD STRIP.AUTO-MCNC: 141 MG/DL (ref 65–117)
GLUCOSE BLD STRIP.AUTO-MCNC: 145 MG/DL (ref 65–117)
GLUCOSE BLD STRIP.AUTO-MCNC: 146 MG/DL (ref 65–117)
GLUCOSE BLD STRIP.AUTO-MCNC: 147 MG/DL (ref 65–117)
GLUCOSE BLD STRIP.AUTO-MCNC: 148 MG/DL (ref 65–117)
GLUCOSE BLD STRIP.AUTO-MCNC: 148 MG/DL (ref 65–117)
GLUCOSE BLD STRIP.AUTO-MCNC: 149 MG/DL (ref 65–117)
GLUCOSE BLD STRIP.AUTO-MCNC: 150 MG/DL (ref 65–117)
GLUCOSE BLD STRIP.AUTO-MCNC: 154 MG/DL (ref 65–117)
GLUCOSE BLD STRIP.AUTO-MCNC: 154 MG/DL (ref 65–117)
GLUCOSE BLD STRIP.AUTO-MCNC: 158 MG/DL (ref 65–117)
GLUCOSE BLD STRIP.AUTO-MCNC: 163 MG/DL (ref 65–117)
GLUCOSE BLD STRIP.AUTO-MCNC: 163 MG/DL (ref 65–117)
GLUCOSE BLD STRIP.AUTO-MCNC: 164 MG/DL (ref 65–117)
GLUCOSE BLD STRIP.AUTO-MCNC: 169 MG/DL (ref 65–117)
GLUCOSE BLD STRIP.AUTO-MCNC: 169 MG/DL (ref 65–117)
GLUCOSE BLD STRIP.AUTO-MCNC: 176 MG/DL (ref 65–117)
GLUCOSE BLD STRIP.AUTO-MCNC: 176 MG/DL (ref 65–117)
GLUCOSE BLD STRIP.AUTO-MCNC: 180 MG/DL (ref 65–117)
GLUCOSE BLD STRIP.AUTO-MCNC: 182 MG/DL (ref 65–117)
GLUCOSE BLD STRIP.AUTO-MCNC: 182 MG/DL (ref 65–117)
GLUCOSE BLD STRIP.AUTO-MCNC: 185 MG/DL (ref 65–117)
GLUCOSE BLD STRIP.AUTO-MCNC: 188 MG/DL (ref 65–117)
GLUCOSE BLD STRIP.AUTO-MCNC: 188 MG/DL (ref 65–117)
GLUCOSE BLD STRIP.AUTO-MCNC: 189 MG/DL (ref 65–117)
GLUCOSE BLD STRIP.AUTO-MCNC: 190 MG/DL (ref 65–117)
GLUCOSE BLD STRIP.AUTO-MCNC: 192 MG/DL (ref 65–117)
GLUCOSE BLD STRIP.AUTO-MCNC: 193 MG/DL (ref 65–117)
GLUCOSE BLD STRIP.AUTO-MCNC: 195 MG/DL (ref 65–117)
GLUCOSE BLD STRIP.AUTO-MCNC: 196 MG/DL (ref 65–117)
GLUCOSE BLD STRIP.AUTO-MCNC: 197 MG/DL (ref 65–117)
GLUCOSE BLD STRIP.AUTO-MCNC: 200 MG/DL (ref 65–117)
GLUCOSE BLD STRIP.AUTO-MCNC: 200 MG/DL (ref 65–117)
GLUCOSE BLD STRIP.AUTO-MCNC: 201 MG/DL (ref 65–117)
GLUCOSE BLD STRIP.AUTO-MCNC: 205 MG/DL (ref 65–117)
GLUCOSE BLD STRIP.AUTO-MCNC: 206 MG/DL (ref 65–117)
GLUCOSE BLD STRIP.AUTO-MCNC: 206 MG/DL (ref 65–117)
GLUCOSE BLD STRIP.AUTO-MCNC: 207 MG/DL (ref 65–117)
GLUCOSE BLD STRIP.AUTO-MCNC: 207 MG/DL (ref 65–117)
GLUCOSE BLD STRIP.AUTO-MCNC: 208 MG/DL (ref 65–117)
GLUCOSE BLD STRIP.AUTO-MCNC: 208 MG/DL (ref 65–117)
GLUCOSE BLD STRIP.AUTO-MCNC: 209 MG/DL (ref 65–117)
GLUCOSE BLD STRIP.AUTO-MCNC: 210 MG/DL (ref 65–117)
GLUCOSE BLD STRIP.AUTO-MCNC: 211 MG/DL (ref 65–117)
GLUCOSE BLD STRIP.AUTO-MCNC: 213 MG/DL (ref 65–117)
GLUCOSE BLD STRIP.AUTO-MCNC: 215 MG/DL (ref 65–117)
GLUCOSE BLD STRIP.AUTO-MCNC: 218 MG/DL (ref 65–117)
GLUCOSE BLD STRIP.AUTO-MCNC: 218 MG/DL (ref 65–117)
GLUCOSE BLD STRIP.AUTO-MCNC: 219 MG/DL (ref 65–117)
GLUCOSE BLD STRIP.AUTO-MCNC: 222 MG/DL (ref 65–117)
GLUCOSE BLD STRIP.AUTO-MCNC: 223 MG/DL (ref 65–117)
GLUCOSE BLD STRIP.AUTO-MCNC: 223 MG/DL (ref 65–117)
GLUCOSE BLD STRIP.AUTO-MCNC: 224 MG/DL (ref 65–117)
GLUCOSE BLD STRIP.AUTO-MCNC: 225 MG/DL (ref 65–117)
GLUCOSE BLD STRIP.AUTO-MCNC: 226 MG/DL (ref 65–117)
GLUCOSE BLD STRIP.AUTO-MCNC: 226 MG/DL (ref 65–117)
GLUCOSE BLD STRIP.AUTO-MCNC: 229 MG/DL (ref 65–117)
GLUCOSE BLD STRIP.AUTO-MCNC: 229 MG/DL (ref 65–117)
GLUCOSE BLD STRIP.AUTO-MCNC: 232 MG/DL (ref 65–117)
GLUCOSE BLD STRIP.AUTO-MCNC: 233 MG/DL (ref 65–117)
GLUCOSE BLD STRIP.AUTO-MCNC: 235 MG/DL (ref 65–117)
GLUCOSE BLD STRIP.AUTO-MCNC: 238 MG/DL (ref 65–117)
GLUCOSE BLD STRIP.AUTO-MCNC: 238 MG/DL (ref 65–117)
GLUCOSE BLD STRIP.AUTO-MCNC: 242 MG/DL (ref 65–117)
GLUCOSE BLD STRIP.AUTO-MCNC: 243 MG/DL (ref 65–117)
GLUCOSE BLD STRIP.AUTO-MCNC: 244 MG/DL (ref 65–117)
GLUCOSE BLD STRIP.AUTO-MCNC: 244 MG/DL (ref 65–117)
GLUCOSE BLD STRIP.AUTO-MCNC: 246 MG/DL (ref 65–117)
GLUCOSE BLD STRIP.AUTO-MCNC: 246 MG/DL (ref 65–117)
GLUCOSE BLD STRIP.AUTO-MCNC: 247 MG/DL (ref 65–117)
GLUCOSE BLD STRIP.AUTO-MCNC: 249 MG/DL (ref 65–117)
GLUCOSE BLD STRIP.AUTO-MCNC: 249 MG/DL (ref 65–117)
GLUCOSE BLD STRIP.AUTO-MCNC: 251 MG/DL (ref 65–117)
GLUCOSE BLD STRIP.AUTO-MCNC: 253 MG/DL (ref 65–117)
GLUCOSE BLD STRIP.AUTO-MCNC: 254 MG/DL (ref 65–117)
GLUCOSE BLD STRIP.AUTO-MCNC: 255 MG/DL (ref 65–117)
GLUCOSE BLD STRIP.AUTO-MCNC: 255 MG/DL (ref 65–117)
GLUCOSE BLD STRIP.AUTO-MCNC: 256 MG/DL (ref 65–117)
GLUCOSE BLD STRIP.AUTO-MCNC: 257 MG/DL (ref 65–117)
GLUCOSE BLD STRIP.AUTO-MCNC: 258 MG/DL (ref 65–117)
GLUCOSE BLD STRIP.AUTO-MCNC: 259 MG/DL (ref 65–117)
GLUCOSE BLD STRIP.AUTO-MCNC: 259 MG/DL (ref 65–117)
GLUCOSE BLD STRIP.AUTO-MCNC: 261 MG/DL (ref 65–117)
GLUCOSE BLD STRIP.AUTO-MCNC: 262 MG/DL (ref 65–117)
GLUCOSE BLD STRIP.AUTO-MCNC: 262 MG/DL (ref 65–117)
GLUCOSE BLD STRIP.AUTO-MCNC: 264 MG/DL (ref 65–117)
GLUCOSE BLD STRIP.AUTO-MCNC: 264 MG/DL (ref 65–117)
GLUCOSE BLD STRIP.AUTO-MCNC: 265 MG/DL (ref 65–117)
GLUCOSE BLD STRIP.AUTO-MCNC: 267 MG/DL (ref 65–117)
GLUCOSE BLD STRIP.AUTO-MCNC: 268 MG/DL (ref 65–117)
GLUCOSE BLD STRIP.AUTO-MCNC: 268 MG/DL (ref 65–117)
GLUCOSE BLD STRIP.AUTO-MCNC: 270 MG/DL (ref 65–117)
GLUCOSE BLD STRIP.AUTO-MCNC: 272 MG/DL (ref 65–117)
GLUCOSE BLD STRIP.AUTO-MCNC: 276 MG/DL (ref 65–117)
GLUCOSE BLD STRIP.AUTO-MCNC: 276 MG/DL (ref 65–117)
GLUCOSE BLD STRIP.AUTO-MCNC: 280 MG/DL (ref 65–117)
GLUCOSE BLD STRIP.AUTO-MCNC: 285 MG/DL (ref 65–117)
GLUCOSE BLD STRIP.AUTO-MCNC: 287 MG/DL (ref 65–117)
GLUCOSE BLD STRIP.AUTO-MCNC: 293 MG/DL (ref 65–117)
GLUCOSE BLD STRIP.AUTO-MCNC: 297 MG/DL (ref 65–117)
GLUCOSE BLD STRIP.AUTO-MCNC: 298 MG/DL (ref 65–117)
GLUCOSE BLD STRIP.AUTO-MCNC: 306 MG/DL (ref 65–117)
GLUCOSE BLD STRIP.AUTO-MCNC: 306 MG/DL (ref 65–117)
GLUCOSE BLD STRIP.AUTO-MCNC: 307 MG/DL (ref 65–117)
GLUCOSE BLD STRIP.AUTO-MCNC: 312 MG/DL (ref 65–117)
GLUCOSE BLD STRIP.AUTO-MCNC: 316 MG/DL (ref 65–117)
GLUCOSE BLD STRIP.AUTO-MCNC: 317 MG/DL (ref 65–117)
GLUCOSE BLD STRIP.AUTO-MCNC: 323 MG/DL (ref 65–117)
GLUCOSE BLD STRIP.AUTO-MCNC: 337 MG/DL (ref 65–117)
GLUCOSE BLD STRIP.AUTO-MCNC: 394 MG/DL (ref 65–117)
GLUCOSE BLD STRIP.AUTO-MCNC: 398 MG/DL (ref 65–117)
GLUCOSE BLD STRIP.AUTO-MCNC: 65 MG/DL (ref 65–117)
GLUCOSE BLD STRIP.AUTO-MCNC: 78 MG/DL (ref 65–117)
GLUCOSE BLD STRIP.AUTO-MCNC: 87 MG/DL (ref 65–117)
GLUCOSE BLD STRIP.AUTO-MCNC: 88 MG/DL (ref 65–117)
GLUCOSE BLD STRIP.AUTO-MCNC: 96 MG/DL (ref 65–117)
GLUCOSE BLD STRIP.AUTO-MCNC: 98 MG/DL (ref 65–117)
GLUCOSE BLD STRIP.AUTO-MCNC: 99 MG/DL (ref 65–117)
GLUCOSE SERPL-MCNC: 118 MG/DL (ref 65–100)
GLUCOSE SERPL-MCNC: 119 MG/DL (ref 65–100)
GLUCOSE SERPL-MCNC: 126 MG/DL (ref 65–100)
GLUCOSE SERPL-MCNC: 130 MG/DL (ref 65–100)
GLUCOSE SERPL-MCNC: 135 MG/DL (ref 65–100)
GLUCOSE SERPL-MCNC: 146 MG/DL (ref 65–100)
GLUCOSE SERPL-MCNC: 148 MG/DL (ref 65–100)
GLUCOSE SERPL-MCNC: 151 MG/DL (ref 65–100)
GLUCOSE SERPL-MCNC: 155 MG/DL (ref 65–100)
GLUCOSE SERPL-MCNC: 159 MG/DL (ref 65–100)
GLUCOSE SERPL-MCNC: 159 MG/DL (ref 65–100)
GLUCOSE SERPL-MCNC: 162 MG/DL (ref 65–100)
GLUCOSE SERPL-MCNC: 163 MG/DL (ref 65–100)
GLUCOSE SERPL-MCNC: 167 MG/DL (ref 65–100)
GLUCOSE SERPL-MCNC: 175 MG/DL (ref 65–100)
GLUCOSE SERPL-MCNC: 177 MG/DL (ref 65–100)
GLUCOSE SERPL-MCNC: 184 MG/DL (ref 65–100)
GLUCOSE SERPL-MCNC: 190 MG/DL (ref 65–100)
GLUCOSE SERPL-MCNC: 191 MG/DL (ref 65–100)
GLUCOSE SERPL-MCNC: 193 MG/DL (ref 65–100)
GLUCOSE SERPL-MCNC: 199 MG/DL (ref 65–100)
GLUCOSE SERPL-MCNC: 225 MG/DL (ref 65–100)
GLUCOSE SERPL-MCNC: 228 MG/DL (ref 65–100)
GLUCOSE SERPL-MCNC: 245 MG/DL (ref 65–100)
GLUCOSE SERPL-MCNC: 272 MG/DL (ref 65–100)
GLUCOSE SERPL-MCNC: 67 MG/DL (ref 65–100)
GLUCOSE UR STRIP.AUTO-MCNC: NEGATIVE MG/DL
HAPTOGLOB SERPL-MCNC: <8 MG/DL (ref 30–200)
HBA1C MFR BLD: 7.2 % (ref 4–5.6)
HCO3 BLDA-SCNC: 20 MMOL/L (ref 22–26)
HCT VFR BLD AUTO: 30.8 % (ref 36.6–50.3)
HCT VFR BLD AUTO: 31.6 % (ref 36.6–50.3)
HCT VFR BLD AUTO: 31.7 % (ref 36.6–50.3)
HCT VFR BLD AUTO: 31.7 % (ref 36.6–50.3)
HCT VFR BLD AUTO: 32.3 % (ref 36.6–50.3)
HCT VFR BLD AUTO: 33.4 % (ref 36.6–50.3)
HCT VFR BLD AUTO: 37 % (ref 36.6–50.3)
HCT VFR BLD AUTO: 38.6 % (ref 36.6–50.3)
HCT VFR BLD AUTO: 39 % (ref 36.6–50.3)
HCT VFR BLD AUTO: 39.8 % (ref 36.6–50.3)
HCT VFR BLD AUTO: 40 % (ref 36.6–50.3)
HCT VFR BLD AUTO: 40.3 % (ref 36.6–50.3)
HCT VFR BLD AUTO: 40.8 % (ref 36.6–50.3)
HCT VFR BLD AUTO: 40.9 % (ref 36.6–50.3)
HCT VFR BLD AUTO: 41.6 % (ref 36.6–50.3)
HCT VFR BLD AUTO: 42.1 % (ref 36.6–50.3)
HCT VFR BLD AUTO: 43.2 % (ref 36.6–50.3)
HCT VFR BLD AUTO: 43.2 % (ref 36.6–50.3)
HCT VFR BLD AUTO: 43.6 % (ref 36.6–50.3)
HCT VFR BLD AUTO: 44.1 % (ref 36.6–50.3)
HCT VFR BLD AUTO: 44.3 % (ref 36.6–50.3)
HCT VFR BLD AUTO: 45.9 % (ref 36.6–50.3)
HCT VFR BLD AUTO: 46.3 % (ref 36.6–50.3)
HCT VFR BLD AUTO: 46.3 % (ref 36.6–50.3)
HCT VFR BLD AUTO: 46.5 % (ref 36.6–50.3)
HCT VFR BLD AUTO: 46.9 % (ref 36.6–50.3)
HCT VFR BLD AUTO: 47.4 % (ref 36.6–50.3)
HCT VFR BLD AUTO: 48 % (ref 36.6–50.3)
HGB BLD-MCNC: 10.4 G/DL (ref 12.1–17)
HGB BLD-MCNC: 10.7 G/DL (ref 12.1–17)
HGB BLD-MCNC: 10.8 G/DL (ref 12.1–17)
HGB BLD-MCNC: 10.8 G/DL (ref 12.1–17)
HGB BLD-MCNC: 10.9 G/DL (ref 12.1–17)
HGB BLD-MCNC: 11.5 G/DL (ref 12.1–17)
HGB BLD-MCNC: 12.4 G/DL (ref 12.1–17)
HGB BLD-MCNC: 13.4 G/DL (ref 12.1–17)
HGB BLD-MCNC: 13.6 G/DL (ref 12.1–17)
HGB BLD-MCNC: 13.6 G/DL (ref 12.1–17)
HGB BLD-MCNC: 13.9 G/DL (ref 12.1–17)
HGB BLD-MCNC: 14 G/DL (ref 12.1–17)
HGB BLD-MCNC: 14 G/DL (ref 12.1–17)
HGB BLD-MCNC: 14.3 G/DL (ref 12.1–17)
HGB BLD-MCNC: 14.3 G/DL (ref 12.1–17)
HGB BLD-MCNC: 14.7 G/DL (ref 12.1–17)
HGB BLD-MCNC: 14.7 G/DL (ref 12.1–17)
HGB BLD-MCNC: 14.8 G/DL (ref 12.1–17)
HGB BLD-MCNC: 14.9 G/DL (ref 12.1–17)
HGB BLD-MCNC: 15.3 G/DL (ref 12.1–17)
HGB BLD-MCNC: 15.4 G/DL (ref 12.1–17)
HGB BLD-MCNC: 16 G/DL (ref 12.1–17)
HGB BLD-MCNC: 16 G/DL (ref 12.1–17)
HGB BLD-MCNC: 16.2 G/DL (ref 12.1–17)
HGB BLD-MCNC: 16.2 G/DL (ref 12.1–17)
HGB BLD-MCNC: 16.4 G/DL (ref 12.1–17)
HGB BLD-MCNC: 16.5 G/DL (ref 12.1–17)
HGB BLD-MCNC: 16.8 G/DL (ref 12.1–17)
HGB UR QL STRIP: ABNORMAL
IMM GRANULOCYTES # BLD AUTO: 0 K/UL
IMM GRANULOCYTES # BLD AUTO: 0 K/UL (ref 0–0.04)
IMM GRANULOCYTES # BLD AUTO: 0.1 K/UL (ref 0–0.04)
IMM GRANULOCYTES # BLD AUTO: 0.2 K/UL (ref 0–0.04)
IMM GRANULOCYTES # BLD AUTO: 0.3 K/UL (ref 0–0.04)
IMM GRANULOCYTES # BLD AUTO: 0.4 K/UL (ref 0–0.04)
IMM GRANULOCYTES NFR BLD AUTO: 0 %
IMM GRANULOCYTES NFR BLD AUTO: 0 % (ref 0–0.5)
IMM GRANULOCYTES NFR BLD AUTO: 1 % (ref 0–0.5)
IMM GRANULOCYTES NFR BLD AUTO: 3 % (ref 0–0.5)
IMM GRANULOCYTES NFR BLD AUTO: 5 % (ref 0–0.5)
INR PPP: 1.2 (ref 0.9–1.1)
KETONES UR QL STRIP.AUTO: NEGATIVE MG/DL
LACTATE SERPL-SCNC: 1.3 MMOL/L (ref 0.4–2)
LACTATE SERPL-SCNC: 1.6 MMOL/L (ref 0.4–2)
LACTATE SERPL-SCNC: 1.6 MMOL/L (ref 0.4–2)
LACTATE SERPL-SCNC: 1.7 MMOL/L (ref 0.4–2)
LACTATE SERPL-SCNC: 3.6 MMOL/L (ref 0.4–2)
LACTATE SERPL-SCNC: 5.2 MMOL/L (ref 0.4–2)
LDH SERPL L TO P-CCNC: 352 U/L (ref 85–241)
LDH SERPL L TO P-CCNC: 776 U/L (ref 85–241)
LEUKOCYTE ESTERASE UR QL STRIP.AUTO: ABNORMAL
LYMPHOCYTES # BLD: 0.1 K/UL (ref 0.8–3.5)
LYMPHOCYTES # BLD: 0.2 K/UL (ref 0.8–3.5)
LYMPHOCYTES # BLD: 0.2 K/UL (ref 0.8–3.5)
LYMPHOCYTES # BLD: 0.3 K/UL (ref 0.8–3.5)
LYMPHOCYTES # BLD: 0.3 K/UL (ref 0.8–3.5)
LYMPHOCYTES # BLD: 0.4 K/UL (ref 0.8–3.5)
LYMPHOCYTES # BLD: 0.5 K/UL (ref 0.8–3.5)
LYMPHOCYTES # BLD: 0.6 K/UL (ref 0.8–3.5)
LYMPHOCYTES # BLD: 0.9 K/UL (ref 0.8–3.5)
LYMPHOCYTES # BLD: 1.2 K/UL (ref 0.8–3.5)
LYMPHOCYTES NFR BLD: 1 % (ref 12–49)
LYMPHOCYTES NFR BLD: 11 % (ref 12–49)
LYMPHOCYTES NFR BLD: 20 % (ref 12–49)
LYMPHOCYTES NFR BLD: 26 % (ref 12–49)
LYMPHOCYTES NFR BLD: 3 % (ref 12–49)
LYMPHOCYTES NFR BLD: 4 % (ref 12–49)
LYMPHOCYTES NFR BLD: 5 % (ref 12–49)
LYMPHOCYTES NFR BLD: 6 % (ref 12–49)
LYMPHOCYTES NFR BLD: 6 % (ref 12–49)
LYMPHOCYTES NFR BLD: 7 % (ref 12–49)
LYMPHOCYTES NFR BLD: 8 % (ref 12–49)
MAGNESIUM SERPL-MCNC: 1.1 MG/DL (ref 1.6–2.4)
MAGNESIUM SERPL-MCNC: 1.6 MG/DL (ref 1.6–2.4)
MAGNESIUM SERPL-MCNC: 1.7 MG/DL (ref 1.6–2.4)
MAGNESIUM SERPL-MCNC: 1.7 MG/DL (ref 1.6–2.4)
MAGNESIUM SERPL-MCNC: 1.8 MG/DL (ref 1.6–2.4)
MAGNESIUM SERPL-MCNC: 1.9 MG/DL (ref 1.6–2.4)
MAGNESIUM SERPL-MCNC: 1.9 MG/DL (ref 1.6–2.4)
MAGNESIUM SERPL-MCNC: 2 MG/DL (ref 1.6–2.4)
MAGNESIUM SERPL-MCNC: 2.1 MG/DL (ref 1.6–2.4)
MAGNESIUM SERPL-MCNC: 2.2 MG/DL (ref 1.6–2.4)
MAGNESIUM SERPL-MCNC: 2.2 MG/DL (ref 1.6–2.4)
MAGNESIUM SERPL-MCNC: 2.3 MG/DL (ref 1.6–2.4)
MCH RBC QN AUTO: 29.3 PG (ref 26–34)
MCH RBC QN AUTO: 29.4 PG (ref 26–34)
MCH RBC QN AUTO: 29.5 PG (ref 26–34)
MCH RBC QN AUTO: 29.5 PG (ref 26–34)
MCH RBC QN AUTO: 29.6 PG (ref 26–34)
MCH RBC QN AUTO: 29.7 PG (ref 26–34)
MCH RBC QN AUTO: 29.7 PG (ref 26–34)
MCH RBC QN AUTO: 29.8 PG (ref 26–34)
MCH RBC QN AUTO: 29.9 PG (ref 26–34)
MCH RBC QN AUTO: 29.9 PG (ref 26–34)
MCH RBC QN AUTO: 30 PG (ref 26–34)
MCH RBC QN AUTO: 30.1 PG (ref 26–34)
MCH RBC QN AUTO: 30.2 PG (ref 26–34)
MCH RBC QN AUTO: 30.3 PG (ref 26–34)
MCH RBC QN AUTO: 30.4 PG (ref 26–34)
MCHC RBC AUTO-ENTMCNC: 33.4 G/DL (ref 30–36.5)
MCHC RBC AUTO-ENTMCNC: 33.5 G/DL (ref 30–36.5)
MCHC RBC AUTO-ENTMCNC: 33.8 G/DL (ref 30–36.5)
MCHC RBC AUTO-ENTMCNC: 33.9 G/DL (ref 30–36.5)
MCHC RBC AUTO-ENTMCNC: 34 G/DL (ref 30–36.5)
MCHC RBC AUTO-ENTMCNC: 34.1 G/DL (ref 30–36.5)
MCHC RBC AUTO-ENTMCNC: 34.2 G/DL (ref 30–36.5)
MCHC RBC AUTO-ENTMCNC: 34.2 G/DL (ref 30–36.5)
MCHC RBC AUTO-ENTMCNC: 34.3 G/DL (ref 30–36.5)
MCHC RBC AUTO-ENTMCNC: 34.3 G/DL (ref 30–36.5)
MCHC RBC AUTO-ENTMCNC: 34.4 G/DL (ref 30–36.5)
MCHC RBC AUTO-ENTMCNC: 34.5 G/DL (ref 30–36.5)
MCHC RBC AUTO-ENTMCNC: 34.5 G/DL (ref 30–36.5)
MCHC RBC AUTO-ENTMCNC: 34.6 G/DL (ref 30–36.5)
MCHC RBC AUTO-ENTMCNC: 34.7 G/DL (ref 30–36.5)
MCHC RBC AUTO-ENTMCNC: 34.9 G/DL (ref 30–36.5)
MCHC RBC AUTO-ENTMCNC: 35 G/DL (ref 30–36.5)
MCHC RBC AUTO-ENTMCNC: 35.2 G/DL (ref 30–36.5)
MCV RBC AUTO: 83.3 FL (ref 80–99)
MCV RBC AUTO: 83.9 FL (ref 80–99)
MCV RBC AUTO: 84.5 FL (ref 80–99)
MCV RBC AUTO: 84.7 FL (ref 80–99)
MCV RBC AUTO: 84.7 FL (ref 80–99)
MCV RBC AUTO: 84.8 FL (ref 80–99)
MCV RBC AUTO: 85 FL (ref 80–99)
MCV RBC AUTO: 85.1 FL (ref 80–99)
MCV RBC AUTO: 85.5 FL (ref 80–99)
MCV RBC AUTO: 85.5 FL (ref 80–99)
MCV RBC AUTO: 86 FL (ref 80–99)
MCV RBC AUTO: 86 FL (ref 80–99)
MCV RBC AUTO: 86.2 FL (ref 80–99)
MCV RBC AUTO: 86.4 FL (ref 80–99)
MCV RBC AUTO: 86.5 FL (ref 80–99)
MCV RBC AUTO: 86.5 FL (ref 80–99)
MCV RBC AUTO: 86.7 FL (ref 80–99)
MCV RBC AUTO: 87.1 FL (ref 80–99)
MCV RBC AUTO: 87.1 FL (ref 80–99)
MCV RBC AUTO: 87.3 FL (ref 80–99)
MCV RBC AUTO: 87.7 FL (ref 80–99)
MCV RBC AUTO: 88 FL (ref 80–99)
MCV RBC AUTO: 88.3 FL (ref 80–99)
MCV RBC AUTO: 88.8 FL (ref 80–99)
MCV RBC AUTO: 89.4 FL (ref 80–99)
METAMYELOCYTES NFR BLD MANUAL: 1 %
METAMYELOCYTES NFR BLD MANUAL: 3 %
METAMYELOCYTES NFR BLD MANUAL: 4 %
MONOCYTES # BLD: 0.1 K/UL (ref 0–1)
MONOCYTES # BLD: 0.1 K/UL (ref 0–1)
MONOCYTES # BLD: 0.2 K/UL (ref 0–1)
MONOCYTES # BLD: 0.3 K/UL (ref 0–1)
MONOCYTES # BLD: 0.4 K/UL (ref 0–1)
MONOCYTES # BLD: 0.5 K/UL (ref 0–1)
MONOCYTES # BLD: 0.6 K/UL (ref 0–1)
MONOCYTES # BLD: 0.8 K/UL (ref 0–1)
MONOCYTES # BLD: 0.8 K/UL (ref 0–1)
MONOCYTES # BLD: 0.9 K/UL (ref 0–1)
MONOCYTES # BLD: 1 K/UL (ref 0–1)
MONOCYTES NFR BLD: 10 % (ref 5–13)
MONOCYTES NFR BLD: 17 % (ref 5–13)
MONOCYTES NFR BLD: 2 % (ref 5–13)
MONOCYTES NFR BLD: 3 % (ref 5–13)
MONOCYTES NFR BLD: 4 % (ref 5–13)
MONOCYTES NFR BLD: 5 % (ref 5–13)
MONOCYTES NFR BLD: 6 % (ref 5–13)
MONOCYTES NFR BLD: 7 % (ref 5–13)
MONOCYTES NFR BLD: 8 % (ref 5–13)
MONOCYTES NFR BLD: 8 % (ref 5–13)
MYELOCYTES NFR BLD MANUAL: 2 %
MYELOCYTES NFR BLD MANUAL: 3 %
NEUTS BAND NFR BLD MANUAL: 1 % (ref 0–6)
NEUTS BAND NFR BLD MANUAL: 10 % (ref 0–6)
NEUTS BAND NFR BLD MANUAL: 2 % (ref 0–6)
NEUTS BAND NFR BLD MANUAL: 4 % (ref 0–6)
NEUTS BAND NFR BLD MANUAL: 5 % (ref 0–6)
NEUTS BAND NFR BLD MANUAL: 5 % (ref 0–6)
NEUTS SEG # BLD: 11.2 K/UL (ref 1.8–8)
NEUTS SEG # BLD: 11.9 K/UL (ref 1.8–8)
NEUTS SEG # BLD: 11.9 K/UL (ref 1.8–8)
NEUTS SEG # BLD: 13.1 K/UL (ref 1.8–8)
NEUTS SEG # BLD: 13.1 K/UL (ref 1.8–8)
NEUTS SEG # BLD: 13.7 K/UL (ref 1.8–8)
NEUTS SEG # BLD: 14.8 K/UL (ref 1.8–8)
NEUTS SEG # BLD: 15 K/UL (ref 1.8–8)
NEUTS SEG # BLD: 2.3 K/UL (ref 1.8–8)
NEUTS SEG # BLD: 2.7 K/UL (ref 1.8–8)
NEUTS SEG # BLD: 3.8 K/UL (ref 1.8–8)
NEUTS SEG # BLD: 4.3 K/UL (ref 1.8–8)
NEUTS SEG # BLD: 4.8 K/UL (ref 1.8–8)
NEUTS SEG # BLD: 5 K/UL (ref 1.8–8)
NEUTS SEG # BLD: 6.9 K/UL (ref 1.8–8)
NEUTS SEG # BLD: 6.9 K/UL (ref 1.8–8)
NEUTS SEG # BLD: 7.1 K/UL (ref 1.8–8)
NEUTS SEG # BLD: 7.2 K/UL (ref 1.8–8)
NEUTS SEG # BLD: 7.6 K/UL (ref 1.8–8)
NEUTS SEG # BLD: 7.9 K/UL (ref 1.8–8)
NEUTS SEG # BLD: 8.1 K/UL (ref 1.8–8)
NEUTS SEG # BLD: 8.2 K/UL (ref 1.8–8)
NEUTS SEG # BLD: 8.9 K/UL (ref 1.8–8)
NEUTS SEG # BLD: 9.2 K/UL (ref 1.8–8)
NEUTS SEG # BLD: 9.3 K/UL (ref 1.8–8)
NEUTS SEG NFR BLD: 57 % (ref 32–75)
NEUTS SEG NFR BLD: 72 % (ref 32–75)
NEUTS SEG NFR BLD: 77 % (ref 32–75)
NEUTS SEG NFR BLD: 81 % (ref 32–75)
NEUTS SEG NFR BLD: 82 % (ref 32–75)
NEUTS SEG NFR BLD: 82 % (ref 32–75)
NEUTS SEG NFR BLD: 83 % (ref 32–75)
NEUTS SEG NFR BLD: 84 % (ref 32–75)
NEUTS SEG NFR BLD: 86 % (ref 32–75)
NEUTS SEG NFR BLD: 87 % (ref 32–75)
NEUTS SEG NFR BLD: 87 % (ref 32–75)
NEUTS SEG NFR BLD: 88 % (ref 32–75)
NEUTS SEG NFR BLD: 88 % (ref 32–75)
NEUTS SEG NFR BLD: 89 % (ref 32–75)
NEUTS SEG NFR BLD: 90 % (ref 32–75)
NEUTS SEG NFR BLD: 90 % (ref 32–75)
NEUTS SEG NFR BLD: 91 % (ref 32–75)
NEUTS SEG NFR BLD: 91 % (ref 32–75)
NEUTS SEG NFR BLD: 92 % (ref 32–75)
NEUTS SEG NFR BLD: 93 % (ref 32–75)
NEUTS SEG NFR BLD: 93 % (ref 32–75)
NEUTS SEG NFR BLD: 94 % (ref 32–75)
NITRITE UR QL STRIP.AUTO: NEGATIVE
NRBC # BLD: 0 K/UL (ref 0–0.01)
NRBC # BLD: 0.02 K/UL (ref 0–0.01)
NRBC # BLD: 0.03 K/UL (ref 0–0.01)
NRBC # BLD: 0.03 K/UL (ref 0–0.01)
NRBC # BLD: 0.04 K/UL (ref 0–0.01)
NRBC # BLD: 0.05 K/UL (ref 0–0.01)
NRBC # BLD: 0.07 K/UL (ref 0–0.01)
NRBC # BLD: 0.11 K/UL (ref 0–0.01)
NRBC BLD-RTO: 0 PER 100 WBC
NRBC BLD-RTO: 0.3 PER 100 WBC
NRBC BLD-RTO: 0.4 PER 100 WBC
NRBC BLD-RTO: 0.6 PER 100 WBC
NRBC BLD-RTO: 0.7 PER 100 WBC
NRBC BLD-RTO: 0.7 PER 100 WBC
NRBC BLD-RTO: 1.2 PER 100 WBC
NRBC BLD-RTO: 1.9 PER 100 WBC
P-R INTERVAL, ECG05: 176 MS
PCO2 BLDA: 36 MMHG (ref 35–45)
PERIPHERAL SMEAR,PSM: NORMAL
PH BLDA: 7.37 [PH] (ref 7.35–7.45)
PH UR STRIP: 7 [PH] (ref 5–8)
PHOSPHATE SERPL-MCNC: 2.7 MG/DL (ref 2.6–4.7)
PHOSPHATE SERPL-MCNC: 2.8 MG/DL (ref 2.6–4.7)
PHOSPHATE SERPL-MCNC: 2.9 MG/DL (ref 2.6–4.7)
PHOSPHATE SERPL-MCNC: 3 MG/DL (ref 2.6–4.7)
PHOSPHATE SERPL-MCNC: 3 MG/DL (ref 2.6–4.7)
PHOSPHATE SERPL-MCNC: 3.1 MG/DL (ref 2.6–4.7)
PHOSPHATE SERPL-MCNC: 3.3 MG/DL (ref 2.6–4.7)
PHOSPHATE SERPL-MCNC: 3.4 MG/DL (ref 2.6–4.7)
PLATELET # BLD AUTO: 112 K/UL (ref 150–400)
PLATELET # BLD AUTO: 122 K/UL (ref 150–400)
PLATELET # BLD AUTO: 136 K/UL (ref 150–400)
PLATELET # BLD AUTO: 157 K/UL (ref 150–400)
PLATELET # BLD AUTO: 166 K/UL (ref 150–400)
PLATELET # BLD AUTO: 171 K/UL (ref 150–400)
PLATELET # BLD AUTO: 172 K/UL (ref 150–400)
PLATELET # BLD AUTO: 218 K/UL (ref 150–400)
PLATELET # BLD AUTO: 232 K/UL (ref 150–400)
PLATELET # BLD AUTO: 239 K/UL (ref 150–400)
PLATELET # BLD AUTO: 241 K/UL (ref 150–400)
PLATELET # BLD AUTO: 257 K/UL (ref 150–400)
PLATELET # BLD AUTO: 279 K/UL (ref 150–400)
PLATELET # BLD AUTO: 299 K/UL (ref 150–400)
PLATELET # BLD AUTO: 321 K/UL (ref 150–400)
PLATELET # BLD AUTO: 336 K/UL (ref 150–400)
PLATELET # BLD AUTO: 345 K/UL (ref 150–400)
PLATELET # BLD AUTO: 369 K/UL (ref 150–400)
PLATELET # BLD AUTO: 48 K/UL (ref 150–400)
PLATELET # BLD AUTO: 56 K/UL (ref 150–400)
PLATELET # BLD AUTO: 57 K/UL (ref 150–400)
PLATELET # BLD AUTO: 60 K/UL (ref 150–400)
PLATELET # BLD AUTO: 67 K/UL (ref 150–400)
PLATELET # BLD AUTO: 73 K/UL (ref 150–400)
PLATELET # BLD AUTO: 75 K/UL (ref 150–400)
PLATELET # BLD AUTO: 83 K/UL (ref 150–400)
PLATELET # BLD AUTO: 88 K/UL (ref 150–400)
PLATELET # BLD AUTO: 96 K/UL (ref 150–400)
PMV BLD AUTO: 10.7 FL (ref 8.9–12.9)
PMV BLD AUTO: 10.8 FL (ref 8.9–12.9)
PMV BLD AUTO: 11 FL (ref 8.9–12.9)
PMV BLD AUTO: 11.1 FL (ref 8.9–12.9)
PMV BLD AUTO: 11.2 FL (ref 8.9–12.9)
PMV BLD AUTO: 11.3 FL (ref 8.9–12.9)
PMV BLD AUTO: 11.4 FL (ref 8.9–12.9)
PMV BLD AUTO: 11.6 FL (ref 8.9–12.9)
PMV BLD AUTO: 11.6 FL (ref 8.9–12.9)
PMV BLD AUTO: 11.7 FL (ref 8.9–12.9)
PMV BLD AUTO: 11.9 FL (ref 8.9–12.9)
PMV BLD AUTO: 12.1 FL (ref 8.9–12.9)
PO2 BLDA: 45 MMHG (ref 80–100)
POTASSIUM SERPL-SCNC: 3.9 MMOL/L (ref 3.5–5.1)
POTASSIUM SERPL-SCNC: 4.1 MMOL/L (ref 3.5–5.1)
POTASSIUM SERPL-SCNC: 4.2 MMOL/L (ref 3.5–5.1)
POTASSIUM SERPL-SCNC: 4.3 MMOL/L (ref 3.5–5.1)
POTASSIUM SERPL-SCNC: 4.4 MMOL/L (ref 3.5–5.1)
POTASSIUM SERPL-SCNC: 4.5 MMOL/L (ref 3.5–5.1)
POTASSIUM SERPL-SCNC: 4.6 MMOL/L (ref 3.5–5.1)
POTASSIUM SERPL-SCNC: 4.7 MMOL/L (ref 3.5–5.1)
POTASSIUM SERPL-SCNC: 4.7 MMOL/L (ref 3.5–5.1)
PROCALCITONIN SERPL-MCNC: <0.05 NG/ML
PROT SERPL-MCNC: 4.5 G/DL (ref 6.4–8.2)
PROT SERPL-MCNC: 4.6 G/DL (ref 6.4–8.2)
PROT SERPL-MCNC: 4.9 G/DL (ref 6.4–8.2)
PROT SERPL-MCNC: 5.1 G/DL (ref 6.4–8.2)
PROT SERPL-MCNC: 5.2 G/DL (ref 6.4–8.2)
PROT SERPL-MCNC: 5.3 G/DL (ref 6.4–8.2)
PROT SERPL-MCNC: 5.3 G/DL (ref 6.4–8.2)
PROT SERPL-MCNC: 5.5 G/DL (ref 6.4–8.2)
PROT SERPL-MCNC: 5.6 G/DL (ref 6.4–8.2)
PROT SERPL-MCNC: 5.7 G/DL (ref 6.4–8.2)
PROT SERPL-MCNC: 5.9 G/DL (ref 6.4–8.2)
PROT SERPL-MCNC: 6 G/DL (ref 6.4–8.2)
PROT SERPL-MCNC: 6.1 G/DL (ref 6.4–8.2)
PROT SERPL-MCNC: 6.2 G/DL (ref 6.4–8.2)
PROT SERPL-MCNC: 6.3 G/DL (ref 6.4–8.2)
PROT SERPL-MCNC: 6.7 G/DL (ref 6.4–8.2)
PROT SERPL-MCNC: 6.7 G/DL (ref 6.4–8.2)
PROT SERPL-MCNC: 6.8 G/DL (ref 6.4–8.2)
PROT SERPL-MCNC: 7.1 G/DL (ref 6.4–8.2)
PROT SERPL-MCNC: 7.2 G/DL (ref 6.4–8.2)
PROT SERPL-MCNC: 8.1 G/DL (ref 6.4–8.2)
PROT UR STRIP-MCNC: NEGATIVE MG/DL
PROTHROMBIN TIME: 12 SEC (ref 9–11.1)
Q-T INTERVAL, ECG07: 244 MS
Q-T INTERVAL, ECG07: 458 MS
QRS DURATION, ECG06: 170 MS
QRS DURATION, ECG06: 64 MS
QTC CALCULATION (BEZET), ECG08: 405 MS
QTC CALCULATION (BEZET), ECG08: 494 MS
RBC # BLD AUTO: 3.5 M/UL (ref 4.1–5.7)
RBC # BLD AUTO: 3.56 M/UL (ref 4.1–5.7)
RBC # BLD AUTO: 3.63 M/UL (ref 4.1–5.7)
RBC # BLD AUTO: 3.63 M/UL (ref 4.1–5.7)
RBC # BLD AUTO: 3.66 M/UL (ref 4.1–5.7)
RBC # BLD AUTO: 3.81 M/UL (ref 4.1–5.7)
RBC # BLD AUTO: 4.14 M/UL (ref 4.1–5.7)
RBC # BLD AUTO: 4.42 M/UL (ref 4.1–5.7)
RBC # BLD AUTO: 4.48 M/UL (ref 4.1–5.7)
RBC # BLD AUTO: 4.56 M/UL (ref 4.1–5.7)
RBC # BLD AUTO: 4.66 M/UL (ref 4.1–5.7)
RBC # BLD AUTO: 4.68 M/UL (ref 4.1–5.7)
RBC # BLD AUTO: 4.72 M/UL (ref 4.1–5.7)
RBC # BLD AUTO: 4.8 M/UL (ref 4.1–5.7)
RBC # BLD AUTO: 4.83 M/UL (ref 4.1–5.7)
RBC # BLD AUTO: 4.96 M/UL (ref 4.1–5.7)
RBC # BLD AUTO: 4.98 M/UL (ref 4.1–5.7)
RBC # BLD AUTO: 5.05 M/UL (ref 4.1–5.7)
RBC # BLD AUTO: 5.06 M/UL (ref 4.1–5.7)
RBC # BLD AUTO: 5.12 M/UL (ref 4.1–5.7)
RBC # BLD AUTO: 5.13 M/UL (ref 4.1–5.7)
RBC # BLD AUTO: 5.4 M/UL (ref 4.1–5.7)
RBC # BLD AUTO: 5.41 M/UL (ref 4.1–5.7)
RBC # BLD AUTO: 5.44 M/UL (ref 4.1–5.7)
RBC # BLD AUTO: 5.52 M/UL (ref 4.1–5.7)
RBC # BLD AUTO: 5.59 M/UL (ref 4.1–5.7)
RBC # BLD AUTO: 5.63 M/UL (ref 4.1–5.7)
RBC # BLD AUTO: 5.67 M/UL (ref 4.1–5.7)
RBC #/AREA URNS HPF: ABNORMAL /HPF (ref 0–5)
RBC MORPH BLD: ABNORMAL
SAMPLES BEING HELD,HOLD: NORMAL
SAO2 % BLD: 80 % (ref 92–97)
SAO2% DEVICE SAO2% SENSOR NAME: ABNORMAL
SERVICE CMNT-IMP: ABNORMAL
SERVICE CMNT-IMP: NORMAL
SODIUM SERPL-SCNC: 131 MMOL/L (ref 136–145)
SODIUM SERPL-SCNC: 131 MMOL/L (ref 136–145)
SODIUM SERPL-SCNC: 132 MMOL/L (ref 136–145)
SODIUM SERPL-SCNC: 133 MMOL/L (ref 136–145)
SODIUM SERPL-SCNC: 134 MMOL/L (ref 136–145)
SODIUM SERPL-SCNC: 135 MMOL/L (ref 136–145)
SODIUM SERPL-SCNC: 136 MMOL/L (ref 136–145)
SODIUM SERPL-SCNC: 137 MMOL/L (ref 136–145)
SOURCE, COVRS: ABNORMAL
SOURCE, COVRS: ABNORMAL
SP GR UR REFRACTOMETRY: 1.01 (ref 1–1.03)
SPECIMEN SITE: ABNORMAL
THERAPEUTIC RANGE,PTTT: NORMAL SECS (ref 58–77)
TROPONIN-HIGH SENSITIVITY: 190 NG/L (ref 0–76)
TROPONIN-HIGH SENSITIVITY: 24 NG/L (ref 0–76)
TSH SERPL DL<=0.05 MIU/L-ACNC: 0.32 UIU/ML (ref 0.36–3.74)
UROBILINOGEN UR QL STRIP.AUTO: 1 EU/DL (ref 0.2–1)
VENTRICULAR RATE, ECG03: 166 BPM
VENTRICULAR RATE, ECG03: 70 BPM
VIT B12 SERPL-MCNC: 627 PG/ML (ref 193–986)
WBC # BLD AUTO: 10.1 K/UL (ref 4.1–11.1)
WBC # BLD AUTO: 10.6 K/UL (ref 4.1–11.1)
WBC # BLD AUTO: 12.9 K/UL (ref 4.1–11.1)
WBC # BLD AUTO: 13 K/UL (ref 4.1–11.1)
WBC # BLD AUTO: 13.5 K/UL (ref 4.1–11.1)
WBC # BLD AUTO: 14.2 K/UL (ref 4.1–11.1)
WBC # BLD AUTO: 14.2 K/UL (ref 4.1–11.1)
WBC # BLD AUTO: 15 K/UL (ref 4.1–11.1)
WBC # BLD AUTO: 16 K/UL (ref 4.1–11.1)
WBC # BLD AUTO: 16 K/UL (ref 4.1–11.1)
WBC # BLD AUTO: 3.2 K/UL (ref 4.1–11.1)
WBC # BLD AUTO: 4.1 K/UL (ref 4.1–11.1)
WBC # BLD AUTO: 4.7 K/UL (ref 4.1–11.1)
WBC # BLD AUTO: 5.2 K/UL (ref 4.1–11.1)
WBC # BLD AUTO: 5.3 K/UL (ref 4.1–11.1)
WBC # BLD AUTO: 5.8 K/UL (ref 4.1–11.1)
WBC # BLD AUTO: 5.8 K/UL (ref 4.1–11.1)
WBC # BLD AUTO: 5.9 K/UL (ref 4.1–11.1)
WBC # BLD AUTO: 6.8 K/UL (ref 4.1–11.1)
WBC # BLD AUTO: 7.8 K/UL (ref 4.1–11.1)
WBC # BLD AUTO: 7.9 K/UL (ref 4.1–11.1)
WBC # BLD AUTO: 8.4 K/UL (ref 4.1–11.1)
WBC # BLD AUTO: 8.5 K/UL (ref 4.1–11.1)
WBC # BLD AUTO: 8.6 K/UL (ref 4.1–11.1)
WBC # BLD AUTO: 8.8 K/UL (ref 4.1–11.1)
WBC # BLD AUTO: 8.9 K/UL (ref 4.1–11.1)
WBC # BLD AUTO: 9.2 K/UL (ref 4.1–11.1)
WBC # BLD AUTO: 9.9 K/UL (ref 4.1–11.1)
WBC MORPH BLD: ABNORMAL
WBC URNS QL MICRO: ABNORMAL /HPF (ref 0–4)

## 2022-01-01 PROCEDURE — 85025 COMPLETE CBC W/AUTO DIFF WBC: CPT

## 2022-01-01 PROCEDURE — 84100 ASSAY OF PHOSPHORUS: CPT

## 2022-01-01 PROCEDURE — 65270000029 HC RM PRIVATE

## 2022-01-01 PROCEDURE — 74011250637 HC RX REV CODE- 250/637: Performed by: INTERNAL MEDICINE

## 2022-01-01 PROCEDURE — 74011636637 HC RX REV CODE- 636/637: Performed by: INTERNAL MEDICINE

## 2022-01-01 PROCEDURE — 94761 N-INVAS EAR/PLS OXIMETRY MLT: CPT

## 2022-01-01 PROCEDURE — 74011000250 HC RX REV CODE- 250: Performed by: INTERNAL MEDICINE

## 2022-01-01 PROCEDURE — 77010033678 HC OXYGEN DAILY

## 2022-01-01 PROCEDURE — 82962 GLUCOSE BLOOD TEST: CPT

## 2022-01-01 PROCEDURE — 82607 VITAMIN B-12: CPT

## 2022-01-01 PROCEDURE — APPSS45 APP SPLIT SHARED TIME 31-45 MINUTES: Performed by: NURSE PRACTITIONER

## 2022-01-01 PROCEDURE — 94640 AIRWAY INHALATION TREATMENT: CPT

## 2022-01-01 PROCEDURE — 74011250637 HC RX REV CODE- 250/637: Performed by: SPECIALIST

## 2022-01-01 PROCEDURE — 97530 THERAPEUTIC ACTIVITIES: CPT

## 2022-01-01 PROCEDURE — 74011000250 HC RX REV CODE- 250: Performed by: RADIOLOGY

## 2022-01-01 PROCEDURE — 74011000258 HC RX REV CODE- 258: Performed by: INTERNAL MEDICINE

## 2022-01-01 PROCEDURE — 74011000258 HC RX REV CODE- 258: Performed by: HOSPITALIST

## 2022-01-01 PROCEDURE — 87186 SC STD MICRODIL/AGAR DIL: CPT

## 2022-01-01 PROCEDURE — 74011250636 HC RX REV CODE- 250/636: Performed by: INTERNAL MEDICINE

## 2022-01-01 PROCEDURE — 83036 HEMOGLOBIN GLYCOSYLATED A1C: CPT

## 2022-01-01 PROCEDURE — 94760 N-INVAS EAR/PLS OXIMETRY 1: CPT

## 2022-01-01 PROCEDURE — 94762 N-INVAS EAR/PLS OXIMTRY CONT: CPT

## 2022-01-01 PROCEDURE — 82746 ASSAY OF FOLIC ACID SERUM: CPT

## 2022-01-01 PROCEDURE — 65660000000 HC RM CCU STEPDOWN

## 2022-01-01 PROCEDURE — 86140 C-REACTIVE PROTEIN: CPT

## 2022-01-01 PROCEDURE — 83735 ASSAY OF MAGNESIUM: CPT

## 2022-01-01 PROCEDURE — 77010033711 HC HIGH FLOW OXYGEN

## 2022-01-01 PROCEDURE — C1769 GUIDE WIRE: HCPCS

## 2022-01-01 PROCEDURE — 83880 ASSAY OF NATRIURETIC PEPTIDE: CPT

## 2022-01-01 PROCEDURE — 85379 FIBRIN DEGRADATION QUANT: CPT

## 2022-01-01 PROCEDURE — 71045 X-RAY EXAM CHEST 1 VIEW: CPT

## 2022-01-01 PROCEDURE — 84145 PROCALCITONIN (PCT): CPT

## 2022-01-01 PROCEDURE — 77030037877 HC DRSG MEPILEX >48IN BORD MOLN -A

## 2022-01-01 PROCEDURE — 5A09557 ASSISTANCE WITH RESPIRATORY VENTILATION, GREATER THAN 96 CONSECUTIVE HOURS, CONTINUOUS POSITIVE AIRWAY PRESSURE: ICD-10-PCS | Performed by: HOSPITALIST

## 2022-01-01 PROCEDURE — 97116 GAIT TRAINING THERAPY: CPT

## 2022-01-01 PROCEDURE — 74011250636 HC RX REV CODE- 250/636: Performed by: NURSE PRACTITIONER

## 2022-01-01 PROCEDURE — 93306 TTE W/DOPPLER COMPLETE: CPT | Performed by: INTERNAL MEDICINE

## 2022-01-01 PROCEDURE — 74011000250 HC RX REV CODE- 250: Performed by: HOSPITALIST

## 2022-01-01 PROCEDURE — 99285 EMERGENCY DEPT VISIT HI MDM: CPT

## 2022-01-01 PROCEDURE — 97110 THERAPEUTIC EXERCISES: CPT

## 2022-01-01 PROCEDURE — 99232 SBSQ HOSP IP/OBS MODERATE 35: CPT | Performed by: NURSE PRACTITIONER

## 2022-01-01 PROCEDURE — 65610000006 HC RM INTENSIVE CARE

## 2022-01-01 PROCEDURE — 36415 COLL VENOUS BLD VENIPUNCTURE: CPT

## 2022-01-01 PROCEDURE — 74011250636 HC RX REV CODE- 250/636: Performed by: HOSPITALIST

## 2022-01-01 PROCEDURE — 93306 TTE W/DOPPLER COMPLETE: CPT

## 2022-01-01 PROCEDURE — 97165 OT EVAL LOW COMPLEX 30 MIN: CPT

## 2022-01-01 PROCEDURE — XW0DXM6 INTRODUCTION OF BARICITINIB INTO MOUTH AND PHARYNX, EXTERNAL APPROACH, NEW TECHNOLOGY GROUP 6: ICD-10-PCS | Performed by: HOSPITALIST

## 2022-01-01 PROCEDURE — 99222 1ST HOSP IP/OBS MODERATE 55: CPT | Performed by: INTERNAL MEDICINE

## 2022-01-01 PROCEDURE — 83605 ASSAY OF LACTIC ACID: CPT

## 2022-01-01 PROCEDURE — 02H633Z INSERTION OF INFUSION DEVICE INTO RIGHT ATRIUM, PERCUTANEOUS APPROACH: ICD-10-PCS | Performed by: RADIOLOGY

## 2022-01-01 PROCEDURE — 97535 SELF CARE MNGMENT TRAINING: CPT

## 2022-01-01 PROCEDURE — 80053 COMPREHEN METABOLIC PANEL: CPT

## 2022-01-01 PROCEDURE — APPSS30 APP SPLIT SHARED TIME 16-30 MINUTES: Performed by: NURSE PRACTITIONER

## 2022-01-01 PROCEDURE — 82728 ASSAY OF FERRITIN: CPT

## 2022-01-01 PROCEDURE — 99356 PR PROLONGED SVC I/P OR OBS SETTING 1ST HOUR: CPT | Performed by: NURSE PRACTITIONER

## 2022-01-01 PROCEDURE — 71250 CT THORAX DX C-: CPT

## 2022-01-01 PROCEDURE — 85610 PROTHROMBIN TIME: CPT

## 2022-01-01 PROCEDURE — 77030020847 HC STATLOK BARD -A

## 2022-01-01 PROCEDURE — 74011250637 HC RX REV CODE- 250/637: Performed by: HOSPITALIST

## 2022-01-01 PROCEDURE — 83615 LACTATE (LD) (LDH) ENZYME: CPT

## 2022-01-01 PROCEDURE — 83010 ASSAY OF HAPTOGLOBIN QUANT: CPT

## 2022-01-01 PROCEDURE — 73502 X-RAY EXAM HIP UNI 2-3 VIEWS: CPT

## 2022-01-01 PROCEDURE — 99233 SBSQ HOSP IP/OBS HIGH 50: CPT | Performed by: SPECIALIST

## 2022-01-01 PROCEDURE — 99223 1ST HOSP IP/OBS HIGH 75: CPT | Performed by: NURSE PRACTITIONER

## 2022-01-01 PROCEDURE — 97162 PT EVAL MOD COMPLEX 30 MIN: CPT

## 2022-01-01 PROCEDURE — 87040 BLOOD CULTURE FOR BACTERIA: CPT

## 2022-01-01 PROCEDURE — 80048 BASIC METABOLIC PNL TOTAL CA: CPT

## 2022-01-01 PROCEDURE — 94660 CPAP INITIATION&MGMT: CPT

## 2022-01-01 PROCEDURE — 36556 INSERT NON-TUNNEL CV CATH: CPT

## 2022-01-01 PROCEDURE — C1751 CATH, INF, PER/CENT/MIDLINE: HCPCS

## 2022-01-01 PROCEDURE — 77030012794 HC KT NSL CANN/HGH TRAN -A

## 2022-01-01 PROCEDURE — 85384 FIBRINOGEN ACTIVITY: CPT

## 2022-01-01 PROCEDURE — 85027 COMPLETE CBC AUTOMATED: CPT

## 2022-01-01 PROCEDURE — 99233 SBSQ HOSP IP/OBS HIGH 50: CPT | Performed by: NURSE PRACTITIONER

## 2022-01-01 PROCEDURE — 84443 ASSAY THYROID STIM HORMONE: CPT

## 2022-01-01 PROCEDURE — C1894 INTRO/SHEATH, NON-LASER: HCPCS

## 2022-01-01 PROCEDURE — 74011250636 HC RX REV CODE- 250/636: Performed by: PHYSICIAN ASSISTANT

## 2022-01-01 PROCEDURE — 82248 BILIRUBIN DIRECT: CPT

## 2022-01-01 PROCEDURE — 82803 BLOOD GASES ANY COMBINATION: CPT

## 2022-01-01 PROCEDURE — 93005 ELECTROCARDIOGRAM TRACING: CPT

## 2022-01-01 PROCEDURE — 77030010894 HC CHMB TRNSF FISP -A

## 2022-01-01 PROCEDURE — 74011250636 HC RX REV CODE- 250/636: Performed by: EMERGENCY MEDICINE

## 2022-01-01 PROCEDURE — 87635 SARS-COV-2 COVID-19 AMP PRB: CPT

## 2022-01-01 PROCEDURE — 99222 1ST HOSP IP/OBS MODERATE 55: CPT | Performed by: SPECIALIST

## 2022-01-01 PROCEDURE — 99221 1ST HOSP IP/OBS SF/LOW 40: CPT | Performed by: STUDENT IN AN ORGANIZED HEALTH CARE EDUCATION/TRAINING PROGRAM

## 2022-01-01 PROCEDURE — 87077 CULTURE AEROBIC IDENTIFY: CPT

## 2022-01-01 PROCEDURE — 99232 SBSQ HOSP IP/OBS MODERATE 35: CPT | Performed by: INTERNAL MEDICINE

## 2022-01-01 PROCEDURE — 93306 TTE W/DOPPLER COMPLETE: CPT | Performed by: SPECIALIST

## 2022-01-01 PROCEDURE — 84484 ASSAY OF TROPONIN QUANT: CPT

## 2022-01-01 PROCEDURE — 51798 US URINE CAPACITY MEASURE: CPT

## 2022-01-01 PROCEDURE — 96374 THER/PROPH/DIAG INJ IV PUSH: CPT

## 2022-01-01 PROCEDURE — 85730 THROMBOPLASTIN TIME PARTIAL: CPT

## 2022-01-01 PROCEDURE — 81001 URINALYSIS AUTO W/SCOPE: CPT

## 2022-01-01 PROCEDURE — 97535 SELF CARE MNGMENT TRAINING: CPT | Performed by: OCCUPATIONAL THERAPIST

## 2022-01-01 PROCEDURE — 77030040393 HC DRSG OPTIFOAM GENT MDII -B

## 2022-01-01 PROCEDURE — 36600 WITHDRAWAL OF ARTERIAL BLOOD: CPT

## 2022-01-01 PROCEDURE — 94664 DEMO&/EVAL PT USE INHALER: CPT

## 2022-01-01 PROCEDURE — 76705 ECHO EXAM OF ABDOMEN: CPT

## 2022-01-01 PROCEDURE — 99232 SBSQ HOSP IP/OBS MODERATE 35: CPT | Performed by: SPECIALIST

## 2022-01-01 RX ORDER — GLYCOPYRROLATE 0.2 MG/ML
0.2 INJECTION INTRAMUSCULAR; INTRAVENOUS
Status: DISCONTINUED | OUTPATIENT
Start: 2022-01-01 | End: 2022-01-01 | Stop reason: HOSPADM

## 2022-01-01 RX ORDER — LORAZEPAM 2 MG/ML
1 INJECTION INTRAMUSCULAR ONCE
Status: COMPLETED | OUTPATIENT
Start: 2022-01-01 | End: 2022-01-01

## 2022-01-01 RX ORDER — GUAIFENESIN/DEXTROMETHORPHAN 100-10MG/5
5 SYRUP ORAL
Status: DISCONTINUED | OUTPATIENT
Start: 2022-01-01 | End: 2022-01-01

## 2022-01-01 RX ORDER — INSULIN LISPRO 100 [IU]/ML
INJECTION, SOLUTION INTRAVENOUS; SUBCUTANEOUS
Status: DISCONTINUED | OUTPATIENT
Start: 2022-01-01 | End: 2022-01-01

## 2022-01-01 RX ORDER — MORPHINE SULFATE 4 MG/ML
3 INJECTION INTRAVENOUS
Status: DISCONTINUED | OUTPATIENT
Start: 2022-01-01 | End: 2022-01-01 | Stop reason: HOSPADM

## 2022-01-01 RX ORDER — LORAZEPAM 2 MG/ML
2 INJECTION INTRAMUSCULAR
Status: DISCONTINUED | OUTPATIENT
Start: 2022-01-01 | End: 2022-01-01 | Stop reason: HOSPADM

## 2022-01-01 RX ORDER — LORAZEPAM 0.5 MG/1
0.5 TABLET ORAL
Status: DISCONTINUED | OUTPATIENT
Start: 2022-01-01 | End: 2022-01-01

## 2022-01-01 RX ORDER — METOPROLOL TARTRATE 5 MG/5ML
5 INJECTION INTRAVENOUS ONCE
Status: DISCONTINUED | OUTPATIENT
Start: 2022-01-01 | End: 2022-01-01

## 2022-01-01 RX ORDER — QUETIAPINE FUMARATE 100 MG/1
100 TABLET, FILM COATED ORAL
Status: DISCONTINUED | OUTPATIENT
Start: 2022-01-01 | End: 2022-01-01

## 2022-01-01 RX ORDER — MAGNESIUM SULFATE 100 %
4 CRYSTALS MISCELLANEOUS AS NEEDED
Status: DISCONTINUED | OUTPATIENT
Start: 2022-01-01 | End: 2022-01-01

## 2022-01-01 RX ORDER — KETOROLAC TROMETHAMINE 30 MG/ML
30 INJECTION, SOLUTION INTRAMUSCULAR; INTRAVENOUS
Status: DISCONTINUED | OUTPATIENT
Start: 2022-01-01 | End: 2022-01-01 | Stop reason: HOSPADM

## 2022-01-01 RX ORDER — ENOXAPARIN SODIUM 100 MG/ML
40 INJECTION SUBCUTANEOUS DAILY
Status: DISCONTINUED | OUTPATIENT
Start: 2022-01-01 | End: 2022-01-01

## 2022-01-01 RX ORDER — SCOLOPAMINE TRANSDERMAL SYSTEM 1 MG/1
1 PATCH, EXTENDED RELEASE TRANSDERMAL
Status: DISCONTINUED | OUTPATIENT
Start: 2022-01-01 | End: 2022-01-01 | Stop reason: HOSPADM

## 2022-01-01 RX ORDER — ATORVASTATIN CALCIUM 20 MG/1
20 TABLET, FILM COATED ORAL DAILY
Status: DISCONTINUED | OUTPATIENT
Start: 2022-01-01 | End: 2022-01-01

## 2022-01-01 RX ORDER — VANCOMYCIN/0.9 % SOD CHLORIDE 1.5G/250ML
1500 PLASTIC BAG, INJECTION (ML) INTRAVENOUS ONCE
Status: COMPLETED | OUTPATIENT
Start: 2022-01-01 | End: 2022-01-01

## 2022-01-01 RX ORDER — DEXAMETHASONE SODIUM PHOSPHATE 10 MG/ML
10 INJECTION INTRAMUSCULAR; INTRAVENOUS EVERY 12 HOURS
Status: DISCONTINUED | OUTPATIENT
Start: 2022-01-01 | End: 2022-01-01

## 2022-01-01 RX ORDER — MORPHINE SULFATE 2 MG/ML
2 INJECTION, SOLUTION INTRAMUSCULAR; INTRAVENOUS
Status: DISCONTINUED | OUTPATIENT
Start: 2022-01-01 | End: 2022-01-01

## 2022-01-01 RX ORDER — LORAZEPAM 1 MG/1
1 TABLET ORAL
Status: DISCONTINUED | OUTPATIENT
Start: 2022-01-01 | End: 2022-01-01

## 2022-01-01 RX ORDER — ONDANSETRON 2 MG/ML
4 INJECTION INTRAMUSCULAR; INTRAVENOUS
Status: DISCONTINUED | OUTPATIENT
Start: 2022-01-01 | End: 2022-01-01 | Stop reason: HOSPADM

## 2022-01-01 RX ORDER — IPRATROPIUM BROMIDE AND ALBUTEROL SULFATE 2.5; .5 MG/3ML; MG/3ML
3 SOLUTION RESPIRATORY (INHALATION)
Status: DISCONTINUED | OUTPATIENT
Start: 2022-01-01 | End: 2022-01-01

## 2022-01-01 RX ORDER — MAGNESIUM SULFATE 1 G/100ML
1 INJECTION INTRAVENOUS ONCE
Status: COMPLETED | OUTPATIENT
Start: 2022-01-01 | End: 2022-01-01

## 2022-01-01 RX ORDER — LORAZEPAM 1 MG/1
1 TABLET ORAL ONCE
Status: COMPLETED | OUTPATIENT
Start: 2022-01-01 | End: 2022-01-01

## 2022-01-01 RX ORDER — SODIUM CHLORIDE 9 MG/ML
75 INJECTION, SOLUTION INTRAVENOUS CONTINUOUS
Status: DISPENSED | OUTPATIENT
Start: 2022-01-01 | End: 2022-01-01

## 2022-01-01 RX ORDER — SODIUM CHLORIDE 0.9 % (FLUSH) 0.9 %
5-40 SYRINGE (ML) INJECTION AS NEEDED
Status: DISCONTINUED | OUTPATIENT
Start: 2022-01-01 | End: 2022-01-01 | Stop reason: HOSPADM

## 2022-01-01 RX ORDER — DEXTROSE MONOHYDRATE AND SODIUM CHLORIDE 5; .9 G/100ML; G/100ML
75 INJECTION, SOLUTION INTRAVENOUS CONTINUOUS
Status: DISCONTINUED | OUTPATIENT
Start: 2022-01-01 | End: 2022-01-01

## 2022-01-01 RX ORDER — MORPHINE SULFATE 2 MG/ML
1 INJECTION, SOLUTION INTRAMUSCULAR; INTRAVENOUS
Status: DISCONTINUED | OUTPATIENT
Start: 2022-01-01 | End: 2022-01-01

## 2022-01-01 RX ORDER — LIDOCAINE HYDROCHLORIDE 10 MG/ML
1-10 INJECTION INFILTRATION; PERINEURAL
Status: COMPLETED | OUTPATIENT
Start: 2022-01-01 | End: 2022-01-01

## 2022-01-01 RX ORDER — DEXMEDETOMIDINE HYDROCHLORIDE 4 UG/ML
.1-1.5 INJECTION, SOLUTION INTRAVENOUS
Status: DISCONTINUED | OUTPATIENT
Start: 2022-01-01 | End: 2022-01-01 | Stop reason: HOSPADM

## 2022-01-01 RX ORDER — ONDANSETRON 4 MG/1
4 TABLET, ORALLY DISINTEGRATING ORAL
Status: DISCONTINUED | OUTPATIENT
Start: 2022-01-01 | End: 2022-01-01 | Stop reason: HOSPADM

## 2022-01-01 RX ORDER — DEXAMETHASONE 6 MG/1
6 TABLET ORAL DAILY
Status: DISCONTINUED | OUTPATIENT
Start: 2022-01-01 | End: 2022-01-01

## 2022-01-01 RX ORDER — DEXAMETHASONE 4 MG/1
4 TABLET ORAL EVERY 12 HOURS
Status: DISCONTINUED | OUTPATIENT
Start: 2022-01-01 | End: 2022-01-01

## 2022-01-01 RX ORDER — METOPROLOL TARTRATE 25 MG/1
12.5 TABLET, FILM COATED ORAL 2 TIMES DAILY
Status: DISCONTINUED | OUTPATIENT
Start: 2022-01-01 | End: 2022-01-01

## 2022-01-01 RX ORDER — LORAZEPAM 2 MG/ML
INJECTION INTRAMUSCULAR
Status: DISPENSED
Start: 2022-01-01 | End: 2022-01-01

## 2022-01-01 RX ORDER — ARFORMOTEROL TARTRATE 15 UG/2ML
15 SOLUTION RESPIRATORY (INHALATION)
Status: DISCONTINUED | OUTPATIENT
Start: 2022-01-01 | End: 2022-01-01

## 2022-01-01 RX ORDER — NOREPINEPHRINE BITARTRATE/D5W 8 MG/250ML
PLASTIC BAG, INJECTION (ML) INTRAVENOUS
Status: DISPENSED
Start: 2022-01-01 | End: 2022-01-01

## 2022-01-01 RX ORDER — ACETAMINOPHEN 325 MG/1
650 TABLET ORAL
Status: DISCONTINUED | OUTPATIENT
Start: 2022-01-01 | End: 2022-01-01 | Stop reason: HOSPADM

## 2022-01-01 RX ORDER — ENOXAPARIN SODIUM 100 MG/ML
60 INJECTION SUBCUTANEOUS EVERY 12 HOURS
Status: DISCONTINUED | OUTPATIENT
Start: 2022-01-01 | End: 2022-01-01

## 2022-01-01 RX ORDER — POLYETHYLENE GLYCOL 3350 17 G/17G
17 POWDER, FOR SOLUTION ORAL DAILY PRN
Status: DISCONTINUED | OUTPATIENT
Start: 2022-01-01 | End: 2022-01-01

## 2022-01-01 RX ORDER — NOREPINEPHRINE BITARTRATE/D5W 8 MG/250ML
.5-3 PLASTIC BAG, INJECTION (ML) INTRAVENOUS
Status: DISCONTINUED | OUTPATIENT
Start: 2022-01-01 | End: 2022-01-01

## 2022-01-01 RX ORDER — SODIUM CHLORIDE 0.9 % (FLUSH) 0.9 %
5-40 SYRINGE (ML) INJECTION EVERY 8 HOURS
Status: DISCONTINUED | OUTPATIENT
Start: 2022-01-01 | End: 2022-01-01

## 2022-01-01 RX ORDER — FAMOTIDINE 20 MG/1
20 TABLET, FILM COATED ORAL 2 TIMES DAILY
Status: DISCONTINUED | OUTPATIENT
Start: 2022-01-01 | End: 2022-01-01

## 2022-01-01 RX ORDER — LORAZEPAM 2 MG/ML
1 INJECTION INTRAMUSCULAR
Status: DISCONTINUED | OUTPATIENT
Start: 2022-01-01 | End: 2022-01-01

## 2022-01-01 RX ORDER — DEXTROSE 50 % IN WATER (D50W) INTRAVENOUS SYRINGE
25-50 AS NEEDED
Status: DISCONTINUED | OUTPATIENT
Start: 2022-01-01 | End: 2022-01-01

## 2022-01-01 RX ORDER — DEXAMETHASONE SODIUM PHOSPHATE 10 MG/ML
6 INJECTION INTRAMUSCULAR; INTRAVENOUS
Status: COMPLETED | OUTPATIENT
Start: 2022-01-01 | End: 2022-01-01

## 2022-01-01 RX ORDER — LISINOPRIL 5 MG/1
10 TABLET ORAL
Status: DISCONTINUED | OUTPATIENT
Start: 2022-01-01 | End: 2022-01-01

## 2022-01-01 RX ORDER — LANOLIN ALCOHOL/MO/W.PET/CERES
6 CREAM (GRAM) TOPICAL
Status: DISCONTINUED | OUTPATIENT
Start: 2022-01-01 | End: 2022-01-01

## 2022-01-01 RX ORDER — DEXAMETHASONE SODIUM PHOSPHATE 10 MG/ML
6 INJECTION INTRAMUSCULAR; INTRAVENOUS EVERY 12 HOURS
Status: DISCONTINUED | OUTPATIENT
Start: 2022-01-01 | End: 2022-01-01

## 2022-01-01 RX ORDER — MAGNESIUM SULFATE HEPTAHYDRATE 40 MG/ML
2 INJECTION, SOLUTION INTRAVENOUS ONCE
Status: COMPLETED | OUTPATIENT
Start: 2022-01-01 | End: 2022-01-01

## 2022-01-01 RX ORDER — ACETAMINOPHEN 650 MG/1
650 SUPPOSITORY RECTAL
Status: DISCONTINUED | OUTPATIENT
Start: 2022-01-01 | End: 2022-01-01 | Stop reason: HOSPADM

## 2022-01-01 RX ORDER — MORPHINE SULFATE 2 MG/ML
1 INJECTION, SOLUTION INTRAMUSCULAR; INTRAVENOUS
Status: COMPLETED | OUTPATIENT
Start: 2022-01-01 | End: 2022-01-01

## 2022-01-01 RX ORDER — BUDESONIDE 0.5 MG/2ML
500 INHALANT ORAL
Status: DISCONTINUED | OUTPATIENT
Start: 2022-01-01 | End: 2022-01-01

## 2022-01-01 RX ORDER — DEXMEDETOMIDINE HYDROCHLORIDE 4 UG/ML
.1-1.5 INJECTION, SOLUTION INTRAVENOUS
Status: DISCONTINUED | OUTPATIENT
Start: 2022-01-01 | End: 2022-01-01

## 2022-01-01 RX ADMIN — INSULIN HUMAN 18 UNITS: 100 INJECTION, SUSPENSION SUBCUTANEOUS at 07:23

## 2022-01-01 RX ADMIN — IPRATROPIUM BROMIDE AND ALBUTEROL SULFATE 3 ML: .5; 3 SOLUTION RESPIRATORY (INHALATION) at 07:56

## 2022-01-01 RX ADMIN — DEXAMETHASONE SODIUM PHOSPHATE 10 MG: 10 INJECTION INTRAMUSCULAR; INTRAVENOUS at 21:02

## 2022-01-01 RX ADMIN — DEXAMETHASONE SODIUM PHOSPHATE 10 MG: 10 INJECTION INTRAMUSCULAR; INTRAVENOUS at 08:00

## 2022-01-01 RX ADMIN — FAMOTIDINE 20 MG: 20 TABLET ORAL at 08:34

## 2022-01-01 RX ADMIN — Medication 10 ML: at 05:27

## 2022-01-01 RX ADMIN — BUDESONIDE 500 MCG: 0.5 SUSPENSION RESPIRATORY (INHALATION) at 21:38

## 2022-01-01 RX ADMIN — Medication 3 UNITS: at 12:28

## 2022-01-01 RX ADMIN — Medication 30 UNITS: at 17:04

## 2022-01-01 RX ADMIN — RIVAROXABAN 20 MG: 20 TABLET, FILM COATED ORAL at 16:49

## 2022-01-01 RX ADMIN — INSULIN HUMAN 20 UNITS: 100 INJECTION, SUSPENSION SUBCUTANEOUS at 08:17

## 2022-01-01 RX ADMIN — Medication 3 UNITS: at 08:30

## 2022-01-01 RX ADMIN — CEFAZOLIN 1 G: 1 INJECTION, POWDER, FOR SOLUTION INTRAMUSCULAR; INTRAVENOUS at 00:26

## 2022-01-01 RX ADMIN — METOPROLOL 12.5 MG: 25 TABLET ORAL at 08:34

## 2022-01-01 RX ADMIN — RIVAROXABAN 15 MG: 15 TABLET, FILM COATED ORAL at 17:21

## 2022-01-01 RX ADMIN — Medication 10 ML: at 14:05

## 2022-01-01 RX ADMIN — BARICITINIB 4 MG: 2 TABLET, FILM COATED ORAL at 08:30

## 2022-01-01 RX ADMIN — METOPROLOL 12.5 MG: 25 TABLET ORAL at 17:04

## 2022-01-01 RX ADMIN — Medication 2 UNITS: at 18:42

## 2022-01-01 RX ADMIN — Medication 12 UNITS: at 18:09

## 2022-01-01 RX ADMIN — CEFAZOLIN 1 G: 1 INJECTION, POWDER, FOR SOLUTION INTRAMUSCULAR; INTRAVENOUS at 09:09

## 2022-01-01 RX ADMIN — GUAIFENESIN AND DEXTROMETHORPHAN 5 ML: 100; 10 SYRUP ORAL at 16:49

## 2022-01-01 RX ADMIN — Medication 3 UNITS: at 21:15

## 2022-01-01 RX ADMIN — Medication 10 ML: at 21:07

## 2022-01-01 RX ADMIN — CEFAZOLIN 1 G: 1 INJECTION, POWDER, FOR SOLUTION INTRAMUSCULAR; INTRAVENOUS at 09:33

## 2022-01-01 RX ADMIN — Medication 3 UNITS: at 09:32

## 2022-01-01 RX ADMIN — Medication 12 UNITS: at 17:12

## 2022-01-01 RX ADMIN — METOPROLOL 12.5 MG: 25 TABLET ORAL at 09:04

## 2022-01-01 RX ADMIN — ACETAMINOPHEN 650 MG: 325 TABLET ORAL at 18:31

## 2022-01-01 RX ADMIN — SODIUM CHLORIDE 2 G: 9 INJECTION INTRAMUSCULAR; INTRAVENOUS; SUBCUTANEOUS at 23:35

## 2022-01-01 RX ADMIN — SODIUM CHLORIDE 1000 ML: 9 INJECTION, SOLUTION INTRAVENOUS at 22:29

## 2022-01-01 RX ADMIN — INSULIN HUMAN 10 UNITS: 100 INJECTION, SUSPENSION SUBCUTANEOUS at 16:49

## 2022-01-01 RX ADMIN — Medication 10 ML: at 15:34

## 2022-01-01 RX ADMIN — LISINOPRIL 10 MG: 5 TABLET ORAL at 21:46

## 2022-01-01 RX ADMIN — ATORVASTATIN CALCIUM 20 MG: 20 TABLET, FILM COATED ORAL at 08:08

## 2022-01-01 RX ADMIN — METOPROLOL 12.5 MG: 25 TABLET ORAL at 08:57

## 2022-01-01 RX ADMIN — GUAIFENESIN AND DEXTROMETHORPHAN 5 ML: 100; 10 SYRUP ORAL at 11:37

## 2022-01-01 RX ADMIN — DEXAMETHASONE SODIUM PHOSPHATE 10 MG: 10 INJECTION INTRAMUSCULAR; INTRAVENOUS at 08:26

## 2022-01-01 RX ADMIN — RIVAROXABAN 20 MG: 20 TABLET, FILM COATED ORAL at 16:55

## 2022-01-01 RX ADMIN — INSULIN HUMAN 20 UNITS: 100 INJECTION, SUSPENSION SUBCUTANEOUS at 08:59

## 2022-01-01 RX ADMIN — Medication 10 ML: at 22:26

## 2022-01-01 RX ADMIN — Medication 10 ML: at 21:32

## 2022-01-01 RX ADMIN — RIVAROXABAN 20 MG: 20 TABLET, FILM COATED ORAL at 17:04

## 2022-01-01 RX ADMIN — Medication 12 UNITS: at 08:52

## 2022-01-01 RX ADMIN — Medication 10 ML: at 05:50

## 2022-01-01 RX ADMIN — DEXAMETHASONE 4 MG: 4 TABLET ORAL at 22:32

## 2022-01-01 RX ADMIN — CEFAZOLIN 1 G: 1 INJECTION, POWDER, FOR SOLUTION INTRAMUSCULAR; INTRAVENOUS at 09:04

## 2022-01-01 RX ADMIN — Medication 10 ML: at 15:33

## 2022-01-01 RX ADMIN — Medication 12 UNITS: at 16:45

## 2022-01-01 RX ADMIN — CEFAZOLIN 1 G: 1 INJECTION, POWDER, FOR SOLUTION INTRAMUSCULAR; INTRAVENOUS at 17:01

## 2022-01-01 RX ADMIN — Medication 3 UNITS: at 12:20

## 2022-01-01 RX ADMIN — ARFORMOTEROL TARTRATE 15 MCG: 15 SOLUTION RESPIRATORY (INHALATION) at 08:01

## 2022-01-01 RX ADMIN — Medication 3 UNITS: at 22:21

## 2022-01-01 RX ADMIN — DEXAMETHASONE SODIUM PHOSPHATE 10 MG: 10 INJECTION INTRAMUSCULAR; INTRAVENOUS at 21:27

## 2022-01-01 RX ADMIN — ACETAMINOPHEN 650 MG: 325 TABLET ORAL at 06:26

## 2022-01-01 RX ADMIN — Medication 3 UNITS: at 21:41

## 2022-01-01 RX ADMIN — BARICITINIB 2 MG: 2 TABLET, FILM COATED ORAL at 08:46

## 2022-01-01 RX ADMIN — METOPROLOL 12.5 MG: 25 TABLET ORAL at 16:41

## 2022-01-01 RX ADMIN — METOPROLOL 12.5 MG: 25 TABLET ORAL at 08:47

## 2022-01-01 RX ADMIN — METHYLPREDNISOLONE SODIUM SUCCINATE 40 MG: 40 INJECTION, POWDER, FOR SOLUTION INTRAMUSCULAR; INTRAVENOUS at 20:12

## 2022-01-01 RX ADMIN — Medication 7 UNITS: at 16:59

## 2022-01-01 RX ADMIN — RIVAROXABAN 20 MG: 20 TABLET, FILM COATED ORAL at 09:31

## 2022-01-01 RX ADMIN — ATORVASTATIN CALCIUM 20 MG: 20 TABLET, FILM COATED ORAL at 08:35

## 2022-01-01 RX ADMIN — CEFAZOLIN 1 G: 1 INJECTION, POWDER, FOR SOLUTION INTRAMUSCULAR; INTRAVENOUS at 08:10

## 2022-01-01 RX ADMIN — Medication 5 UNITS: at 17:14

## 2022-01-01 RX ADMIN — RIVAROXABAN 20 MG: 20 TABLET, FILM COATED ORAL at 18:09

## 2022-01-01 RX ADMIN — METHYLPREDNISOLONE SODIUM SUCCINATE 60 MG: 125 INJECTION, POWDER, FOR SOLUTION INTRAMUSCULAR; INTRAVENOUS at 15:46

## 2022-01-01 RX ADMIN — BARICITINIB 4 MG: 2 TABLET, FILM COATED ORAL at 08:39

## 2022-01-01 RX ADMIN — Medication 10 ML: at 21:02

## 2022-01-01 RX ADMIN — Medication 2 UNITS: at 08:32

## 2022-01-01 RX ADMIN — BUDESONIDE 500 MCG: 0.5 SUSPENSION RESPIRATORY (INHALATION) at 08:42

## 2022-01-01 RX ADMIN — IPRATROPIUM BROMIDE AND ALBUTEROL SULFATE 3 ML: .5; 3 SOLUTION RESPIRATORY (INHALATION) at 07:52

## 2022-01-01 RX ADMIN — CEFAZOLIN 1 G: 1 INJECTION, POWDER, FOR SOLUTION INTRAMUSCULAR; INTRAVENOUS at 08:33

## 2022-01-01 RX ADMIN — RIVAROXABAN 20 MG: 20 TABLET, FILM COATED ORAL at 17:22

## 2022-01-01 RX ADMIN — Medication 2 UNITS: at 09:28

## 2022-01-01 RX ADMIN — METHYLPREDNISOLONE SODIUM SUCCINATE 40 MG: 125 INJECTION, POWDER, FOR SOLUTION INTRAMUSCULAR; INTRAVENOUS at 21:29

## 2022-01-01 RX ADMIN — PHENYLEPHRINE HYDROCHLORIDE 75 MCG/MIN: 10 INJECTION INTRAVENOUS at 09:23

## 2022-01-01 RX ADMIN — Medication 10 ML: at 13:23

## 2022-01-01 RX ADMIN — FAMOTIDINE 20 MG: 20 TABLET ORAL at 09:33

## 2022-01-01 RX ADMIN — ATORVASTATIN CALCIUM 20 MG: 20 TABLET, FILM COATED ORAL at 08:34

## 2022-01-01 RX ADMIN — Medication 10 ML: at 06:07

## 2022-01-01 RX ADMIN — FAMOTIDINE 20 MG: 20 TABLET ORAL at 08:08

## 2022-01-01 RX ADMIN — BUDESONIDE 500 MCG: 0.5 SUSPENSION RESPIRATORY (INHALATION) at 08:13

## 2022-01-01 RX ADMIN — Medication 10 ML: at 21:14

## 2022-01-01 RX ADMIN — CEFEPIME HYDROCHLORIDE 2 G: 2 INJECTION, POWDER, FOR SOLUTION INTRAVENOUS at 08:29

## 2022-01-01 RX ADMIN — LORAZEPAM 0.5 MG: 0.5 TABLET ORAL at 21:12

## 2022-01-01 RX ADMIN — DEXAMETHASONE 4 MG: 4 TABLET ORAL at 08:17

## 2022-01-01 RX ADMIN — Medication 5 UNITS: at 17:48

## 2022-01-01 RX ADMIN — Medication 30 UNITS: at 10:05

## 2022-01-01 RX ADMIN — SODIUM CHLORIDE 75 ML/HR: 9 INJECTION, SOLUTION INTRAVENOUS at 10:44

## 2022-01-01 RX ADMIN — DEXAMETHASONE 4 MG: 4 TABLET ORAL at 21:34

## 2022-01-01 RX ADMIN — Medication 5 UNITS: at 12:10

## 2022-01-01 RX ADMIN — IPRATROPIUM BROMIDE AND ALBUTEROL SULFATE 3 ML: .5; 3 SOLUTION RESPIRATORY (INHALATION) at 11:04

## 2022-01-01 RX ADMIN — Medication 3 UNITS: at 22:14

## 2022-01-01 RX ADMIN — IPRATROPIUM BROMIDE AND ALBUTEROL SULFATE 3 ML: .5; 3 SOLUTION RESPIRATORY (INHALATION) at 16:04

## 2022-01-01 RX ADMIN — Medication 10 ML: at 21:17

## 2022-01-01 RX ADMIN — DEXAMETHASONE SODIUM PHOSPHATE 10 MG: 10 INJECTION INTRAMUSCULAR; INTRAVENOUS at 08:46

## 2022-01-01 RX ADMIN — RIVAROXABAN 20 MG: 20 TABLET, FILM COATED ORAL at 18:44

## 2022-01-01 RX ADMIN — ARFORMOTEROL TARTRATE 15 MCG: 15 SOLUTION RESPIRATORY (INHALATION) at 21:36

## 2022-01-01 RX ADMIN — Medication 5 UNITS: at 11:19

## 2022-01-01 RX ADMIN — VANCOMYCIN HYDROCHLORIDE 750 MG: 750 INJECTION, POWDER, LYOPHILIZED, FOR SOLUTION INTRAVENOUS at 23:35

## 2022-01-01 RX ADMIN — Medication 12 UNITS: at 08:34

## 2022-01-01 RX ADMIN — Medication 2 UNITS: at 08:39

## 2022-01-01 RX ADMIN — PHENYLEPHRINE HYDROCHLORIDE 30 MCG/MIN: 10 INJECTION INTRAVENOUS at 21:50

## 2022-01-01 RX ADMIN — Medication 10 ML: at 15:44

## 2022-01-01 RX ADMIN — Medication 30 UNITS: at 08:31

## 2022-01-01 RX ADMIN — BUDESONIDE 500 MCG: 0.5 SUSPENSION RESPIRATORY (INHALATION) at 21:00

## 2022-01-01 RX ADMIN — Medication 5 UNITS: at 17:00

## 2022-01-01 RX ADMIN — RIVAROXABAN 20 MG: 20 TABLET, FILM COATED ORAL at 16:45

## 2022-01-01 RX ADMIN — DEXAMETHASONE SODIUM PHOSPHATE 10 MG: 10 INJECTION INTRAMUSCULAR; INTRAVENOUS at 08:41

## 2022-01-01 RX ADMIN — Medication 5 UNITS: at 11:11

## 2022-01-01 RX ADMIN — INSULIN HUMAN 15 UNITS: 100 INJECTION, SUSPENSION SUBCUTANEOUS at 09:09

## 2022-01-01 RX ADMIN — LORAZEPAM 0.5 MG: 0.5 TABLET ORAL at 18:22

## 2022-01-01 RX ADMIN — Medication 10 ML: at 14:12

## 2022-01-01 RX ADMIN — Medication 5 UNITS: at 11:55

## 2022-01-01 RX ADMIN — Medication 2 UNITS: at 16:38

## 2022-01-01 RX ADMIN — DEXAMETHASONE SODIUM PHOSPHATE 10 MG: 10 INJECTION INTRAMUSCULAR; INTRAVENOUS at 20:26

## 2022-01-01 RX ADMIN — IPRATROPIUM BROMIDE AND ALBUTEROL SULFATE 3 ML: .5; 3 SOLUTION RESPIRATORY (INHALATION) at 08:41

## 2022-01-01 RX ADMIN — RIVAROXABAN 20 MG: 20 TABLET, FILM COATED ORAL at 18:15

## 2022-01-01 RX ADMIN — IPRATROPIUM BROMIDE AND ALBUTEROL SULFATE 3 ML: .5; 3 SOLUTION RESPIRATORY (INHALATION) at 08:05

## 2022-01-01 RX ADMIN — METOPROLOL 12.5 MG: 25 TABLET ORAL at 08:17

## 2022-01-01 RX ADMIN — SODIUM CHLORIDE, POTASSIUM CHLORIDE, SODIUM LACTATE AND CALCIUM CHLORIDE 500 ML: 600; 310; 30; 20 INJECTION, SOLUTION INTRAVENOUS at 05:38

## 2022-01-01 RX ADMIN — INSULIN HUMAN 15 UNITS: 100 INJECTION, SUSPENSION SUBCUTANEOUS at 17:16

## 2022-01-01 RX ADMIN — ACETAMINOPHEN 650 MG: 650 SUPPOSITORY RECTAL at 19:21

## 2022-01-01 RX ADMIN — Medication 3 UNITS: at 12:12

## 2022-01-01 RX ADMIN — ATORVASTATIN CALCIUM 20 MG: 20 TABLET, FILM COATED ORAL at 08:31

## 2022-01-01 RX ADMIN — Medication 2 UNITS: at 17:18

## 2022-01-01 RX ADMIN — IPRATROPIUM BROMIDE AND ALBUTEROL SULFATE 3 ML: .5; 3 SOLUTION RESPIRATORY (INHALATION) at 08:09

## 2022-01-01 RX ADMIN — VANCOMYCIN HYDROCHLORIDE 1000 MG: 1 INJECTION, POWDER, LYOPHILIZED, FOR SOLUTION INTRAVENOUS at 10:00

## 2022-01-01 RX ADMIN — RIVAROXABAN 20 MG: 20 TABLET, FILM COATED ORAL at 16:40

## 2022-01-01 RX ADMIN — LORAZEPAM 1 MG: 2 INJECTION INTRAMUSCULAR; INTRAVENOUS at 16:37

## 2022-01-01 RX ADMIN — METOPROLOL 12.5 MG: 25 TABLET ORAL at 08:31

## 2022-01-01 RX ADMIN — Medication 7 UNITS: at 12:30

## 2022-01-01 RX ADMIN — LISINOPRIL 10 MG: 5 TABLET ORAL at 21:12

## 2022-01-01 RX ADMIN — Medication 10 ML: at 13:31

## 2022-01-01 RX ADMIN — DEXAMETHASONE 4 MG: 4 TABLET ORAL at 08:04

## 2022-01-01 RX ADMIN — RIVAROXABAN 20 MG: 20 TABLET, FILM COATED ORAL at 17:18

## 2022-01-01 RX ADMIN — Medication 3 UNITS: at 17:40

## 2022-01-01 RX ADMIN — Medication 10 ML: at 23:00

## 2022-01-01 RX ADMIN — Medication 3 UNITS: at 17:08

## 2022-01-01 RX ADMIN — METOPROLOL 12.5 MG: 25 TABLET ORAL at 17:16

## 2022-01-01 RX ADMIN — METOPROLOL 12.5 MG: 25 TABLET ORAL at 08:19

## 2022-01-01 RX ADMIN — Medication 2 UNITS: at 21:36

## 2022-01-01 RX ADMIN — METOPROLOL 12.5 MG: 25 TABLET ORAL at 17:07

## 2022-01-01 RX ADMIN — IPRATROPIUM BROMIDE AND ALBUTEROL SULFATE 3 ML: .5; 3 SOLUTION RESPIRATORY (INHALATION) at 21:36

## 2022-01-01 RX ADMIN — INSULIN HUMAN 16 UNITS: 100 INJECTION, SUSPENSION SUBCUTANEOUS at 17:00

## 2022-01-01 RX ADMIN — Medication 10 ML: at 16:45

## 2022-01-01 RX ADMIN — IPRATROPIUM BROMIDE AND ALBUTEROL SULFATE 3 ML: .5; 3 SOLUTION RESPIRATORY (INHALATION) at 13:51

## 2022-01-01 RX ADMIN — LORAZEPAM 1 MG: 2 INJECTION INTRAMUSCULAR; INTRAVENOUS at 23:24

## 2022-01-01 RX ADMIN — INSULIN HUMAN 18 UNITS: 100 INJECTION, SUSPENSION SUBCUTANEOUS at 09:28

## 2022-01-01 RX ADMIN — Medication 2 UNITS: at 16:55

## 2022-01-01 RX ADMIN — CEFEPIME HYDROCHLORIDE 2 G: 2 INJECTION, POWDER, FOR SOLUTION INTRAVENOUS at 18:09

## 2022-01-01 RX ADMIN — DEXAMETHASONE SODIUM PHOSPHATE 6 MG: 10 INJECTION INTRAMUSCULAR; INTRAVENOUS at 08:53

## 2022-01-01 RX ADMIN — METHYLPREDNISOLONE SODIUM SUCCINATE 40 MG: 125 INJECTION, POWDER, FOR SOLUTION INTRAMUSCULAR; INTRAVENOUS at 21:12

## 2022-01-01 RX ADMIN — METHYLPREDNISOLONE SODIUM SUCCINATE 40 MG: 40 INJECTION, POWDER, FOR SOLUTION INTRAMUSCULAR; INTRAVENOUS at 20:38

## 2022-01-01 RX ADMIN — RIVAROXABAN 20 MG: 20 TABLET, FILM COATED ORAL at 17:11

## 2022-01-01 RX ADMIN — IPRATROPIUM BROMIDE AND ALBUTEROL SULFATE 3 ML: .5; 3 SOLUTION RESPIRATORY (INHALATION) at 21:15

## 2022-01-01 RX ADMIN — SODIUM CHLORIDE 75 ML/HR: 9 INJECTION, SOLUTION INTRAVENOUS at 06:12

## 2022-01-01 RX ADMIN — DEXAMETHASONE SODIUM PHOSPHATE 10 MG: 10 INJECTION INTRAMUSCULAR; INTRAVENOUS at 21:08

## 2022-01-01 RX ADMIN — ATORVASTATIN CALCIUM 20 MG: 20 TABLET, FILM COATED ORAL at 09:09

## 2022-01-01 RX ADMIN — Medication 10 ML: at 17:41

## 2022-01-01 RX ADMIN — SODIUM CHLORIDE, POTASSIUM CHLORIDE, SODIUM LACTATE AND CALCIUM CHLORIDE 500 ML: 600; 310; 30; 20 INJECTION, SOLUTION INTRAVENOUS at 06:24

## 2022-01-01 RX ADMIN — CEFAZOLIN 1 G: 1 INJECTION, POWDER, FOR SOLUTION INTRAMUSCULAR; INTRAVENOUS at 09:53

## 2022-01-01 RX ADMIN — CEFAZOLIN 1 G: 1 INJECTION, POWDER, FOR SOLUTION INTRAMUSCULAR; INTRAVENOUS at 00:20

## 2022-01-01 RX ADMIN — Medication 10 ML: at 06:24

## 2022-01-01 RX ADMIN — Medication 5 UNITS: at 10:05

## 2022-01-01 RX ADMIN — Medication 12 UNITS: at 08:27

## 2022-01-01 RX ADMIN — MORPHINE SULFATE 2 MG: 2 INJECTION, SOLUTION INTRAMUSCULAR; INTRAVENOUS at 16:45

## 2022-01-01 RX ADMIN — DEXAMETHASONE SODIUM PHOSPHATE 10 MG: 10 INJECTION INTRAMUSCULAR; INTRAVENOUS at 21:07

## 2022-01-01 RX ADMIN — IPRATROPIUM BROMIDE AND ALBUTEROL SULFATE 3 ML: .5; 3 SOLUTION RESPIRATORY (INHALATION) at 01:19

## 2022-01-01 RX ADMIN — RIVAROXABAN 20 MG: 20 TABLET, FILM COATED ORAL at 17:48

## 2022-01-01 RX ADMIN — Medication 2 UNITS: at 08:26

## 2022-01-01 RX ADMIN — Medication 10 ML: at 14:48

## 2022-01-01 RX ADMIN — INSULIN HUMAN 15 UNITS: 100 INJECTION, SUSPENSION SUBCUTANEOUS at 17:40

## 2022-01-01 RX ADMIN — Medication 5 UNITS: at 08:36

## 2022-01-01 RX ADMIN — LISINOPRIL 10 MG: 5 TABLET ORAL at 21:15

## 2022-01-01 RX ADMIN — Medication 2 UNITS: at 08:20

## 2022-01-01 RX ADMIN — Medication 2 UNITS: at 12:42

## 2022-01-01 RX ADMIN — BARICITINIB 4 MG: 2 TABLET, FILM COATED ORAL at 08:34

## 2022-01-01 RX ADMIN — Medication 10 ML: at 06:09

## 2022-01-01 RX ADMIN — ATORVASTATIN CALCIUM 20 MG: 20 TABLET, FILM COATED ORAL at 09:04

## 2022-01-01 RX ADMIN — METOPROLOL 12.5 MG: 25 TABLET ORAL at 17:13

## 2022-01-01 RX ADMIN — Medication 2 UNITS: at 17:13

## 2022-01-01 RX ADMIN — RIVAROXABAN 20 MG: 20 TABLET, FILM COATED ORAL at 17:13

## 2022-01-01 RX ADMIN — LORAZEPAM 0.5 MG: 0.5 TABLET ORAL at 21:28

## 2022-01-01 RX ADMIN — Medication 10 ML: at 15:46

## 2022-01-01 RX ADMIN — FAMOTIDINE 20 MG: 20 TABLET ORAL at 09:09

## 2022-01-01 RX ADMIN — ATORVASTATIN CALCIUM 20 MG: 20 TABLET, FILM COATED ORAL at 10:44

## 2022-01-01 RX ADMIN — Medication 10 ML: at 22:22

## 2022-01-01 RX ADMIN — BARICITINIB 4 MG: 2 TABLET, FILM COATED ORAL at 08:41

## 2022-01-01 RX ADMIN — Medication 10 ML: at 05:59

## 2022-01-01 RX ADMIN — METHYLPREDNISOLONE SODIUM SUCCINATE 40 MG: 40 INJECTION, POWDER, FOR SOLUTION INTRAMUSCULAR; INTRAVENOUS at 20:47

## 2022-01-01 RX ADMIN — ARFORMOTEROL TARTRATE 15 MCG: 15 SOLUTION RESPIRATORY (INHALATION) at 07:46

## 2022-01-01 RX ADMIN — BARICITINIB 2 MG: 2 TABLET, FILM COATED ORAL at 10:44

## 2022-01-01 RX ADMIN — METHYLPREDNISOLONE SODIUM SUCCINATE 60 MG: 125 INJECTION, POWDER, FOR SOLUTION INTRAMUSCULAR; INTRAVENOUS at 15:35

## 2022-01-01 RX ADMIN — GUAIFENESIN AND DEXTROMETHORPHAN 5 ML: 100; 10 SYRUP ORAL at 21:16

## 2022-01-01 RX ADMIN — Medication 10 ML: at 22:00

## 2022-01-01 RX ADMIN — Medication 10 ML: at 14:40

## 2022-01-01 RX ADMIN — RIVAROXABAN 20 MG: 20 TABLET, FILM COATED ORAL at 17:14

## 2022-01-01 RX ADMIN — Medication 10 ML: at 21:38

## 2022-01-01 RX ADMIN — Medication 3 UNITS: at 18:39

## 2022-01-01 RX ADMIN — Medication 3 UNITS: at 08:17

## 2022-01-01 RX ADMIN — Medication 5 UNITS: at 17:21

## 2022-01-01 RX ADMIN — Medication 3 UNITS: at 17:16

## 2022-01-01 RX ADMIN — Medication 2 UNITS: at 12:34

## 2022-01-01 RX ADMIN — Medication 10 ML: at 21:40

## 2022-01-01 RX ADMIN — LORAZEPAM 0.5 MG: 0.5 TABLET ORAL at 21:40

## 2022-01-01 RX ADMIN — Medication 6 MG: at 23:17

## 2022-01-01 RX ADMIN — Medication 10 ML: at 06:00

## 2022-01-01 RX ADMIN — RIVAROXABAN 20 MG: 20 TABLET, FILM COATED ORAL at 17:35

## 2022-01-01 RX ADMIN — DEXAMETHASONE SODIUM PHOSPHATE 10 MG: 10 INJECTION INTRAMUSCULAR; INTRAVENOUS at 21:51

## 2022-01-01 RX ADMIN — Medication 10 ML: at 14:00

## 2022-01-01 RX ADMIN — RIVAROXABAN 20 MG: 20 TABLET, FILM COATED ORAL at 16:37

## 2022-01-01 RX ADMIN — Medication 12 UNITS: at 08:42

## 2022-01-01 RX ADMIN — METOPROLOL 12.5 MG: 25 TABLET ORAL at 09:08

## 2022-01-01 RX ADMIN — Medication 2 UNITS: at 08:46

## 2022-01-01 RX ADMIN — Medication 7 UNITS: at 18:44

## 2022-01-01 RX ADMIN — Medication 10 ML: at 05:39

## 2022-01-01 RX ADMIN — IPRATROPIUM BROMIDE AND ALBUTEROL SULFATE 3 ML: .5; 3 SOLUTION RESPIRATORY (INHALATION) at 19:58

## 2022-01-01 RX ADMIN — METHYLPREDNISOLONE SODIUM SUCCINATE 60 MG: 125 INJECTION, POWDER, FOR SOLUTION INTRAMUSCULAR; INTRAVENOUS at 21:15

## 2022-01-01 RX ADMIN — Medication 12 UNITS: at 08:47

## 2022-01-01 RX ADMIN — Medication 3 UNITS: at 08:25

## 2022-01-01 RX ADMIN — DEXAMETHASONE SODIUM PHOSPHATE 6 MG: 10 INJECTION INTRAMUSCULAR; INTRAVENOUS at 21:38

## 2022-01-01 RX ADMIN — Medication 10 ML: at 06:34

## 2022-01-01 RX ADMIN — LORAZEPAM 1 MG: 2 INJECTION INTRAMUSCULAR; INTRAVENOUS at 04:55

## 2022-01-01 RX ADMIN — RIVAROXABAN 20 MG: 20 TABLET, FILM COATED ORAL at 18:43

## 2022-01-01 RX ADMIN — Medication 30 UNITS: at 08:50

## 2022-01-01 RX ADMIN — FAMOTIDINE 20 MG: 10 INJECTION, SOLUTION INTRAVENOUS at 08:29

## 2022-01-01 RX ADMIN — DEXAMETHASONE SODIUM PHOSPHATE 10 MG: 10 INJECTION INTRAMUSCULAR; INTRAVENOUS at 21:22

## 2022-01-01 RX ADMIN — CEFAZOLIN 1 G: 1 INJECTION, POWDER, FOR SOLUTION INTRAMUSCULAR; INTRAVENOUS at 17:00

## 2022-01-01 RX ADMIN — ARFORMOTEROL TARTRATE 15 MCG: 15 SOLUTION RESPIRATORY (INHALATION) at 02:31

## 2022-01-01 RX ADMIN — METOPROLOL 12.5 MG: 25 TABLET ORAL at 08:35

## 2022-01-01 RX ADMIN — Medication 2 UNITS: at 08:57

## 2022-01-01 RX ADMIN — ACETAMINOPHEN 650 MG: 650 SUPPOSITORY RECTAL at 02:35

## 2022-01-01 RX ADMIN — Medication 2 UNITS: at 08:34

## 2022-01-01 RX ADMIN — IPRATROPIUM BROMIDE AND ALBUTEROL SULFATE 3 ML: .5; 3 SOLUTION RESPIRATORY (INHALATION) at 07:45

## 2022-01-01 RX ADMIN — LORAZEPAM 0.5 MG: 0.5 TABLET ORAL at 21:34

## 2022-01-01 RX ADMIN — Medication 10 ML: at 05:28

## 2022-01-01 RX ADMIN — ENOXAPARIN SODIUM 60 MG: 100 INJECTION SUBCUTANEOUS at 10:00

## 2022-01-01 RX ADMIN — INSULIN HUMAN 20 UNITS: 100 INJECTION, SUSPENSION SUBCUTANEOUS at 17:35

## 2022-01-01 RX ADMIN — Medication 3 UNITS: at 11:37

## 2022-01-01 RX ADMIN — Medication 30 UNITS: at 08:51

## 2022-01-01 RX ADMIN — BUDESONIDE 500 MCG: 0.5 SUSPENSION RESPIRATORY (INHALATION) at 21:36

## 2022-01-01 RX ADMIN — INSULIN HUMAN 10 UNITS: 100 INJECTION, SUSPENSION SUBCUTANEOUS at 16:55

## 2022-01-01 RX ADMIN — Medication 10 ML: at 21:06

## 2022-01-01 RX ADMIN — METHYLPREDNISOLONE SODIUM SUCCINATE 40 MG: 125 INJECTION, POWDER, FOR SOLUTION INTRAMUSCULAR; INTRAVENOUS at 05:02

## 2022-01-01 RX ADMIN — LISINOPRIL 10 MG: 5 TABLET ORAL at 21:16

## 2022-01-01 RX ADMIN — Medication 30 UNITS: at 17:16

## 2022-01-01 RX ADMIN — METOPROLOL 12.5 MG: 25 TABLET ORAL at 17:19

## 2022-01-01 RX ADMIN — IPRATROPIUM BROMIDE AND ALBUTEROL SULFATE 3 ML: .5; 3 SOLUTION RESPIRATORY (INHALATION) at 01:56

## 2022-01-01 RX ADMIN — DEXAMETHASONE SODIUM PHOSPHATE 10 MG: 10 INJECTION INTRAMUSCULAR; INTRAVENOUS at 08:34

## 2022-01-01 RX ADMIN — CEFAZOLIN 1 G: 1 INJECTION, POWDER, FOR SOLUTION INTRAMUSCULAR; INTRAVENOUS at 09:31

## 2022-01-01 RX ADMIN — Medication 3 UNITS: at 12:25

## 2022-01-01 RX ADMIN — Medication 5 UNITS: at 11:41

## 2022-01-01 RX ADMIN — Medication 10 ML: at 18:46

## 2022-01-01 RX ADMIN — INSULIN HUMAN 10 UNITS: 100 INJECTION, SUSPENSION SUBCUTANEOUS at 08:32

## 2022-01-01 RX ADMIN — DEXAMETHASONE SODIUM PHOSPHATE 6 MG: 10 INJECTION INTRAMUSCULAR; INTRAVENOUS at 21:54

## 2022-01-01 RX ADMIN — INSULIN HUMAN 20 UNITS: 100 INJECTION, SUSPENSION SUBCUTANEOUS at 17:21

## 2022-01-01 RX ADMIN — ARFORMOTEROL TARTRATE 15 MCG: 15 SOLUTION RESPIRATORY (INHALATION) at 21:24

## 2022-01-01 RX ADMIN — IPRATROPIUM BROMIDE AND ALBUTEROL SULFATE 3 ML: .5; 3 SOLUTION RESPIRATORY (INHALATION) at 02:02

## 2022-01-01 RX ADMIN — IPRATROPIUM BROMIDE AND ALBUTEROL SULFATE 3 ML: .5; 3 SOLUTION RESPIRATORY (INHALATION) at 20:40

## 2022-01-01 RX ADMIN — ARFORMOTEROL TARTRATE 15 MCG: 15 SOLUTION RESPIRATORY (INHALATION) at 19:58

## 2022-01-01 RX ADMIN — Medication 10 ML: at 21:08

## 2022-01-01 RX ADMIN — LORAZEPAM 1 MG: 1 TABLET ORAL at 21:46

## 2022-01-01 RX ADMIN — Medication 10 ML: at 17:14

## 2022-01-01 RX ADMIN — RIVAROXABAN 20 MG: 20 TABLET, FILM COATED ORAL at 16:38

## 2022-01-01 RX ADMIN — Medication 10 ML: at 05:57

## 2022-01-01 RX ADMIN — SODIUM CHLORIDE, POTASSIUM CHLORIDE, SODIUM LACTATE AND CALCIUM CHLORIDE 1000 ML: 600; 310; 30; 20 INJECTION, SOLUTION INTRAVENOUS at 00:35

## 2022-01-01 RX ADMIN — Medication 3 UNITS: at 16:37

## 2022-01-01 RX ADMIN — LORAZEPAM 0.5 MG: 0.5 TABLET ORAL at 21:27

## 2022-01-01 RX ADMIN — Medication 12 UNITS: at 18:39

## 2022-01-01 RX ADMIN — DEXMEDETOMIDINE HYDROCHLORIDE 0.4 MCG/KG/HR: 4 INJECTION, SOLUTION INTRAVENOUS at 02:16

## 2022-01-01 RX ADMIN — DEXAMETHASONE SODIUM PHOSPHATE 10 MG: 10 INJECTION INTRAMUSCULAR; INTRAVENOUS at 21:00

## 2022-01-01 RX ADMIN — DEXAMETHASONE SODIUM PHOSPHATE 10 MG: 10 INJECTION INTRAMUSCULAR; INTRAVENOUS at 08:47

## 2022-01-01 RX ADMIN — LORAZEPAM 0.5 MG: 0.5 TABLET ORAL at 23:43

## 2022-01-01 RX ADMIN — Medication 3 UNITS: at 16:41

## 2022-01-01 RX ADMIN — Medication 2 UNITS: at 17:21

## 2022-01-01 RX ADMIN — SODIUM CHLORIDE 1000 ML: 9 INJECTION, SOLUTION INTRAVENOUS at 20:26

## 2022-01-01 RX ADMIN — CEFAZOLIN 1 G: 1 INJECTION, POWDER, FOR SOLUTION INTRAMUSCULAR; INTRAVENOUS at 00:42

## 2022-01-01 RX ADMIN — METHYLPREDNISOLONE SODIUM SUCCINATE 40 MG: 40 INJECTION, POWDER, FOR SOLUTION INTRAMUSCULAR; INTRAVENOUS at 22:10

## 2022-01-01 RX ADMIN — LORAZEPAM 1 MG: 2 INJECTION INTRAMUSCULAR; INTRAVENOUS at 20:48

## 2022-01-01 RX ADMIN — LISINOPRIL 10 MG: 5 TABLET ORAL at 21:51

## 2022-01-01 RX ADMIN — Medication 10 ML: at 12:26

## 2022-01-01 RX ADMIN — Medication 3 UNITS: at 11:45

## 2022-01-01 RX ADMIN — Medication 3 UNITS: at 11:38

## 2022-01-01 RX ADMIN — ATORVASTATIN CALCIUM 20 MG: 20 TABLET, FILM COATED ORAL at 09:33

## 2022-01-01 RX ADMIN — MORPHINE SULFATE 1 MG: 2 INJECTION, SOLUTION INTRAMUSCULAR; INTRAVENOUS at 11:22

## 2022-01-01 RX ADMIN — ATORVASTATIN CALCIUM 20 MG: 20 TABLET, FILM COATED ORAL at 08:37

## 2022-01-01 RX ADMIN — ATORVASTATIN CALCIUM 20 MG: 20 TABLET, FILM COATED ORAL at 08:17

## 2022-01-01 RX ADMIN — Medication 30 UNITS: at 08:36

## 2022-01-01 RX ADMIN — BUDESONIDE 500 MCG: 0.5 SUSPENSION RESPIRATORY (INHALATION) at 21:25

## 2022-01-01 RX ADMIN — Medication 12 UNITS: at 17:35

## 2022-01-01 RX ADMIN — Medication 12 UNITS: at 16:17

## 2022-01-01 RX ADMIN — Medication 10 ML: at 21:52

## 2022-01-01 RX ADMIN — METHYLPREDNISOLONE SODIUM SUCCINATE 60 MG: 125 INJECTION, POWDER, FOR SOLUTION INTRAMUSCULAR; INTRAVENOUS at 05:28

## 2022-01-01 RX ADMIN — Medication 10 ML: at 21:53

## 2022-01-01 RX ADMIN — Medication 12 UNITS: at 09:08

## 2022-01-01 RX ADMIN — LORAZEPAM 0.5 MG: 0.5 TABLET ORAL at 22:32

## 2022-01-01 RX ADMIN — Medication 12 UNITS: at 16:38

## 2022-01-01 RX ADMIN — Medication 3 UNITS: at 22:25

## 2022-01-01 RX ADMIN — Medication 12 UNITS: at 08:31

## 2022-01-01 RX ADMIN — Medication 10 ML: at 06:01

## 2022-01-01 RX ADMIN — ATORVASTATIN CALCIUM 20 MG: 20 TABLET, FILM COATED ORAL at 08:41

## 2022-01-01 RX ADMIN — CEFAZOLIN 1 G: 1 INJECTION, POWDER, FOR SOLUTION INTRAMUSCULAR; INTRAVENOUS at 17:20

## 2022-01-01 RX ADMIN — ATORVASTATIN CALCIUM 20 MG: 20 TABLET, FILM COATED ORAL at 08:04

## 2022-01-01 RX ADMIN — ATORVASTATIN CALCIUM 20 MG: 20 TABLET, FILM COATED ORAL at 08:47

## 2022-01-01 RX ADMIN — Medication 6 UNITS: at 21:18

## 2022-01-01 RX ADMIN — Medication 10 ML: at 21:47

## 2022-01-01 RX ADMIN — ARFORMOTEROL TARTRATE 15 MCG: 15 SOLUTION RESPIRATORY (INHALATION) at 23:33

## 2022-01-01 RX ADMIN — BARICITINIB 4 MG: 2 TABLET, FILM COATED ORAL at 08:42

## 2022-01-01 RX ADMIN — Medication 3 UNITS: at 21:27

## 2022-01-01 RX ADMIN — LIDOCAINE HYDROCHLORIDE 7 ML: 10 INJECTION, SOLUTION INFILTRATION; PERINEURAL at 12:53

## 2022-01-01 RX ADMIN — BARICITINIB 4 MG: 2 TABLET, FILM COATED ORAL at 08:35

## 2022-01-01 RX ADMIN — Medication 3 UNITS: at 11:33

## 2022-01-01 RX ADMIN — Medication 10 ML: at 17:08

## 2022-01-01 RX ADMIN — Medication 3 UNITS: at 21:04

## 2022-01-01 RX ADMIN — ATORVASTATIN CALCIUM 20 MG: 20 TABLET, FILM COATED ORAL at 08:32

## 2022-01-01 RX ADMIN — BARICITINIB 4 MG: 2 TABLET, FILM COATED ORAL at 08:26

## 2022-01-01 RX ADMIN — FAMOTIDINE 20 MG: 20 TABLET ORAL at 17:00

## 2022-01-01 RX ADMIN — Medication 3 UNITS: at 11:41

## 2022-01-01 RX ADMIN — Medication 10 ML: at 22:01

## 2022-01-01 RX ADMIN — Medication 3 UNITS: at 12:40

## 2022-01-01 RX ADMIN — Medication 10 ML: at 21:13

## 2022-01-01 RX ADMIN — Medication 10 ML: at 08:51

## 2022-01-01 RX ADMIN — DEXAMETHASONE 4 MG: 4 TABLET ORAL at 08:59

## 2022-01-01 RX ADMIN — Medication 10 ML: at 13:21

## 2022-01-01 RX ADMIN — BUDESONIDE 500 MCG: 0.5 SUSPENSION RESPIRATORY (INHALATION) at 08:10

## 2022-01-01 RX ADMIN — Medication 2 UNITS: at 21:20

## 2022-01-01 RX ADMIN — METHYLPREDNISOLONE SODIUM SUCCINATE 60 MG: 125 INJECTION, POWDER, FOR SOLUTION INTRAMUSCULAR; INTRAVENOUS at 05:55

## 2022-01-01 RX ADMIN — METOPROLOL 12.5 MG: 25 TABLET ORAL at 15:45

## 2022-01-01 RX ADMIN — METOPROLOL 12.5 MG: 25 TABLET ORAL at 08:30

## 2022-01-01 RX ADMIN — Medication 3 UNITS: at 21:32

## 2022-01-01 RX ADMIN — Medication 5 UNITS: at 11:47

## 2022-01-01 RX ADMIN — METHYLPREDNISOLONE SODIUM SUCCINATE 40 MG: 40 INJECTION, POWDER, FOR SOLUTION INTRAMUSCULAR; INTRAVENOUS at 09:09

## 2022-01-01 RX ADMIN — ATORVASTATIN CALCIUM 20 MG: 20 TABLET, FILM COATED ORAL at 08:40

## 2022-01-01 RX ADMIN — MORPHINE SULFATE 2 MG: 2 INJECTION, SOLUTION INTRAMUSCULAR; INTRAVENOUS at 16:37

## 2022-01-01 RX ADMIN — LORAZEPAM 0.5 MG: 0.5 TABLET ORAL at 21:02

## 2022-01-01 RX ADMIN — Medication 10 ML: at 14:43

## 2022-01-01 RX ADMIN — ATORVASTATIN CALCIUM 20 MG: 20 TABLET, FILM COATED ORAL at 08:42

## 2022-01-01 RX ADMIN — INSULIN HUMAN 15 UNITS: 100 INJECTION, SUSPENSION SUBCUTANEOUS at 17:00

## 2022-01-01 RX ADMIN — CEFAZOLIN 1 G: 1 INJECTION, POWDER, FOR SOLUTION INTRAMUSCULAR; INTRAVENOUS at 01:24

## 2022-01-01 RX ADMIN — INSULIN HUMAN 20 UNITS: 100 INJECTION, SUSPENSION SUBCUTANEOUS at 17:14

## 2022-01-01 RX ADMIN — ATORVASTATIN CALCIUM 20 MG: 20 TABLET, FILM COATED ORAL at 08:46

## 2022-01-01 RX ADMIN — Medication 3 UNITS: at 21:54

## 2022-01-01 RX ADMIN — IPRATROPIUM BROMIDE AND ALBUTEROL SULFATE 3 ML: .5; 3 SOLUTION RESPIRATORY (INHALATION) at 01:02

## 2022-01-01 RX ADMIN — Medication 30 UNITS: at 17:48

## 2022-01-01 RX ADMIN — DEXAMETHASONE 4 MG: 4 TABLET ORAL at 08:57

## 2022-01-01 RX ADMIN — Medication 10 ML: at 05:10

## 2022-01-01 RX ADMIN — Medication 2 UNITS: at 08:59

## 2022-01-01 RX ADMIN — LISINOPRIL 10 MG: 5 TABLET ORAL at 21:18

## 2022-01-01 RX ADMIN — ATORVASTATIN CALCIUM 20 MG: 20 TABLET, FILM COATED ORAL at 08:01

## 2022-01-01 RX ADMIN — Medication 10 ML: at 05:03

## 2022-01-01 RX ADMIN — IPRATROPIUM BROMIDE AND ALBUTEROL SULFATE 3 ML: .5; 3 SOLUTION RESPIRATORY (INHALATION) at 02:19

## 2022-01-01 RX ADMIN — Medication 4 MCG/MIN: at 20:39

## 2022-01-01 RX ADMIN — Medication 10 ML: at 15:18

## 2022-01-01 RX ADMIN — Medication 10 ML: at 22:17

## 2022-01-01 RX ADMIN — Medication 3 UNITS: at 21:40

## 2022-01-01 RX ADMIN — DEXAMETHASONE 4 MG: 4 TABLET ORAL at 08:43

## 2022-01-01 RX ADMIN — Medication 3 UNITS: at 18:09

## 2022-01-01 RX ADMIN — DEXAMETHASONE SODIUM PHOSPHATE 10 MG: 10 INJECTION INTRAMUSCULAR; INTRAVENOUS at 09:08

## 2022-01-01 RX ADMIN — INSULIN HUMAN 20 UNITS: 100 INJECTION, SUSPENSION SUBCUTANEOUS at 08:05

## 2022-01-01 RX ADMIN — CEFAZOLIN 1 G: 1 INJECTION, POWDER, FOR SOLUTION INTRAMUSCULAR; INTRAVENOUS at 01:06

## 2022-01-01 RX ADMIN — INSULIN HUMAN 25 UNITS: 100 INJECTION, SUSPENSION SUBCUTANEOUS at 16:45

## 2022-01-01 RX ADMIN — METHYLPREDNISOLONE SODIUM SUCCINATE 40 MG: 40 INJECTION, POWDER, FOR SOLUTION INTRAMUSCULAR; INTRAVENOUS at 08:09

## 2022-01-01 RX ADMIN — METOPROLOL 12.5 MG: 25 TABLET ORAL at 08:58

## 2022-01-01 RX ADMIN — Medication 6 MG: at 22:10

## 2022-01-01 RX ADMIN — LORAZEPAM 0.5 MG: 0.5 TABLET ORAL at 02:26

## 2022-01-01 RX ADMIN — DEXAMETHASONE SODIUM PHOSPHATE 10 MG: 10 INJECTION INTRAMUSCULAR; INTRAVENOUS at 08:42

## 2022-01-01 RX ADMIN — Medication 6 MG: at 22:25

## 2022-01-01 RX ADMIN — LISINOPRIL 10 MG: 5 TABLET ORAL at 21:28

## 2022-01-01 RX ADMIN — INSULIN HUMAN 15 UNITS: 100 INJECTION, SUSPENSION SUBCUTANEOUS at 08:30

## 2022-01-01 RX ADMIN — Medication 2 UNITS: at 08:31

## 2022-01-01 RX ADMIN — ENOXAPARIN SODIUM 60 MG: 100 INJECTION SUBCUTANEOUS at 20:49

## 2022-01-01 RX ADMIN — Medication 10 ML: at 21:04

## 2022-01-01 RX ADMIN — Medication 10 ML: at 06:36

## 2022-01-01 RX ADMIN — METOPROLOL 12.5 MG: 25 TABLET ORAL at 17:48

## 2022-01-01 RX ADMIN — LORAZEPAM 0.5 MG: 0.5 TABLET ORAL at 21:18

## 2022-01-01 RX ADMIN — METHYLPREDNISOLONE SODIUM SUCCINATE 40 MG: 40 INJECTION, POWDER, FOR SOLUTION INTRAMUSCULAR; INTRAVENOUS at 08:32

## 2022-01-01 RX ADMIN — METOPROLOL 12.5 MG: 25 TABLET ORAL at 17:22

## 2022-01-01 RX ADMIN — Medication 30 UNITS: at 18:42

## 2022-01-01 RX ADMIN — Medication 10 ML: at 13:56

## 2022-01-01 RX ADMIN — Medication 10 ML: at 07:00

## 2022-01-01 RX ADMIN — Medication 3 UNITS: at 17:35

## 2022-01-01 RX ADMIN — ATORVASTATIN CALCIUM 20 MG: 20 TABLET, FILM COATED ORAL at 08:59

## 2022-01-01 RX ADMIN — METHYLPREDNISOLONE SODIUM SUCCINATE 40 MG: 40 INJECTION, POWDER, FOR SOLUTION INTRAMUSCULAR; INTRAVENOUS at 09:33

## 2022-01-01 RX ADMIN — ATORVASTATIN CALCIUM 20 MG: 20 TABLET, FILM COATED ORAL at 08:57

## 2022-01-01 RX ADMIN — LORAZEPAM 0.5 MG: 0.5 TABLET ORAL at 23:37

## 2022-01-01 RX ADMIN — Medication 12 UNITS: at 16:21

## 2022-01-01 RX ADMIN — IPRATROPIUM BROMIDE AND ALBUTEROL SULFATE 3 ML: .5; 3 SOLUTION RESPIRATORY (INHALATION) at 21:32

## 2022-01-01 RX ADMIN — Medication 2 UNITS: at 10:48

## 2022-01-01 RX ADMIN — ARFORMOTEROL TARTRATE 15 MCG: 15 SOLUTION RESPIRATORY (INHALATION) at 08:10

## 2022-01-01 RX ADMIN — BUDESONIDE 500 MCG: 0.5 SUSPENSION RESPIRATORY (INHALATION) at 08:01

## 2022-01-01 RX ADMIN — BUDESONIDE 500 MCG: 0.5 SUSPENSION RESPIRATORY (INHALATION) at 19:59

## 2022-01-01 RX ADMIN — INSULIN HUMAN 18 UNITS: 100 INJECTION, SUSPENSION SUBCUTANEOUS at 16:59

## 2022-01-01 RX ADMIN — METHYLPREDNISOLONE SODIUM SUCCINATE 30 MG: 40 INJECTION, POWDER, FOR SOLUTION INTRAMUSCULAR; INTRAVENOUS at 09:04

## 2022-01-01 RX ADMIN — Medication 10 ML: at 05:51

## 2022-01-01 RX ADMIN — IPRATROPIUM BROMIDE AND ALBUTEROL SULFATE 3 ML: .5; 3 SOLUTION RESPIRATORY (INHALATION) at 08:23

## 2022-01-01 RX ADMIN — ARFORMOTEROL TARTRATE 15 MCG: 15 SOLUTION RESPIRATORY (INHALATION) at 07:57

## 2022-01-01 RX ADMIN — Medication 3 UNITS: at 21:03

## 2022-01-01 RX ADMIN — ATORVASTATIN CALCIUM 20 MG: 20 TABLET, FILM COATED ORAL at 08:50

## 2022-01-01 RX ADMIN — Medication 10 ML: at 16:22

## 2022-01-01 RX ADMIN — METHYLPREDNISOLONE SODIUM SUCCINATE 40 MG: 125 INJECTION, POWDER, FOR SOLUTION INTRAMUSCULAR; INTRAVENOUS at 06:33

## 2022-01-01 RX ADMIN — Medication 10 ML: at 11:55

## 2022-01-01 RX ADMIN — MAGNESIUM SULFATE HEPTAHYDRATE 2 G: 40 INJECTION, SOLUTION INTRAVENOUS at 09:04

## 2022-01-01 RX ADMIN — METHYLPREDNISOLONE SODIUM SUCCINATE 60 MG: 125 INJECTION, POWDER, FOR SOLUTION INTRAMUSCULAR; INTRAVENOUS at 21:04

## 2022-01-01 RX ADMIN — Medication 30 UNITS: at 16:30

## 2022-01-01 RX ADMIN — Medication 12 UNITS: at 16:30

## 2022-01-01 RX ADMIN — Medication 2 UNITS: at 08:41

## 2022-01-01 RX ADMIN — Medication 3 UNITS: at 14:06

## 2022-01-01 RX ADMIN — ACETAMINOPHEN 650 MG: 650 SUPPOSITORY RECTAL at 09:51

## 2022-01-01 RX ADMIN — BARICITINIB 4 MG: 2 TABLET, FILM COATED ORAL at 09:08

## 2022-01-01 RX ADMIN — METHYLPREDNISOLONE SODIUM SUCCINATE 40 MG: 125 INJECTION, POWDER, FOR SOLUTION INTRAMUSCULAR; INTRAVENOUS at 21:49

## 2022-01-01 RX ADMIN — RIVAROXABAN 20 MG: 20 TABLET, FILM COATED ORAL at 18:39

## 2022-01-01 RX ADMIN — ACETAMINOPHEN 650 MG: 325 TABLET ORAL at 22:32

## 2022-01-01 RX ADMIN — Medication 10 ML: at 05:26

## 2022-01-01 RX ADMIN — Medication 10 ML: at 23:09

## 2022-01-01 RX ADMIN — METHYLPREDNISOLONE SODIUM SUCCINATE 60 MG: 125 INJECTION, POWDER, FOR SOLUTION INTRAMUSCULAR; INTRAVENOUS at 21:13

## 2022-01-01 RX ADMIN — ARFORMOTEROL TARTRATE 15 MCG: 15 SOLUTION RESPIRATORY (INHALATION) at 09:25

## 2022-01-01 RX ADMIN — BARICITINIB 2 MG: 2 TABLET, FILM COATED ORAL at 08:26

## 2022-01-01 RX ADMIN — Medication 6 MG: at 22:30

## 2022-01-01 RX ADMIN — Medication 3 UNITS: at 22:32

## 2022-01-01 RX ADMIN — IPRATROPIUM BROMIDE AND ALBUTEROL SULFATE 3 ML: .5; 3 SOLUTION RESPIRATORY (INHALATION) at 13:49

## 2022-01-01 RX ADMIN — METHYLPREDNISOLONE SODIUM SUCCINATE 60 MG: 125 INJECTION, POWDER, FOR SOLUTION INTRAMUSCULAR; INTRAVENOUS at 15:00

## 2022-01-01 RX ADMIN — Medication 12 UNITS: at 08:35

## 2022-01-01 RX ADMIN — Medication 3 UNITS: at 12:46

## 2022-01-01 RX ADMIN — Medication 10 ML: at 05:23

## 2022-01-01 RX ADMIN — METHYLPREDNISOLONE SODIUM SUCCINATE 40 MG: 125 INJECTION, POWDER, FOR SOLUTION INTRAMUSCULAR; INTRAVENOUS at 06:01

## 2022-01-01 RX ADMIN — METOPROLOL 12.5 MG: 25 TABLET ORAL at 08:37

## 2022-01-01 RX ADMIN — SODIUM CHLORIDE 500 ML: 9 INJECTION, SOLUTION INTRAVENOUS at 00:06

## 2022-01-01 RX ADMIN — Medication 2 UNITS: at 07:23

## 2022-01-01 RX ADMIN — Medication 2 UNITS: at 16:17

## 2022-01-01 RX ADMIN — Medication 3 UNITS: at 08:05

## 2022-01-01 RX ADMIN — GUAIFENESIN AND DEXTROMETHORPHAN 5 ML: 100; 10 SYRUP ORAL at 15:43

## 2022-01-01 RX ADMIN — Medication 12 UNITS: at 08:00

## 2022-01-01 RX ADMIN — Medication 10 ML: at 21:35

## 2022-01-01 RX ADMIN — Medication 2 UNITS: at 21:22

## 2022-01-01 RX ADMIN — IPRATROPIUM BROMIDE AND ALBUTEROL SULFATE 3 ML: .5; 3 SOLUTION RESPIRATORY (INHALATION) at 11:51

## 2022-01-01 RX ADMIN — LISINOPRIL 10 MG: 5 TABLET ORAL at 20:26

## 2022-01-01 RX ADMIN — BUDESONIDE 500 MCG: 0.5 SUSPENSION RESPIRATORY (INHALATION) at 23:33

## 2022-01-01 RX ADMIN — METOPROLOL 12.5 MG: 25 TABLET ORAL at 08:59

## 2022-01-01 RX ADMIN — BARICITINIB 2 MG: 2 TABLET, FILM COATED ORAL at 08:32

## 2022-01-01 RX ADMIN — DEXAMETHASONE SODIUM PHOSPHATE 10 MG: 10 INJECTION INTRAMUSCULAR; INTRAVENOUS at 20:49

## 2022-01-01 RX ADMIN — METHYLPREDNISOLONE SODIUM SUCCINATE 40 MG: 40 INJECTION, POWDER, FOR SOLUTION INTRAMUSCULAR; INTRAVENOUS at 08:33

## 2022-01-01 RX ADMIN — METOPROLOL 12.5 MG: 25 TABLET ORAL at 08:04

## 2022-01-01 RX ADMIN — Medication 2 UNITS: at 12:08

## 2022-01-01 RX ADMIN — IPRATROPIUM BROMIDE AND ALBUTEROL SULFATE 3 ML: .5; 3 SOLUTION RESPIRATORY (INHALATION) at 01:41

## 2022-01-01 RX ADMIN — RIVAROXABAN 20 MG: 20 TABLET, FILM COATED ORAL at 17:16

## 2022-01-01 RX ADMIN — Medication 7 UNITS: at 08:51

## 2022-01-01 RX ADMIN — DEXAMETHASONE 6 MG: 6 TABLET ORAL at 22:30

## 2022-01-01 RX ADMIN — Medication 3 UNITS: at 12:11

## 2022-01-01 RX ADMIN — Medication 10 ML: at 21:01

## 2022-01-01 RX ADMIN — Medication 10 ML: at 21:42

## 2022-01-01 RX ADMIN — IPRATROPIUM BROMIDE AND ALBUTEROL SULFATE 3 ML: .5; 3 SOLUTION RESPIRATORY (INHALATION) at 06:04

## 2022-01-01 RX ADMIN — Medication 2 UNITS: at 08:00

## 2022-01-01 RX ADMIN — IPRATROPIUM BROMIDE AND ALBUTEROL SULFATE 3 ML: .5; 3 SOLUTION RESPIRATORY (INHALATION) at 20:47

## 2022-01-01 RX ADMIN — Medication 10 ML: at 21:00

## 2022-01-01 RX ADMIN — IPRATROPIUM BROMIDE AND ALBUTEROL SULFATE 3 ML: .5; 3 SOLUTION RESPIRATORY (INHALATION) at 13:07

## 2022-01-01 RX ADMIN — LORAZEPAM 0.5 MG: 0.5 TABLET ORAL at 18:50

## 2022-01-01 RX ADMIN — Medication 10 ML: at 13:08

## 2022-01-01 RX ADMIN — METHYLPREDNISOLONE SODIUM SUCCINATE 40 MG: 40 INJECTION, POWDER, FOR SOLUTION INTRAMUSCULAR; INTRAVENOUS at 08:51

## 2022-01-01 RX ADMIN — Medication 10 ML: at 20:53

## 2022-01-01 RX ADMIN — Medication 10 ML: at 05:07

## 2022-01-01 RX ADMIN — MORPHINE SULFATE 1 MG: 2 INJECTION, SOLUTION INTRAMUSCULAR; INTRAVENOUS at 22:58

## 2022-01-01 RX ADMIN — Medication 5 UNITS: at 12:33

## 2022-01-01 RX ADMIN — INSULIN HUMAN 7 UNITS: 100 INJECTION, SUSPENSION SUBCUTANEOUS at 08:25

## 2022-01-01 RX ADMIN — Medication 30 UNITS: at 08:30

## 2022-01-01 RX ADMIN — DEXAMETHASONE SODIUM PHOSPHATE 10 MG: 10 INJECTION INTRAMUSCULAR; INTRAVENOUS at 21:16

## 2022-01-01 RX ADMIN — ATORVASTATIN CALCIUM 20 MG: 20 TABLET, FILM COATED ORAL at 08:30

## 2022-01-01 RX ADMIN — Medication 2 UNITS: at 21:21

## 2022-01-01 RX ADMIN — METOPROLOL 12.5 MG: 25 TABLET ORAL at 18:09

## 2022-01-01 RX ADMIN — Medication 3 UNITS: at 09:09

## 2022-01-01 RX ADMIN — IPRATROPIUM BROMIDE AND ALBUTEROL SULFATE 3 ML: .5; 3 SOLUTION RESPIRATORY (INHALATION) at 09:17

## 2022-01-01 RX ADMIN — Medication 2 UNITS: at 21:59

## 2022-01-01 RX ADMIN — PHENYLEPHRINE HYDROCHLORIDE 25 MCG/MIN: 10 INJECTION INTRAVENOUS at 05:28

## 2022-01-01 RX ADMIN — DEXMEDETOMIDINE HYDROCHLORIDE 0.4 MCG/KG/HR: 4 INJECTION, SOLUTION INTRAVENOUS at 16:33

## 2022-01-01 RX ADMIN — METOPROLOL 12.5 MG: 25 TABLET ORAL at 17:00

## 2022-01-01 RX ADMIN — PHENYLEPHRINE HYDROCHLORIDE 30 MCG/MIN: 10 INJECTION INTRAVENOUS at 02:10

## 2022-01-01 RX ADMIN — DEXAMETHASONE SODIUM PHOSPHATE 10 MG: 10 INJECTION INTRAMUSCULAR; INTRAVENOUS at 21:36

## 2022-01-01 RX ADMIN — Medication 10 ML: at 06:12

## 2022-01-01 RX ADMIN — PHENYLEPHRINE HYDROCHLORIDE 15 MCG/MIN: 10 INJECTION INTRAVENOUS at 06:25

## 2022-01-01 RX ADMIN — Medication 5 UNITS: at 16:21

## 2022-01-01 RX ADMIN — METOPROLOL 12.5 MG: 25 TABLET ORAL at 18:44

## 2022-01-01 RX ADMIN — Medication 2 UNITS: at 21:38

## 2022-01-01 RX ADMIN — ARFORMOTEROL TARTRATE 15 MCG: 15 SOLUTION RESPIRATORY (INHALATION) at 08:13

## 2022-01-01 RX ADMIN — Medication 3 UNITS: at 16:49

## 2022-01-01 RX ADMIN — Medication 7 UNITS: at 18:09

## 2022-01-01 RX ADMIN — Medication 10 ML: at 21:16

## 2022-01-01 RX ADMIN — DEXAMETHASONE SODIUM PHOSPHATE 10 MG: 10 INJECTION INTRAMUSCULAR; INTRAVENOUS at 22:21

## 2022-01-01 RX ADMIN — RIVAROXABAN 20 MG: 20 TABLET, FILM COATED ORAL at 17:08

## 2022-01-01 RX ADMIN — Medication 4 UNITS: at 21:49

## 2022-01-01 RX ADMIN — METOPROLOL 12.5 MG: 25 TABLET ORAL at 08:43

## 2022-01-01 RX ADMIN — INSULIN HUMAN 16 UNITS: 100 INJECTION, SUSPENSION SUBCUTANEOUS at 08:09

## 2022-01-01 RX ADMIN — Medication 12 UNITS: at 16:37

## 2022-01-01 RX ADMIN — IPRATROPIUM BROMIDE AND ALBUTEROL SULFATE 3 ML: .5; 3 SOLUTION RESPIRATORY (INHALATION) at 15:33

## 2022-01-01 RX ADMIN — CEFAZOLIN 1 G: 1 INJECTION, POWDER, FOR SOLUTION INTRAMUSCULAR; INTRAVENOUS at 08:09

## 2022-01-01 RX ADMIN — DEXAMETHASONE SODIUM PHOSPHATE 6 MG: 10 INJECTION, SOLUTION INTRAMUSCULAR; INTRAVENOUS at 19:34

## 2022-01-01 RX ADMIN — DEXAMETHASONE SODIUM PHOSPHATE 10 MG: 10 INJECTION INTRAMUSCULAR; INTRAVENOUS at 10:44

## 2022-01-01 RX ADMIN — IPRATROPIUM BROMIDE AND ALBUTEROL SULFATE 3 ML: .5; 3 SOLUTION RESPIRATORY (INHALATION) at 02:31

## 2022-01-01 RX ADMIN — DEXAMETHASONE SODIUM PHOSPHATE 10 MG: 10 INJECTION INTRAMUSCULAR; INTRAVENOUS at 08:31

## 2022-01-01 RX ADMIN — Medication 12 UNITS: at 17:13

## 2022-01-01 RX ADMIN — ARFORMOTEROL TARTRATE 15 MCG: 15 SOLUTION RESPIRATORY (INHALATION) at 21:38

## 2022-01-01 RX ADMIN — METHYLPREDNISOLONE SODIUM SUCCINATE 40 MG: 125 INJECTION, POWDER, FOR SOLUTION INTRAMUSCULAR; INTRAVENOUS at 15:44

## 2022-01-01 RX ADMIN — METOPROLOL 12.5 MG: 25 TABLET ORAL at 17:35

## 2022-01-01 RX ADMIN — Medication 12 UNITS: at 17:08

## 2022-01-01 RX ADMIN — IPRATROPIUM BROMIDE AND ALBUTEROL SULFATE 3 ML: .5; 3 SOLUTION RESPIRATORY (INHALATION) at 15:04

## 2022-01-01 RX ADMIN — SODIUM CHLORIDE 2 G: 9 INJECTION INTRAMUSCULAR; INTRAVENOUS; SUBCUTANEOUS at 16:35

## 2022-01-01 RX ADMIN — METOPROLOL 12.5 MG: 25 TABLET ORAL at 17:40

## 2022-01-01 RX ADMIN — Medication 10 ML: at 14:02

## 2022-01-01 RX ADMIN — IPRATROPIUM BROMIDE AND ALBUTEROL SULFATE 3 ML: .5; 3 SOLUTION RESPIRATORY (INHALATION) at 15:35

## 2022-01-01 RX ADMIN — Medication 10 ML: at 05:56

## 2022-01-01 RX ADMIN — DEXAMETHASONE SODIUM PHOSPHATE 10 MG: 10 INJECTION INTRAMUSCULAR; INTRAVENOUS at 21:37

## 2022-01-01 RX ADMIN — RIVAROXABAN 20 MG: 20 TABLET, FILM COATED ORAL at 16:21

## 2022-01-01 RX ADMIN — ATORVASTATIN CALCIUM 20 MG: 20 TABLET, FILM COATED ORAL at 09:08

## 2022-01-01 RX ADMIN — Medication 2 UNITS: at 21:13

## 2022-01-01 RX ADMIN — Medication 3 UNITS: at 17:04

## 2022-01-01 RX ADMIN — RIVAROXABAN 20 MG: 20 TABLET, FILM COATED ORAL at 17:15

## 2022-01-01 RX ADMIN — IPRATROPIUM BROMIDE AND ALBUTEROL SULFATE 3 ML: .5; 3 SOLUTION RESPIRATORY (INHALATION) at 12:58

## 2022-01-01 RX ADMIN — Medication 10 ML: at 13:47

## 2022-01-01 RX ADMIN — BUDESONIDE 500 MCG: 0.5 SUSPENSION RESPIRATORY (INHALATION) at 09:25

## 2022-01-01 RX ADMIN — ARFORMOTEROL TARTRATE 15 MCG: 15 SOLUTION RESPIRATORY (INHALATION) at 08:42

## 2022-01-01 RX ADMIN — Medication 7 UNITS: at 16:30

## 2022-01-01 RX ADMIN — INSULIN HUMAN 15 UNITS: 100 INJECTION, SUSPENSION SUBCUTANEOUS at 16:35

## 2022-01-01 RX ADMIN — ARFORMOTEROL TARTRATE 15 MCG: 15 SOLUTION RESPIRATORY (INHALATION) at 20:54

## 2022-01-01 RX ADMIN — DEXTROSE AND SODIUM CHLORIDE 75 ML/HR: 5; 900 INJECTION, SOLUTION INTRAVENOUS at 20:25

## 2022-01-01 RX ADMIN — BUDESONIDE 500 MCG: 0.5 SUSPENSION RESPIRATORY (INHALATION) at 20:54

## 2022-01-01 RX ADMIN — Medication 3 UNITS: at 11:28

## 2022-01-01 RX ADMIN — Medication 12 UNITS: at 16:41

## 2022-01-01 RX ADMIN — Medication 10 ML: at 21:28

## 2022-01-01 RX ADMIN — Medication 5 UNITS: at 11:33

## 2022-01-01 RX ADMIN — LORAZEPAM 0.5 MG: 0.5 TABLET ORAL at 21:42

## 2022-01-01 RX ADMIN — Medication 10 ML: at 21:39

## 2022-01-01 RX ADMIN — LISINOPRIL 10 MG: 5 TABLET ORAL at 20:15

## 2022-01-01 RX ADMIN — INSULIN HUMAN 15 UNITS: 100 INJECTION, SUSPENSION SUBCUTANEOUS at 08:57

## 2022-01-01 RX ADMIN — METHYLPREDNISOLONE SODIUM SUCCINATE 40 MG: 40 INJECTION, POWDER, FOR SOLUTION INTRAMUSCULAR; INTRAVENOUS at 21:46

## 2022-01-01 RX ADMIN — ENOXAPARIN SODIUM 60 MG: 100 INJECTION SUBCUTANEOUS at 09:09

## 2022-01-01 RX ADMIN — Medication 10 ML: at 14:19

## 2022-01-01 RX ADMIN — FAMOTIDINE 20 MG: 10 INJECTION, SOLUTION INTRAVENOUS at 12:34

## 2022-01-01 RX ADMIN — DEXAMETHASONE 4 MG: 4 TABLET ORAL at 21:42

## 2022-01-01 RX ADMIN — Medication 2 UNITS: at 08:09

## 2022-01-01 RX ADMIN — ARFORMOTEROL TARTRATE 15 MCG: 15 SOLUTION RESPIRATORY (INHALATION) at 08:24

## 2022-01-01 RX ADMIN — Medication 10 ML: at 17:49

## 2022-01-01 RX ADMIN — CEFEPIME HYDROCHLORIDE 2 G: 2 INJECTION, POWDER, FOR SOLUTION INTRAVENOUS at 08:50

## 2022-01-01 RX ADMIN — METOPROLOL 12.5 MG: 25 TABLET ORAL at 10:05

## 2022-01-01 RX ADMIN — Medication 10 ML: at 05:36

## 2022-01-01 RX ADMIN — Medication 10 ML: at 21:19

## 2022-01-01 RX ADMIN — Medication 12 UNITS: at 08:41

## 2022-01-01 RX ADMIN — Medication 10 ML: at 17:16

## 2022-01-01 RX ADMIN — Medication 10 ML: at 06:44

## 2022-01-01 RX ADMIN — Medication 4 UNITS: at 21:35

## 2022-01-01 RX ADMIN — ATORVASTATIN CALCIUM 20 MG: 20 TABLET, FILM COATED ORAL at 08:26

## 2022-01-01 RX ADMIN — Medication 10 ML: at 21:56

## 2022-01-01 RX ADMIN — CEFAZOLIN 1 G: 1 INJECTION, POWDER, FOR SOLUTION INTRAMUSCULAR; INTRAVENOUS at 01:05

## 2022-01-01 RX ADMIN — Medication 10 ML: at 12:11

## 2022-01-01 RX ADMIN — LISINOPRIL 10 MG: 5 TABLET ORAL at 21:36

## 2022-01-01 RX ADMIN — SODIUM CHLORIDE 500 ML: 9 INJECTION, SOLUTION INTRAVENOUS at 21:12

## 2022-01-01 RX ADMIN — LORAZEPAM 1 MG: 2 INJECTION INTRAMUSCULAR; INTRAVENOUS at 00:54

## 2022-01-01 RX ADMIN — METHYLPREDNISOLONE SODIUM SUCCINATE 60 MG: 125 INJECTION, POWDER, FOR SOLUTION INTRAMUSCULAR; INTRAVENOUS at 08:50

## 2022-01-01 RX ADMIN — BUDESONIDE 500 MCG: 0.5 SUSPENSION RESPIRATORY (INHALATION) at 07:46

## 2022-01-01 RX ADMIN — LORAZEPAM 0.5 MG: 0.5 TABLET ORAL at 21:04

## 2022-01-01 RX ADMIN — DEXAMETHASONE SODIUM PHOSPHATE 10 MG: 10 INJECTION INTRAMUSCULAR; INTRAVENOUS at 21:13

## 2022-01-01 RX ADMIN — Medication 5 UNITS: at 16:35

## 2022-01-01 RX ADMIN — Medication 3 UNITS: at 11:03

## 2022-01-01 RX ADMIN — Medication 12 UNITS: at 08:46

## 2022-01-01 RX ADMIN — INSULIN HUMAN 20 UNITS: 100 INJECTION, SUSPENSION SUBCUTANEOUS at 08:34

## 2022-01-01 RX ADMIN — METHYLPREDNISOLONE SODIUM SUCCINATE 40 MG: 125 INJECTION, POWDER, FOR SOLUTION INTRAMUSCULAR; INTRAVENOUS at 15:17

## 2022-01-01 RX ADMIN — Medication 10 ML: at 06:58

## 2022-01-01 RX ADMIN — BARICITINIB 4 MG: 2 TABLET, FILM COATED ORAL at 09:28

## 2022-01-01 RX ADMIN — Medication 10 ML: at 21:22

## 2022-01-01 RX ADMIN — CEFAZOLIN 1 G: 1 INJECTION, POWDER, FOR SOLUTION INTRAMUSCULAR; INTRAVENOUS at 00:41

## 2022-01-01 RX ADMIN — Medication 3 UNITS: at 11:16

## 2022-01-01 RX ADMIN — METHYLPREDNISOLONE SODIUM SUCCINATE 40 MG: 125 INJECTION, POWDER, FOR SOLUTION INTRAMUSCULAR; INTRAVENOUS at 05:07

## 2022-01-01 RX ADMIN — Medication 10 ML: at 05:38

## 2022-01-01 RX ADMIN — Medication 12 UNITS: at 17:14

## 2022-01-01 RX ADMIN — Medication 3 UNITS: at 21:58

## 2022-01-01 RX ADMIN — FAMOTIDINE 20 MG: 20 TABLET ORAL at 17:01

## 2022-01-01 RX ADMIN — METHYLPREDNISOLONE SODIUM SUCCINATE 40 MG: 125 INJECTION, POWDER, FOR SOLUTION INTRAMUSCULAR; INTRAVENOUS at 13:31

## 2022-01-01 RX ADMIN — Medication 3 UNITS: at 12:30

## 2022-01-01 RX ADMIN — LORAZEPAM 0.5 MG: 0.5 TABLET ORAL at 21:49

## 2022-01-01 RX ADMIN — METHYLPREDNISOLONE SODIUM SUCCINATE 40 MG: 125 INJECTION, POWDER, FOR SOLUTION INTRAMUSCULAR; INTRAVENOUS at 14:40

## 2022-01-01 RX ADMIN — BARICITINIB 4 MG: 2 TABLET, FILM COATED ORAL at 08:00

## 2022-01-01 RX ADMIN — INSULIN HUMAN 7 UNITS: 100 INJECTION, SUSPENSION SUBCUTANEOUS at 17:22

## 2022-01-01 RX ADMIN — Medication 30 UNITS: at 08:20

## 2022-01-01 RX ADMIN — INSULIN HUMAN 15 UNITS: 100 INJECTION, SUSPENSION SUBCUTANEOUS at 09:32

## 2022-01-01 RX ADMIN — METOPROLOL 12.5 MG: 25 TABLET ORAL at 18:43

## 2022-01-01 RX ADMIN — RIVAROXABAN 20 MG: 20 TABLET, FILM COATED ORAL at 16:18

## 2022-01-01 RX ADMIN — BUDESONIDE 500 MCG: 0.5 SUSPENSION RESPIRATORY (INHALATION) at 02:31

## 2022-01-01 RX ADMIN — MAGNESIUM SULFATE HEPTAHYDRATE 1 G: 1 INJECTION, SOLUTION INTRAVENOUS at 11:44

## 2022-01-01 RX ADMIN — BUDESONIDE 500 MCG: 0.5 SUSPENSION RESPIRATORY (INHALATION) at 08:24

## 2022-01-01 RX ADMIN — METHYLPREDNISOLONE SODIUM SUCCINATE 40 MG: 125 INJECTION, POWDER, FOR SOLUTION INTRAMUSCULAR; INTRAVENOUS at 21:18

## 2022-01-01 RX ADMIN — VANCOMYCIN HYDROCHLORIDE 1500 MG: 10 INJECTION, POWDER, LYOPHILIZED, FOR SOLUTION INTRAVENOUS at 08:52

## 2022-01-01 RX ADMIN — METOPROLOL 12.5 MG: 25 TABLET ORAL at 17:14

## 2022-01-01 RX ADMIN — Medication 10 ML: at 05:08

## 2022-01-01 RX ADMIN — CEFAZOLIN 1 G: 1 INJECTION, POWDER, FOR SOLUTION INTRAMUSCULAR; INTRAVENOUS at 00:49

## 2022-01-01 RX ADMIN — ARFORMOTEROL TARTRATE 15 MCG: 15 SOLUTION RESPIRATORY (INHALATION) at 21:00

## 2022-01-01 RX ADMIN — ATORVASTATIN CALCIUM 20 MG: 20 TABLET, FILM COATED ORAL at 08:53

## 2022-01-01 RX ADMIN — Medication 12 UNITS: at 08:39

## 2022-01-01 RX ADMIN — ACETAMINOPHEN 650 MG: 325 TABLET ORAL at 23:31

## 2022-01-01 RX ADMIN — METHYLPREDNISOLONE SODIUM SUCCINATE 40 MG: 40 INJECTION, POWDER, FOR SOLUTION INTRAMUSCULAR; INTRAVENOUS at 22:01

## 2022-01-01 RX ADMIN — Medication 10 ML: at 16:18

## 2022-01-01 RX ADMIN — DEXTROSE MONOHYDRATE 12.5 G: 25 INJECTION, SOLUTION INTRAVENOUS at 19:21

## 2022-01-01 RX ADMIN — LORAZEPAM 1 MG: 2 INJECTION INTRAMUSCULAR; INTRAVENOUS at 16:45

## 2022-01-01 RX ADMIN — METOPROLOL 12.5 MG: 25 TABLET ORAL at 08:50

## 2022-01-01 RX ADMIN — CEFAZOLIN 1 G: 1 INJECTION, POWDER, FOR SOLUTION INTRAMUSCULAR; INTRAVENOUS at 09:10

## 2022-01-01 RX ADMIN — DEXAMETHASONE SODIUM PHOSPHATE 10 MG: 10 INJECTION INTRAMUSCULAR; INTRAVENOUS at 08:40

## 2022-01-01 RX ADMIN — BUDESONIDE 500 MCG: 0.5 SUSPENSION RESPIRATORY (INHALATION) at 07:57

## 2022-01-01 RX ADMIN — DEXAMETHASONE SODIUM PHOSPHATE 10 MG: 10 INJECTION INTRAMUSCULAR; INTRAVENOUS at 08:32

## 2022-01-01 RX ADMIN — Medication 5 UNITS: at 10:43

## 2022-01-01 RX ADMIN — Medication 10 ML: at 21:37

## 2022-01-01 RX ADMIN — Medication 30 UNITS: at 18:44

## 2022-01-01 RX ADMIN — ENOXAPARIN SODIUM 60 MG: 100 INJECTION SUBCUTANEOUS at 22:01

## 2022-01-01 RX ADMIN — Medication 5 UNITS: at 12:04

## 2022-01-01 RX ADMIN — DEXAMETHASONE SODIUM PHOSPHATE 10 MG: 10 INJECTION INTRAMUSCULAR; INTRAVENOUS at 09:04

## 2022-01-01 RX ADMIN — DEXAMETHASONE 4 MG: 4 TABLET ORAL at 21:39

## 2022-01-01 RX ADMIN — ATORVASTATIN CALCIUM 20 MG: 20 TABLET, FILM COATED ORAL at 08:38

## 2022-01-01 RX ADMIN — ACETAMINOPHEN 650 MG: 325 TABLET ORAL at 02:32

## 2022-01-01 RX ADMIN — IPRATROPIUM BROMIDE AND ALBUTEROL SULFATE 3 ML: .5; 3 SOLUTION RESPIRATORY (INHALATION) at 21:30

## 2022-01-01 RX ADMIN — CEFAZOLIN 1 G: 1 INJECTION, POWDER, FOR SOLUTION INTRAMUSCULAR; INTRAVENOUS at 16:59

## 2022-01-01 RX ADMIN — DEXAMETHASONE SODIUM PHOSPHATE 10 MG: 10 INJECTION INTRAMUSCULAR; INTRAVENOUS at 08:35

## 2022-01-01 RX ADMIN — Medication 3 UNITS: at 11:49

## 2022-01-01 RX ADMIN — LORAZEPAM 0.5 MG: 0.5 TABLET ORAL at 22:49

## 2022-01-11 PROBLEM — J12.82 PNEUMONIA DUE TO COVID-19 VIRUS: Status: ACTIVE | Noted: 2022-01-01

## 2022-01-11 PROBLEM — U07.1 PNEUMONIA DUE TO COVID-19 VIRUS: Status: ACTIVE | Noted: 2022-01-01

## 2022-01-11 PROBLEM — J96.01 ACUTE RESPIRATORY FAILURE WITH HYPOXIA (HCC): Status: ACTIVE | Noted: 2022-01-01

## 2022-01-11 NOTE — ED PROVIDER NOTES
Patient is an 79-year-old male with past medical history significant for A. fib and prior stroke, status post pacemaker followed by Dr. Jailene Mckeon who presents emerged from and for shortness of breath and feeling unwell which he states started around Friday. Patient is very hard of hearing which limits history however notes that he is not normally on oxygen at home. Patient denies any pain other than his right hip. He states its been on again off again over the past week. Denies any injury. States he is got his first 2 COVID vaccines but has not yet gotten his booster. Past Medical History:   Diagnosis Date    Atrial fibrillation ([address]     Stroke [facility])        Past Surgical History:   Procedure Laterality Date    VT INS NEW/RPLCMT PRM PM W/TRANSV ELTRD ATRIAL&VENT N/A 2020    INSERT PPM DUAL performed by [facility] Louise [facility]         Family History:   Adopted: Yes       Social History     Socioeconomic History    Marital status: SINGLE     Spouse name: Not on file    Number of children: Not on file    Years of education: Not on file    Highest education level: Not on file   Occupational History    Not on file   Tobacco Use    Smoking status: Former Smoker     Packs/day: 3.00     Years: 50.00     Pack years: 150.00     Quit date: 2004     Years since quittin.6    Smokeless tobacco: Former User     Quit date:    Substance and Sexual Activity    Alcohol use: Yes     Comment: beer every other day    Drug use: No    Sexual activity: Not on file   Other Topics Concern    Not on file   Social History Narrative    Not on file     Social Determinants of Health     Financial Resource Strain:     Difficulty of Paying Living Expenses: Not on file   Food Insecurity:     Worried About [address] in the Last Year: Not on file    [address] in the Last Year: Not on file   Transportation Needs:     Lack of Transportation (Medical):  Not on file    Lack of Transportation (Non-Medical): Not on file   Physical Activity:     Days of Exercise per Week: Not on file    Minutes of Exercise per Session: Not on file   Stress:     Feeling of Stress : Not on file   Social Connections:     Frequency of Communication with Friends and Family: Not on file    Frequency of Social Gatherings with Friends and Family: Not on file    Attends Orthodoxy Services: Not on file    Active Member of [address] [place] [address] or Organizations: Not on file    Attends Club or Organization Meetings: Not on file    Marital Status: Not on file   Intimate Partner Violence:     Fear of Current or Ex-Partner: Not on file    Emotionally Abused: Not on file    Physically Abused: Not on file    Sexually Abused: Not on file   Housing Stability:     Unable to Pay for Housing in the Last Year: Not on file    Number of [place] in the Last Year: Not on file    Unstable Housing in the Last Year: Not on file         ALLERGIES: Ampicillin and Iodine    Review of Systems   Constitutional: Positive for activity change and fatigue. HENT: Negative for drooling. Respiratory: Positive for shortness of breath. Cardiovascular: Negative for chest pain. Gastrointestinal: Negative for vomiting. Musculoskeletal: Positive for arthralgias (Hip) and myalgias. Neurological: Negative for seizures and syncope. Hematological: Does not bruise/bleed easily. Psychiatric/Behavioral: Negative. Vitals:    01/11/22 1810 01/11/22 1812 01/11/22 1813   BP: (!) 106/56     Pulse: 89     Resp: 18     Temp: 97.3 °F (36.3 °C)     SpO2: (!) 81% 90% 91%   Weight: 73.5 kg (162 lb)     Height: 5' 8\" (1.727 m)              Physical Exam  Vitals and nursing note reviewed. Constitutional:       Appearance: He is ill-appearing. He is not toxic-appearing or diaphoretic. HENT:      Head: Normocephalic and atraumatic.       Mouth/Throat:      Comments: Mask in place  Eyes:      Comments: Very hard of hearing   Cardiovascular: Rate and Rhythm: Normal rate. Pulmonary:      Effort: Tachypnea (Mild) present. Breath sounds: Decreased breath sounds and rales present. Chest:      Chest wall: No deformity or crepitus. Abdominal:      Palpations: Abdomen is soft. Tenderness: There is no guarding. Musculoskeletal:      Cervical back: Neck supple. Right lower leg: No tenderness. Left lower leg: No tenderness. Comments: Yes erythema or swelling of right hip. However patient does note discomfort with ambulation and movement. Skin:     General: Skin is warm and dry. Neurological:      Mental Status: He is alert and oriented to person, place, and time. Psychiatric:         Mood and Affect: Mood normal.         Behavior: Behavior normal.          MDM  Number of Diagnoses or Management Options     Amount and/or Complexity of Data Reviewed  Clinical lab tests: reviewed and ordered  Tests in the radiology section of CPT®: reviewed and ordered  Discuss the patient with other providers: yes  Independent visualization of images, tracings, or specimens: yes    Risk of Complications, Morbidity, and/or Mortality  Presenting problems: high  Diagnostic procedures: high  Management options: high      ED Course as of 01/11/22 2356   Tue Jan 11, 2022   2307 EKG obtained at 2243 showing paced rhythm, significant artifact noted however.   Rate 90 [JE]      ED Course User Index  [JE] Felipa Woods MD       Critical Care  Performed by: Felipa Woods MD  Authorized by: Felipa Woods MD     Critical care provider statement:     Critical care time (minutes):  45    Critical care time was exclusive of:  Separately billable procedures and treating other patients    Critical care was necessary to treat or prevent imminent or life-threatening deterioration of the following conditions:  Respiratory failure    Critical care was time spent personally by me on the following activities:  Ordering and performing treatments and interventions, ordering and review of laboratory studies, ordering and review of radiographic studies, pulse oximetry, re-evaluation of patient's condition, development of treatment plan with patient or surrogate, evaluation of patient's response to treatment, examination of patient and obtaining history from patient or surrogate    I assumed direction of critical care for this patient from another provider in my specialty: no              Perfect Serve Consult for Admission  8:08 PM    ED Room Number: ER09/09  Patient Name and age:  Otila Algona 79 y.o.  male  Working Diagnosis:   1. Hypoxia    2. COVID-19    3. Hip pain        COVID-19 Suspicion:  yes  Sepsis present:  no  Reassessment needed: no  Code Status:  Full Code  Readmission: no  Isolation Requirements:  yes  Recommended Level of Care:  telemetry  Department:[facility] ED - [phone]  Other: Patient is an 79-year-old male with past medical history significant for A. fib status post pacemaker prior stroke. Patient COVID-positive with hypoxia into the 70s upon arrival on room air. Chest x-rays look like COVID-pneumonia.

## 2022-01-11 NOTE — ED TRIAGE NOTES
Patient reports unable to eat anything since Friday because food does not taste good, has diarrhea. Reports fever. Patient O2 saturation in Triage 79-81%. Patient placed on oxygen in triage.

## 2022-01-12 NOTE — PROGRESS NOTES
1/12/2022  10:34 AM  Case management note    Reason for Admission:  Acute respiratory failure    EVAL done with princess via phone, unable to reach patient by phone    Patient lives with princess. Patient came to hospital for SOB. Patient is normally independent with AD'Ls. He has a walker if needed,  Patient has history of afib, CVA and pacemaker. [employer] [facility]                     RUR Score:         16%            Plan for utilizing home health:      PT/OT to eval    PCP: First and Last name:  Ena Balderas MD     Name of Practice:    Are you a current patient: Yes/No: yes   Approximate date of last visit: 1 week   Can you participate in a virtual visit with your PCP:                     Current Advanced Directive/Advance Care Plan: Full Code NO AD      Healthcare Decision Maker: Chaparro Edwards princess                     Transition of Care Plan:           1. Home with family assistance  2. PCP follow up  3. AD planning  4. CM to follow for discharge needs    Care Management Interventions  PCP Verified by CM: Yes (Sukhdev Spicer)  Mode of Transport at Discharge:  Other (see comment)  Support Systems: Other Family Member(s)  Confirm Follow Up Transport: Family  The Plan for Transition of Care is Related to the Following Treatment Goals : acute respiratory failure  Discharge Location  Discharge Placement: Home with family assistance  Carmen Anders

## 2022-01-12 NOTE — PROGRESS NOTES
[facility] [address]  [address] [facility], [place], [place], [address] [facility]  [phone]      Medical Progress Note      NAME: Delma Simpson   :  1940  MRM:  [id number]    Date/Time: 2022        Assessment / Plan:       80 yo M w/ hx of AF, HTN, DM presenting w/ fatigue, dyspneic, admitted for AHRF 2/2 COVID-19      Acute respiratory failure with hypoxia: 2/2 COVID. Severe hypoxia, currently requiring 45L HFNC. Follow closely, prognosis guarded      Pneumonia due to COVID-19 virus: CRP elevated, start baricitinib. Increase to high dose IV dexamethasone. D-dimer WNR, unlikely acute PE/VTE. Trend CRP, d-dimer, procalcitonin      Type 2 diabetes mellitus without complication: uncontrolled BG, likely worsened from steroids. Start NPH, SSI. Check A1c. Resume ACEi when BP /Cr more stable      SOHAIL: mild, improving already. Follow off ACEi      A-fib: monitor on telemetry, cont Xarelto      History of CVA (cerebrovascular accident): [address] as above      Hypertension: BP low/normal. Hold lisinopril       Total time spent: 35 minutes   Critical Care:  I personally spent 35 minutes in providing critical care. The reason for providing this level of medical care for this critically ill patient was due to a critical illness (severe AHRF) that impaired one or more vital organ systems such that there was a high probability of imminent or life threatening deterioration in the patient's condition. This care involved high complexity decision making to assess, manipulate, and support vital system functions. Time spent in the care of this patient including reviewing records, discussing with nursing and/or other providers on the treatment team, obtaining history and examining the patient, and discussing treatment plans.                   Care Plan discussed with: Patient, Nursing Staff and >50% of time spent in counseling and coordination of care    Discussed:  Care Plan    Prophylaxis: Xarelto    Disposition:  Home w/Family         Subjective:     Chief Complaint:  Follow up COVID    Chart/notes/labs/studies reviewed, patient examined. Feels tired. +dyspnea. No CP. No fevers          Objective:       Vitals:        Last 24hrs VS reviewed since prior progress note. Most recent are:    Visit Vitals  BP (!) 111/98   Pulse 80   Temp 97.7 °F (36.5 °C)   Resp 20   Ht 5' 8\" (1.727 m)   Wt 73.5 kg (162 lb)   SpO2 91%   BMI 24.63 kg/m²     SpO2 Readings from Last 6 Encounters:   01/12/22 91%   11/26/21 95%   11/19/21 93%   05/28/21 95%   02/06/20 98%   02/02/20 93%    O2 Flow Rate (L/min): 45 l/min       Intake/Output Summary (Last 24 hours) at 1/12/2022 1447  Last data filed at 1/12/2022 1238  Gross per 24 hour   Intake 1980 ml   Output 1200 ml   Net 780 ml          Exam:     Physical Exam:    Gen:  Elderly, ill-appearing. NAD  HEENT:  Atraumatic, normocephalic. Sclerae nonicteric, hearing intact to voice  Neck:  Supple, without apparent masses  Resp:  No accessory muscle use, increased WOB, mildly diminished without wheezes, rales, or rhonchi  Card: RRR, without m/r/g. No LE edema  Abd:  +bowel sounds, soft, NTTP, nondistended  Neuro: Face symmetric, speech fluent, follows commands appropriately, no focal weakness or numbness  Psych:  Alert, oriented x 3.  Fair insight     Medications Reviewed: (see below)    Lab Data Reviewed: (see below)    ______________________________________________________________________    Medications:     Current Facility-Administered Medications   Medication Dose Route Frequency    rivaroxaban (XARELTO) tablet 15 mg  15 mg Oral DAILY WITH DINNER    atorvastatin (LIPITOR) tablet 20 mg  20 mg Oral DAILY    glucose chewable tablet 16 g  4 Tablet Oral PRN    dextrose (D50W) injection syrg 12.5-25 g  25-50 mL IntraVENous PRN    glucagon (GLUCAGEN) injection 1 mg  1 mg IntraMUSCular PRN    0.9% sodium chloride infusion  75 mL/hr IntraVENous CONTINUOUS    insulin lispro (HUMALOG) injection   SubCUTAneous AC&HS    dexamethasone (PF) (DECADRON) 10 mg/mL injection 10 mg  10 mg IntraVENous Q12H    baricitinib (OLUMIANT) tablet 2 mg  2 mg Oral DAILY    insulin NPH (NOVOLIN N, HUMULIN N) injection 7 Units  0.1 Units/kg SubCUTAneous ACB&D    sodium chloride (NS) flush 5-40 mL  5-40 mL IntraVENous Q8H    sodium chloride (NS) flush 5-40 mL  5-40 mL IntraVENous PRN    acetaminophen (TYLENOL) tablet 650 mg  650 mg Oral Q6H PRN    Or    acetaminophen (TYLENOL) suppository 650 mg  650 mg Rectal Q6H PRN    polyethylene glycol (MIRALAX) packet 17 g  17 g Oral DAILY PRN    ondansetron (ZOFRAN ODT) tablet 4 mg  4 mg Oral Q8H PRN    Or    ondansetron (ZOFRAN) injection 4 mg  4 mg IntraVENous Q6H PRN            Lab Review:     Recent Labs     01/12/22  1239 01/11/22  1834   WBC 3.2* 4.7   HGB 14.8 16.0   HCT 43.2 46.5    171     Recent Labs     01/12/22  1239 01/11/22  1834   * 132*   K 4.5 4.5    101   CO2 25 24   * 162*   BUN 31* 39*   CREA 1.13 1.64*   CA 8.5 8.7   MG 2.0 1.7   PHOS 2.7  --    ALB 2.7* 3.3*   ALT 27 33     No components found for: GLPOC  No results for input(s): PH, PCO2, PO2, HCO3, FIO2 in the last 72 hours. No results for input(s): INR, INREXT in the last 72 hours.   No results found for: SDES  Lab Results   Component Value Date/Time    Culture result: Culture performed on Fluid swab specimen 11/25/2021 08:22 PM    Culture result: NO GROWTH 4 DAYS 11/25/2021 08:22 PM              ___________________________________________________    Attending Physician: Te Loomis MD

## 2022-01-12 NOTE — H&P
History and Physical  NAME: Ginna Savage  MRN: [id number]  YOB: 1940  AGE: 79 y.o.  male  SOCIAL SECURITY NUMBER: [id number]  Primary Care Provider: Bennie Acevedo MD  CODE STATUS: Full Code      ASSESSMENT/PLAN:  1. Acute respiratory failure with hypoxia. Likely secondary to COVID-19 pneumonia. Suspect underlying COPD as well. Nurse into room at 21:10 to start fluids. Found pt oxygen at 71% and patients oxygen removed (had been on 10 L/min). Nurse placed oxygen back onto patient ,  waited 5 minutes with no improvement of oxygen saturation. Increased to 12 lpm and contacted respiratory therapy who stated to increase oxygen to 15 lpm at 2120. Patient's oxygen saturations remained 72% at that time--patient was subsequently placed on 45 L/min. 2.  COVID-19 virus infection. Patient meets criteria for dexamethasone and remdesivir. Procalcitonin levels are negative, thus ruling out superimposed bacterial infection. CRP is < 7.5patient does not meet criteria for Actemra. 3.  Longstanding history of tobacco use. Patient smoked 100 pack years, stopping in 2004. Suspect underlying COPD. 4.  Atrial fibrillation, possibly chronic. Patient is on anticoagulation with Xarelto. 5.  Moderate aortic stenosis per echocardiogram of 2/5/2020.  6.  Acute kidney injury/hypotension. Suspect that this is due to dehydration. Creatinine 0.88 on 11/23/2021. Patient has received a total of 1500 mL normal saline bolus in the emergency department. Continue to monitor blood pressures--in the setting of COVID-19, a dry lung approach is ideal the benefits of IV fluids outweigh the risks at this time. Continue IV fluids with normal saline at 75 cc/h hold all blood pressure medicine at this time. 7.  Non- insulin-dependent diabetes mellitus. We will hold glipizide at this time until p.o. intake can be gauged. Sliding scale insulin for now.     History of Presenting Illness:   Sandi Collado is a 79 y.o. male who presents emergency department with complaints of \"not being able to eat\". States that he has not had any appetite since Friday, January 7. He states that he has been feeling weak, and either on January 8 or 9, almost fell on the front steps of his home, but his grandson caught him. Patient states that he has been having diarrhea 3-4 times a day from January 7 onward. He states that he has not noted any black stools or red blood in the stools. Asked if he was having any shortness of breath, patient answered \"not really\" and that he really does not feel short of breath or tired walking around home. He states that he does not have a full flight of stairs in his home. Patient admits to a cough here and there, but also admits to having a chronic smoker's cough. Dates that he stopped smoking cigarettes in 2004 after 50 years X 2 packs/day. He states that he has never used home oxygen. Patient denies wheezing. He states that he does not have a nebulizer machine at home. Patient denies any phlegm production. Patient denies fever, chills, sore throat, nasal congestion, postnasal drip, sinus congestion, chest pain, abdominal pain, nausea, vomiting, loss of taste, loss of smell, generalized body aches, headache. He admits to fatigue and loss of appetite. Patient states that he has had 2 doses of the COVID-19 vaccine has not had the booster. REVIEW OF OLD RECORDS:  Patient has had the first dose of Pfizer COVID-19 vaccine 1/28/2021 and given dose of Pfizer vaccine on 2/22/2021. Review of Systems:  A comprehensive review of systems was negative except for that written in the History of Present Illness.      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 Moderate aortic valve stenosis was present. LVEF 55-60% . 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 Previous MRI of 2010 showed possible tiny old right thalamic infarct versus perivascular space         PAST SURGICAL HISTORY:   Past Surgical History:   Procedure Laterality Date    HX CHOLECYSTECTOMY      OR INS NEW/RPLCMT PRM PM W/TRANSV ELTRD ATRIAL&VENT N/A 2020    INSERT PPM DUAL performed by Leslie Carson MD at [address] [facility] CATH LAB       Prior to Admission medications    Medication Sig Start Date Authorizing Provider   21 Lindsay Bauman MD   clotrimazole-betamethasone (LOTRISONE) topical cream Apply  to affected area two (2) times a day. Provider, Historical   rivaroxaban (Xarelto) 20 mg tab tablet Take 1 Tablet by mouth daily (with dinner). 21 Kiara Almendarez MD   glipiZIDE (GLUCOTROL) 5 mg tablet Take 5 mg by mouth daily (with dinner). 21 Provider, Historical   lisinopril (PRINIVIL, ZESTRIL) 10 mg tablet Take 10 mg by mouth nightly. Provider, Historical   simvastatin (ZOCOR) 40 mg tablet Take 40 mg by mouth nightly. Provider, Historical       Allergies   Allergen Reactions    Ampicillin Nausea and Vomiting    Iodine Hives, Itching and Nausea and Vomiting        Family History   Adopted: Yes   Family history unknown: Yes   Patient was adopted and does not know the medical history of his biological family.     Social History     Tobacco Use    Smoking status: Former Smoker     Packs/day: 2.00     Years: 50.00     Pack years: 100.00     Quit date: 2004     Years since quittin.6    Smokeless tobacco: Never Used   Substance Use Topics    Alcohol use: Yes     Comment: 1 beer or 1 glaas of wine daily most of adult life    Drug use: Never       Physical exam  Patient Vitals for the past 24 hrs:   BP Temp Pulse Resp SpO2  oxygen therapy   01/12/22 0024   95 24 95 % 45 liters per minute high flow oxygen   01/11/22 2359 (!) 94/55  (!) 110 24 96 % 45 liters per minute high flow oxygen   01/11/22 2329   84 24 95 % 45 liters per minute high flow oxygen   01/11/22 2259 (!) 95/51  81 (!) 31 96 % 45 liters per minute high flow oxygen   01/11/22 2229   80 30 95 % 45 liters per minute high flow oxygen   01/11/22 2159 (!) 93/48  84 27 94 % 45 liters per minute high flow oxygen   01/11/22 2135     94 %  45 liters per minute high flow oxygen   01/11/22 2124   79 24 (!) 71 %  15 liters per minute   01/11/22 2121   85 (!) 37 (!) 71 %  15 liters per minute   01/11/22 2118 (!) 92/53  89 23 (!) 77 %  12 liters per minute   01/11/22 2115   85 18 (!) 76 %  10 liters per minute   01/11/22 2110   86 19 (!) 72 %  room air   01/11/22 1941   78 30 93 %  10 L/min   01/11/22 1936   85 26 92 %  10 L/min   01/11/22 1923     91 %    01/11/22 1813     91 %  4 L/min   01/11/22 1812     90 %    01/11/22 1810 (!) 106/56 97.3 °F (36.3 °C) 89 18 (!) 81 %       Estimated body mass index is 24.63 kg/m² as calculated from the following:    Height as of this encounter: 5' 8\" (1.727 m). Weight as of this encounter: 73.5 kg (162 lb). General: In no acute distress. Well developed, well nourished. Head: Normocephalic, atraumatic. Eyes: Anicteric sclera. PERRL. Extraocular muscles intact. ENT: External ears and nose appear normal.  Oral mucosa moist.  Neck: Supple. No jugular venous distention. Heart: Irregularly irregular. Grade 2/6 systolic ejection murmur heard best at the left upper sternal border. Chest: Symmetrical excursion. Clear to auscultation bilaterally. Abdomen: Soft, nontender.   No abnormal distention. Bowel sounds are present throughout. Extremities: No gross deformities. No edema, no cyanosis. Feet are warm to touch. Neurological: No lateralizing deficits. Alert, oriented X3. Skin: No jaundice. No rashes. Recent Results (from the past 24 hour(s))   COVID-19 RAPID TEST    Collection Time: 01/11/22  6:34 PM   Result Value Ref Range    Specimen source Nasopharyngeal      COVID-19 rapid test Detected (A) NOTD     FIBRINOGEN    Collection Time: 01/11/22  6:34 PM   Result Value Ref Range    Fibrinogen 670 (H) 200 - 475 mg/dL   PROCALCITONIN    Collection Time: 01/11/22  6:34 PM   Result Value Ref Range    Procalcitonin <0.05 ng/mL   C REACTIVE PROTEIN, QT    Collection Time: 01/11/22  6:34 PM   Result Value Ref Range    C-Reactive protein 6.63 (H) 0.00 - 0.60 mg/dL   LD    Collection Time: 01/11/22  6:34 PM   Result Value Ref Range     (H) 85 - 241 U/L   FERRITIN    Collection Time: 01/11/22  6:34 PM   Result Value Ref Range    Ferritin 759 (H) 26 - 388 NG/ML   D DIMER    Collection Time: 01/11/22  6:34 PM   Result Value Ref Range    D-dimer 0.53 0.00 - 0.65 mg/L FEU   TROPONIN-HIGH SENSITIVITY    Collection Time: 01/11/22  6:34 PM   Result Value Ref Range    Troponin-High Sensitivity 24 0 - 76 ng/L   CBC WITH AUTOMATED DIFF    Collection Time: 01/11/22  6:34 PM   Result Value Ref Range    WBC 4.7 4.1 - 11.1 K/uL    RBC 5.41 4.10 - 5.70 M/uL    HGB 16.0 12.1 - 17.0 g/dL    HCT 46.5 36.6 - 50.3 %    MCV 86.0 80.0 - 99.0 FL    MCH 29.6 26.0 - 34.0 PG    MCHC 34.4 30.0 - 36.5 g/dL    RDW 14.5 11.5 - 14.5 %    PLATELET 659 241 - 809 K/uL    MPV 11.3 8.9 - 12.9 FL    NRBC 0.0 0  WBC    ABSOLUTE NRBC 0.00 0.00 - 0.01 K/uL    NEUTROPHILS 57 32 - 75 %    LYMPHOCYTES 26 12 - 49 %    MONOCYTES 17 (H) 5 - 13 %    EOSINOPHILS 0 0 - 7 %    BASOPHILS 0 0 - 1 %    IMMATURE GRANULOCYTES 0 0.0 - 0.5 %    ABS. NEUTROPHILS 2.7 1.8 - 8.0 K/UL    ABS. LYMPHOCYTES 1.2 0.8 - 3.5 K/UL    ABS. MONOCYTES 0.8 0.0 - 1.0 K/UL    ABS. EOSINOPHILS 0.0 0.0 - 0.4 K/UL    ABS. BASOPHILS 0.0 0.0 - 0.1 K/UL    ABS. IMM. GRANS. 0.0 0.00 - 0.04 K/UL    DF AUTOMATED     METABOLIC PANEL, COMPREHENSIVE    Collection Time: 01/11/22  6:34 PM   Result Value Ref Range    Sodium 132 (L) 136 - 145 mmol/L    Potassium 4.5 3.5 - 5.1 mmol/L    Chloride 101 97 - 108 mmol/L    CO2 24 21 - 32 mmol/L    Anion gap 7 5 - 15 mmol/L    Glucose 162 (H) 65 - 100 mg/dL    BUN 39 (H) 6 - 20 MG/DL    Creatinine 1.64 (H) 0.70 - 1.30 MG/DL    BUN/Creatinine ratio 24 (H) 12 - 20      GFR est AA 49 (L) >60 ml/min/1.73m2    GFR est non-AA 40 (L) >60 ml/min/1.73m2    Calcium 8.7 8.5 - 10.1 MG/DL    Bilirubin, total 0.7 0.2 - 1.0 MG/DL    ALT (SGPT) 33 12 - 78 U/L    AST (SGOT) 54 (H) 15 - 37 U/L    Alk. phosphatase 51 45 - 117 U/L    Protein, total 8.1 6.4 - 8.2 g/dL    Albumin 3.3 (L) 3.5 - 5.0 g/dL    Globulin 4.8 (H) 2.0 - 4.0 g/dL    A-G Ratio 0.7 (L) 1.1 - 2.2     MAGNESIUM    Collection Time: 01/11/22  6:34 PM   Result Value Ref Range    Magnesium 1.7 1.6 - 2.4 mg/dL   LACTIC ACID    Collection Time: 01/11/22  6:34 PM   Result Value Ref Range    Lactic acid 1.3 0.4 - 2.0 MMOL/L   NT-PRO BNP    Collection Time: 01/11/22  6:34 PM   Result Value Ref Range    NT pro- <450 PG/ML   EKG, 12 LEAD, INITIAL    Collection Time: 01/11/22 10:43 PM   Result Value Ref Range    Ventricular Rate 179 BPM    Atrial Rate 35 BPM    QRS Duration 152 ms    Q-T Interval 410 ms    QTC Calculation (Bezet) 707 ms    Calculated P Axis 84 degrees    Calculated R Axis 80 degrees    Calculated T Axis 94 degrees    Diagnosis       Undetermined rhythm  Left bundle branch block  Abnormal ECG  When compared with ECG of 04-FEB-2020 18:29,  Current undetermined rhythm precludes rhythm comparison, needs review  Left bundle branch block is now present         XR HIP RT W OR WO PELV 2-3 VWS  Narrative: EXAM: XR HIP RT W OR WO PELV 2-3 VWS    INDICATION: pain.     COMPARISON: None.    FINDINGS: AP view of the pelvis and a frogleg lateral view of the right hip  demonstrate no fracture, dislocation or other acute abnormality. Impression: No acute abnormality. XR CHEST PORT  Narrative: INDICATION:  hypoxia     EXAM: Portable chest 1829 hours. Comparison 2/6/2020    FINDINGS: Pacer is unchanged. Cardiac silhouette is not enlarged. Pulmonary  vasculature is mildly prominent. Increased patchy opacity in the right mid chest  superimposed upon diffuse interstitial prominence. Similar findings at the left  base laterally  Impression: 1. Superimposed patchy bilateral airspace disease on interstitial changes  concerning for infection or inflammation      02/04/20    ECHO ADULT COMPLETE 02/05/2020 2/5/2020    Interpretation Summary  · Normal cavity size and systolic function (ejection fraction normal). Upper normal wall thickness. Estimated left ventricular ejection fraction is 55 - 60%. Suboptimal endocardial visualization limits wall motion analysis Age-appropriate left ventricular diastolic function. · Not well visualized. Mildly reduced systolic function. · Severe aortic valve leaflet calcification present with reduced excursion. Moderate aortic valve stenosis is present. · Mitral valve non-specific thickening. Mild mitral annular calcification. Trace mitral valve regurgitation is present. · Moderately elevated central venous pressure (10-15 mmHg); IVC diameter is larger than 21 mm and collapses more than 50% with respiration. Signed by: Domi Moon DO on 2/5/2020  4:23 PM      There is a high probability of eminent life-threatening deterioration in the patient's condition, requiring frequent assessments. Critical care time is 34 minutes.

## 2022-01-12 NOTE — PROGRESS NOTES
Baricitinib Initiation Note    This patient meets criteria for initiation of baricitinib based on the following:  Confirmed COVID-19 (+) and hospitalized  Requiring oxygen support - specific O2 saturation is not a determinant for baricitinib  Elevated inflammatory markers (CRP, D-dimer, LDH, or ferritin). Concomitant therapy with dexamethasone 6-20 mg IV/PO daily (or equivalent steroid)  Do not give if patient has already received tocilizumab for COVID-19 treatment due to long half-life of tocilizumab (16 days)     Prescribers should weigh risks/benefits before initiating therapy in patients with the following:   Pregnancy  Severe hepatic impairment  Chronic/recurrent infections  Hemoglobin <8.0 g/dL   History/current thrombosis    Plan:  - Baricitinib 2 mg po daily X 14 days or until hospital discharge, whichever is sooner, has been ordered. Dose has been adjusted for renal function.  - Renal function will be monitored daily while on baricitinib. -VTE prophylaxis is recommended unless contraindicated.

## 2022-01-12 NOTE — ED NOTES
Nurse into room at 2110 to start fluids. Found pt oxygen at 71% and patients oxygen removed. Nurse placed oxygen back onto patient and explained importance of keeping it in. Waited 5 minutes with no improvement of oxygen saturation. Upped to 12 lpm and contacted respiratory therapy who stated to increase oxygen to 15 lpm at 2120. Pt oxygen remains 72% at this time.

## 2022-01-12 NOTE — PROGRESS NOTES
1900: Bedside shift change report given to [address] (oncoming nurse) by Lou Mir (offgoing nurse). Report included the following information SBAR, Kardex, Intake/Output, MAR, Recent Results and Cardiac Rhythm AV paced.

## 2022-01-12 NOTE — ED NOTES
TRANSFER - OUT REPORT:    Verbal report given to Tali Quintanilla RN (name) on Serjio Infante  being transferred to [facility] (unit) for routine progression of care       Report consisted of patients Situation, Background, Assessment and   Recommendations(SBAR). Information from the following report(s) SBAR, Kardex, ED Summary, Intake/Output, Recent Results and Cardiac Rhythm Paced was reviewed with the receiving nurse. Lines:   Peripheral IV 01/11/22 Left Antecubital (Active)   Site Assessment Clean, dry, & intact 01/12/22 1041   Phlebitis Assessment 0 01/12/22 1041   Infiltration Assessment 0 01/12/22 1041   Dressing Status Clean, dry, & intact 01/12/22 1041   Dressing Type Transparent 01/12/22 1041   Hub Color/Line Status Pink 01/12/22 1041   Action Taken Open ports on tubing capped 01/12/22 1041   Alcohol Cap Used Yes 01/12/22 1041        Opportunity for questions and clarification was provided.       Patient transported with:   Monitor  Tech

## 2022-01-12 NOTE — PROGRESS NOTES
[facility] Dosing Services: 1/12/22      The following medication: Xarelto was automatically dose-adjusted per [facility] P&T Committee Protocol, with respect to renal function. Consult provided for this   79 y.o. , male , for the indication of Atrial Fibrillation. Dosage changed to:  Xarelto 15mg daily with dinner (CrCl b/t 15-50 ml/min)    Pt Weight:   Wt Readings from Last 1 Encounters:   01/11/22 73.5 kg (162 lb)         Previous Regimen Xarelto 20mg daily with dinner   Serum Creatinine Lab Results   Component Value Date/Time    Creatinine 1.64 (H) 01/11/2022 06:34 PM    Creatinine (POC) 0.8 09/21/2010 12:56 PM       Creatinine Clearance Estimated Creatinine Clearance: 33.6 mL/min (A) (based on SCr of 1.64 mg/dL (H)). BUN Lab Results   Component Value Date/Time    BUN 39 (H) 01/11/2022 06:34 PM    BUN (POC) 15 09/21/2010 12:56 PM           Additional notes:      Pharmacy to continue to monitor patient daily. Will make dosage adjustments based upon changing renal function. Signed Betzaida Giraldo information:  [phone]

## 2022-01-13 NOTE — ROUTINE PROCESS
1900 - Bedside shift change report given to [address] Rn (oncoming nurse) by Brielle Loera RN (offgoing nurse). Report included the following information SBAR, Kardex, Intake/Output, MAR, Accordion and Recent Results. 0700 - Bedside shift change report given to Kartik Miranda RN (oncoming nurse) by Rupesh Campos RN (offgoing nurse). Report included the following information SBAR, Kardex, Intake/Output, MAR, Accordion and Recent Results.

## 2022-01-13 NOTE — PROGRESS NOTES
Transition of Care Plan - RUR 14%:         1. CM following - patient lives with his grandson, is normally independent with ADLs. Patient is currently on 50L O2.  2. Await further evaluation and clinical progress  3. Palliative care consulted  4.  Disposition TBD pending progress    Cecelia Young LCSW

## 2022-01-13 NOTE — PROGRESS NOTES
Problem: Falls - Risk of  Goal: *Absence of Falls  Description: Document Gene Claros Fall Risk and appropriate interventions in the flowsheet. Outcome: Progressing Towards Goal  Note: Fall Risk Interventions:  Mobility Interventions: Bed/chair exit alarm         Medication Interventions: Bed/chair exit alarm    Elimination Interventions: Bed/chair exit alarm,Call light in reach              Problem: Breathing Pattern - Ineffective  Goal: Ability to achieve and maintain a regular respiratory rate  Outcome: Progressing Towards Goal     Problem:  Body Temperature -  Risk of, Imbalanced  Goal: Ability to maintain a body temperature within defined limits  Outcome: Progressing Towards Goal

## 2022-01-13 NOTE — PROGRESS NOTES
Spiritual Care Assessment/Progress Note  [address]      NAME: Yordy Gutierres      MRN: [id number]  AGE: 79 y.o. SEX: male  Mandaen Affiliation: Sikhism   Language: English     1/13/2022     Total Time (in minutes): 15     Spiritual Assessment begun in [facility] 3 PRO CARE TELE 2 through conversation with:         []Patient        [] Family    [] Friend(s)        Reason for Consult: Palliative Care, Initial/Spiritual Assessment     Spiritual beliefs: (Please include comment if needed)     [] Identifies with a more tradition:         [] Supported by a more community:            [] Claims no spiritual orientation:           [] Seeking spiritual identity:                [] Adheres to an individual form of spirituality:           [x] Not able to assess:                           Identified resources for coping:      [] Prayer                               [] Music                  [] Guided Imagery     [] Family/friends                 [] Pet visits     [] Devotional reading                         [x] Unknown     [] Other:                                               Interventions offered during this visit: (See comments for more details)    Patient Interventions: Initial visit           Plan of Care:     [] Support spiritual and/or cultural needs    [] Support AMD and/or advance care planning process      [] Support grieving process   [] Coordinate Rites and/or Rituals    [] Coordination with community clergy   [] No spiritual needs identified at this time   [] Detailed Plan of Care below (See Comments)  [] Make referral to Music Therapy  [] Make referral to Pet Therapy     [] Make referral to Addiction services  [] Make referral to [facility]  [] Make referral to Spiritual Care Partner  [] No future visits requested        [x] Contact Spiritual Care for further referrals     Attempted visit for palliative initial spiritual assessment. Unable to visit patient at this time.  Pt is on COVID isolation precautions. Pt's chart was consulted.   Chaplain Alex MDiv, MS, [facility]

## 2022-01-13 NOTE — CONSULTS
Palliative Medicine Consult  Sunil: [phone] ([phone])    Patient Name: Gurmeet Harrington  YOB: 1940    Date of Initial Consult: 1/13/2022  Reason for Consult: Care Decisions  Requesting Provider: Dr. Harley Mesa  Primary Care Physician: Herrera Dunlap MD     SUMMARY:   Gurmeet Harrington is a 79 y.o. 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 Patient presented to ER w/ cc of poor appetite, loss of taste, arthralgia and diarrhea x a few days. Patient also reported recent COVID exposure. He received  COVID 19 vaccine x 2 doses in February 2021, but had no booster. Course of hospitalization: O2 needs increased rapidly since admission, now on 50L HFNC. Renal function improved. Current medical issues leading to Palliative Medicine involvement include: GOC discussion with gentleman of advanced age currently hospitalized for hypoxic respiratory failure 2/2/ covid. Denise Guzman Psychosocial: Born and raised in [place], he was adopted, only child, very loving parents. Served as a  in [employer] x 10+ years. 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 He is , has 1 child, son Stefani Aguayo, 2 grandchildren and a few great grandchildren. Patients grandson and  great grandson, who will be turning 12 yo on 1/15, live with him. Patient continues to play Golf, works on cars. Cognitive and Functional baseline: Alert and oriented, independent with ADLs, drives etc,  does have chronic slightly unsteady gait (etiology unknown). PALLIATIVE DIAGNOSES:   1. Palliative care encounter  2. COVID 19  3. Hypoxia  4. Weakness, generalized  5. Poor appetite  6. Advanced care planning discussion  7.  DNR discussion  8. Goals of care discussion       PLAN:   1. Chart reviewed and I spoke with patients nurse Gardenia RN prior to visit. 2. Patient was seen, no family at bedside due to [place] [address] visitor restrictions. 3. Patient sitting up in bed, awake, alert and oriented. Introduced myself and role of palliative medicine. 4. Patient with good insight regarding hospitalization. 5. Mr. Tanisha Carcamo reported feeling pretty good, denied feeling SOB. Despite feeling good, his O2 sats On 50L HFNC were dropping into high 80s while he was talking and he appeared mildly SOB. 6. Weakness, generalized- Worsening, patient reported worsening weakness PTA, now mostly bed bound. Patient  w/ hypoxic resp failure 2/2 covid and is being  confined to bed unless up to [facility] w/ assist. In setting of advanced age, COVID/Hypoxia, poor nutrition and a unsteady gait at baseline, he is at HR for worsening debility  · Recommend PT/OT eval/treat as soon as he is medically appropriate, even if only bed activity to start. 7. Poor nutrition- Unresolved,  Patient reported poor appetite and intake 2/2 change in taste PTA. Recommend RD consult. 8.  Advanced care planning discussion- NO AMD. Patient is , he has 1 child, son Sebastian Martinez, 2 grandchildren and a few great grandchildren. Legal NOK is his son Ramy Wells  · Patient stated that he would want his son Sebastian Martinez to serve as his primary health care decision maker. · Patient declined to complete AMD today, stating he wanted to review the document and discuss with his son first.  · Copy of AMD left in EMR    9. DNR discussion- Patient continues to have a Full Code status. · Spent time discussing benefits vs burdens of CPR and intubation/vent, explaining that  individuals being intubated 2/2 covid related respiratory failure, have a very poor prognosis, most do not survive.   ·  Patient stated that he is leaning toward wanting CPR and intubation/vent support, but he  does not think  his son would want Him to get CPR or carmela intubated. · I encouraged patient to discuss his wishes for CPR and intubation with his son. · I reassured patient that his chart reflects his wishes for DNR/DNI if needed. 10. Goals of Care Discussion- Patient wants Full restorative treatments and interventions at this time, hopeful that he will survive COVID and be able to return home. · I called patients son, Maribell Aleman, no answer, left message on his voice mail requesting call back. 11. Palliative team will attempt to FU tomorrow to assist with completing AMD, and continue discussion re CPR and intubation. 12. Psychosocial and baseline cognitive and functional status assessments completed, see above. 13. No symptom management concerns identified    14. Initial consult note routed to primary continuity provider and/or primary health care team members  15. Communicated plan of care with: Palliative IDT, [facility] Team, Dr. Sanjana Lockhart, Luis Uribe RN, Jeffery RT     GOALS OF CARE / TREATMENT PREFERENCES:     GOALS OF CARE:  Patient/Health Care Proxy Stated Goals: Cure    TREATMENT PREFERENCES:   Code Status: Full Code    Patient and family's personal goals include: Get well and return home    Important upcoming milestones or family events: Ted Mack turning 12 yo this Saturday 1/15/2022.     The patient identifies the following as important for living well: unknown      Advance Care Planning:  [x] The [facility] Interdisciplinary Team has updated the ACP Navigator with Health Care Decision Maker and Patient Capacity      Primary Decision Maker: Monica Lao - [phone]    Secondary Decision Maker: Tena Ruvalcaba - [address] [place] [address] Planning 1/13/2022   Patient's Healthcare Decision Maker is: Legal Next of Kin   Confirm Advance Directive None   Patient Would Like to Complete Advance Directive Yes       Medical Interventions: Full interventions Other:    As far as possible, the palliative care team has discussed with patient / health care proxy about goals of care / treatment preferences for patient. HISTORY:     History obtained from: medical record/nurse/ RT and patient    CHIEF COMPLAINT: Denied, I feel pretty good    HPI/SUBJECTIVE:    The patient is:   [x] Verbal and participatory  [] Non-participatory due to:   Denied pain, denied SOB, stated that he feels pretty good, hopeful. He did acknowledge poor appetite and significantgeneralized  weakness     Clinical Pain Assessment (nonverbal scale for severity on nonverbal patients):   Clinical Pain Assessment  Severity: 0          Duration: for how long has pt been experiencing pain (e.g., 2 days, 1 month, years)  Frequency: how often pain is an issue (e.g., several times per day, once every few days, constant)     FUNCTIONAL ASSESSMENT:     Palliative Performance Scale (PPS):  PPS: 40       PSYCHOSOCIAL/SPIRITUAL SCREENING:     Palliative IDT has assessed this patient for cultural preferences / practices and a referral made as appropriate to needs (Cultural Services, Patient Advocacy, Ethics, etc.)    Any spiritual / Synagogue concerns:  [] Yes /  [x] No   If \"Yes\" to discuss with pastoral care during IDT     Does caregiver feel burdened by caring for their loved one:   [] Yes /  [x] No /  [] No Caregiver Present    If \"Yes\" to discuss with social work during IDT    Anticipatory grief assessment:   [x] Normal  / [] Maladaptive     If \"Maladaptive\" to discuss with social work during IDT    ESAS Anxiety: Anxiety: 0    ESAS Depression: Depression: 0        REVIEW OF SYSTEMS:     Positive and pertinent negative findings in ROS are noted above in HPI. The following systems were [x] reviewed / [] unable to be reviewed as noted in HPI  Other findings are noted below.   Systems: constitutional, ears/nose/mouth/throat, respiratory, gastrointestinal, genitourinary, musculoskeletal, integumentary, neurologic, psychiatric, endocrine. Positive findings noted below. Modified ESAS Completed by: provider   Fatigue: 3 Drowsiness: 0   Depression: 0 Pain: 0   Anxiety: 0 Nausea: 0   Anorexia: 5 Dyspnea: 0                    PHYSICAL EXAM:     From RN flowsheet:  Wt Readings from Last 3 Encounters:   01/13/22 151 lb 8 oz (68.7 kg)   11/22/21 162 lb (73.5 kg)   11/19/21 162 lb (73.5 kg)     Blood pressure 131/72, pulse 77, temperature 97.6 °F (36.4 °C), resp. rate 24, height 5' 8\" (1.727 m), weight 151 lb 8 oz (68.7 kg), SpO2 93 %.     Pain Scale 1: Numeric (0 - 10)  Pain Intensity 1: 0                 Last bowel movement, if known:     Constitutional: Appears stated age, awake, alert, conversant, NAD  Eyes: pupils equal, anicteric  ENMT: no nasal discharge, moist mucous membranes  Cardiovascular: regular rhythm, distal pulses intact  Respiratory: breathing intermittently mild labored, symmetric, on HFNC  Gastrointestinal: soft non-tender, +bowel sounds  Musculoskeletal: no deformity, no tenderness to palpation  Skin: warm, dry  Neurologic: following commands, moving all extremities  Psychiatric: full affect, no hallucinations  Other:       HISTORY:     Active Problems:    A-fib ([address] (2/4/2020)      History of CVA (cerebrovascular accident) (2/4/2020)      Type 2 diabetes mellitus without complication ([address] (4/8/3706)      Acute respiratory failure with hypoxia ([address] (1/11/2022)      Pneumonia due to COVID-19 virus (1/11/2022)      Hypertension ()      Past Medical History:   Diagnosis Date    Atrial fibrillation ([address]     Diabetes mellitus, type 2 ([address]     Hypertension     Internal carotid artery stenosis, right     Chronic occlusion of the right internal carotid artery per MRI of 2/5/2020 unchanged from previous study of 9/21/2010; study of 2020 also showed mild stenosis of the left internal carotid artery and bilateral patent vertebral arteries    Moderate aortic stenosis     Echocardiogram of 2/5/2020: Severe aortic valve leaflet calcification present with reduced excursion. Moderate aortic valve stenosis was present. LVEF 55-60% .  Stroke [facility])     asymptomatic; small chronic infarcts in the bilateral thalami per MRI of 2020. Previous MRI of 2010 showed possible tiny old right thalamic infarct versus perivascular space       Past Surgical History:   Procedure Laterality Date    HX CHOLECYSTECTOMY      MO INS NEW/RPLCMT PRM PM W/TRANSV ELTRD ATRIAL&VENT N/A 2020    INSERT PPM DUAL performed by Phillip Su MD at [facility] LAB      Family History   Adopted: Yes   Family history unknown: Yes      History reviewed, no pertinent family history.   Social History     Tobacco Use    Smoking status: Former Smoker     Packs/day: 2.00     Years: 50.00     Pack years: 100.00     Quit date: 2004     Years since quittin.6    Smokeless tobacco: Never Used   Substance Use Topics    Alcohol use: Yes     Comment: 1 beer or 1 glaas of wine daily most of adult life     Allergies   Allergen Reactions    Ampicillin Nausea and Vomiting    Iodine Hives, Itching and Nausea and Vomiting      Current Facility-Administered Medications   Medication Dose Route Frequency    guaiFENesin-dextromethorphan (ROBITUSSIN DM) 100-10 mg/5 mL syrup 5 mL  5 mL Oral Q6H PRN    rivaroxaban (XARELTO) tablet 20 mg  20 mg Oral DAILY WITH DINNER    insulin NPH (NOVOLIN N, HUMULIN N) injection 10 Units  10 Units SubCUTAneous ACB&D    lisinopriL (PRINIVIL, ZESTRIL) tablet 10 mg  10 mg Oral QHS    atorvastatin (LIPITOR) tablet 20 mg  20 mg Oral DAILY    glucose chewable tablet 16 g  4 Tablet Oral PRN    dextrose (D50W) injection syrg 12.5-25 g  25-50 mL IntraVENous PRN    glucagon (GLUCAGEN) injection 1 mg  1 mg IntraMUSCular PRN    insulin lispro (HUMALOG) injection   SubCUTAneous AC&HS    dexamethasone (PF) (DECADRON) 10 mg/mL injection 10 mg  10 mg IntraVENous Q12H    baricitinib (OLUMIANT) tablet 2 mg  2 mg Oral DAILY    sodium chloride (NS) flush 5-40 mL  5-40 mL IntraVENous Q8H    sodium chloride (NS) flush 5-40 mL  5-40 mL IntraVENous PRN    acetaminophen (TYLENOL) tablet 650 mg  650 mg Oral Q6H PRN    Or    acetaminophen (TYLENOL) suppository 650 mg  650 mg Rectal Q6H PRN    polyethylene glycol (MIRALAX) packet 17 g  17 g Oral DAILY PRN    ondansetron (ZOFRAN ODT) tablet 4 mg  4 mg Oral Q8H PRN    Or    ondansetron (ZOFRAN) injection 4 mg  4 mg IntraVENous Q6H PRN          LAB AND IMAGING FINDINGS:     Lab Results   Component Value Date/Time    WBC 8.4 01/13/2022 03:09 AM    HGB 14.3 01/13/2022 03:09 AM    PLATELET 701 29/92/6775 03:09 AM     Lab Results   Component Value Date/Time    Sodium 134 (L) 01/13/2022 03:09 AM    Potassium 4.7 01/13/2022 03:09 AM    Chloride 108 01/13/2022 03:09 AM    CO2 23 01/13/2022 03:09 AM    BUN 32 (H) 01/13/2022 03:09 AM    Creatinine 1.03 01/13/2022 03:09 AM    Calcium 8.5 01/13/2022 03:09 AM    Magnesium 1.9 01/13/2022 03:09 AM    Phosphorus 3.1 01/13/2022 03:09 AM      Lab Results   Component Value Date/Time    Alk. phosphatase 44 (L) 01/13/2022 03:09 AM    Protein, total 7.1 01/13/2022 03:09 AM    Albumin 2.7 (L) 01/13/2022 03:09 AM    Globulin 4.4 (H) 01/13/2022 03:09 AM     Lab Results   Component Value Date/Time    INR 1.0 02/04/2020 03:50 PM    Prothrombin time 10.3 02/04/2020 03:50 PM    aPTT 32.0 09/22/2010 03:50 AM      Lab Results   Component Value Date/Time    Ferritin 759 (H) 01/11/2022 06:34 PM      No results found for: PH, PCO2, PO2  No components found for: [place]   Lab Results   Component Value Date/Time    CK 79 09/22/2010 03:50 AM                Total time: 70 min  Counseling / coordination time, spent as noted above: 50 min   > 50% counseling / coordination?: yes    Prolonged service was provided for  []30 min   []75 min in face to face time in the presence of the patient, spent as noted above.   Time Start:   Time End:   Note: this can only be billed with [id number] (initial) or 62118 (follow up). If multiple start / stop times, list each separately.

## 2022-01-13 NOTE — PROGRESS NOTES
[facility] Dosing Services: 01/13/22  The following medication: Xarelto was automatically dose-adjusted per [facility] P&T Committee Protocol, with respect to renal function. Indication: Afib    Pt Weight:   Wt Readings from Last 1 Encounters:   01/13/22 68.7 kg (151 lb 8 oz)       Previous Regimen Xarelto 15 mg daily with dinner   Serum Creatinine Lab Results   Component Value Date/Time    Creatinine 1.03 01/13/2022 03:09 AM    Creatinine (POC) 0.8 09/21/2010 12:56 PM       Creatinine Clearance Estimated Creatinine Clearance: 53.5 mL/min (based on SCr of 1.03 mg/dL).    BUN Lab Results   Component Value Date/Time    BUN 32 (H) 01/13/2022 03:09 AM    BUN (POC) 15 09/21/2010 12:56 PM         Plan: Changed Xarelto back to 20 mg daily with dinner    Thank you  Lorri Garcia, PharmD  [phone]

## 2022-01-13 NOTE — CONSULTS
Name: Tala Ni: [address]   : 1940 Admit Date: 2022   Phone: [phone]  Room: [facility]   PCP: Janelle Pendleton MD  MRN: [id number]   Date: 2022  Code: Full Code        HPI:      Chart and notes reviewed. Data reviewed. I review the patient's current medications in the medical record at each encounter.  I have evaluated and examined the patient. 9:09 AM       History was obtained from patient. I was asked by Bee Nichols DO to see Baron Cornejo in consultation for a chief complaint of Covid-19 pneumonia. History of Present Illness:  Valentine Mccarthy is a very pleasant, 80 yo gentleman that [facility] on  with worsening shortness of breath. He has a PMH of a.fib, aortic stenosis, DM, hypertension, CVA, and tobacco abuse (100 pack years, quit in ). He received two doses of the Covid vaccine, last in 2021. He did not receive a booster dose. He is currently requiring hi flow at 50L/100%. He reports that his symptoms started five days ago after he was exposed to his grandson who tested positive. He became more progressively more SOB with increased cough. No fever/chills. He did initially have diarrhea. Images:  Personally visualized and report reviewed. CXR :  Superimposed patchy bilateral airspace disease on interstitial changes concerning for infection or inflammation      D-dimer . 55  LFTs stable  Procalcitonin negative  Ferritin 759  Trop wnl  Pro-  CRP 3.18  ECHO 2020:  EF 55-60%, mildly reduced systolic function, moderate aortic stenosis, trave MVR, moderately elevated CVP.       Past Medical History:   Diagnosis Date    Atrial fibrillation ([address]     Diabetes mellitus, type 2 ([facility]     Hypertension     Internal carotid artery stenosis, right     Chronic occlusion of the right internal carotid artery per MRI of 2020 unchanged from previous study of 2010; study of  also showed mild stenosis of the left internal carotid artery and bilateral patent vertebral arteries    Moderate aortic stenosis     Echocardiogram of 2020:  Severe aortic valve leaflet calcification present with reduced excursion. Moderate aortic valve stenosis was present. LVEF 55-60% .  Stroke [facility])     asymptomatic; small chronic infarcts in the bilateral thalami per MRI of 2020.   Previous MRI of 2010 showed possible tiny old right thalamic infarct versus perivascular space        Past Surgical History:   Procedure Laterality Date    HX CHOLECYSTECTOMY      SC INS NEW/RPLCMT PRM PM W/TRANSV ELTRD ATRIAL&VENT N/A 2020    INSERT PPM DUAL performed by Leslie Carson MD at [facility] LAB       Family History   Adopted: Yes   Family history unknown: Yes       Social History     Tobacco Use    Smoking status: Former Smoker     Packs/day: 2.00     Years: 50.00     Pack years: 100.00     Quit date: 2004     Years since quittin.6    Smokeless tobacco: Never Used   Substance Use Topics    Alcohol use: Yes     Comment: 1 beer or 1 glaas of wine daily most of adult life       Allergies   Allergen Reactions    Ampicillin Nausea and Vomiting    Iodine Hives, Itching and Nausea and Vomiting       Current Facility-Administered Medications   Medication Dose Route Frequency    rivaroxaban (XARELTO) tablet 15 mg  15 mg Oral DAILY WITH DINNER    atorvastatin (LIPITOR) tablet 20 mg  20 mg Oral DAILY    glucose chewable tablet 16 g  4 Tablet Oral PRN    dextrose (D50W) injection syrg 12.5-25 g  25-50 mL IntraVENous PRN    glucagon (GLUCAGEN) injection 1 mg  1 mg IntraMUSCular PRN    insulin lispro (HUMALOG) injection   SubCUTAneous AC&HS    dexamethasone (PF) (DECADRON) 10 mg/mL injection 10 mg  10 mg IntraVENous Q12H    baricitinib (OLUMIANT) tablet 2 mg  2 mg Oral DAILY    insulin NPH (NOVOLIN N, HUMULIN N) injection 7 Units  0.1 Units/kg SubCUTAneous ACB&D    sodium chloride (NS) flush 5-40 mL  5-40 mL IntraVENous Q8H    sodium chloride (NS) flush 5-40 mL  5-40 mL IntraVENous PRN    acetaminophen (TYLENOL) tablet 650 mg  650 mg Oral Q6H PRN    Or    acetaminophen (TYLENOL) suppository 650 mg  650 mg Rectal Q6H PRN    polyethylene glycol (MIRALAX) packet 17 g  17 g Oral DAILY PRN    ondansetron (ZOFRAN ODT) tablet 4 mg  4 mg Oral Q8H PRN    Or    ondansetron (ZOFRAN) injection 4 mg  4 mg IntraVENous Q6H PRN         REVIEW OF SYSTEMS   Negative except as stated in the HPI. Physical Exam:   Visit Vitals  /88 (BP 1 Location: Right upper arm, BP Patient Position: At rest)   Pulse 79   Temp 97.7 °F (36.5 °C)   Resp 24   Ht 5' 8\" (1.727 m)   Wt 68.7 kg (151 lb 8 oz)   SpO2 90%   BMI 23.04 kg/m²       General:  Alert, cooperative, no distress, appears stated age, on HF   Head:  Normocephalic, without obvious abnormality, atraumatic. Eyes:  Conjunctivae/corneas clear. Nose: Nares normal. Septum midline. Mucosa normal.   Throat: Lips, mucosa, and tongue normal. Teeth and gums normal.   Neck: Supple, symmetrical, trachea midline, no adenopathy, thyroid: no enlargment/tenderness/nodules, no carotid bruit and no JVD. Lungs:   Clear but diminished. Chest wall:  No tenderness or deformity. Heart:  Regular rate and rhythm, S1, S2 normal, no murmur, click, rub or gallop. Abdomen:   Soft, non-tender. Bowel sounds normal.   Extremities: Extremities normal, atraumatic, no cyanosis or edema. Pulses: 2+ and symmetric all extremities.    Skin: Skin color, texture, turgor normal. No rashes or lesions   Lymph nodes: Cervical  nodes normal.   Neurologic: Grossly nonfocal       Lab Results   Component Value Date/Time    Sodium 134 (L) 01/13/2022 03:09 AM    Potassium 4.7 01/13/2022 03:09 AM    Chloride 108 01/13/2022 03:09 AM    CO2 23 01/13/2022 03:09 AM    BUN 32 (H) 01/13/2022 03:09 AM    Creatinine 1.03 01/13/2022 03:09 AM    Glucose 190 (H) 01/13/2022 03:09 AM    Calcium 8.5 01/13/2022 03:09 AM    Magnesium 1.9 01/13/2022 03:09 AM    Phosphorus 3.1 01/13/2022 03:09 AM    Lactic acid 1.3 01/11/2022 06:34 PM       Lab Results   Component Value Date/Time    WBC 8.4 01/13/2022 03:09 AM    HGB 14.3 01/13/2022 03:09 AM    PLATELET 485 44/61/2711 03:09 AM    MCV 86.7 01/13/2022 03:09 AM       Lab Results   Component Value Date/Time    INR 1.0 02/04/2020 03:50 PM    aPTT 32.0 09/22/2010 03:50 AM    Alk.  phosphatase 44 (L) 01/13/2022 03:09 AM    Protein, total 7.1 01/13/2022 03:09 AM    Albumin 2.7 (L) 01/13/2022 03:09 AM    Globulin 4.4 (H) 01/13/2022 03:09 AM       Lab Results   Component Value Date/Time    Ferritin 759 (H) 01/11/2022 06:34 PM       Lab Results   Component Value Date/Time    Sed rate (ESR) 5 09/22/2010 03:50 AM    C-Reactive protein 3.18 (H) 01/13/2022 03:09 AM        No results found for: PH, PHI, PCO2, PCO2I, PO2, PO2I, HCO3, HCO3I, FIO2, FIO2I    Lab Results   Component Value Date/Time    CK 79 09/22/2010 03:50 AM    Troponin-I, Qt. 0.05 (H) 09/21/2010 12:10 PM        Lab Results   Component Value Date/Time    Culture result: Culture performed on Fluid swab specimen 11/25/2021 08:22 PM    Culture result: NO GROWTH 4 DAYS 11/25/2021 08:22 PM       No results found for: TOXA1, RPR, HBCM, HBSAG, HAAB, HCAB1, HAAT, G6PD, CRYAC, HIVGT, HIVR, HIV1, HIV12, HIVPC, HIVRPI    Lab Results   Component Value Date/Time    CK 79 09/22/2010 03:50 AM       Lab Results   Component Value Date/Time    Color YELLOW/STRAW 08/02/2019 12:10 AM    Appearance CLEAR 08/02/2019 12:10 AM    pH (UA) 5.0 08/02/2019 12:10 AM    Protein 30 (A) 08/02/2019 12:10 AM    Glucose 500 (A) 08/02/2019 12:10 AM    Ketone NEGATIVE  08/02/2019 12:10 AM    Bilirubin NEGATIVE  08/02/2019 12:10 AM    Blood SMALL (A) 08/02/2019 12:10 AM    Urobilinogen 0.2 08/02/2019 12:10 AM    Nitrites NEGATIVE  08/02/2019 12:10 AM    Leukocyte Esterase NEGATIVE  08/02/2019 12:10 AM    WBC 0-4 08/02/2019 12:10 AM    RBC 0-5 08/02/2019 12:10 AM Bacteria NEGATIVE  08/02/2019 12:10 AM       IMPRESSION  · Acute Respiratory Failure with Hypoxia  · Covid-19 Pneumonia  · History of A. Fib  · Moderate Aortic Stenosis  · History of Tobacco Abuse  · DM  · Hypertension      PLAN  · Continue HF to keep sats >88%  · Continue Dexamethasone 10mg q12h  · Agree with Baricitinib  · On AC with Xarleto  · Trend CRP, d-dimer, LFTs  · Careful with IVF  · Encourage IS use  · Prone as able  · OOB  · Would benefit from PFTs as an outpatient  · Will need repeat imaging as an outpatient     is critically ill, requiring HFNC for acute respiratory failure secondary to Covid pneumonia. Total CC time: 40 minutes. Thank you very much for the consult.     Phillip Wang NP

## 2022-01-13 NOTE — PROGRESS NOTES
Attempted to update family to no avail.     VM left @ pt's son's phone nbr    Name Relation Home Work [phone] [id number]   Esthela Roe [phone]

## 2022-01-13 NOTE — PROGRESS NOTES
Carlos Eduardo [facility] 79  [address] [facility], [place], [address] [place] [address]  [phone]      Medical Progress Note      NAME: Mauro Peres   :  1940  MRM:  [id number]    Date/Time: 2022        Assessment / Plan:     78 yo M w/ hx of AF, HTN, DM presenting w/ fatigue, dyspneic, admitted for AHRF 2/2 COVID-19      Acute respiratory failure with hypoxia: 2/2 COVID. Severe, now requiring 50L HFNC. Monitor closely, prognosis guarded. Palliative care consult      Pneumonia due to COVID-19 virus: CRP improved, cont baricitinib. Cont high dose IV dexamethasone. D-dimer WNR, unlikely acute PE/VTE, as pt already on Xarelto. Follow CRP, d-dimer. Procalcitonin low, defer abx      Type 2 diabetes mellitus without complication: W2O 4.2%. BG high 2/2 steroids. Increase NPH, cont SSI. Resume ACEi       SOHAIL: mild, resolved. Resume lisinopril      A-fib: monitor on telemetry, cont Xarelto      History of CVA (cerebrovascular accident): cont OAC as above. Cont statin      Hypertension: BP up-trending. Resume lisinopril       Total time spent: 35 minutes   Critical Care:  I personally spent 35 minutes in providing critical care. The reason for providing this level of medical care for this critically ill patient was due to a critical illness (severe AHRF) that impaired one or more vital organ systems such that there was a high probability of imminent or life threatening deterioration in the patient's condition. This care involved high complexity decision making to assess, manipulate, and support vital system functions. Time spent in the care of this patient including reviewing records, discussing with nursing and/or other providers on the treatment team, obtaining history and examining the patient, and discussing treatment plans.                   Care Plan discussed with: Patient, Nursing Staff and >50% of time spent in counseling and coordination of care    Discussed:  Care Plan    Prophylaxis: Xarelto    Disposition:  Home w/Family         Subjective:     Chief Complaint:  Follow up COVID    Chart/notes/labs/studies reviewed, patient examined. Feels \"fine\". Denies severe dyspnea. No CP. No fevers          Objective:       Vitals:        Last 24hrs VS reviewed since prior progress note. Most recent are:    Visit Vitals  /72 (BP 1 Location: Left upper arm, BP Patient Position: At rest)   Pulse 77   Temp 97.6 °F (36.4 °C)   Resp 24   Ht 5' 8\" (1.727 m)   Wt 68.7 kg (151 lb 8 oz)   SpO2 93%   BMI 23.04 kg/m²     SpO2 Readings from Last 6 Encounters:   01/13/22 93%   11/26/21 95%   11/19/21 93%   05/28/21 95%   02/06/20 98%   02/02/20 93%    O2 Flow Rate (L/min): 50 l/min       Intake/Output Summary (Last 24 hours) at 1/13/2022 1621  Last data filed at 1/13/2022 1312  Gross per 24 hour   Intake 1221.25 ml   Output 1450 ml   Net -228.75 ml          Exam:     Physical Exam:    Gen:  Elderly, ill-appearing. NAD  HEENT:  Atraumatic, normocephalic. Sclerae nonicteric, hearing intact to voice  Neck:  Supple, without apparent masses  Resp:  No accessory muscle use, increased WOB, mildly diminished breath sounds w/o wheezing, rales, or rhonchi  Card: RRR, without m/r/g. No LE edema  Abd:  +bowel sounds, soft, NTTP, nondistended  Neuro: Face symmetric, speech fluent, follows commands appropriately, no focal weakness or numbness  Psych:  Alert, oriented x 3.  Fair insight     Medications Reviewed: (see below)    Lab Data Reviewed: (see below)    ______________________________________________________________________    Medications:     Current Facility-Administered Medications   Medication Dose Route Frequency    guaiFENesin-dextromethorphan (ROBITUSSIN DM) 100-10 mg/5 mL syrup 5 mL  5 mL Oral Q6H PRN    rivaroxaban (XARELTO) tablet 20 mg  20 mg Oral DAILY WITH DINNER    atorvastatin (LIPITOR) tablet 20 mg  20 mg Oral DAILY    glucose chewable tablet 16 g  4 Tablet Oral PRN    dextrose (D50W) injection syrg 12.5-25 g  25-50 mL IntraVENous PRN    glucagon (GLUCAGEN) injection 1 mg  1 mg IntraMUSCular PRN    insulin lispro (HUMALOG) injection   SubCUTAneous AC&HS    dexamethasone (PF) (DECADRON) 10 mg/mL injection 10 mg  10 mg IntraVENous Q12H    baricitinib (OLUMIANT) tablet 2 mg  2 mg Oral DAILY    insulin NPH (NOVOLIN N, HUMULIN N) injection 7 Units  0.1 Units/kg SubCUTAneous ACB&D    sodium chloride (NS) flush 5-40 mL  5-40 mL IntraVENous Q8H    sodium chloride (NS) flush 5-40 mL  5-40 mL IntraVENous PRN    acetaminophen (TYLENOL) tablet 650 mg  650 mg Oral Q6H PRN    Or    acetaminophen (TYLENOL) suppository 650 mg  650 mg Rectal Q6H PRN    polyethylene glycol (MIRALAX) packet 17 g  17 g Oral DAILY PRN    ondansetron (ZOFRAN ODT) tablet 4 mg  4 mg Oral Q8H PRN    Or    ondansetron (ZOFRAN) injection 4 mg  4 mg IntraVENous Q6H PRN            Lab Review:     Recent Labs     01/13/22  0309 01/12/22  1239 01/11/22  1834   WBC 8.4 3.2* 4.7   HGB 14.3 14.8 16.0   HCT 41.6 43.2 46.5    157 171     Recent Labs     01/13/22  0309 01/12/22  1239 01/11/22  1834   * 133* 132*   K 4.7 4.5 4.5    104 101   CO2 23 25 24   * 245* 162*   BUN 32* 31* 39*   CREA 1.03 1.13 1.64*   CA 8.5 8.5 8.7   MG 1.9 2.0 1.7   PHOS 3.1 2.7  --    ALB 2.7* 2.7* 3.3*   ALT 26 27 33     No components found for: GLPOC  No results for input(s): PH, PCO2, PO2, HCO3, FIO2 in the last 72 hours. No results for input(s): INR, INREXT, INREXT in the last 72 hours.   No results found for: SDES  Lab Results   Component Value Date/Time    Culture result: Culture performed on Fluid swab specimen 11/25/2021 08:22 PM    Culture result: NO GROWTH 4 DAYS 11/25/2021 08:22 PM              ___________________________________________________    Attending Physician: Diaz Small MD

## 2022-01-14 NOTE — PROGRESS NOTES
Name: Ben Maxwell: [employer]   : 1940 Admit Date: 2022   Phone: [phone]  Room: [facility]/   PCP: Héctor Sesay MD  MRN: [id number]   Date: 2022  Code: Full Code        HPI:      Chart and notes reviewed. Data reviewed. I review the patient's current medications in the medical record at each encounter.  I have evaluated and examined the patient. 9:09 AM       History was obtained from patient. I was asked by Linda Badillo DO to see Yordy Solimant in consultation for a chief complaint of Covid-19 pneumonia. History of Present Illness:  Tracee Sidhu is a very pleasant, 78 yo gentleman that presented to [facility] on  with worsening shortness of breath. He has a PMH of a.fib, aortic stenosis, DM, hypertension, CVA, and tobacco abuse (100 pack years, quit in ). He received two doses of the Covid vaccine, last in 2021. He did not receive a booster dose. He is currently requiring hi flow at 50L/100%. He reports that his symptoms started five days ago after he was exposed to his grandson who tested positive. He became more progressively more SOB with increased cough. No fever/chills. He did initially have diarrhea. Images:  Personally visualized and report reviewed. CXR :  Superimposed patchy bilateral airspace disease on interstitial changes concerning for infection or inflammation      D-dimer . 55  LFTs stable  Procalcitonin negative  Ferritin 759  Trop wnl  Pro-  CRP 3.18  ECHO 2020:  EF 55-60%, mildly reduced systolic function, moderate aortic stenosis, trave MVR, moderately elevated CVP. Interval History:    Afebrile  Bp stable  Sats 95% on 40L/100% HFNC  D-dimer . 55  Procalcitonin <.05  CRP 3.18--decreased  LFTs stable    ROS:  States that he doesn't feel badly. Has some SOB with activity. Denies fever/chills. Laid on sides overnight. Using IS.       Past Medical History:   Diagnosis Date    Atrial fibrillation ([facility] [address]     Diabetes mellitus, type 2 ([facility] [address]     Hypertension     Internal carotid artery stenosis, right     Chronic occlusion of the right internal carotid artery per MRI of 2020 unchanged from previous study of 2010; study of  also showed mild stenosis of the left internal carotid artery and bilateral patent vertebral arteries    Moderate aortic stenosis     Echocardiogram of 2020:  Severe aortic valve leaflet calcification present with reduced excursion. Moderate aortic valve stenosis was present. LVEF 55-60% .  Stroke [facility])     asymptomatic; small chronic infarcts in the bilateral thalami per MRI of 2020.   Previous MRI of 2010 showed possible tiny old right thalamic infarct versus perivascular space        Past Surgical History:   Procedure Laterality Date    HX CHOLECYSTECTOMY      NC INS NEW/RPLCMT PRM PM W/TRANSV ELTRD ATRIAL&VENT N/A 2020    INSERT PPM DUAL performed by Daisha Eugene MD at [place] [address] LAB       Family History   Adopted: Yes   Family history unknown: Yes       Social History     Tobacco Use    Smoking status: Former Smoker     Packs/day: 2.00     Years: 50.00     Pack years: 100.00     Quit date: 2004     Years since quittin.6    Smokeless tobacco: Never Used   Substance Use Topics    Alcohol use: Yes     Comment: 1 beer or 1 glaas of wine daily most of adult life       Allergies   Allergen Reactions    Ampicillin Nausea and Vomiting    Iodine Hives, Itching and Nausea and Vomiting       Current Facility-Administered Medications   Medication Dose Route Frequency    guaiFENesin-dextromethorphan (ROBITUSSIN DM) 100-10 mg/5 mL syrup 5 mL  5 mL Oral Q6H PRN    rivaroxaban (XARELTO) tablet 20 mg  20 mg Oral DAILY WITH DINNER    insulin NPH (NOVOLIN N, HUMULIN N) injection 10 Units  10 Units SubCUTAneous ACB&D    lisinopriL (PRINIVIL, ZESTRIL) tablet 10 mg  10 mg Oral QHS    atorvastatin (LIPITOR) tablet 20 mg  20 mg Oral DAILY    glucose chewable tablet 16 g  4 Tablet Oral PRN    dextrose (D50W) injection syrg 12.5-25 g  25-50 mL IntraVENous PRN    glucagon (GLUCAGEN) injection 1 mg  1 mg IntraMUSCular PRN    insulin lispro (HUMALOG) injection   SubCUTAneous AC&HS    dexamethasone (PF) (DECADRON) 10 mg/mL injection 10 mg  10 mg IntraVENous Q12H    baricitinib (OLUMIANT) tablet 2 mg  2 mg Oral DAILY    sodium chloride (NS) flush 5-40 mL  5-40 mL IntraVENous Q8H    sodium chloride (NS) flush 5-40 mL  5-40 mL IntraVENous PRN    acetaminophen (TYLENOL) tablet 650 mg  650 mg Oral Q6H PRN    Or    acetaminophen (TYLENOL) suppository 650 mg  650 mg Rectal Q6H PRN    polyethylene glycol (MIRALAX) packet 17 g  17 g Oral DAILY PRN    ondansetron (ZOFRAN ODT) tablet 4 mg  4 mg Oral Q8H PRN    Or    ondansetron (ZOFRAN) injection 4 mg  4 mg IntraVENous Q6H PRN         REVIEW OF SYSTEMS   Negative except as stated in the HPI. Physical Exam:   Visit Vitals  /74 (BP 1 Location: Left arm, BP Patient Position: At rest)   Pulse 79   Temp 97.7 °F (36.5 °C)   Resp 22   Ht 5' 8\" (1.727 m)   Wt 68.4 kg (150 lb 11.2 oz)   SpO2 95%   BMI 22.91 kg/m²       General:  Alert, cooperative, no distress, appears stated age, on HF   Head:  Normocephalic, without obvious abnormality, atraumatic. Eyes:  Conjunctivae/corneas clear. Nose: Nares normal. Septum midline. Mucosa normal.   Throat: Lips, mucosa, and tongue normal. Teeth and gums normal.   Neck: Supple, symmetrical, trachea midline, no adenopathy, thyroid: no enlargment/tenderness/nodules, no carotid bruit and no JVD. Lungs:   Clear but diminished. Chest wall:  No tenderness or deformity. Heart:  Regular rate and rhythm, S1, S2 normal, no murmur, click, rub or gallop. Abdomen:   Soft, non-tender. Bowel sounds normal.   Extremities: Extremities normal, atraumatic, no cyanosis or edema. Pulses: 2+ and symmetric all extremities.    Skin: Skin color, texture, turgor normal. No rashes or lesions   Lymph nodes: Cervical  nodes normal.   Neurologic: Grossly nonfocal       Lab Results   Component Value Date/Time    Sodium 134 (L) 01/13/2022 03:09 AM    Potassium 4.7 01/13/2022 03:09 AM    Chloride 108 01/13/2022 03:09 AM    CO2 23 01/13/2022 03:09 AM    BUN 32 (H) 01/13/2022 03:09 AM    Creatinine 1.03 01/13/2022 03:09 AM    Glucose 190 (H) 01/13/2022 03:09 AM    Calcium 8.5 01/13/2022 03:09 AM    Magnesium 1.9 01/13/2022 03:09 AM    Phosphorus 3.1 01/13/2022 03:09 AM    Lactic acid 1.3 01/11/2022 06:34 PM       Lab Results   Component Value Date/Time    WBC 8.4 01/13/2022 03:09 AM    HGB 14.3 01/13/2022 03:09 AM    PLATELET 990 79/53/1702 03:09 AM    MCV 86.7 01/13/2022 03:09 AM       Lab Results   Component Value Date/Time    INR 1.0 02/04/2020 03:50 PM    aPTT 32.0 09/22/2010 03:50 AM    Alk.  phosphatase 44 (L) 01/13/2022 03:09 AM    Protein, total 7.1 01/13/2022 03:09 AM    Albumin 2.7 (L) 01/13/2022 03:09 AM    Globulin 4.4 (H) 01/13/2022 03:09 AM       Lab Results   Component Value Date/Time    Ferritin 759 (H) 01/11/2022 06:34 PM       Lab Results   Component Value Date/Time    Sed rate (ESR) 5 09/22/2010 03:50 AM    C-Reactive protein 3.18 (H) 01/13/2022 03:09 AM        No results found for: PH, PHI, PCO2, PCO2I, PO2, PO2I, HCO3, HCO3I, FIO2, FIO2I    Lab Results   Component Value Date/Time    CK 79 09/22/2010 03:50 AM    Troponin-I, Qt. 0.05 (H) 09/21/2010 12:10 PM        Lab Results   Component Value Date/Time    Culture result: Culture performed on Fluid swab specimen 11/25/2021 08:22 PM    Culture result: NO GROWTH 4 DAYS 11/25/2021 08:22 PM       No results found for: TOXA1, RPR, HBCM, HBSAG, HAAB, HCAB1, HAAT, G6PD, CRYAC, HIVGT, HIVR, HIV1, HIV12, HIVPC, HIVRPI    Lab Results   Component Value Date/Time    CK 79 09/22/2010 03:50 AM       Lab Results   Component Value Date/Time    Color YELLOW/STRAW 08/02/2019 12:10 AM    Appearance CLEAR 08/02/2019 12:10 AM    pH (UA) 5.0 08/02/2019 12:10 AM    Protein 30 (A) 08/02/2019 12:10 AM    Glucose 500 (A) 08/02/2019 12:10 AM    Ketone NEGATIVE  08/02/2019 12:10 AM    Bilirubin NEGATIVE  08/02/2019 12:10 AM    Blood SMALL (A) 08/02/2019 12:10 AM    Urobilinogen 0.2 08/02/2019 12:10 AM    Nitrites NEGATIVE  08/02/2019 12:10 AM    Leukocyte Esterase NEGATIVE  08/02/2019 12:10 AM    WBC 0-4 08/02/2019 12:10 AM    RBC 0-5 08/02/2019 12:10 AM    Bacteria NEGATIVE  08/02/2019 12:10 AM       IMPRESSION  · Acute Respiratory Failure with Hypoxia  · Covid-19 Pneumonia  · History of A. Fib  · Moderate Aortic Stenosis  · History of Tobacco Abuse  · DM  · Hypertension      PLAN  · Continue HF to keep sats >88%; weaned to 35L/100%  · Continue Dexamethasone 10mg q12h  · Agree with Baricitinib  · On AC with Xarleto  · Trend CRP, d-dimer, LFTs  · Encourage IS use  · Prone as able  · OOB  · Would benefit from PFTs as an outpatient  · Will need repeat imaging as an outpatient     is critically ill, requiring HFNC for acute respiratory failure secondary to Covid pneumonia. Total CC time: 35 minutes. Thank you very much for the consult.     Rosemary Wheat NP

## 2022-01-14 NOTE — PROGRESS NOTES
[facility]  [address] [facility], [place], [address] [place] [address]  [phone]      Medical Progress Note      NAME: Yarely Sagastume   :  1940  MRM:  [id number]    Date/Time: 2022        Assessment / Plan:     78 yo M w/ hx of AF, HTN, DM presenting w/ fatigue, dyspneic, admitted for AHRF 2/2 COVID-19      Acute respiratory failure with hypoxia: 2/2 COVID. Severe, requiring 40-55L HFNC. Low threshold for BiPAP. Monitor closely, prognosis guarded. Palliative care following      Pneumonia due to COVID-19 virus: Cont high dose IV dexamethasone, baricitinib. D-dimer WNR, unlikely acute PE/VTE, as pt was already on Xarelto. Trend CRP, d-dimer. Procalcitonin low, no need for abx for now      Type 2 diabetes mellitus without complication: R6K 7.6%. BG high 2/2 steroids. Increase NPH, cont SSI. Resume ACEi       SOHAIL: mild, resolved. Cont lisinopril      A-fib: monitor on telemetry, cont Xarelto      History of CVA (cerebrovascular accident): cont OAC, statin      Hypertension: BP up-trending. Resume lisinopril       Total time spent: 35 minutes     Time spent in the care of this patient including reviewing records, discussing with nursing and/or other providers on the treatment team, obtaining history and examining the patient, and discussing treatment plans. Care Plan discussed with: Patient, Nursing Staff and >50% of time spent in counseling and coordination of care    Discussed:  Care Plan    Prophylaxis:  Xarelto    Disposition:  Home w/Family         Subjective:     Chief Complaint:  Follow up COVID    Chart/notes/labs/studies reviewed, patient examined. Feels \"okay\". No CP, SOB at rest. No F/C          Objective:       Vitals:        Last 24hrs VS reviewed since prior progress note.  Most recent are:    Visit Vitals  /69 (BP 1 Location: Right upper arm, BP Patient Position: At rest)   Pulse 75   Temp 98.3 °F (36.8 °C)   Resp 19   Ht 5' 8\" (1.727 m)   Wt 68.4 kg (150 lb 11.2 oz)   SpO2 92%   BMI 22.91 kg/m²     SpO2 Readings from Last 6 Encounters:   01/14/22 92%   11/26/21 95%   11/19/21 93%   05/28/21 95%   02/06/20 98%   02/02/20 93%    O2 Flow Rate (L/min): 55 l/min       Intake/Output Summary (Last 24 hours) at 1/14/2022 1756  Last data filed at 1/14/2022 1724  Gross per 24 hour   Intake 730 ml   Output 700 ml   Net 30 ml          Exam:     Physical Exam:    Gen:  Elderly, ill-appearing. NAD  HEENT:  Atraumatic, normocephalic. Sclerae nonicteric, hearing intact to voice  Neck:  Supple, without apparent masses  Resp:  No accessory muscle use, increased WOB, mildly diminished breath sounds w/o wheezing, rales, or rhonchi  Card: RRR, without m/r/g. No LE edema  Abd:  +bowel sounds, soft, NTTP, nondistended  Neuro: Face symmetric, speech fluent, follows commands appropriately, no focal weakness or numbness  Psych:  Alert, oriented x 3.  Fair insight     Medications Reviewed: (see below)    Lab Data Reviewed: (see below)    ______________________________________________________________________    Medications:     Current Facility-Administered Medications   Medication Dose Route Frequency    guaiFENesin-dextromethorphan (ROBITUSSIN DM) 100-10 mg/5 mL syrup 5 mL  5 mL Oral Q6H PRN    rivaroxaban (XARELTO) tablet 20 mg  20 mg Oral DAILY WITH DINNER    insulin NPH (NOVOLIN N, HUMULIN N) injection 10 Units  10 Units SubCUTAneous ACB&D    lisinopriL (PRINIVIL, ZESTRIL) tablet 10 mg  10 mg Oral QHS    atorvastatin (LIPITOR) tablet 20 mg  20 mg Oral DAILY    glucose chewable tablet 16 g  4 Tablet Oral PRN    dextrose (D50W) injection syrg 12.5-25 g  25-50 mL IntraVENous PRN    glucagon (GLUCAGEN) injection 1 mg  1 mg IntraMUSCular PRN    insulin lispro (HUMALOG) injection   SubCUTAneous AC&HS    dexamethasone (PF) (DECADRON) 10 mg/mL injection 10 mg  10 mg IntraVENous Q12H    baricitinib (OLUMIANT) tablet 2 mg  2 mg Oral DAILY    sodium chloride (NS) flush 5-40 mL  5-40 mL IntraVENous Q8H    sodium chloride (NS) flush 5-40 mL  5-40 mL IntraVENous PRN    acetaminophen (TYLENOL) tablet 650 mg  650 mg Oral Q6H PRN    Or    acetaminophen (TYLENOL) suppository 650 mg  650 mg Rectal Q6H PRN    polyethylene glycol (MIRALAX) packet 17 g  17 g Oral DAILY PRN    ondansetron (ZOFRAN ODT) tablet 4 mg  4 mg Oral Q8H PRN    Or    ondansetron (ZOFRAN) injection 4 mg  4 mg IntraVENous Q6H PRN            Lab Review:     Recent Labs     01/13/22  0309 01/12/22  1239 01/11/22  1834   WBC 8.4 3.2* 4.7   HGB 14.3 14.8 16.0   HCT 41.6 43.2 46.5    157 171     Recent Labs     01/13/22  0309 01/12/22  1239 01/11/22  1834   * 133* 132*   K 4.7 4.5 4.5    104 101   CO2 23 25 24   * 245* 162*   BUN 32* 31* 39*   CREA 1.03 1.13 1.64*   CA 8.5 8.5 8.7   MG 1.9 2.0 1.7   PHOS 3.1 2.7  --    ALB 2.7* 2.7* 3.3*   ALT 26 27 33     No components found for: GLPOC  No results for input(s): PH, PCO2, PO2, HCO3, FIO2 in the last 72 hours. No results for input(s): INR, INREXT, INREXT in the last 72 hours.   No results found for: SDES  Lab Results   Component Value Date/Time    Culture result: Culture performed on Fluid swab specimen 11/25/2021 08:22 PM    Culture result: NO GROWTH 4 DAYS 11/25/2021 08:22 PM              ___________________________________________________    Attending Physician: Mamta Mckinney MD

## 2022-01-14 NOTE — PROGRESS NOTES
Problem: Isolation Precautions - Risk of Spread of Infection  Goal: Prevent transmission of infectious organism to others  Outcome: Progressing Towards Goal     Problem: Diabetes Self-Management  Goal: *Disease process and treatment process  Description: Define diabetes and identify own type of diabetes; list 3 options for treating diabetes.   Outcome: Progressing Towards Goal

## 2022-01-14 NOTE — ACP (ADVANCE CARE PLANNING)
Advance Care Planning   Advance Care Planning Inpatient Note  [address]    Today's Date: 1/14/2022  Unit: SF[facility] 3 PROG CARE TELE 2    Received request from admission screening. Upon review of chart and communication with care team, patient's decision making abilities are not in question. Patient was/were present in the room during visit. Goals of ACP Conversation:  Discuss Advance Care planning documents  Facilitate a discussion related to patient's goals of care as they align with the patient's values and beliefs    Health Care Decision Makers:      Primary Decision Maker: Montserrat Saez - Trav - [phone]    Secondary Decision Maker: Gretel Nice - [phone]      Summary:  No Decision Maker named by patient at this time    Advance Care Planning Documents (Patient Wishes) on file:  None     Assessment:    Responded to an In Basket request for an AMD conversation with pt. Pt is currently on Covid isolation precautions. The conversation was over pt's room phone. He did not choose to complete an AMD at this time. He did verbally confirm that he is comfortable with his legal NOK, trav Johnson being his MPOA. He indicated that he will take the blank AMD he has home and discuss it with his son and grandson. Interventions:  Provided education on documents for clarity and greater understanding  Discussed and provided education on state decision maker hierarchy  Reviewed but did not complete ACP document    Care Preferences Communicated:  No    Outcomes/Plan:  No AMD completed at this time.     LES Ramirez on 1/14/2022 at 1:11 PM

## 2022-01-14 NOTE — PROGRESS NOTES
1/14/2022  11:35 AM    Care Management Progress Note      ICD-10-CM ICD-9-CM    1. Hypoxia  R09.02 799.02    2. COVID-19  U07.1 079.89    3. Hip pain  M25.559 719.45    4. Palliative care encounter  Z51.5 V66.7    5. Weakness generalized  R53.1 780.79    6. Poor appetite  R63.0 783.0    7. Advanced care planning/counseling discussion  Z71.89 V65.49    8. DNR (do not resuscitate) discussion  Z71.89 V65.49    9. Goals of care, counseling/discussion  Z71.89 V65.49        RUR:  14%  Risk Level: [x]Low []Moderate []High  Value-based purchasing: [] Yes [x] No  Bundle patient: [] Yes [x] No   Specify:     Transition of care plan:  1. CM following - patient lives with his grandson, is normally independent with ADLs. Patient is currently on 40L hiflo O2.  2. Await further evaluation and clinical progress   3. Palliative care met with patient 1/13 - patient remains Full Code; recommendation for PT/OT evals when medically appropriate  4. Disposition TBD pending progress  5.  CM to continue to follow    Gail Thomson RN

## 2022-01-14 NOTE — ACP (ADVANCE CARE PLANNING)
Primary Decision Maker: Whitley Fontaine - [phone]    Secondary Decision Maker: Isabelle Chris - [phone]  Advance Care Planning 1/13/2022   Patient's Healthcare Decision Maker is: Legal Next of Kin   Confirm Advance Directive None   Patient Would Like to Complete Advance Directive Yes     Pt does not have AMD at this time, expressed desire to review document and discuss with son before completing. In absence of assigned Medical POA, son Sheeba Cano is legal NOK and would be surrogate decision maker if pt is unable to speak for himself. Pt remains full code at this time but will discuss with son.

## 2022-01-14 NOTE — PROGRESS NOTES
Spiritual Care Assessment/Progress Note  [address]      NAME: Gonzales Cee      MRN: [id number]  AGE: 79 y.o.  SEX: male  Temple Affiliation: Lutheran   Language: English     1/14/2022     Total Time (in minutes): 32     Spiritual Assessment begun in [facility] 3 PRO CARE TELE 2 through conversation with:         [x]Patient        [] Family    [] Friend(s)        Reason for Consult: Advance medical directive consult     Spiritual beliefs: (Please include comment if needed)     [x] Identifies with a more tradition:         [] Supported by a more community:            [] Claims no spiritual orientation:           [] Seeking spiritual identity:                [] Adheres to an individual form of spirituality:           [] Not able to assess:                           Identified resources for coping:      [] Prayer                               [] Music                  [] Guided Imagery     [x] Family/friends                 [] Pet visits     [] Devotional reading                         [] Unknown     [] Other:                                              Interventions offered during this visit: (See comments for more details)    Patient Interventions: Advance medical directive consult,Affirmation of emotions/emotional suffering,Affirmation of more,Coping skills reviewed/reinforced           Plan of Care:     [] Support spiritual and/or cultural needs    [] Support AMD and/or advance care planning process      [] Support grieving process   [] Coordinate Rites and/or Rituals    [] Coordination with community clergy   [] No spiritual needs identified at this time   [] Detailed Plan of Care below (See Comments)  [] Make referral to Music Therapy  [] Make referral to Pet Therapy     [] Make referral to Addiction services  [] Make referral to [facility]  [] Make referral to Spiritual Care Partner  [] No future visits requested        [x] Follow up visits as needed       Responded to an In Basket request for an AMD conversation with pt. Pt is currently on Covid isolation precautions. The conversation was over pt's room phone. He did not choose to complete an AMD at this time. He did verbally confirm that he is comfortable with his legal NOK, son Sheeba Castros being his MPOA. He indicated that he will take the blank AMD he has home and discuss it with his son and grandson. Pt spoke of his concern about his illness and fear of how he will respond medically. He lives with his son and grandson and misses being with his family.     Chaplain Regan MDiv, MS, [facility]

## 2022-01-14 NOTE — ROUTINE PROCESS
1900 - Bedside shift change report given to [address]i RN (oncoming nurse) by Singh Carcamo RN (offgoing nurse). Report included the following information SBAR, Kardex, Intake/Output, MAR, Accordion and Recent Results. 0700 - Bedside shift change report given to Eli Perry RN (oncoming nurse) by Jigna Hull RN (offgoing nurse). Report included the following information SBAR, Kardex, Intake/Output, MAR, Accordion and Recent Results.

## 2022-01-14 NOTE — PROGRESS NOTES
0730- Bedside and Verbal shift change report given to Prashant Brock RN (oncoming nurse) by Henrik Ann RN (offgoing nurse). Report included the following information SBAR, Kardex, ED Summary, OR Summary, Procedure Summary, Intake/Output, MAR, Accordion, and Recent Results. This patient was assisted with Intentional Toileting every 2 hours during this shift. Documentation of ambulation and output reflected on Flowsheet. 1930-Bedside and Verbal shift change report given to RN (oncoming nurse) by Prashant Brock RN(offgoing nurse). Report included the following information SBAR, Kardex, ED Summary, OR Summary, Procedure Summary, Intake/Output, MAR, Accordion, Recent Results, and Med Rec Status.

## 2022-01-15 NOTE — ROUTINE PROCESS
0700  Bedside and Verbal shift change report given to Doyle Landau RN (oncoming nurse) by Maricarmen Sears (offgoing nurse). Report included the following information SBAR, Kardex, Procedure Summary, Intake/Output, MAR, Accordion, Recent Results and Cardiac Rhythm NSR. Initial assessment done. AOX4. No complaints of pain. On 55 L and 90%. Visit Vitals  BP (!) 140/66 (BP 1 Location: Left upper arm, BP Patient Position: At rest)   Pulse 73   Temp 97.6 °F (36.4 °C)   Resp 18   Ht 5' 8\" (1.727 m)   Wt 68.4 kg (150 lb 11.2 oz)   SpO2 (!) 89%   BMI 22.91 kg/m²     1900  Bedside and Verbal shift change report given to [employer] (oncoming nurse) by Doyle Landau RN (offgoing nurse). Report included the following information SBAR, Kardex, Procedure Summary, Intake/Output, MAR, Accordion and Recent Results.

## 2022-01-15 NOTE — PROGRESS NOTES
[facility]  [address] [facility] [address], [place], [address] [place] [address]  [phone]      Medical Progress Note      NAME: Gonzales Cee   :  1940  MRM:  [id number]    Date/Time: 1/15/2022        Assessment / Plan:     80 yo M w/ hx of AF, HTN, DM presenting w/ fatigue, dyspneic, admitted for AHRF 2/2 COVID-19      Acute respiratory failure with hypoxia: 2/2 COVID. Severe, requiring HFNC, ~55L today, titrate up as needed. Low threshold for BiPAP. Monitor closely, prognosis guarded. Palliative care following       Pneumonia due to COVID-19 virus: Cont high dose IV dexamethasone, baricitinib. D-dimer WNR, unlikely acute PE/VTE, as pt was already on Xarelto. Trend CRP, d-dimer. Procalcitonin low, no need for abx. Repeat CXR in AM      Type 2 diabetes mellitus without complication: T0U 2.0%. BG high 2/2 steroids. Increased NPH, cont SSI. Cont ACEi       SOHAIL: mild, resolved. Cont lisinopril      A-fib: monitor on telemetry, cont Xarelto      History of CVA (cerebrovascular accident): cont OAC, statin      Hypertension: BP up-trending. Resume lisinopril       Total time spent: 35 minutes, updated pt's grandson  Name [address] Rievra [phone]            Critical Care:  I personally spent 35 minutes in providing critical care. The reason for providing this level of medical care for this critically ill patient was due to a critical illness (severe AHRF 2/2 COVID-19) that impaired one or more vital organ systems such that there was a high probability of imminent or life threatening deterioration in the patient's condition. This care involved high complexity decision making to assess, manipulate, and support vital system functions.     Time spent in the care of this patient including reviewing records, discussing with nursing and/or other providers on the treatment team, obtaining history and examining the patient, and discussing treatment plans. Care Plan discussed with: Patient, Nursing Staff and >50% of time spent in counseling and coordination of care    Discussed:  Care Plan    Prophylaxis:  Xarelto    Disposition:  Home w/Family         Subjective:     Chief Complaint:  Follow up COVID    Chart/notes/labs/studies reviewed, patient examined. Feels fine. Denies significant dyspnea. No CP. No fevers          Objective:       Vitals:        Last 24hrs VS reviewed since prior progress note. Most recent are:    Visit Vitals  BP (!) 140/66 (BP 1 Location: Left upper arm, BP Patient Position: At rest)   Pulse 73   Temp 97.6 °F (36.4 °C)   Resp 18   Ht 5' 8\" (1.727 m)   Wt 68.4 kg (150 lb 11.2 oz)   SpO2 (!) 89%   BMI 22.91 kg/m²     SpO2 Readings from Last 6 Encounters:   01/15/22 (!) 89%   11/26/21 95%   11/19/21 93%   05/28/21 95%   02/06/20 98%   02/02/20 93%    O2 Flow Rate (L/min): 55 l/min       Intake/Output Summary (Last 24 hours) at 1/15/2022 1739  Last data filed at 1/15/2022 5221  Gross per 24 hour   Intake    Output 750 ml   Net -750 ml          Exam:     Physical Exam:    Gen:  Elderly, ill-appearing. NAD  HEENT:  Atraumatic, normocephalic. Sclerae nonicteric, hearing intact to voice  Neck:  Supple, without apparent masses  Resp:  No accessory muscle use, increased WOB, mildly diminished breath sounds w/o wheezing, rales, or rhonchi  Card: RRR, without m/r/g. No LE edema  Abd:  +bowel sounds, soft, NTTP, nondistended  Neuro: Face symmetric, speech fluent, follows commands appropriately, no focal weakness or numbness  Psych:  Alert, oriented x 3.  Fair insight     Medications Reviewed: (see below)    Lab Data Reviewed: (see below)    ______________________________________________________________________    Medications:     Current Facility-Administered Medications   Medication Dose Route Frequency    [START ON 1/16/2022] baricitinib (OLUMIANT) tablet 4 mg  4 mg Oral DAILY    insulin NPH (NOVOLIN N, HUMULIN N) injection 12 Units  12 Units SubCUTAneous ACB&D    guaiFENesin-dextromethorphan (ROBITUSSIN DM) 100-10 mg/5 mL syrup 5 mL  5 mL Oral Q6H PRN    rivaroxaban (XARELTO) tablet 20 mg  20 mg Oral DAILY WITH DINNER    lisinopriL (PRINIVIL, ZESTRIL) tablet 10 mg  10 mg Oral QHS    atorvastatin (LIPITOR) tablet 20 mg  20 mg Oral DAILY    glucose chewable tablet 16 g  4 Tablet Oral PRN    dextrose (D50W) injection syrg 12.5-25 g  25-50 mL IntraVENous PRN    glucagon (GLUCAGEN) injection 1 mg  1 mg IntraMUSCular PRN    insulin lispro (HUMALOG) injection   SubCUTAneous AC&HS    dexamethasone (PF) (DECADRON) 10 mg/mL injection 10 mg  10 mg IntraVENous Q12H    sodium chloride (NS) flush 5-40 mL  5-40 mL IntraVENous Q8H    sodium chloride (NS) flush 5-40 mL  5-40 mL IntraVENous PRN    acetaminophen (TYLENOL) tablet 650 mg  650 mg Oral Q6H PRN    Or    acetaminophen (TYLENOL) suppository 650 mg  650 mg Rectal Q6H PRN    polyethylene glycol (MIRALAX) packet 17 g  17 g Oral DAILY PRN    ondansetron (ZOFRAN ODT) tablet 4 mg  4 mg Oral Q8H PRN    Or    ondansetron (ZOFRAN) injection 4 mg  4 mg IntraVENous Q6H PRN            Lab Review:     Recent Labs     01/15/22  0102 01/13/22  0309   WBC 8.6 8.4   HGB 13.9 14.3   HCT 40.3 41.6    172     Recent Labs     01/15/22  0102 01/13/22  0309    134*   K 4.6 4.7    108   CO2 29 23   * 190*   BUN 28* 32*   CREA 0.88 1.03   CA 8.4* 8.5   MG 1.8 1.9   PHOS 3.0 3.1   ALB 2.6* 2.7*   ALT 26 26     No components found for: GLPOC  No results for input(s): PH, PCO2, PO2, HCO3, FIO2 in the last 72 hours. No results for input(s): INR, INREXT, INREXT in the last 72 hours.   No results found for: SDES  Lab Results   Component Value Date/Time    Culture result: Culture performed on Fluid swab specimen 11/25/2021 08:22 PM    Culture result: NO GROWTH 4 DAYS 11/25/2021 08:22 PM              ___________________________________________________    Attending Physician: Libra Shahid Lotus Galeas MD

## 2022-01-15 NOTE — PROGRESS NOTES
Baricitinib Dose Change Note 01/15/22  Physician: Dr Maria Luisa Diamond  Indication: Covid 19+  Estimated Creatinine Clearance: 62.6 mL/min (based on SCr of 0.88 mg/dL).     eGFR ?60: 4 mg once daily  eGFR 30 to <60: 2 mg once daily  eGFR 15 to <30: 1 mg once daily if potential benefit outweighs risk  eGFR ? 15: not recommended  On dialysis, ESRD, or SOHAIL: not recommended      Current dose: Baricitnib 2 mg po daily  Plan: Change Baricitinib to 6 mg po daily per protocol    Thank you  Aminta Page, PharmD  [phone]

## 2022-01-15 NOTE — PROGRESS NOTES
Problem: Falls - Risk of  Goal: *Absence of Falls  Description: Document Jose Ding Fall Risk and appropriate interventions in the flowsheet.   Outcome: Progressing Towards Goal  Note: Fall Risk Interventions:  Mobility Interventions: Bed/chair exit alarm,Patient to call before getting OOB,Utilize walker, cane, or other assistive device         Medication Interventions: Bed/chair exit alarm,Patient to call before getting OOB,Teach patient to arise slowly    Elimination Interventions: Bed/chair exit alarm,Call light in reach,Elevated toilet seat,Patient to call for help with toileting needs,Toilet paper/wipes in reach,Toileting schedule/hourly rounds,Urinal in reach

## 2022-01-16 NOTE — PROGRESS NOTES
1915 Bedside and Verbal shift change report given to YASMANI Calloway (oncoming nurse) by Jayme Cranker., RN (offgoing nurse). Report included the following information SBAR, Kardex, Intake/Output, MAR, Recent Results and Cardiac Rhythm vpaced. This patient was assisted with Intentional Toileting every 2 hours during this shift as appropriate. Documentation of ambulation and output reflected on Flowsheet as appropriate. Purposeful hourly rounding was completed using AIDET and 5Ps. Outcomes of PHR documented as they occurred. Bed alarm in use as appropriate. Dual Suction and ambubag in place.

## 2022-01-16 NOTE — PROGRESS NOTES
0700  Bedside and Verbal shift change report given to Kim Rodriguez (oncoming nurse) by Carlos Chang RN (offgoing nurse). Report included the following information SBAR, Kardex, Procedure Summary, Intake/Output, MAR, Accordion and Recent Results. Initial assessment done. AOX4. No complaints of pain. Bed alarm on. Will continue to monitor.      Visit Vitals  /68 (BP 1 Location: Right upper arm, BP Patient Position: Semi fowlers)   Pulse 80   Temp 97.7 °F (36.5 °C)   Resp 18   Ht 5' 8\" (1.727 m)   Wt 68.4 kg (150 lb 11.2 oz)   SpO2 91%   BMI 22.91 kg/m²

## 2022-01-17 NOTE — PROGRESS NOTES
Bedside shift change report given to [facility] (oncoming nurse) by Chelsea Valdez (offgoing nurse). Report included the following information SBAR, Kardex, Procedure Summary, Intake/Output, MAR, Recent Results and Cardiac Rhythm A fib.

## 2022-01-17 NOTE — PROGRESS NOTES
[facility] [place] [facility]  [address] [facility], [place], [address] [place] [address]  [phone]      Medical Progress Note      NAME: Johnson Marshall   :  1940  MRM:  [id number]    Date/Time: 2022        Assessment / Plan:     78 yo M w/ hx of AF, HTN, DM presenting w/ fatigue, dyspneic, admitted for AHRF 2/2 COVID-19      Acute respiratory failure with hypoxia: 2/2 COVID. Severe, requiring 55L HFNC. Prognosis guarded, discussed w/ son today. He confirms FULL CODE status. Palliative care following       Pneumonia due to COVID-19 virus: Repeat CXR today worsening of left mid and lower lung pulmonary opacities. On high dose IV dexamethasone, baricitinib. No indication for abx, procalcitonin low but would have low threshold to use empiric abx given severity of illness. D-dimer low, unlikely acute PE/VTE, as pt was already taking Xarelto. Follow CRP. Pulmonology following      Type 2 diabetes mellitus without complication: H8M 2.0%. BG high 2/2 steroids. Increased NPH, cont SSI. Cont ACEi       SOHAIL: mild, resolved. Cont lisinopril      A-fib: monitor on telemetry, cont Xarelto      History of CVA (cerebrovascular accident): cont OAC, statin      Hypertension: BP up-trending. Resume lisinopril       Total time spent: 35 minutes, updated pt's son at [address] [facility] [phone]   Critical Care:  I personally spent 35 minutes in providing critical care. The reason for providing this level of medical care for this critically ill patient was due to a critical illness (severe AHRF 2/2 COVID-19) that impaired one or more vital organ systems such that there was a high probability of imminent or life threatening deterioration in the patient's condition. This care involved high complexity decision making to assess, manipulate, and support vital system functions.     Time spent in the care of this patient including reviewing records, discussing with nursing and/or other providers on the treatment team, obtaining history and examining the patient, and discussing treatment plans. Care Plan discussed with: Patient, Nursing Staff and >50% of time spent in counseling and coordination of care    Discussed:  Care Plan    Prophylaxis:  Xarelto    Disposition:  Home w/Family         Subjective:     Chief Complaint:  Follow up COVID    Chart/notes/labs/studies reviewed, patient examined. Does not feel short-of-breath, states he feels fine. No fevers. Denies CP. Objective:       Vitals:        Last 24hrs VS reviewed since prior progress note. Most recent are:    Visit Vitals  BP (!) 100/58 (BP 1 Location: Right upper arm, BP Patient Position: Semi fowlers)   Pulse 73   Temp 97.4 °F (36.3 °C)   Resp 22   Ht 5' 8\" (1.727 m)   Wt 68.4 kg (150 lb 11.2 oz)   SpO2 98%   BMI 22.91 kg/m²     SpO2 Readings from Last 6 Encounters:   01/16/22 98%   11/26/21 95%   11/19/21 93%   05/28/21 95%   02/06/20 98%   02/02/20 93%    O2 Flow Rate (L/min): 55 l/min       Intake/Output Summary (Last 24 hours) at 1/16/2022 1918  Last data filed at 1/16/2022 3286  Gross per 24 hour   Intake    Output 350 ml   Net -350 ml          Exam:     Physical Exam:    Gen:  Elderly, ill-appearing. NAD. Pleasant   HEENT:  Atraumatic, normocephalic. Sclerae nonicteric, hearing intact to voice  Neck:  Supple, without apparent masses  Resp:  No accessory muscle use, increased WOB, mildly diminished breath sounds w/o wheezing, rales, or rhonchi  Card: RRR, without m/r/g. No LE edema  Abd:  +bowel sounds, soft, NTTP, nondistended  Neuro: Face symmetric, speech fluent, follows commands appropriately, no focal weakness or numbness  Psych:  Alert, oriented x 3.  Fair insight     Medications Reviewed: (see below)    Lab Data Reviewed: (see below)    ______________________________________________________________________    Medications:     Current Facility-Administered Medications   Medication Dose Route Frequency    baricitinib (OLUMIANT) tablet 4 mg  4 mg Oral DAILY    insulin NPH (NOVOLIN N, HUMULIN N) injection 12 Units  12 Units SubCUTAneous ACB&D    guaiFENesin-dextromethorphan (ROBITUSSIN DM) 100-10 mg/5 mL syrup 5 mL  5 mL Oral Q6H PRN    rivaroxaban (XARELTO) tablet 20 mg  20 mg Oral DAILY WITH DINNER    lisinopriL (PRINIVIL, ZESTRIL) tablet 10 mg  10 mg Oral QHS    atorvastatin (LIPITOR) tablet 20 mg  20 mg Oral DAILY    glucose chewable tablet 16 g  4 Tablet Oral PRN    dextrose (D50W) injection syrg 12.5-25 g  25-50 mL IntraVENous PRN    glucagon (GLUCAGEN) injection 1 mg  1 mg IntraMUSCular PRN    insulin lispro (HUMALOG) injection   SubCUTAneous AC&HS    dexamethasone (PF) (DECADRON) 10 mg/mL injection 10 mg  10 mg IntraVENous Q12H    sodium chloride (NS) flush 5-40 mL  5-40 mL IntraVENous Q8H    sodium chloride (NS) flush 5-40 mL  5-40 mL IntraVENous PRN    acetaminophen (TYLENOL) tablet 650 mg  650 mg Oral Q6H PRN    Or    acetaminophen (TYLENOL) suppository 650 mg  650 mg Rectal Q6H PRN    polyethylene glycol (MIRALAX) packet 17 g  17 g Oral DAILY PRN    ondansetron (ZOFRAN ODT) tablet 4 mg  4 mg Oral Q8H PRN    Or    ondansetron (ZOFRAN) injection 4 mg  4 mg IntraVENous Q6H PRN            Lab Review:     Recent Labs     01/16/22  0152 01/15/22  0102   WBC 7.8 8.6   HGB 14.7 13.9   HCT 42.1 40.3    218     Recent Labs     01/16/22  0152 01/15/22  0102   * 136   K 4.2 4.6   CL 99 101   CO2 30 29   * 159*   BUN 25* 28*   CREA 0.78 0.88   CA 8.8 8.4*   MG 2.1 1.8   PHOS 3.1 3.0   ALB 2.8* 2.6*   ALT 23 26     No components found for: GLPOC  No results for input(s): PH, PCO2, PO2, HCO3, FIO2 in the last 72 hours. No results for input(s): INR, INREXT, INREXT in the last 72 hours.   No results found for: SDES  Lab Results   Component Value Date/Time    Culture result: Culture performed on Fluid swab specimen 11/25/2021 08:22 PM    Culture result: NO GROWTH 4 DAYS 11/25/2021 08:22 PM              ___________________________________________________    Attending Physician: Tonny Flynn MD

## 2022-01-17 NOTE — PROGRESS NOTES
Bedside shift change report given to Iliana Babin (oncoming nurse) by YASMANI Berry (offgoing nurse). Report included the following information SBAR, Kardex, ED Summary, Procedure Summary, Intake/Output, MAR, Recent Results and Med Rec Status. Bedside shift change report given to Muriel Morales (oncoming nurse) by Genet Ramirez RN (offgoing nurse). Report included the following information SBAR, Kardex, ED Summary, Procedure Summary, Intake/Output, MAR, Accordion, Recent Results and Med Rec Status.

## 2022-01-17 NOTE — PROGRESS NOTES
Carlos Eduardo Kingston [facility] [place] [address]  [address] [facility], [address] [place] [address]  [phone]      Medical Progress Note      NAME: Deena Dorsey   :  1940  MRM:  [id number]    Date/Time: 2022        Assessment / Plan:     80 yo M w/ hx of AF, HTN, DM presenting w/ fatigue, dyspneic, admitted for AHRF 2/2 COVID-19      Acute respiratory failure with hypoxia: 2/2 COVID. Severe, requiring 55L HFNC. Prognosis guarded       Pneumonia due to COVID-19 virus: Repeat CXR  showed worsening of left mid and lower lung pulmonary opacities. On high dose IV dexamethasone, baricitinib. Low procalcitonin, defer abx but low threshold to start given severity of illness. D-dimer low, unlikely acute PE/VTE, already on Xarelto. CRP improved/normalized, continue to monitor. Pulmonology following      Aortic stenosis: moderate, based on echo in 2020. Repeat TTE       Type 2 diabetes mellitus without complication: Q2J 2.7%. BG high 2/2 steroids. Cont NPH, cont SSI ACEi      SOHAIL: mild, resolved. Cont lisinopril. Avoid hypotension      A-fib: monitor on telemetry, cont Xarelto      History of CVA (cerebrovascular accident): cont OAC, statin      Hypertension: BP up-trending. Resume lisinopril       Total time spent: 35 minutes, updated pt's son   Carlos Ritchie [phone]     Critical Care:  I personally spent 35 minutes in providing critical care. The reason for providing this level of medical care for this critically ill patient was due to a critical illness (severe AHRF 2/2 COVID-19) that impaired one or more vital organ systems such that there was a high probability of imminent or life threatening deterioration in the patient's condition. This care involved high complexity decision making to assess, manipulate, and support vital system functions.     Time spent in the care of this patient including reviewing records, discussing with nursing and/or other providers on the treatment team, obtaining history and examining the patient, and discussing treatment plans. Care Plan discussed with: Patient, Nursing Staff and >50% of time spent in counseling and coordination of care    Discussed:  Care Plan    Prophylaxis:  Xarelto    Disposition:  Home w/Family         Subjective:     Chief Complaint:  Follow up COVID    Chart/notes/labs/studies reviewed, patient examined. No CP. Denies SOB. No fevers. Was annoyed by staff advising him to sleep on his side. Objective:       Vitals:        Last 24hrs VS reviewed since prior progress note. Most recent are:    Visit Vitals  /87 (BP 1 Location: Left upper arm, BP Patient Position: Sitting)   Pulse 86   Temp 97.3 °F (36.3 °C)   Resp 16   Ht 5' 8\" (1.727 m)   Wt 68.4 kg (150 lb 11.2 oz)   SpO2 95%   BMI 22.91 kg/m²     SpO2 Readings from Last 6 Encounters:   01/17/22 95%   11/26/21 95%   11/19/21 93%   05/28/21 95%   02/06/20 98%   02/02/20 93%    O2 Flow Rate (L/min): 55 l/min       Intake/Output Summary (Last 24 hours) at 1/17/2022 1727  Last data filed at 1/17/2022 0910  Gross per 24 hour   Intake 360 ml   Output 820 ml   Net -460 ml          Exam:     Physical Exam:    Gen:  Elderly, ill-appearing. NAD. Pleasant   HEENT:  Atraumatic, normocephalic. Sclerae nonicteric, hearing intact to voice  Neck:  Supple, without apparent masses  Resp:  No accessory muscle use, increased WOB, mildly diminished breath sounds w/o wheezing, rales, or rhonchi  Card: RRR, soft 2/6 systolic murmur. No rubs or gallops. No LE edema  Abd:  +bowel sounds, soft, NTTP, nondistended  Neuro: Face symmetric, speech fluent, follows commands appropriately, no focal weakness or numbness  Psych:  Alert, oriented x 3.  Fair insight     Medications Reviewed: (see below)    Lab Data Reviewed: (see below)    ______________________________________________________________________    Medications:     Current Facility-Administered Medications   Medication Dose Route Frequency    baricitinib (OLUMIANT) tablet 4 mg  4 mg Oral DAILY    insulin NPH (NOVOLIN N, HUMULIN N) injection 12 Units  12 Units SubCUTAneous ACB&D    guaiFENesin-dextromethorphan (ROBITUSSIN DM) 100-10 mg/5 mL syrup 5 mL  5 mL Oral Q6H PRN    rivaroxaban (XARELTO) tablet 20 mg  20 mg Oral DAILY WITH DINNER    lisinopriL (PRINIVIL, ZESTRIL) tablet 10 mg  10 mg Oral QHS    atorvastatin (LIPITOR) tablet 20 mg  20 mg Oral DAILY    glucose chewable tablet 16 g  4 Tablet Oral PRN    dextrose (D50W) injection syrg 12.5-25 g  25-50 mL IntraVENous PRN    glucagon (GLUCAGEN) injection 1 mg  1 mg IntraMUSCular PRN    insulin lispro (HUMALOG) injection   SubCUTAneous AC&HS    dexamethasone (PF) (DECADRON) 10 mg/mL injection 10 mg  10 mg IntraVENous Q12H    sodium chloride (NS) flush 5-40 mL  5-40 mL IntraVENous Q8H    sodium chloride (NS) flush 5-40 mL  5-40 mL IntraVENous PRN    acetaminophen (TYLENOL) tablet 650 mg  650 mg Oral Q6H PRN    Or    acetaminophen (TYLENOL) suppository 650 mg  650 mg Rectal Q6H PRN    polyethylene glycol (MIRALAX) packet 17 g  17 g Oral DAILY PRN    ondansetron (ZOFRAN ODT) tablet 4 mg  4 mg Oral Q8H PRN    Or    ondansetron (ZOFRAN) injection 4 mg  4 mg IntraVENous Q6H PRN            Lab Review:     Recent Labs     01/17/22  0115 01/16/22  0152 01/15/22  0102   WBC 9.9 7.8 8.6   HGB 14.3 14.7 13.9   HCT 40.9 42.1 40.3    241 218     Recent Labs     01/17/22  0115 01/16/22 0152 01/15/22  0102   * 134* 136   K 4.6 4.2 4.6    99 101   CO2 30 30 29   * 146* 159*   BUN 33* 25* 28*   CREA 0.87 0.78 0.88   CA 8.5 8.8 8.4*   MG  --  2.1 1.8   PHOS  --  3.1 3.0   ALB 2.6* 2.8* 2.6*   ALT 24 23 26     No components found for: GLPOC  No results for input(s): PH, PCO2, PO2, HCO3, FIO2 in the last 72 hours. No results for input(s): INR, INREXT, INREXT in the last 72 hours.   No results found for: SDES  Lab Results   Component Value Date/Time    Culture result: Culture performed on Fluid swab specimen 11/25/2021 08:22 PM    Culture result: NO GROWTH 4 DAYS 11/25/2021 08:22 PM              ___________________________________________________    Attending Physician: Bairon Michelle MD

## 2022-01-17 NOTE — PROGRESS NOTES
1/17/2022  11:09 AM  Pt discussed in IDR rounds, is continuing to require medical management for, Acute respiratory failure with hypoxia: 2/2 COVID, Pneumonia due to COVID-19 virus:T2DM, AFib, history of CVA, hypertension  Transitions of Care Plan:  COVID 19+  RUR 14 %  LOS 6 Days  1. Medical management continues  2. COVID 19 PNA, Acute Hypoxic Respiratory Failure, pt on 55 L O2 Hi-Jas, pulmonary following, IV steroids  3. Palliative consult   4. PT, OT evals pending medical stability   5.  Dispo pending therapy evals and recommendations, pt lives w/ grandson  Kimberly Maker princess , was independent w/ ADLs, prior to admission, uses RW PRN   6 CM will continue to follow and assist w/ DC needs  QI Jamil

## 2022-01-17 NOTE — PROGRESS NOTES
Problem: Mobility Impaired (Adult and Pediatric)  Goal: *Acute Goals and Plan of Care (Insert Text)  Description: FUNCTIONAL STATUS PRIOR TO ADMISSION: Patient was independent and active without use of DME.    HOME SUPPORT PRIOR TO ADMISSION: The patient lived with son and grandson but did not require assist.    Physical Therapy Goals  Initiated 1/17/2022  1. Patient will move from supine to sit and sit to supine  in bed with modified independence within 7 day(s). 2.  Patient will transfer from bed to chair and chair to bed with modified independence using the least restrictive device within 7 day(s). 3.  Patient will perform sit to stand with modified independence within 7 day(s). 4.  Patient will ambulate with modified independence for 150 feet with the least restrictive device within 7 day(s). 5.  Patient will ascend/descend 6 stairs with 2 handrail(s) with modified independence within 7 day(s). Outcome: Progressing Towards Goal  Note:   PHYSICAL THERAPY EVALUATION  Patient: Consuelo Vogel (62 y.o. male)  Date: 1/17/2022  Primary Diagnosis: Acute respiratory failure with hypoxia (HCC) [J96.01]  Pneumonia due to COVID-19 virus [U07.1, J12.82]        Precautions:   Fall      ASSESSMENT  Based on the objective data described below, the patient presents with decreased strength and activity tolerance following admission for shortness of breath and COVID+. Patient currently requiring 50L highflow and sats remained >90% throughout activity. Patient is highly motivated to participate. Patient able to perform seated and standing exercises. Instructed in pursed lip breathing and use of incentive spirometer. Current Level of Function Impacting Discharge (mobility/balance): SBA-CGA without an assistive device    Functional Outcome Measure:   The patient scored Total: 55/100 on the Barthel Index outcome measur    Other factors to consider for discharge: patient still requiring high level of O2, highflow at 55-55L     Patient will benefit from skilled therapy intervention to address the above noted impairments. PLAN :  Recommendations and Planned Interventions: bed mobility training, transfer training, gait training, therapeutic exercises, and therapeutic activities      Frequency/Duration: Patient will be followed by physical therapy:  5 times a week to address goals. Recommendation for discharge: (in order for the patient to meet his/her long term goals)  To be determined: pulmonary rehab vs home health pending progress with highflow O2    This discharge recommendation:  Has been made in collaboration with the attending provider and/or case management    IF patient discharges home will need the following DME: to be determined (TBD)         SUBJECTIVE:   Patient stated what else can I do? Cuco Kwan    OBJECTIVE DATA SUMMARY:   HISTORY:    Past Medical History:   Diagnosis Date    Atrial fibrillation ([facility] [place] [address]     Diabetes mellitus, type 2 ([facility] [place] [address]     Hypertension     Internal carotid artery stenosis, right     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 Moderate aortic valve stenosis was present. LVEF 55-60% .  Stroke [facility])     asymptomatic; small chronic infarcts in the bilateral thalami per MRI of 2/5/2020.   Previous MRI of 9/21/2010 showed possible tiny old right thalamic infarct versus perivascular space      Past Surgical History:   Procedure Laterality Date    HX CHOLECYSTECTOMY      IN INS NEW/RPLCMT PRM PM W/TRANSV ELTRD ATRIAL&VENT N/A 2/6/2020    INSERT PPM DUAL performed by Noelle Braun MD at [facility] CATH LAB       Personal factors and/or comorbidities impacting plan of care: see above    Home Situation  Home Environment: Private residence  # Steps to Enter: 1  Rails to Enter: Yes  One/Two Story Residence: One story  Living Alone: No  Support Systems: Other (Comment) (grandson)  Patient Expects to be Discharged to[de-identified] House  Current DME Used/Available at Home: Walker, rolling  Tub or Shower Type: Tub/Shower combination    EXAMINATION/PRESENTATION/DECISION MAKING:   Vitals:    01/17/22 0959 01/17/22 1012 01/17/22 1022 01/17/22 1102   BP: 107/74 (!) 91/55 100/71 97/63   BP 1 Location: Right upper arm Right upper arm  Right upper arm   BP Patient Position: At rest Sitting  Comment: following transfer to chair Sitting Sitting   Pulse: 78 (!) 111 97 71   Temp:    97.7 °F (36.5 °C)   Resp:    24   Height:       Weight:       SpO2: 96% 98%  98%       Critical Behavior:  Neurologic State: Alert  Orientation Level: Oriented X4  Cognition: Appropriate decision making,Follows commands  Safety/Judgement: Awareness of environment  Hearing: Auditory  Auditory Impairment: Hard of hearing, bilateral  Skin:  all exposed intact  Edema: none noted  Range Of Motion:  AROM: Within functional limits                       Strength:    Strength: Within functional limits                    Tone & Sensation:   Tone: Normal              Sensation: Intact               Coordination:  Coordination: Grossly decreased, non-functional  Vision:   Corrective Lenses: Glasses  Functional Mobility:  Bed Mobility:     Supine to Sit: Supervision     Scooting: Supervision  Transfers:  Sit to Stand: Contact guard assistance  Stand to Sit: Contact guard assistance        Bed to Chair: Contact guard assistance              Balance:   Sitting: Intact  Standing: With support  Ambulation/Gait Training:                                   Theraputic Exercise  Exercises:    Patient educated regarding home exercise program for strengthening, ROM, and respiratory function; they demonstrated appropriate performance.  Reviewed importance of symptom monitoring and adjusting HEP based on symptoms and respiratory status. Pt verbalized understanding via demonstration. LE exercises    Bridge     Gluteal sets     Supine march     Supine short arc quad     Supine quad set     Supine hip abduction     Supine heel slides          Seated ankle pump 10 1   Seated LAQ 10 1   Seated march 10 1   Seated toe raise     Seated heel raise     Seated hip adduction squeeze     Seated hip abduction with resistance     Seated march with resistance     Seated knee extension with resistance          Standing march with counter support     Standing knee flexion     Standing hip abduction     Standing toe raises with counter support     Sit to Stand 5 1       5x sit-stand: 34.97 seconds     Functional Measure:  Barthel Index:    Bathin  Bladder: 10  Bowels: 10  Groomin  Dressin  Feeding: 10  Mobility: 0  Stairs: 0  Toilet Use: 5  Transfer (Bed to Chair and Back): 10  Total: 55/100            Physical Therapy Evaluation Charge Determination   History Examination Presentation Decision-Making   HIGH Complexity :3+ comorbidities / personal factors will impact the outcome/ POC  MEDIUM Complexity : 3 Standardized tests and measures addressing body structure, function, activity limitation and / or participation in recreation  MEDIUM Complexity : Evolving with changing characteristics  Other outcome measures barthel index  MEDIUM      Based on the above components, the patient evaluation is determined to be of the following complexity level: MEDIUM    Pain Rating:  None reported    Activity Tolerance:   Fair    After treatment patient left in no apparent distress:   Sitting in chair, Call bell within reach, and Bed / chair alarm activated    COMMUNICATION/EDUCATION:   The patients plan of care was discussed with: Occupational therapist and Registered nurse.      Fall prevention education was provided and the patient/caregiver indicated understanding., Patient/family have participated as able in goal setting and plan of care. , and Patient/family agree to work toward stated goals and plan of care.     Thank you for this referral.  Jonathan Parrish, PT, DPT   Time Calculation: 17 mins

## 2022-01-17 NOTE — PROGRESS NOTES
Name: Aki Hope: [address]   : 1940 Admit Date: 2022   Phone: [phone]  Room: [facility]/   PCP: Geri Almendarez MD  MRN: [id number]   Date: 2022  Code: Full Code          Chart and notes reviewed. Data reviewed. I review the patient's current medications in the medical record at each encounter.  I have evaluated and examined the patient. History of Present Illness:  Cecy Hughes is a very pleasant, 80 yo gentleman that presented to [facility] on  with worsening shortness of breath. He has a PMH of a.fib, aortic stenosis, DM, hypertension, CVA, and tobacco abuse (100 pack years, quit in ). He received two doses of the Covid vaccine, last in 2021. He did not receive a booster dose. He is currently requiring hi flow at 50L/100%. He reports that his symptoms started five days ago after he was exposed to his grandson who tested positive. He became more progressively more SOB with increased cough. No fever/chills. He did initially have diarrhea. Images:  Personally visualized and report reviewed. CXR :  Superimposed patchy bilateral airspace disease on interstitial changes concerning for infection or inflammation      D-dimer . 55  LFTs stable  Procalcitonin negative  Ferritin 759  Trop wnl  Pro-  CRP 3.18  ECHO 2020:  EF 55-60%, mildly reduced systolic function, moderate aortic stenosis, trave MVR, moderately elevated CVP. Interval History:  Afebrile  BP stable  Sats 98% on 55/100% HFNC; I weaned down to 50L  D-dimer 0.72  Procalcitonin <.05  CRP 0.35 - better  LFTs stable    ROS: Denies SOB at rest.  Denies fever or chills. Denies CP, abd pain, or LE pain/swelling. Trying to lay on sides and prone, but not comfortable and gets tangled in his lines and tubing.     Past Medical History:   Diagnosis Date    Atrial fibrillation ([facility] [address]     Diabetes mellitus, type 2 ([address]     Hypertension     Internal carotid artery stenosis, right     Chronic occlusion of the right internal carotid artery per MRI of 2020 unchanged from previous study of 2010; study of  also showed mild stenosis of the left internal carotid artery and bilateral patent vertebral arteries    Moderate aortic stenosis     Echocardiogram of 2020:  Severe aortic valve leaflet calcification present with reduced excursion. Moderate aortic valve stenosis was present. LVEF 55-60% .  Stroke [facility])     asymptomatic; small chronic infarcts in the bilateral thalami per MRI of 2020.   Previous MRI of 2010 showed possible tiny old right thalamic infarct versus perivascular space        Past Surgical History:   Procedure Laterality Date    HX CHOLECYSTECTOMY      VT INS NEW/RPLCMT PRM PM W/TRANSV ELTRD ATRIAL&VENT N/A 2020    INSERT PPM DUAL performed by Iker Heller MD at [facility] LAB       Family History   Adopted: Yes   Family history unknown: Yes       Social History     Tobacco Use    Smoking status: Former Smoker     Packs/day: 2.00     Years: 50.00     Pack years: 100.00     Quit date: 2004     Years since quittin.6    Smokeless tobacco: Never Used   Substance Use Topics    Alcohol use: Yes     Comment: 1 beer or 1 glaas of wine daily most of adult life       Allergies   Allergen Reactions    Ampicillin Nausea and Vomiting    Iodine Hives, Itching and Nausea and Vomiting       Current Facility-Administered Medications   Medication Dose Route Frequency    baricitinib (OLUMIANT) tablet 4 mg  4 mg Oral DAILY    insulin NPH (NOVOLIN N, HUMULIN N) injection 12 Units  12 Units SubCUTAneous ACB&D    guaiFENesin-dextromethorphan (ROBITUSSIN DM) 100-10 mg/5 mL syrup 5 mL  5 mL Oral Q6H PRN    rivaroxaban (XARELTO) tablet 20 mg  20 mg Oral DAILY WITH DINNER    lisinopriL (PRINIVIL, ZESTRIL) tablet 10 mg  10 mg Oral QHS    atorvastatin (LIPITOR) tablet 20 mg  20 mg Oral DAILY    glucose chewable tablet 16 g  4 Tablet Oral PRN    dextrose (D50W) injection syrg 12.5-25 g  25-50 mL IntraVENous PRN    glucagon (GLUCAGEN) injection 1 mg  1 mg IntraMUSCular PRN    insulin lispro (HUMALOG) injection   SubCUTAneous AC&HS    dexamethasone (PF) (DECADRON) 10 mg/mL injection 10 mg  10 mg IntraVENous Q12H    sodium chloride (NS) flush 5-40 mL  5-40 mL IntraVENous Q8H    sodium chloride (NS) flush 5-40 mL  5-40 mL IntraVENous PRN    acetaminophen (TYLENOL) tablet 650 mg  650 mg Oral Q6H PRN    Or    acetaminophen (TYLENOL) suppository 650 mg  650 mg Rectal Q6H PRN    polyethylene glycol (MIRALAX) packet 17 g  17 g Oral DAILY PRN    ondansetron (ZOFRAN ODT) tablet 4 mg  4 mg Oral Q8H PRN    Or    ondansetron (ZOFRAN) injection 4 mg  4 mg IntraVENous Q6H PRN         REVIEW OF SYSTEMS   Negative except as stated in the HPI. Physical Exam:   Visit Vitals  /65 (BP 1 Location: Right upper arm, BP Patient Position: At rest)   Pulse 73   Temp 97.4 °F (36.3 °C)   Resp 22   Ht 5' 8\" (1.727 m)   Wt 68.4 kg (150 lb 11.2 oz)   SpO2 98%   BMI 22.91 kg/m²       General:  Alert, cooperative, no distress, appears stated age, on HF   Head:  Normocephalic, without obvious abnormality, atraumatic. Eyes:  Conjunctivae/corneas clear. Nose: Nares normal. Septum midline. Mucosa normal.   Throat: Lips, mucosa, and tongue normal.    Neck: Supple, symmetrical, trachea midline, no adenopathy. Lungs:   Clear but diminished. Chest wall:  No tenderness or deformity. Heart:  Regular rate and rhythm, S1, S2 normal, no murmur, click, rub or gallop. Abdomen:   Soft, non-tender. Bowel sounds normal.   Extremities: Extremities normal, atraumatic, no cyanosis or edema. Pulses: 2+ and symmetric all extremities.    Skin: Skin color, texture, turgor normal. No rashes or lesions   Lymph nodes: Cervical  nodes normal.   Neurologic: Grossly nonfocal       Lab Results   Component Value Date/Time    Sodium 135 (L) 01/17/2022 01:15 AM Potassium 4.6 01/17/2022 01:15 AM    Chloride 100 01/17/2022 01:15 AM    CO2 30 01/17/2022 01:15 AM    BUN 33 (H) 01/17/2022 01:15 AM    Creatinine 0.87 01/17/2022 01:15 AM    Glucose 155 (H) 01/17/2022 01:15 AM    Calcium 8.5 01/17/2022 01:15 AM    Magnesium 2.1 01/16/2022 01:52 AM    Phosphorus 3.1 01/16/2022 01:52 AM    Lactic acid 1.3 01/11/2022 06:34 PM       Lab Results   Component Value Date/Time    WBC 9.9 01/17/2022 01:15 AM    HGB 14.3 01/17/2022 01:15 AM    PLATELET 733 42/36/3667 01:15 AM    MCV 84.7 01/17/2022 01:15 AM       Lab Results   Component Value Date/Time    INR 1.0 02/04/2020 03:50 PM    aPTT 32.0 09/22/2010 03:50 AM    Alk.  phosphatase 55 01/17/2022 01:15 AM    Protein, total 6.0 (L) 01/17/2022 01:15 AM    Albumin 2.6 (L) 01/17/2022 01:15 AM    Globulin 3.4 01/17/2022 01:15 AM       Lab Results   Component Value Date/Time    Ferritin 759 (H) 01/11/2022 06:34 PM       Lab Results   Component Value Date/Time    Sed rate (ESR) 5 09/22/2010 03:50 AM    C-Reactive protein 0.35 01/17/2022 01:15 AM        No results found for: PH, PHI, PCO2, PCO2I, PO2, PO2I, HCO3, HCO3I, FIO2, FIO2I    Lab Results   Component Value Date/Time    CK 79 09/22/2010 03:50 AM    Troponin-I, Qt. 0.05 (H) 09/21/2010 12:10 PM        Lab Results   Component Value Date/Time    Culture result: Culture performed on Fluid swab specimen 11/25/2021 08:22 PM    Culture result: NO GROWTH 4 DAYS 11/25/2021 08:22 PM       No results found for: TOXA1, RPR, HBCM, HBSAG, HAAB, HCAB1, HAAT, G6PD, CRYAC, HIVGT, HIVR, HIV1, HIV12, HIVPC, HIVRPI    Lab Results   Component Value Date/Time    CK 79 09/22/2010 03:50 AM       Lab Results   Component Value Date/Time    Color YELLOW/STRAW 08/02/2019 12:10 AM    Appearance CLEAR 08/02/2019 12:10 AM    pH (UA) 5.0 08/02/2019 12:10 AM    Protein 30 (A) 08/02/2019 12:10 AM    Glucose 500 (A) 08/02/2019 12:10 AM    Ketone NEGATIVE  08/02/2019 12:10 AM    Bilirubin NEGATIVE  08/02/2019 12:10 AM    Blood SMALL (A) 08/02/2019 12:10 AM    Urobilinogen 0.2 08/02/2019 12:10 AM    Nitrites NEGATIVE  08/02/2019 12:10 AM    Leukocyte Esterase NEGATIVE  08/02/2019 12:10 AM    WBC 0-4 08/02/2019 12:10 AM    RBC 0-5 08/02/2019 12:10 AM    Bacteria NEGATIVE  08/02/2019 12:10 AM       Images: personally visualized and report reviewed    CXR (1/16/2021): Worsening of left mid and lower lung pulmonary opacities. IMPRESSION  · Acute Respiratory Failure with Hypoxia  · Covid-19 Pneumonia  · History of A. Fib  · Moderate Aortic Stenosis  · History of Tobacco Abuse  · DM  · Hypertension      PLAN  · Continue HF to keep sats >88%; weaned to 55L/100%  · Continue Dexamethasone 10mg q12h  · Continue Baricitinib  · On AC with Xarleto  · Trend CRP, d-dimer, LFTs  · Encourage IS use  · Prone as able  · OOB  · Would benefit from PFTs as an outpatient  · Will need repeat imaging as an outpatient  · PT/OT consulted and eval pending. Jannette Leal is critically ill, requiring HFNC for acute respiratory failure secondary to Covid pneumonia. Total CC time: 32 minutes.       Dawson Domínguez

## 2022-01-17 NOTE — PROGRESS NOTES
Bedside shift change report given to [facility] (oncoming nurse) by Yunior Elkins RN (offgoing nurse). Report included the following information SBAR, Kardex, ED Summary, Intake/Output, and Recent Results. Bedside shift change report given to [facility] (oncoming nurse) by [facility] (offgoing nurse). Report included the following information SBAR, Kardex, ED Summary, Intake/Output, and Recent Results. This patient was assisted with Intentional Toileting every 2 hours during this shift as appropriate. Documentation of ambulation and output reflected on Flowsheet as appropriate. Purposeful hourly rounding was completed using AIDET and 5Ps. Outcomes of PHR documented as they occurred. Bed alarm in use as appropriate. Dual Suction and ambubag in place.

## 2022-01-17 NOTE — PROGRESS NOTES
Problem: Self Care Deficits Care Plan (Adult)  Goal: *Acute Goals and Plan of Care (Insert Text)  Description: FUNCTIONAL STATUS PRIOR TO ADMISSION: Patient was independent and active without use of DME.     HOME SUPPORT: The patient lived with grandson but did not require assist.    Occupational Therapy Goals  Initiated 1/17/2022  1. Patient will perform grooming and bathing with supervision standing at the sink with < or = 2 rest breaks and maintain oxygen sats > or = 90% within 7 day(s). 2. Patient will perform upper body dressing and lower body dressing with supervision with < or = 2 rest breaks and maintain oxygen sats > or = 90%  within 7 day(s). 3. Patient will perform toileting and toilet transfer with supervision and maintain oxygen sats > or = 90% within 7 day(s). 4. Patient will demonstrate pursed lip breathing with min cues during functional tasks within 7 day(s). 5. Patient will independently perform and recall home exercise program while maintaining O2 sats at or above 90% within 7 days. 6. Patient will verbalize/demonstrate all energy conservation techniques during ADL tasks with independence within 7 days. Outcome: Progressing Towards Goal   OCCUPATIONAL THERAPY EVALUATION  Patient: Belén Stapleton (1 y.o. male)  Date: 1/17/2022  Primary Diagnosis: Acute respiratory failure with hypoxia (HCC) [J96.01]  Pneumonia due to COVID-19 virus [U07.1, J12.82]        Precautions: fall       ASSESSMENT  Based on the objective data described below, the patient presents with hospital admission secondary to acute respiratory failure secondary to Covid 19. Patient in bed, on 55L High flow. Patient pleasant and eager for OOB activity. Patient to EOB with supervision and sats reading 94%. Patient given washcloth and performed light grooming/UE bathing tasks while seated. Patient transferred to chair with CGA with no assistive device.   Once seated in chair, patient sats briefly at 88% before increasing to 96%. Patient BP decreased and reading 55T systolic. Patient educated on LE exercises and BP improved. Patient educated on pursed lip breathing techniques and use of incentive spirometer. Patient left in chair in NAD . Current Level of Function Impacting Discharge (ADLs/self-care): CGA use of 55L high flow    Functional Outcome Measure: The patient scored 55/100 on the Barthel Index outcome measure. Other factors to consider for discharge:      Patient will benefit from skilled therapy intervention to address the above noted impairments. PLAN :  Recommendations and Planned Interventions: self care training, functional mobility training, therapeutic exercise, balance training, therapeutic activities, endurance activities, patient education, home safety training, and family training/education    Frequency/Duration: Patient will be followed by occupational therapy 5 times a week to address goals. Recommendation for discharge: (in order for the patient to meet his/her long term goals)  To be determined: pending patient O2 needs    This discharge recommendation:  Has not yet been discussed the attending provider and/or case management    IF patient discharges home will need the following DME: TBD       SUBJECTIVE:   Patient stated Its so much better being out of that bed.     OBJECTIVE DATA SUMMARY:   HISTORY:   Past Medical History:   Diagnosis Date    [facility]     Diabetes mellitus, type 2 ([facility]     Hypertension     Internal carotid artery stenosis, right     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  Moderate aortic valve stenosis was present. LVEF 55-60% .  Stroke [facility])     asymptomatic; small chronic infarcts in the bilateral thalami per MRI of 2/5/2020. Previous MRI of 9/21/2010 showed possible tiny old right thalamic infarct versus perivascular space      Past Surgical History:   Procedure Laterality Date    HX CHOLECYSTECTOMY      TX INS NEW/RPLCMT PRM PM W/TRANSV ELTRD ATRIAL&VENT N/A 2/6/2020    INSERT PPM DUAL performed by Esther Renee MD at [place] [address] LAB       Expanded or extensive additional review of patient history:     Home Situation  Home Environment: Private residence  # Steps to Enter: 1  Rails to Enter: Yes  One/Two Story Residence: One story  Living Alone: No  Support Systems: Other (Comment) (grandson)  Patient Expects to be Discharged to[de-identified] House  Current DME Used/Available at Home: Walker, rolling  Tub or Shower Type: Tub/Shower combination    Hand dominance: Right    EXAMINATION OF PERFORMANCE DEFICITS:  Cognitive/Behavioral Status:  Neurologic State: Alert  Orientation Level: Oriented X4  Cognition: Appropriate decision making; Follows commands  Perception: Appears intact  Perseveration: No perseveration noted  Safety/Judgement: Awareness of environment    Skin: intact as seen    Edema: none noted     Hearing: Auditory  Auditory Impairment: Hard of hearing, bilateral    Vision/Perceptual:                                Corrective Lenses: Glasses    Range of Motion:  AROM: Within functional limits                         Strength:  Strength: Within functional limits                Coordination:  Coordination: Grossly decreased, non-functional  Fine Motor Skills-Upper: Left Intact; Right Intact    Gross Motor Skills-Upper: Left Intact; Right Intact    Tone & Sensation:  Tone: Normal  Sensation: Intact                      Balance:  Sitting: Intact  Standing: With support    Functional Mobility and Transfers for ADLs:  Bed Mobility:  Supine to Sit: Supervision  Scooting: Supervision    Transfers:  Sit to Stand: Contact guard assistance  Stand to Sit: Contact guard assistance  Bed to Chair: Contact guard assistance  Toilet Transfer : Contact guard assistance [facility])  Assistive Device : Gait Belt    ADL Assessment:  Feeding: Supervision    Oral Facial Hygiene/Grooming: Setup    Bathing: Contact guard assistance; Additional time    Upper Body Dressing: Contact guard assistance; Additional time    Lower Body Dressing: Contact guard assistance; Additional time    Toileting: Contact guard assistance; Additional time                ADL Intervention and task modifications:                                     Cognitive Retraining  Safety/Judgement: Awareness of environment    Therapeutic Exercise:   Functional Measure:    Barthel Index:  Bathin  Bladder: 10  Bowels: 10  Groomin  Dressin  Feeding: 10  Mobility: 0  Stairs: 0  Toilet Use: 5  Transfer (Bed to Chair and Back): 10  Total: 55/100      The Barthel ADL Index: Guidelines  1. The index should be used as a record of what a patient does, not as a record of what a patient could do. 2. The main aim is to establish degree of independence from any help, physical or verbal, however minor and for whatever reason. 3. The need for supervision renders the patient not independent. 4. A patient's performance should be established using the best available evidence. Asking the patient, friends/relatives and nurses are the usual sources, but direct observation and common sense are also important. However direct testing is not needed. 5. Usually the patient's performance over the preceding 24-48 hours is important, but occasionally longer periods will be relevant. 6. Middle categories imply that the patient supplies over 50 per cent of the effort. 7. Use of aids to be independent is allowed.     Score Interpretation (from [facility])    Independent   60-79 Minimally independent   40-59 Partially dependent   20-39 Very dependent   <20 Totally dependent     -Lucas Simms., Barthel, DAbbyW. (1965). Functional evaluation: the Barthel Index. [address] ([address] (1997). The Barthel activities of daily living index: self-reporting versus actual performance in the old (> or = 75 years). Journal of [address] [place] [address] 45(7), [facility], MAKEDA, Sarahi Esparza., Romana Just. (1999). Measuring the change in disability after inpatient rehabilitation; comparison of the responsiveness of the Barthel Index and Functional [place] Measure. Journal of Neurology, Neurosurgery, and Psychiatry, 66(4), 173-885. ELYSSA Espino, BREANNE Rollins, & Mi Elkins M.A. (2004) Assessment of post-stroke quality of life in cost-effectiveness studies: The usefulness of the Barthel Index and the EuroQoL-5D. Quality of Life Research, 15, 470-55         Occupational Therapy Evaluation Charge Determination   History Examination Decision-Making   LOW Complexity : Brief history review  LOW Complexity : 1-3 performance deficits relating to physical, cognitive , or psychosocial skils that result in activity limitations and / or participation restrictions  LOW Complexity : No comorbidities that affect functional and no verbal or physical assistance needed to complete eval tasks       Based on the above components, the patient evaluation is determined to be of the following complexity level: LOW   Pain Rating:  none    Activity Tolerance:   Fair and requires rest breaks    After treatment patient left in no apparent distress:    Sitting in chair, Call bell within reach, and Bed / chair alarm activated    COMMUNICATION/EDUCATION:   The patients plan of care was discussed with: Physical therapist and Registered nurse. Home safety education was provided and the patient/caregiver indicated understanding., Patient/family have participated as able in goal setting and plan of care. , and Patient/family agree to work toward stated goals and plan of care.    This patients plan of care is appropriate for delegation to MARIBELL.     Thank you for this referral.  Stan Rader, OTR/L  Time Calculation: 33 mins

## 2022-01-18 NOTE — PROGRESS NOTES
Comprehensive Nutrition Assessment    Type and Reason for Visit: Initial,RD nutrition re-screen/LOS    Nutrition Recommendations/Plan:   1. Continue regular, 4 Carb Choice diet    Nutrition Assessment:    Pt is an 79year old male admitted with Acute respiratory failure with hypoxia; Pneumonia due to COVID-19 virus [U07.1, J12.82]. He  has a past medical history of Atrial fibrillation ([facility] Diabetes mellitus, type 2 ([facility] Hypertension, Internal carotid artery stenosis, right, Moderate aortic stenosis, and Stroke ([facility] PO intake averaging 75% of all meals. Attempted to call into patient room - no answer. Currently on 55L O2. Per documentation, patient has lost 12# (7.4%) x ~3 days, unlikely. No noted edema or prior edema this admission. Admission weight of 162 was stated. No noted N/V/D. No hx of chewing/swallowing problems. NKFA. If current PO intake trend continues, patient likely meeting >/= 75% kcal needs and 100% protein needs. Last three BG , 169, 147. Wt Readings from Last 10 Encounters:   01/14/22 68.4 kg (150 lb 11.2 oz)   11/22/21 73.5 kg (162 lb)   11/19/21 73.5 kg (162 lb)   05/28/21 73.7 kg (162 lb 6.4 oz)   02/05/20 73.3 kg (161 lb 9.6 oz)   02/02/20 70.3 kg (155 lb)   08/01/19 68 kg (150 lb)   07/30/18 72.6 kg (160 lb)   03/27/13 73.9 kg (163 lb)     Last 3 Recorded Weights in this Encounter    01/11/22 1810 01/13/22 0304 01/14/22 0409   Weight: 73.5 kg (162 lb) 68.7 kg (151 lb 8 oz) 68.4 kg (150 lb 11.2 oz)       Malnutrition Assessment:  Malnutrition Status:  Insufficient data  - unable to physically assess patient at this time    Estimated Daily Nutrient Needs:  Energy (kcal): 1765 (MSJ x 1.3); Weight Used for Energy Requirements: Current  Protein (g): 68-81 (1.0-1.2);  Weight Used for Protein Requirements: Current  Fluid (ml/day): 1618; Method Used for Fluid Requirements: 1 ml/kcal    Nutrition Related Findings:    ABD: Good appetite with active bowel sounds 1/18  Last BM: 1/18  Edema: None noted 1/18    Nutr. Labs:  Lab Results   Component Value Date/Time    GFR est AA >60 01/17/2022 01:15 AM    GFR est non-AA >60 01/17/2022 01:15 AM    Creatinine (POC) 0.8 09/21/2010 12:56 PM    Creatinine 0.87 01/17/2022 01:15 AM    BUN 33 (H) 01/17/2022 01:15 AM    BUN (POC) 15 09/21/2010 12:56 PM    Sodium (POC) 138 09/21/2010 12:56 PM    Sodium 135 (L) 01/17/2022 01:15 AM    Potassium 4.6 01/17/2022 01:15 AM    Potassium (POC) 3.8 09/21/2010 12:56 PM    Chloride (POC) 103 09/21/2010 12:56 PM    Chloride 100 01/17/2022 01:15 AM    CO2 30 01/17/2022 01:15 AM     Lab Results   Component Value Date/Time    Glucose 155 (H) 01/17/2022 01:15 AM    Glucose (POC) 208 (H) 01/18/2022 10:53 AM     Lab Results   Component Value Date/Time    Hemoglobin A1c 7.2 (H) 01/13/2022 03:09 AM       Nutr. Meds:  Lipitor, Olumiant, Decadron, Humalog, NPH, Zofran PRN, Miralax PRN, Xarelto    Wounds:    None noted     Current Nutrition Therapies:  ADULT DIET Regular; 4 carb choices (60 gm/meal)    Anthropometric Measures:  · Height:  5' 8\" (172.7 cm)  · Current Body Wt:  68.4 kg (150 lb 12.7 oz)   · Admission Body Wt:  162 lb (stated)    · Usual Body Wt:        · Ideal Body Wt:  154 lbs:  97.9 %   · Body mass index is 22.91 kg/m².    · BMI Category:  Normal weight (BMI 22.0-24.9) age over 72       Nutrition Diagnosis:   · Increased nutrient needs related to catabolic illness,impaired respiratory function as evidenced by  (COVID-19, requiring 55L O2)    Nutrition Interventions:   Food and/or Nutrient Delivery: Continue current diet  Nutrition Education and Counseling: No recommendations at this time  Coordination of Nutrition Care: Continue to monitor while inpatient,Interdisciplinary rounds    Goals:  PO intake continues >/= 75% of all meals within 5 - 7 days       Nutrition Monitoring and Evaluation:   Behavioral-Environmental Outcomes: None identified  Food/Nutrient Intake Outcomes: Food and nutrient intake,Supplement intake  Physical Signs/Symptoms Outcomes: Biochemical data,Weight    Discharge Planning:     Too soon to determine     Electronically signed by Fausto Reyna RD on 0/84/4603   Contact: [phone] or via [employer]

## 2022-01-18 NOTE — PROGRESS NOTES
1/18/2022  12:25 PM  Pt discussed in IDR rounds, is continuing to require medical management for, Acute respiratory failure with hypoxia: 2/2 COVID, Pneumonia due to COVID-19 virus:T2DM, AFib, history of CVA, hypertension  Transitions of Care Plan:  COVID 19+  RUR 16 %  LOS 7 Days  1. Medical management continues  2. COVID 19 PNA, Acute Hypoxic Respiratory Failure, pt on 55 L O2 [place], pulmonary following, IV steroids, follow for DC O2 needs   3. Palliative following   4. PT, OT recommending pulmonary rehab vs HH pending progress, pt has  [employer] would require pre-auth  5.  DC when stable pulm rehab vs HH;  pt lives w/ grandson  Valentina Carey grandson , was independent w/ ADLs, prior to admission, uses RW PRN   6 CM will continue to follow and assist w/ DC needs  QI Guillen

## 2022-01-18 NOTE — PROGRESS NOTES
Bedside shift change report given to [facility] (oncoming nurse) by Héctor Goldman RN (offgoing nurse). Report included the following information SBAR, Kardex, ED Summary, Intake/Output, and Recent Results. Bedside shift change report given to [address] (oncoming nurse) by [facility] (offgoing nurse). Report included the following information SBAR, Kardex, ED Summary, Intake/Output, and Recent Results. This patient was assisted with Intentional Toileting every 2 hours during this shift as appropriate. Documentation of ambulation and output reflected on Flowsheet as appropriate. Purposeful hourly rounding was completed using AIDET and 5Ps. Outcomes of PHR documented as they occurred. Bed alarm in use as appropriate. Dual Suction and ambubag in place.

## 2022-01-18 NOTE — PROGRESS NOTES
1900 - Bedside and verbal shift change report given to Lee Grace RN (oncoming nurse) by Carissa Magaña RN (offgoing nurse). Report included the following information: SBAR, Kardex, Intake/Output, MAR, Recent Results, and Med Rec Status. This patient was assisted with intentional toileting every 2 hours during this shift as appropriate. Documentation of ambulation and output reflected on flow sheet as appropriate. Purposeful hourly rounding was completed using AIDET and 5 Ps. Outcomes of PHR documented as they occurred. Bed alarm in use as appropriate. Dual suction and ambubag in place. 0700 - Bedside and verbal shift change report given to Carissa Magaña RN (oncoming nurse) by Lee Grace RN (offgoing nurse). Report included the following information: SBAR, Kardex, Intake/Output, MAR, Recent Results, and Med Rec Status.

## 2022-01-18 NOTE — PROGRESS NOTES
Carlos Eduardo [address] [place] [address]  [facility], [address] [place] [address]  [phone]      Medical Progress Note      NAME: Sultana Richardson   :  1940  MRM:  [id number]    Date/Time: 2022        Assessment / Plan:     78 yo M w/ hx of AF, HTN, DM presenting w/ fatigue, dyspneic, admitted for AHRF 2/2 COVID-19      Acute respiratory failure with hypoxia: 2/2 COVID. LOS 7 Severe, requiring HFNC 55L, 90%. Prognosis remains guarded       Pneumonia due to COVID-19 virus: Repeat CXR  showed worsening of left mid and lower lung pulmonary opacities. On high dose IV dexamethasone, baricitinib. Low procalcitonin. D-dimer low, unlikely acute PE/VTE, already on Xarelto. CRP improved/normalized, continue to monitor. Pulmonology following      Aortic stenosis: moderate, based on echo in 2020. Repeat TTE       Type 2 diabetes mellitus without complication: R2T 5.8%. BG high 2/2 steroids. Cont NPH, cont SSI ACEi      SOHAIL: mild, resolved. Cont lisinopril. Avoid hypotension      A-fib: monitor on telemetry, cont Xarelto      History of CVA (cerebrovascular accident): cont OAC, statin      Hypertension: BP up-trending. Resume lisinopril                     Care Plan discussed with: Patient, Nursing Staff and >50% of time spent in counseling and coordination of care    Discussed:  Care Plan    Prophylaxis:  Xarelto    Disposition:  SNF vs inpt rehab         Subjective:     Chief Complaint:  Follow up COVID    Chart/notes/labs/studies reviewed, patient examined. Abhi Swenson, feels about the same as yesterday. He is tearful and worried he won't make it through this       Objective:       Vitals:        Last 24hrs VS reviewed since prior progress note.  Most recent are:    Visit Vitals  /76 (BP Patient Position: At rest)   Pulse 69   Temp 97.5 °F (36.4 °C)   Resp 21   Ht 5' 8\" (1.727 m)   Wt 68.4 kg (150 lb 11.2 oz)   SpO2 97%   BMI 22.91 kg/m²     SpO2 Readings from Last 6 Encounters:   22 97%   11/26/21 95%   11/19/21 93%   05/28/21 95%   02/06/20 98%   02/02/20 93%    O2 Flow Rate (L/min): 55 l/min       Intake/Output Summary (Last 24 hours) at 1/18/2022 1404  Last data filed at 1/18/2022 1359  Gross per 24 hour   Intake 480 ml   Output 1375 ml   Net -895 ml          Exam:     Physical Exam:    Gen:  Elderly, ill-appearing. NAD. Pleasant   HEENT:  Atraumatic, normocephalic. Sclerae nonicteric, hearing intact to voice  Neck:  Supple, without apparent masses  Resp:  No accessory muscle use, increased WOB, mildly diminished breath sounds w/o wheezing, rales, or rhonchi  Card: RRR, soft 2/6 systolic murmur. No rubs or gallops. No LE edema  Abd:  +bowel sounds, soft, NTTP, nondistended  Neuro: Face symmetric, speech fluent, follows commands appropriately, no focal weakness or numbness  Psych:  Alert, oriented x 3.  Fair insight     Medications Reviewed: (see below)    Lab Data Reviewed: (see below)    ______________________________________________________________________    Medications:     Current Facility-Administered Medications   Medication Dose Route Frequency    baricitinib (OLUMIANT) tablet 4 mg  4 mg Oral DAILY    insulin NPH (NOVOLIN N, HUMULIN N) injection 12 Units  12 Units SubCUTAneous ACB&D    guaiFENesin-dextromethorphan (ROBITUSSIN DM) 100-10 mg/5 mL syrup 5 mL  5 mL Oral Q6H PRN    rivaroxaban (XARELTO) tablet 20 mg  20 mg Oral DAILY WITH DINNER    [Held by provider] lisinopriL (PRINIVIL, ZESTRIL) tablet 10 mg  10 mg Oral QHS    atorvastatin (LIPITOR) tablet 20 mg  20 mg Oral DAILY    glucose chewable tablet 16 g  4 Tablet Oral PRN    dextrose (D50W) injection syrg 12.5-25 g  25-50 mL IntraVENous PRN    glucagon (GLUCAGEN) injection 1 mg  1 mg IntraMUSCular PRN    insulin lispro (HUMALOG) injection   SubCUTAneous AC&HS    dexamethasone (PF) (DECADRON) 10 mg/mL injection 10 mg  10 mg IntraVENous Q12H    sodium chloride (NS) flush 5-40 mL  5-40 mL IntraVENous Q8H    sodium chloride (NS) flush 5-40 mL  5-40 mL IntraVENous PRN    acetaminophen (TYLENOL) tablet 650 mg  650 mg Oral Q6H PRN    Or    acetaminophen (TYLENOL) suppository 650 mg  650 mg Rectal Q6H PRN    polyethylene glycol (MIRALAX) packet 17 g  17 g Oral DAILY PRN    ondansetron (ZOFRAN ODT) tablet 4 mg  4 mg Oral Q8H PRN    Or    ondansetron (ZOFRAN) injection 4 mg  4 mg IntraVENous Q6H PRN            Lab Review:     Recent Labs     01/17/22 0115 01/16/22 0152   WBC 9.9 7.8   HGB 14.3 14.7   HCT 40.9 42.1    241     Recent Labs     01/17/22 0115 01/16/22 0152   * 134*   K 4.6 4.2    99   CO2 30 30   * 146*   BUN 33* 25*   CREA 0.87 0.78   CA 8.5 8.8   MG  --  2.1   PHOS  --  3.1   ALB 2.6* 2.8*   ALT 24 23     No components found for: GLPOC  No results for input(s): PH, PCO2, PO2, HCO3, FIO2 in the last 72 hours. No results for input(s): INR, INREXT, INREXT in the last 72 hours.   No results found for: SDES  Lab Results   Component Value Date/Time    Culture result: Culture performed on Fluid swab specimen 11/25/2021 08:22 PM    Culture result: NO GROWTH 4 DAYS 11/25/2021 08:22 PM              ___________________________________________________    Attending Physician: Yolie Casanova MD

## 2022-01-18 NOTE — PROGRESS NOTES
Name: Ben Maxwell: [address]   : 1940 Admit Date: 2022   Phone: [phone]  Room: [facility]/01   PCP: Héctor Sesay MD  MRN: [id number]   Date: 2022  Code: Full Code          Chart and notes reviewed. Data reviewed. I review the patient's current medications in the medical record at each encounter.  I have evaluated and examined the patient. History of Present Illness:  Tracee Sidhu is a very pleasant, 80 yo gentleman that presented to [facility] on  with worsening shortness of breath. He has a PMH of a.fib, aortic stenosis, DM, hypertension, CVA, and tobacco abuse (100 pack years, quit in ). He received two doses of the Covid vaccine, last in 2021. He did not receive a booster dose. He is currently requiring hi flow at 50L/100%. He reports that his symptoms started five days ago after he was exposed to his grandson who tested positive. He became more progressively more SOB with increased cough. No fever/chills. He did initially have diarrhea. Images:  Personally visualized and report reviewed. CXR :  Superimposed patchy bilateral airspace disease on interstitial changes concerning for infection or inflammation      D-dimer . 55  LFTs stable  Procalcitonin negative  Ferritin 759  Trop wnl  Pro-  CRP 3.18  ECHO 2020:  EF 55-60%, mildly reduced systolic function, moderate aortic stenosis, trave MVR, moderately elevated CVP. Interval History:  Afebrile  BP stable  Sats 90% on 55/90% HFNC during my exam  D-dimer 0.72  Procalcitonin <.05  CRP 0.35 - better  LFTs stable    ROS: Denies SOB at rest with HF in place  Denies fever or chills. Denies CP, abd pain, or LE pain/swelling. Trying to lay on sides and prone as able.     Past Medical History:   Diagnosis Date    [facility] ([facility]     Diabetes mellitus, type 2 ([facility]     Hypertension     Internal carotid artery stenosis, right     Chronic occlusion of the right internal carotid artery per MRI of 2020 unchanged from previous study of 2010; study of 2020 also showed mild stenosis of the left internal carotid artery and bilateral patent vertebral arteries    Moderate aortic stenosis     Echocardiogram of 2020:  Severe aortic valve leaflet calcification present with reduced excursion. Moderate aortic valve stenosis was present. LVEF 55-60% .  Stroke [facility])     asymptomatic; small chronic infarcts in the bilateral thalami per MRI of 2020.   Previous MRI of 2010 showed possible tiny old right thalamic infarct versus perivascular space        Past Surgical History:   Procedure Laterality Date    HX CHOLECYSTECTOMY      SD INS NEW/RPLCMT PRM PM W/TRANSV ELTRD ATRIAL&VENT N/A 2020    INSERT PPM DUAL performed by Aundrea Joe MD at [facility] LAB       Family History   Adopted: Yes   Family history unknown: Yes       Social History     Tobacco Use    Smoking status: Former Smoker     Packs/day: 2.00     Years: 50.00     Pack years: 100.00     Quit date: 2004     Years since quittin.6    Smokeless tobacco: Never Used   Substance Use Topics    Alcohol use: Yes     Comment: 1 beer or 1 glaas of wine daily most of adult life       Allergies   Allergen Reactions    Ampicillin Nausea and Vomiting    Iodine Hives, Itching and Nausea and Vomiting       Current Facility-Administered Medications   Medication Dose Route Frequency    baricitinib (OLUMIANT) tablet 4 mg  4 mg Oral DAILY    insulin NPH (NOVOLIN N, HUMULIN N) injection 12 Units  12 Units SubCUTAneous ACB&D    guaiFENesin-dextromethorphan (ROBITUSSIN DM) 100-10 mg/5 mL syrup 5 mL  5 mL Oral Q6H PRN    rivaroxaban (XARELTO) tablet 20 mg  20 mg Oral DAILY WITH DINNER    [Held by provider] lisinopriL (PRINIVIL, ZESTRIL) tablet 10 mg  10 mg Oral QHS    atorvastatin (LIPITOR) tablet 20 mg  20 mg Oral DAILY    glucose chewable tablet 16 g  4 Tablet Oral PRN    dextrose (D50W) injection syrg 12.5-25 g  25-50 mL IntraVENous PRN    glucagon (GLUCAGEN) injection 1 mg  1 mg IntraMUSCular PRN    insulin lispro (HUMALOG) injection   SubCUTAneous AC&HS    dexamethasone (PF) (DECADRON) 10 mg/mL injection 10 mg  10 mg IntraVENous Q12H    sodium chloride (NS) flush 5-40 mL  5-40 mL IntraVENous Q8H    sodium chloride (NS) flush 5-40 mL  5-40 mL IntraVENous PRN    acetaminophen (TYLENOL) tablet 650 mg  650 mg Oral Q6H PRN    Or    acetaminophen (TYLENOL) suppository 650 mg  650 mg Rectal Q6H PRN    polyethylene glycol (MIRALAX) packet 17 g  17 g Oral DAILY PRN    ondansetron (ZOFRAN ODT) tablet 4 mg  4 mg Oral Q8H PRN    Or    ondansetron (ZOFRAN) injection 4 mg  4 mg IntraVENous Q6H PRN         REVIEW OF SYSTEMS   Negative except as stated in the HPI. Physical Exam:   Visit Vitals  /76 (BP Patient Position: At rest)   Pulse 85   Temp 98 °F (36.7 °C)   Resp 28   Ht 5' 8\" (1.727 m)   Wt 68.4 kg (150 lb 11.2 oz)   SpO2 95%   BMI 22.91 kg/m²       General:  Alert, cooperative, no distress, appears stated age, on HF   Head:  Normocephalic, without obvious abnormality, atraumatic. Eyes:  Conjunctivae/corneas clear. Nose: Nares normal. Septum midline. Mucosa normal.   Throat: Lips, mucosa, and tongue normal.    Neck: Supple, symmetrical, trachea midline, no adenopathy. Lungs:   Clear but diminished. Chest wall:  No tenderness or deformity. Heart:  Regular rate and rhythm, S1, S2 normal, no murmur, click, rub or gallop. Abdomen:   Soft, non-tender. Bowel sounds normal.   Extremities: Extremities normal, atraumatic, no cyanosis or edema. Pulses: 2+ and symmetric all extremities.    Skin: Skin color, texture, turgor normal. No rashes or lesions   Lymph nodes: Cervical  nodes normal.   Neurologic: Grossly nonfocal       Lab Results   Component Value Date/Time    Sodium 135 (L) 01/17/2022 01:15 AM    Potassium 4.6 01/17/2022 01:15 AM    Chloride 100 01/17/2022 01:15 AM    CO2 30 01/17/2022 01:15 AM    BUN 33 (H) 01/17/2022 01:15 AM    Creatinine 0.87 01/17/2022 01:15 AM    Glucose 155 (H) 01/17/2022 01:15 AM    Calcium 8.5 01/17/2022 01:15 AM    Magnesium 2.1 01/16/2022 01:52 AM    Phosphorus 3.1 01/16/2022 01:52 AM    Lactic acid 1.3 01/11/2022 06:34 PM       Lab Results   Component Value Date/Time    WBC 9.9 01/17/2022 01:15 AM    HGB 14.3 01/17/2022 01:15 AM    PLATELET 325 39/03/9386 01:15 AM    MCV 84.7 01/17/2022 01:15 AM       Lab Results   Component Value Date/Time    INR 1.0 02/04/2020 03:50 PM    aPTT 32.0 09/22/2010 03:50 AM    Alk.  phosphatase 55 01/17/2022 01:15 AM    Protein, total 6.0 (L) 01/17/2022 01:15 AM    Albumin 2.6 (L) 01/17/2022 01:15 AM    Globulin 3.4 01/17/2022 01:15 AM       Lab Results   Component Value Date/Time    Ferritin 759 (H) 01/11/2022 06:34 PM       Lab Results   Component Value Date/Time    Sed rate (ESR) 5 09/22/2010 03:50 AM    C-Reactive protein 0.35 01/17/2022 01:15 AM        No results found for: PH, PHI, PCO2, PCO2I, PO2, PO2I, HCO3, HCO3I, FIO2, FIO2I    Lab Results   Component Value Date/Time    CK 79 09/22/2010 03:50 AM    Troponin-I, Qt. 0.05 (H) 09/21/2010 12:10 PM        Lab Results   Component Value Date/Time    Culture result: Culture performed on Fluid swab specimen 11/25/2021 08:22 PM    Culture result: NO GROWTH 4 DAYS 11/25/2021 08:22 PM       No results found for: TOXA1, RPR, HBCM, HBSAG, HAAB, HCAB1, HAAT, G6PD, CRYAC, HIVGT, HIVR, HIV1, HIV12, HIVPC, HIVRPI    Lab Results   Component Value Date/Time    CK 79 09/22/2010 03:50 AM       Lab Results   Component Value Date/Time    Color YELLOW/STRAW 08/02/2019 12:10 AM    Appearance CLEAR 08/02/2019 12:10 AM    pH (UA) 5.0 08/02/2019 12:10 AM    Protein 30 (A) 08/02/2019 12:10 AM    Glucose 500 (A) 08/02/2019 12:10 AM    Ketone NEGATIVE  08/02/2019 12:10 AM    Bilirubin NEGATIVE  08/02/2019 12:10 AM    Blood SMALL (A) 08/02/2019 12:10 AM    Urobilinogen 0.2 08/02/2019 12:10 AM Nitrites NEGATIVE  08/02/2019 12:10 AM    Leukocyte Esterase NEGATIVE  08/02/2019 12:10 AM    WBC 0-4 08/02/2019 12:10 AM    RBC 0-5 08/02/2019 12:10 AM    Bacteria NEGATIVE  08/02/2019 12:10 AM       Images: personally visualized and report reviewed    CXR (1/16/2021): Worsening of left mid and lower lung pulmonary opacities. IMPRESSION  · Acute Respiratory Failure with Hypoxia  · Covid-19 Pneumonia  · History of A. Fib  · Moderate Aortic Stenosis  · History of Tobacco Abuse  · DM  · Hypertension      PLAN  · Continue HF to keep sats >88%; currently at 55L/90%  · Continue Dexamethasone 10mg q12h  · Continue Baricitinib  · On AC with Xarleto  · Trend CRP, d-dimer, LFTs  · Encourage IS use  · Prone as able  · OOB  · Would benefit from PFTs as an outpatient  · Will need repeat imaging as an outpatient  · PT/OT      is critically ill, requiring HFNC for acute respiratory failure secondary to Covid pneumonia. Total CC time: 30+ minutes.       Alejandra Estimable, [address]

## 2022-01-18 NOTE — PROGRESS NOTES
Bedside and Verbal shift change report given to Tung Kevin RN (oncoming nurse) by Dilcia Xie RN (offgoing nurse). Report included the following information SBAR, Kardex, Intake/Output, MAR, Accordion and Recent Results. This patient was assisted with Intentional Toileting every 2 hours during this shift as appropriate. Documentation of ambulation and output reflected on Flowsheet as appropriate. Purposeful hourly rounding was completed using AIDET and 5Ps. Outcomes of PHR documented as they occurred. Bed alarm in use as appropriate. Dual Suction and ambubag in place. Bedside and Verbal shift change report given to [facility], [address] [place] [address] (oncoming nurse) by Tung Kevin RN (offgoing nurse). Report included the following information SBAR, Kardex, Intake/Output, MAR, Accordion and Recent Results.

## 2022-01-18 NOTE — PROGRESS NOTES
Problem: Mobility Impaired (Adult and Pediatric)  Goal: *Acute Goals and Plan of Care (Insert Text)  Description: FUNCTIONAL STATUS PRIOR TO ADMISSION: Patient was independent and active without use of DME.    HOME SUPPORT PRIOR TO ADMISSION: The patient lived with son and grandson but did not require assist.    Physical Therapy Goals  Initiated 1/17/2022  1. Patient will move from supine to sit and sit to supine  in bed with modified independence within 7 day(s). 2.  Patient will transfer from bed to chair and chair to bed with modified independence using the least restrictive device within 7 day(s). 3.  Patient will perform sit to stand with modified independence within 7 day(s). 4.  Patient will ambulate with modified independence for 150 feet with the least restrictive device within 7 day(s). 5.  Patient will ascend/descend 6 stairs with 2 handrail(s) with modified independence within 7 day(s). Note:   PHYSICAL THERAPY TREATMENT  Patient: Sherie November (89 y.o. male)  Date: 1/18/2022  Diagnosis: [facility] failure [facility] [J96.01]  Pneumonia due to COVID-19 virus [U07.1, J12.82] <principal problem not specified>       Precautions: Fall  Chart, physical therapy assessment, plan of care and goals were reviewed. ASSESSMENT  Patient continues with skilled PT services. Pt currently on high flow. Pt instructed to move slowly needing only CGA supine to sit. SPO2 was 91% sitting on EOB. Pt sit to stand CGA. Pts SPO2 maintained at 90% standing on high flow. Pt took steps forward and back x 2 hand held with SPO2 dropping to 85%. Pt recoverd to 90% standing with regular breathing. Pt returned to supine with CGA. Pts SPO2 post activity back to bed was 92% on high flow. Pt progressing slowly. Continue goals. PLAN :  Patient continues to benefit from skilled intervention to address the above impairments. Continue treatment per established plan of care. to address goals.     Recommendation for discharge: (in order for the patient to meet his/her long term goals)  To be determined pulmonary Rehab vs Home health.     This discharge recommendation:  Has been made in collaboration with the attending provider and/or case management    IF patient discharges home will need the following DME: to be determined (TBD)       SUBJECTIVE:       OBJECTIVE DATA SUMMARY:   Critical Behavior:  Neurologic State: Alert  Orientation Level: Oriented X4  Cognition: Follows commands  Safety/Judgement: Awareness of environment  Functional Mobility Training:  Bed Mobility:     Supine to Sit: Contact guard assistance              Transfers:  Sit to Stand: Contact guard assistance  Stand to Sit: Contact guard assistance                             Balance:  Sitting: Intact  Standing: With support  Ambulation/Gait Training:   Pt took 2 steps forward and back x 2 hand h                 Activity Tolerance:   Fair    After treatment patient left in no apparent distress:   Supine in bed    COMMUNICATION/COLLABORATION:   The patients plan of care was discussed with: Physical therapist.     Gaetano Trejo PTA   Time Calculation: 24 mins

## 2022-01-19 NOTE — CONSULTS
Nutrition Note    RD consulted for wounds. Per wound care note, no open areas. Some chaffing on ears. No redness on sacrum. See full assessment note on 1/18 - patient likely meeting >/= 75% kcal needs and 100% protein needs with current PO intake. No new nutrition intervention is needed at this time - will follow up as indicated in full assessment note.      Electronically signed by Dale Hough RD on 0/13/5415   Contact: [phone] or via [employer]

## 2022-01-19 NOTE — WOUND CARE
Wound Consult:  New consult Visit. Chart reviewed. Consulted for red ears from HFNC. COVID -23   Spoke with patients nurse, Deena Price RN. Patient is resting on a lula bed with ARMANDO mattress. Heels off loaded with pillows. Patient is awake, alert, oriented X4 [place]; requires 1 assist to move side to side in bed. Alexy score 19  Assessment:  Right ear over top where connects to scalp - red, chafed,blanching. Left ear over top where connects to scalp- pink, blanching  Bilateral heels- boggy, pink, blanching , skin intact  Lower buttocks- pink, blanching, skin intact  Sacrum- no redness noted. Treatment:  Sacral foam to buttocks  hydrocolloid to ears bilaterally for protection. Elevated heels on pillows  Wound Recommendations:  Sacral foam to buttocks- change every 3 days or incontinence care  hydrocolloid to ears bilaterally for protection. change every 3 days or as needed  Elevated heels on pillows while in bed. Waffle cushion while in chair. Skin Care / PI Prevention Recommendations:  1. Minimize friction/shear: minimize layers of linen/pads under patient. 2. Off load pressure/reposition:  turn and reposition approximately every 2 hours; float heels with pillows or use off loading heel boots; waffle cushion for sitting; position wedge. 3. Manage Moisture - keep skin folds dry; incontinence skin care with incontinence wipes; zinc guard barrier ointment. 4. Continue to monitor nutrition, pain, and skin risk scale, and skin assessment. Plan:  Spoke with Dr. Catarino Ruelas regarding findings and proposed orders for treatment. We will continue to reassess weekly and as needed. Please re-consult should concerns arise despite continued skin/PI prevention measures.     [address] [place], Wound / [address] [facility] Healing Office [phone]

## 2022-01-19 NOTE — PROGRESS NOTES
Problem: Self Care Deficits Care Plan (Adult)  Goal: *Acute Goals and Plan of Care (Insert Text)  Description: FUNCTIONAL STATUS PRIOR TO ADMISSION: Patient was independent and active without use of DME.     HOME SUPPORT: The patient lived with grandson but did not require assist.    Occupational Therapy Goals  Initiated 1/17/2022  1. Patient will perform grooming and bathing with supervision standing at the sink with < or = 2 rest breaks and maintain oxygen sats > or = 90% within 7 day(s). 2. Patient will perform upper body dressing and lower body dressing with supervision with < or = 2 rest breaks and maintain oxygen sats > or = 90%  within 7 day(s). 3. Patient will perform toileting and toilet transfer with supervision and maintain oxygen sats > or = 90% within 7 day(s). 4. Patient will demonstrate pursed lip breathing with min cues during functional tasks within 7 day(s). 5. Patient will independently perform and recall home exercise program while maintaining O2 sats at or above 90% within 7 days. 6. Patient will verbalize/demonstrate all energy conservation techniques during ADL tasks with independence within 7 days. Outcome: Progressing Towards Goal   OCCUPATIONAL THERAPY TREATMENT  Patient: Rachelle Haddad (89 y.o. male)  Date: 1/19/2022  Diagnosis: Acute respiratory failure with hypoxia ([facility] [J96.01]  Pneumonia due to COVID-19 virus [U07.1, J12.82] <principal problem not specified>       Precautions: Fall  Chart, occupational therapy assessment, plan of care, and goals were reviewed. ASSESSMENT  Patient continues with skilled OT services and is progressing towards goals. Pt transfers to chair contact guard in prep for UE exercises. Pt encouraged to pursed lip breathe on high flow nasal cannula 30 L/min  with exercises. Pt desats with activity to 78% but quickly increases to 85% and than 97%. Pt verbalized sore thighs and ears. RN aware.      Current Level of Function Impacting Discharge (ADLs): min assist UB bathe, dress, groom    Other factors to consider for discharge:          PLAN :  Patient continues to benefit from skilled intervention to address the above impairments. Continue treatment per established plan of care to address goals.     Recommend with staff: out of bed for meals, there act, there ex, ADL's    Recommend next OT session: cont towards goals    Recommendation for discharge: (in order for the patient to meet his/her long term goals)  To be determined: per MD    This discharge recommendation:  Has not yet been discussed the attending provider and/or case management    IF patient discharges home will need the following DME:        SUBJECTIVE:   Patient stated I am cold    OBJECTIVE DATA SUMMARY:   Cognitive/Behavioral Status:  Neurologic State: Alert  Orientation Level: Oriented X4  Cognition: Follows commands             Functional Mobility and Transfers for ADLs:  Bed Mobility:  Supine to Sit: Stand-by assistance    Transfers:  Sit to Stand: Contact guard assistance          Balance: Sitting balance intact       ADL Intervention:     Min assist UB bathe/dress          Therapeutic Exercises:     EXERCISE   Sets   Reps   Active Active Assist   Passive   Comments   Shoulder flex 1 10 [x]           []           []              Shoulder abduction 1 10 [x]           []           []              Shoulder horizontal ab/add 1 10 [x]           []           []                 []           []           []                 []           []           []                 []           []           []                 []           []           []                 []           []           []                 []           []           []                 []           []           []                 []           []           []                   Activity Tolerance:   Fair    After treatment patient left in no apparent distress:   Sitting in chair and Call bell within reach    COMMUNICATION/COLLABORATION:   The patients plan of care was discussed with: Occupational therapist and Registered nurse.      ANJELICA Pete/L  Time Calculation: 40 mins

## 2022-01-19 NOTE — PROGRESS NOTES
[facility] [place] [address]  [address] [facility], [address] [place] [address]  [phone]      Medical Progress Note      NAME: Sherie November   :  1940  MRM:  [id number]    Date/Time: 2022        Assessment / Plan:     78 yo M w/ hx of AF, HTN, DM presenting w/ fatigue, dyspneic, admitted for AHRF 2/2 COVID-19      Acute respiratory failure with hypoxia: 2/2 COVID. Severe but improving, down to HFNC 30L. Prognosis remains guarded       Pneumonia due to COVID-19 virus: Repeat CXR  showed worsening of left mid and lower lung pulmonary opacities. On high dose IV dexamethasone, baricitinib. Low procalcitonin. D-dimer low, unlikely acute PE/VTE, already on Xarelto. Follow inflammatory markers. Pulmonology following      Aortic stenosis: moderate, based on echo in 2020 but repeat TTE  showing mild stenosis     Elevated bilirubin: obtain ABD US. Type 2 diabetes mellitus without complication: N9K 9.1%. BG high 2/2 steroids. Cont NPH, cont SSI       SOHAIL: mild, resolved. Currently holding lisinopril. Avoid hypotension      A-fib: monitor on telemetry, cont Xarelto      History of CVA (cerebrovascular accident): cont OAC, statin      Hypertension: currently holding lisinopril. Care Plan discussed with: Patient, Nursing Staff and >50% of time spent in counseling and coordination of care    Discussed:  Care Plan    Prophylaxis:  Xarelto    Disposition:  SNF vs inpt rehab         Subjective:     Chief Complaint:  Follow up COVID    Chart/notes/labs/studies reviewed, patient examined. Sergio Rosado, reports improved dyspnea, no cp, no abd pcomplaints. Spoke with son and provided him update. Objective:       Vitals:        Last 24hrs VS reviewed since prior progress note.  Most recent are:    Visit Vitals  /68 (BP 1 Location: Right upper arm, BP Patient Position: At rest)   Pulse 69   Temp 98.5 °F (36.9 °C)   Resp 24   Ht 5' 8\" (1.727 m)   Wt 66.7 kg (147 lb 1.6 oz)   SpO2 98%   BMI 22.37 kg/m²     SpO2 Readings from Last 6 Encounters:   01/19/22 98%   11/26/21 95%   11/19/21 93%   05/28/21 95%   02/06/20 98%   02/02/20 93%    O2 Flow Rate (L/min): 30 l/min       Intake/Output Summary (Last 24 hours) at 1/19/2022 0910  Last data filed at 1/19/2022 0302  Gross per 24 hour   Intake 600 ml   Output 1050 ml   Net -450 ml          Exam:     Physical Exam:    Gen:  Elderly, ill-appearing. NAD. Pleasant   HEENT:  Atraumatic, normocephalic. Sclerae nonicteric, hearing intact to voice  Neck:  Supple, without apparent masses  Resp:  No accessory muscle use,  diminished breath sounds b/l   Card: RRR, soft 2/6 systolic murmur, No rubs or gallops. No LE edema  Abd:  +bowel sounds, soft, NTTP, nondistended  Neuro: Face symmetric, speech fluent, follows commands appropriately, no focal weakness or numbness  Psych:  Alert, oriented x 3.  Fair insight     Medications Reviewed: (see below)    Lab Data Reviewed: (see below)    ______________________________________________________________________    Medications:     Current Facility-Administered Medications   Medication Dose Route Frequency    baricitinib (OLUMIANT) tablet 4 mg  4 mg Oral DAILY    insulin NPH (NOVOLIN N, HUMULIN N) injection 12 Units  12 Units SubCUTAneous ACB&D    guaiFENesin-dextromethorphan (ROBITUSSIN DM) 100-10 mg/5 mL syrup 5 mL  5 mL Oral Q6H PRN    rivaroxaban (XARELTO) tablet 20 mg  20 mg Oral DAILY WITH DINNER    [Held by provider] lisinopriL (PRINIVIL, ZESTRIL) tablet 10 mg  10 mg Oral QHS    atorvastatin (LIPITOR) tablet 20 mg  20 mg Oral DAILY    glucose chewable tablet 16 g  4 Tablet Oral PRN    dextrose (D50W) injection syrg 12.5-25 g  25-50 mL IntraVENous PRN    glucagon (GLUCAGEN) injection 1 mg  1 mg IntraMUSCular PRN    insulin lispro (HUMALOG) injection   SubCUTAneous AC&HS    dexamethasone (PF) (DECADRON) 10 mg/mL injection 10 mg  10 mg IntraVENous Q12H    sodium chloride (NS) flush 5-40 mL 5-40 mL IntraVENous Q8H    sodium chloride (NS) flush 5-40 mL  5-40 mL IntraVENous PRN    acetaminophen (TYLENOL) tablet 650 mg  650 mg Oral Q6H PRN    Or    acetaminophen (TYLENOL) suppository 650 mg  650 mg Rectal Q6H PRN    polyethylene glycol (MIRALAX) packet 17 g  17 g Oral DAILY PRN    ondansetron (ZOFRAN ODT) tablet 4 mg  4 mg Oral Q8H PRN    Or    ondansetron (ZOFRAN) injection 4 mg  4 mg IntraVENous Q6H PRN            Lab Review:     Recent Labs     01/17/22 0115   WBC 9.9   HGB 14.3   HCT 40.9        Recent Labs     01/17/22 0115   *   K 4.6      CO2 30   *   BUN 33*   CREA 0.87   CA 8.5   ALB 2.6*   ALT 24     No components found for: GLPOC  No results for input(s): PH, PCO2, PO2, HCO3, FIO2 in the last 72 hours. No results for input(s): INR, INREXT, INREXT in the last 72 hours.   No results found for: SDES  Lab Results   Component Value Date/Time    Culture result: Culture performed on Fluid swab specimen 11/25/2021 08:22 PM    Culture result: NO GROWTH 4 DAYS 11/25/2021 08:22 PM              ___________________________________________________    Attending Physician: Alyse Gutierrez DO

## 2022-01-19 NOTE — PROGRESS NOTES
Bedside and Verbal shift change report given to Tung Kevin RN (oncoming nurse) by Varsha Orellana RN (offgoing nurse). Report included the following information SBAR, Kardex, Intake/Output, MAR, Accordion and Recent Results. This patient was assisted with Intentional Toileting every 2 hours during this shift as appropriate. Documentation of ambulation and output reflected on Flowsheet as appropriate. Purposeful hourly rounding was completed using AIDET and 5Ps. Outcomes of PHR documented as they occurred. Bed alarm in use as appropriate. Dual Suction and ambubag in place. Bedside and Verbal shift change report given to [facility], RN (oncoming nurse) by Tung Kevin RN (offgoing nurse). Report included the following information SBAR, Kardex, Intake/Output, MAR, Accordion and Recent Results.

## 2022-01-19 NOTE — PROGRESS NOTES
Name: Vel Tucker: [address]   : 1940 Admit Date: 2022   Phone: [phone]  Room: [facility]/   PCP: Siddhartha Rodríguez MD  MRN: [id number]   Date: 2022  Code: Full Code          Chart and notes reviewed. Data reviewed. I review the patient's current medications in the medical record at each encounter.  I have evaluated and examined the patient. History of Present Illness:  Nicole Mayberry is a very pleasant, 78 yo gentleman that presented to [facility] on  with worsening shortness of breath. He has a PMH of a.fib, aortic stenosis, DM, hypertension, CVA, and tobacco abuse (100 pack years, quit in ). He received two doses of the Covid vaccine, last in 2021. He did not receive a booster dose. He is currently requiring hi flow at 50L/100%. He reports that his symptoms started five days ago after he was exposed to his grandson who tested positive. He became more progressively more SOB with increased cough. No fever/chills. He did initially have diarrhea. Images:  Personally visualized and report reviewed. CXR :  Superimposed patchy bilateral airspace disease on interstitial changes concerning for infection or inflammation      D-dimer . 55  LFTs stable  Procalcitonin negative  Ferritin 759  Trop wnl  Pro-  CRP 3.18  ECHO 2020:  EF 55-60%, mildly reduced systolic function, moderate aortic stenosis, trave MVR, moderately elevated CVP. Interval History:  Afebrile  BP stable  Sats 98% on 30L HF FiO2 100%  D-dimer 0.72  Procalcitonin <.05  CRP 0.35 - better  LFTs stable    ROS: Denies SOB at rest with HF in place  Denies fever or chills. Denies CP, abd pain, or LE pain/swelling.      Past Medical History:   Diagnosis Date    Atrial fibrillation ([facility]     Diabetes mellitus, type 2 ([facility]     Hypertension     Internal carotid artery stenosis, right     Chronic occlusion of the right internal carotid artery per MRI of 2020 unchanged from previous study of 2010; study of  also showed mild stenosis of the left internal carotid artery and bilateral patent vertebral arteries    Moderate aortic stenosis     Echocardiogram of 2020:  Severe aortic valve leaflet calcification present with reduced excursion. Moderate aortic valve stenosis was present. LVEF 55-60% .  Stroke [facility])     asymptomatic; small chronic infarcts in the bilateral thalami per MRI of 2020.   Previous MRI of 2010 showed possible tiny old right thalamic infarct versus perivascular space        Past Surgical History:   Procedure Laterality Date    HX CHOLECYSTECTOMY      VA INS NEW/RPLCMT PRM PM W/TRANSV ELTRD ATRIAL&VENT N/A 2020    INSERT PPM DUAL performed by Nick Arredondo MD at [place] [address] LAB       Family History   Adopted: Yes   Family history unknown: Yes       Social History     Tobacco Use    Smoking status: Former Smoker     Packs/day: 2.00     Years: 50.00     Pack years: 100.00     Quit date: 2004     Years since quittin.6    Smokeless tobacco: Never Used   Substance Use Topics    Alcohol use: Yes     Comment: 1 beer or 1 glaas of wine daily most of adult life       Allergies   Allergen Reactions    Ampicillin Nausea and Vomiting    Iodine Hives, Itching and Nausea and Vomiting       Current Facility-Administered Medications   Medication Dose Route Frequency    baricitinib (OLUMIANT) tablet 4 mg  4 mg Oral DAILY    insulin NPH (NOVOLIN N, HUMULIN N) injection 12 Units  12 Units SubCUTAneous ACB&D    guaiFENesin-dextromethorphan (ROBITUSSIN DM) 100-10 mg/5 mL syrup 5 mL  5 mL Oral Q6H PRN    rivaroxaban (XARELTO) tablet 20 mg  20 mg Oral DAILY WITH DINNER    [Held by provider] lisinopriL (PRINIVIL, ZESTRIL) tablet 10 mg  10 mg Oral QHS    atorvastatin (LIPITOR) tablet 20 mg  20 mg Oral DAILY    glucose chewable tablet 16 g  4 Tablet Oral PRN    dextrose (D50W) injection syrg 12.5-25 g  25-50 mL IntraVENous PRN    glucagon (GLUCAGEN) injection 1 mg  1 mg IntraMUSCular PRN    insulin lispro (HUMALOG) injection   SubCUTAneous AC&HS    dexamethasone (PF) (DECADRON) 10 mg/mL injection 10 mg  10 mg IntraVENous Q12H    sodium chloride (NS) flush 5-40 mL  5-40 mL IntraVENous Q8H    sodium chloride (NS) flush 5-40 mL  5-40 mL IntraVENous PRN    acetaminophen (TYLENOL) tablet 650 mg  650 mg Oral Q6H PRN    Or    acetaminophen (TYLENOL) suppository 650 mg  650 mg Rectal Q6H PRN    polyethylene glycol (MIRALAX) packet 17 g  17 g Oral DAILY PRN    ondansetron (ZOFRAN ODT) tablet 4 mg  4 mg Oral Q8H PRN    Or    ondansetron (ZOFRAN) injection 4 mg  4 mg IntraVENous Q6H PRN         REVIEW OF SYSTEMS   Negative except as stated in the HPI. Physical Exam:   Visit Vitals  /68 (BP 1 Location: Right upper arm, BP Patient Position: At rest)   Pulse 69   Temp 98.5 °F (36.9 °C)   Resp 24   Ht 5' 8\" (1.727 m)   Wt 66.7 kg (147 lb 1.6 oz)   SpO2 98%   BMI 22.37 kg/m²       General:  Alert, cooperative, no distress, appears stated age, on HF   Head:  Normocephalic, without obvious abnormality, atraumatic. Eyes:  Conjunctivae/corneas clear. Nose: Nares normal. Septum midline. Mucosa normal.   Throat: Lips, mucosa, and tongue normal.    Neck: Supple, symmetrical, trachea midline, no adenopathy. Lungs:   Clear but diminished. Chest wall:  No tenderness or deformity. Heart:  Regular rate and rhythm, S1, S2 normal, no murmur, click, rub or gallop. Abdomen:   Soft, non-tender. Bowel sounds normal.   Extremities: Extremities normal, atraumatic, no cyanosis or edema. Pulses: 2+ and symmetric all extremities.    Skin: Skin color, texture, turgor normal. No rashes or lesions   Lymph nodes: Cervical  nodes normal.   Neurologic: Grossly nonfocal       Lab Results   Component Value Date/Time    Sodium 135 (L) 01/17/2022 01:15 AM    Potassium 4.6 01/17/2022 01:15 AM    Chloride 100 01/17/2022 01:15 AM    CO2 30 01/17/2022 01:15 AM BUN 33 (H) 01/17/2022 01:15 AM    Creatinine 0.87 01/17/2022 01:15 AM    Glucose 155 (H) 01/17/2022 01:15 AM    Calcium 8.5 01/17/2022 01:15 AM    Magnesium 2.1 01/16/2022 01:52 AM    Phosphorus 3.1 01/16/2022 01:52 AM    Lactic acid 1.3 01/11/2022 06:34 PM       Lab Results   Component Value Date/Time    WBC 9.9 01/17/2022 01:15 AM    HGB 14.3 01/17/2022 01:15 AM    PLATELET 849 73/14/6491 01:15 AM    MCV 84.7 01/17/2022 01:15 AM       Lab Results   Component Value Date/Time    INR 1.0 02/04/2020 03:50 PM    aPTT 32.0 09/22/2010 03:50 AM    Alk.  phosphatase 55 01/17/2022 01:15 AM    Protein, total 6.0 (L) 01/17/2022 01:15 AM    Albumin 2.6 (L) 01/17/2022 01:15 AM    Globulin 3.4 01/17/2022 01:15 AM       Lab Results   Component Value Date/Time    Ferritin 759 (H) 01/11/2022 06:34 PM       Lab Results   Component Value Date/Time    Sed rate (ESR) 5 09/22/2010 03:50 AM    C-Reactive protein 0.35 01/17/2022 01:15 AM        No results found for: PH, PHI, PCO2, PCO2I, PO2, PO2I, HCO3, HCO3I, FIO2, FIO2I    Lab Results   Component Value Date/Time    CK 79 09/22/2010 03:50 AM    Troponin-I, Qt. 0.05 (H) 09/21/2010 12:10 PM        Lab Results   Component Value Date/Time    Culture result: Culture performed on Fluid swab specimen 11/25/2021 08:22 PM    Culture result: NO GROWTH 4 DAYS 11/25/2021 08:22 PM       No results found for: TOXA1, RPR, HBCM, HBSAG, HAAB, HCAB1, HAAT, G6PD, CRYAC, HIVGT, HIVR, HIV1, HIV12, HIVPC, HIVRPI    Lab Results   Component Value Date/Time    CK 79 09/22/2010 03:50 AM       Lab Results   Component Value Date/Time    Color YELLOW/STRAW 08/02/2019 12:10 AM    Appearance CLEAR 08/02/2019 12:10 AM    pH (UA) 5.0 08/02/2019 12:10 AM    Protein 30 (A) 08/02/2019 12:10 AM    Glucose 500 (A) 08/02/2019 12:10 AM    Ketone NEGATIVE  08/02/2019 12:10 AM    Bilirubin NEGATIVE  08/02/2019 12:10 AM    Blood SMALL (A) 08/02/2019 12:10 AM    Urobilinogen 0.2 08/02/2019 12:10 AM    Nitrites NEGATIVE 08/02/2019 12:10 AM    Leukocyte Esterase NEGATIVE  08/02/2019 12:10 AM    WBC 0-4 08/02/2019 12:10 AM    RBC 0-5 08/02/2019 12:10 AM    Bacteria NEGATIVE  08/02/2019 12:10 AM       Images: personally visualized and report reviewed    CXR (1/16/2021): Worsening of left mid and lower lung pulmonary opacities. IMPRESSION  · Acute Respiratory Failure with Hypoxia  · Covid-19 Pneumonia  · History of A. Fib  · Moderate Aortic Stenosis  · History of Tobacco Abuse  · DM  · Hypertension      PLAN  · Continue HF to keep sats >88%; currently at 55L/90%  · Continue Dexamethasone 10mg q12h  · Continue Baricitinib  · On AC with Xarleto  · Trend CRP, d-dimer, LFTs  · Encourage IS use  · Prone as able  · OOB  · Would benefit from PFTs as an outpatient  · Will need repeat imaging as an outpatient  · PT/OT       ALFREDA Jesus

## 2022-01-19 NOTE — PROGRESS NOTES
Attempted to work with the patient for Physical Therapy, chart reviewed and discussed with nurse patient just got back to bed and was very tired sitting from the chair. Patient asked to skip this afternoon. We will continue to follow up with the patient for therapy thank you.

## 2022-01-19 NOTE — PROGRESS NOTES
1/19/2022  11:38 AM  Pt discussed in IDR rounds, is continuing to require medical management for, Acute respiratory failure with hypoxia: 2/2 COVID, Pneumonia due to COVID-19 virus:T2DM, AFib, history of CVA, hypertension      Transitions of Care Plan:  COVID 19+  RUR 16 %  LOS 7 Days  1. Medical management continues  2. COVID 19 PNA, Acute Hypoxic Respiratory Failure, pt on 30  L O2 Hi-Jas, pulmonary following, IV steroids, follow for DC O2 needs   3. Palliative following   4. PT, OT recommending pulmonary rehab vs HH pending progress, pt has  [employer] would require pre-auth for pulm rehab, [facility] currently has waiver or SNF through 1/24/22  5.  DC when stable pulm rehab vs HH;  pt lives w/ Lia sánchez , was independent w/ ADLs, prior to admission, uses RW PRN   6 CM will continue to follow and assist w/ DC needs  QI [place]

## 2022-01-20 NOTE — PROGRESS NOTES
Problem: Mobility Impaired (Adult and Pediatric)  Goal: *Acute Goals and Plan of Care (Insert Text)  Description: FUNCTIONAL STATUS PRIOR TO ADMISSION: Patient was independent and active without use of DME.    HOME SUPPORT PRIOR TO ADMISSION: The patient lived with son and grandson but did not require assist.    Physical Therapy Goals  Initiated 1/17/2022  1. Patient will move from supine to sit and sit to supine  in bed with modified independence within 7 day(s). 2.  Patient will transfer from bed to chair and chair to bed with modified independence using the least restrictive device within 7 day(s). 3.  Patient will perform sit to stand with modified independence within 7 day(s). 4.  Patient will ambulate with modified independence for 150 feet with the least restrictive device within 7 day(s). 5.  Patient will ascend/descend 6 stairs with 2 handrail(s) with modified independence within 7 day(s). Outcome: Progressing Towards Goal   PHYSICAL THERAPY TREATMENT  Patient: Sandi Collado (62 y.o. male)  Date: 1/20/2022  Diagnosis: Acute respiratory failure with hypoxia ([facility] [J96.01]  Pneumonia due to COVID-19 virus [U07.1, J12.82] <principal problem not specified>       Precautions: Fall  Chart, physical therapy assessment, plan of care and goals were reviewed. ASSESSMENT  Patient continues with skilled PT services and is progressing towards goals. Communicated with nurse cleared for therapy. Patient supine on bed when received. Rolled on the edge of bed SBA, supine to sit SBA, sit to stand CGA, tolerated sitting on the edge of bed without support. Ambulate towards the recliner limited distance due to short High flow tubing. OOB to chair as tolerated, performed some active range of motion exercise on both LE all planes. Educate and instructed patient to continue active range of motion exercise on both legs while up on chair or on bed multiple times.  Recommend patient to be up on the chair at least 3 times a day every meal times as tolerated. Reviewed purse lip breathing and energy conservation. Oxygen saturation ranges between 92% to 97% on high flow  25 liters 100% FiO2. Activated chair alarm and notified nurse who agreed to continue to monitor patient. Current Level of Function Impacting Discharge (mobility/balance): CGA with transfers and ambulation with out assistive device. Other factors to consider for discharge: poor endurance         PLAN :  Patient continues to benefit from skilled intervention to address the above impairments. Continue treatment per established plan of care. to address goals. Recommendation for discharge: (in order for the patient to meet his/her long term goals)  Therapy up to 5 days/week in pulmonary rehab setting vs HHPT to be determine pending progress from therapy    This discharge recommendation:  Has been made in collaboration with the attending provider and/or case management    IF patient discharges home will need the following DME: to be determined (TBD)       SUBJECTIVE:   Patient stated ok.     OBJECTIVE DATA SUMMARY:   Critical Behavior:  Neurologic State: Alert,Appropriate for age  Orientation Level: Oriented X4  Cognition: Appropriate decision making,Appropriate for age attention/concentration,Appropriate safety awareness,Follows commands  Safety/Judgement: Awareness of environment  Functional Mobility Training:  Bed Mobility:  Rolling: Stand-by assistance  Supine to Sit: Stand-by assistance  Sit to Supine: Stand-by assistance  Scooting: Stand-by assistance        Transfers:  Sit to Stand: Contact guard assistance  Stand to Sit: Contact guard assistance  Stand Pivot Transfers: Contact guard assistance     Bed to Chair: Contact guard assistance                    Balance:  Sitting: Intact; High guard  Standing: Impaired; Without support  Standing - Static: Fair  Standing - Dynamic : Fair  Ambulation/Gait Training:  Distance (ft): 5 Feet (ft)  Assistive Device: Gait belt  Ambulation - Level of Assistance: Contact guard assistance     Gait Description (WDL): Exceptions to WDL  Gait Abnormalities: Path deviations        Base of Support: Widened     Speed/Niesha: Pace decreased (<100 feet/min)  Step Length: Right shortened;Left shortened                         Therapeutic Exercises:   Educate and instructed patient to continue active range of motion exercise on both legs while up on chair or on bed multiple times. Recommend patient to be up on the chair at least 3 times a day every meal times as tolerated. Pain Rating:  3/10    Activity Tolerance:   Good    After treatment patient left in no apparent distress:   Sitting in chair, Heels elevated for pressure relief, Call bell within reach, and Bed / chair alarm activated    COMMUNICATION/COLLABORATION:   The patients plan of care was discussed with: Occupational therapist and Registered nurse. Ashlyn Molina PT,WCC.    Time Calculation: 30 mins

## 2022-01-20 NOTE — PROGRESS NOTES
1/20/2022  12:23 PM  Pt discussed in IDR rounds, is continuing to require medical management for, Acute respiratory failure with hypoxia: 2/2 COVID, Pneumonia due to COVID-19 virus:T2DM, AFib, history of CVA, hypertension        Transitions of Care Plan:  COVID 19+  RUR 15 %  LOS 9 Days  1. Medical management continues  2. COVID 19 PNA, Acute Hypoxic Respiratory Failure, pt on 15 L O2 Hi-Jas, wean as tolerated,  pulmonary following, IV steroids, follow for DC O2 needs   3. Palliative following   4. PT, OT following recommending pulmonary rehab vs HH pending progress, pt declined yesterday.    pt has [employer] would require pre-auth for pulm rehab, [facility] currently has waiver or SNF through 1/24/22  5. DC when stable pulm rehab vs HH;  pt lives w/ Arin John grandson , was independent w/ ADLs, prior to admission, uses RW PRN   6 CM will continue to follow and assist w/ DC needs  QI Jean

## 2022-01-20 NOTE — PROGRESS NOTES
Problem: Falls - Risk of  Goal: *Absence of Falls  Description: Document Freida Khan Fall Risk and appropriate interventions in the flowsheet.   Outcome: Progressing Towards Goal  Note: Fall Risk Interventions:  Mobility Interventions: Bed/chair exit alarm,Patient to call before getting OOB         Medication Interventions: Bed/chair exit alarm,Patient to call before getting OOB,Teach patient to arise slowly    Elimination Interventions: Bed/chair exit alarm,Call light in reach,Patient to call for help with toileting needs    History of Falls Interventions: Bed/chair exit alarm,Door open when patient unattended

## 2022-01-20 NOTE — PROGRESS NOTES
Name: Dayday Irbyters: [employer]   : 1940 Admit Date: 2022   Phone: [phone]  Room: 321/01   PCP: Gurdeep Perez MD  MRN: [id number]   Date: 2022  Code: Full Code          Chart and notes reviewed. Data reviewed. I review the patient's current medications in the medical record at each encounter.  I have evaluated and examined the patient. History of Present Illness:  Arjun Aguirre is a very pleasant, 78 yo gentleman that presented to [facility] on  with worsening shortness of breath. He has a PMH of a.fib, aortic stenosis, DM, hypertension, CVA, and tobacco abuse (100 pack years, quit in ). He received two doses of the Covid vaccine, last in 2021. He did not receive a booster dose. He is currently requiring hi flow at 50L/100%. He reports that his symptoms started five days ago after he was exposed to his grandson who tested positive. He became more progressively more SOB with increased cough. No fever/chills. He did initially have diarrhea. Images:  Personally visualized and report reviewed. CXR :  Superimposed patchy bilateral airspace disease on interstitial changes concerning for infection or inflammation      D-dimer . 55  LFTs stable  Procalcitonin negative  Ferritin 759  Trop wnl  Pro-  CRP 3.18  ECHO 2020:  EF 55-60%, mildly reduced systolic function, moderate aortic stenosis, trave MVR, moderately elevated CVP. Interval History:  Afebrile  BP stable  Sats 91% on 12L midflow  WBC 13.5  Na 131  LFTs stable    ROS: Denies SOB at rest with midflow in place  Denies fever or chills. Denies CP, abd pain, or LE pain/swelling.      Past Medical History:   Diagnosis Date    Atrial fibrillation ([facility] [address]     Diabetes mellitus, type 2 ([facility] [address]     Hypertension     Internal carotid artery stenosis, right     Chronic occlusion of the right internal carotid artery per MRI of 2020 unchanged from previous study of 2010; study of  also showed mild stenosis of the left internal carotid artery and bilateral patent vertebral arteries    Moderate aortic stenosis     Echocardiogram of 2020:  Severe aortic valve leaflet calcification present with reduced excursion. Moderate aortic valve stenosis was present. LVEF 55-60% .  Stroke [facility])     asymptomatic; small chronic infarcts in the bilateral thalami per MRI of 2020.   Previous MRI of 2010 showed possible tiny old right thalamic infarct versus perivascular space        Past Surgical History:   Procedure Laterality Date    HX CHOLECYSTECTOMY      AL INS NEW/RPLCMT PRM PM W/TRANSV ELTRD ATRIAL&VENT N/A 2020    INSERT PPM DUAL performed by Beryl Ware MD at [place] [address] LAB       Family History   Adopted: Yes   Family history unknown: Yes       Social History     Tobacco Use    Smoking status: Former Smoker     Packs/day: 2.00     Years: 50.00     Pack years: 100.00     Quit date: 2004     Years since quittin.6    Smokeless tobacco: Never Used   Substance Use Topics    Alcohol use: Yes     Comment: 1 beer or 1 glaas of wine daily most of adult life       Allergies   Allergen Reactions    Ampicillin Nausea and Vomiting    Iodine Hives, Itching and Nausea and Vomiting       Current Facility-Administered Medications   Medication Dose Route Frequency    baricitinib (OLUMIANT) tablet 4 mg  4 mg Oral DAILY    insulin NPH (NOVOLIN N, HUMULIN N) injection 12 Units  12 Units SubCUTAneous ACB&D    guaiFENesin-dextromethorphan (ROBITUSSIN DM) 100-10 mg/5 mL syrup 5 mL  5 mL Oral Q6H PRN    rivaroxaban (XARELTO) tablet 20 mg  20 mg Oral DAILY WITH DINNER    [Held by provider] lisinopriL (PRINIVIL, ZESTRIL) tablet 10 mg  10 mg Oral QHS    atorvastatin (LIPITOR) tablet 20 mg  20 mg Oral DAILY    glucose chewable tablet 16 g  4 Tablet Oral PRN    dextrose (D50W) injection syrg 12.5-25 g  25-50 mL IntraVENous PRN    glucagon (GLUCAGEN) injection 1 mg 1 mg IntraMUSCular PRN    insulin lispro (HUMALOG) injection   SubCUTAneous AC&HS    dexamethasone (PF) (DECADRON) 10 mg/mL injection 10 mg  10 mg IntraVENous Q12H    sodium chloride (NS) flush 5-40 mL  5-40 mL IntraVENous Q8H    sodium chloride (NS) flush 5-40 mL  5-40 mL IntraVENous PRN    acetaminophen (TYLENOL) tablet 650 mg  650 mg Oral Q6H PRN    Or    acetaminophen (TYLENOL) suppository 650 mg  650 mg Rectal Q6H PRN    polyethylene glycol (MIRALAX) packet 17 g  17 g Oral DAILY PRN    ondansetron (ZOFRAN ODT) tablet 4 mg  4 mg Oral Q8H PRN    Or    ondansetron (ZOFRAN) injection 4 mg  4 mg IntraVENous Q6H PRN         REVIEW OF SYSTEMS   Negative except as stated in the HPI. Physical Exam:   Visit Vitals  /82 (BP 1 Location: Right arm, BP Patient Position: At rest)   Pulse 75   Temp 97.9 °F (36.6 °C)   Resp 21   Ht 5' 8\" (1.727 m)   Wt 66.7 kg (147 lb 1.6 oz)   SpO2 91%   BMI 22.37 kg/m²       General:  Alert, cooperative, no distress, appears stated age, on HF   Head:  Normocephalic, without obvious abnormality, atraumatic. Eyes:  Conjunctivae/corneas clear. Nose: Nares normal. Septum midline. Mucosa normal.   Throat: Lips, mucosa, and tongue normal.    Neck: Supple, symmetrical, trachea midline, no adenopathy. Lungs:   Clear but diminished. Chest wall:  No tenderness or deformity. Heart:  Regular rate and rhythm, S1, S2 normal, no murmur, click, rub or gallop. Abdomen:   Soft, non-tender. Bowel sounds normal.   Extremities: Extremities normal, atraumatic, no cyanosis or edema. Pulses: 2+ and symmetric all extremities.    Skin: Skin color, texture, turgor normal. No rashes or lesions   Lymph nodes: Cervical  nodes normal.   Neurologic: Grossly nonfocal       Lab Results   Component Value Date/Time    Sodium 131 (L) 01/20/2022 05:09 AM    Potassium 4.4 01/20/2022 05:09 AM    Chloride 97 01/20/2022 05:09 AM    CO2 28 01/20/2022 05:09 AM    BUN 31 (H) 01/20/2022 05:09 AM Creatinine 0.87 01/20/2022 05:09 AM    Glucose 175 (H) 01/20/2022 05:09 AM    Calcium 9.0 01/20/2022 05:09 AM    Magnesium 2.1 01/16/2022 01:52 AM    Phosphorus 3.1 01/16/2022 01:52 AM    Lactic acid 1.3 01/11/2022 06:34 PM       Lab Results   Component Value Date/Time    WBC 13.5 (H) 01/20/2022 05:09 AM    HGB 16.5 01/20/2022 05:09 AM    PLATELET 098 05/30/0034 05:09 AM    MCV 83.3 01/20/2022 05:09 AM       Lab Results   Component Value Date/Time    INR 1.0 02/04/2020 03:50 PM    aPTT 32.0 09/22/2010 03:50 AM    Alk.  phosphatase 56 01/20/2022 05:09 AM    Protein, total 6.7 01/20/2022 05:09 AM    Albumin 3.0 (L) 01/20/2022 05:09 AM    Globulin 3.7 01/20/2022 05:09 AM       Lab Results   Component Value Date/Time    Ferritin 759 (H) 01/11/2022 06:34 PM       Lab Results   Component Value Date/Time    Sed rate (ESR) 5 09/22/2010 03:50 AM    C-Reactive protein 0.35 01/17/2022 01:15 AM        No results found for: PH, PHI, PCO2, PCO2I, PO2, PO2I, HCO3, HCO3I, FIO2, FIO2I    Lab Results   Component Value Date/Time    CK 79 09/22/2010 03:50 AM    Troponin-I, Qt. 0.05 (H) 09/21/2010 12:10 PM        Lab Results   Component Value Date/Time    Culture result: Culture performed on Fluid swab specimen 11/25/2021 08:22 PM    Culture result: NO GROWTH 4 DAYS 11/25/2021 08:22 PM       No results found for: TOXA1, RPR, HBCM, HBSAG, HAAB, HCAB1, HAAT, G6PD, CRYAC, HIVGT, HIVR, HIV1, HIV12, HIVPC, HIVRPI    Lab Results   Component Value Date/Time    CK 79 09/22/2010 03:50 AM       Lab Results   Component Value Date/Time    Color YELLOW/STRAW 08/02/2019 12:10 AM    Appearance CLEAR 08/02/2019 12:10 AM    pH (UA) 5.0 08/02/2019 12:10 AM    Protein 30 (A) 08/02/2019 12:10 AM    Glucose 500 (A) 08/02/2019 12:10 AM    Ketone NEGATIVE  08/02/2019 12:10 AM    Bilirubin NEGATIVE  08/02/2019 12:10 AM    Blood SMALL (A) 08/02/2019 12:10 AM    Urobilinogen 0.2 08/02/2019 12:10 AM    Nitrites NEGATIVE  08/02/2019 12:10 AM    Leukocyte Esterase NEGATIVE  08/02/2019 12:10 AM    WBC 0-4 08/02/2019 12:10 AM    RBC 0-5 08/02/2019 12:10 AM    Bacteria NEGATIVE  08/02/2019 12:10 AM       Images: personally visualized and report reviewed    CXR (1/16/2021): Worsening of left mid and lower lung pulmonary opacities. IMPRESSION  · Acute Respiratory Failure with Hypoxia  · Covid-19 Pneumonia  · History of A. Fib  · Moderate Aortic Stenosis  · History of Tobacco Abuse  · DM  · Hypertension      PLAN  · Continue HF to keep sats >88%; currently at 55L/90%  · Continue Dexamethasone 10mg q12h  · Continue Baricitinib  · On AC with Xarleto  · Trend CRP, d-dimer, LFTs - recheck d-dimer and CRP today  · Repeat CXR  · Encourage IS use  · Prone as able  · OOB  · Would benefit from PFTs as an outpatient  · Will need repeat imaging as an outpatient  · PT/OT       ALFREDA Alfonso

## 2022-01-20 NOTE — PROGRESS NOTES
Problem: Self Care Deficits Care Plan (Adult)  Goal: *Acute Goals and Plan of Care (Insert Text)  Description: FUNCTIONAL STATUS PRIOR TO ADMISSION: Patient was independent and active without use of DME.     HOME SUPPORT: The patient lived with grandson but did not require assist.    Occupational Therapy Goals  Initiated 1/17/2022  1. Patient will perform grooming and bathing with supervision standing at the sink with < or = 2 rest breaks and maintain oxygen sats > or = 90% within 7 day(s). 2. Patient will perform upper body dressing and lower body dressing with supervision with < or = 2 rest breaks and maintain oxygen sats > or = 90%  within 7 day(s). 3. Patient will perform toileting and toilet transfer with supervision and maintain oxygen sats > or = 90% within 7 day(s). 4. Patient will demonstrate pursed lip breathing with min cues during functional tasks within 7 day(s). 5. Patient will independently perform and recall home exercise program while maintaining O2 sats at or above 90% within 7 days. 6. Patient will verbalize/demonstrate all energy conservation techniques during ADL tasks with independence within 7 days. Outcome: Progressing Towards Goal     OCCUPATIONAL THERAPY TREATMENT  Patient: Howie Olivares (42 y.o. male)  Date: 1/20/2022  Diagnosis: Acute respiratory failure with hypoxia ([facility] [address] [J96.01]  Pneumonia due to COVID-19 virus [U07.1, J12.82] <principal problem not specified>       Precautions: Fall  Chart, occupational therapy assessment, plan of care, and goals were reviewed. ASSESSMENT  Patient continues with skilled OT services and is progressing towards goals. Patient was in chair earlier this date a few hours and tired on arrival, assisted with repositioning in bed and untangling lines for decreased pressure on ears and face. Informed nursing tech of increased pressure noted on left cheek and to inform RN.       Current Level of Function Impacting Discharge (ADLs):     Other factors to consider for discharge:          PLAN :  Patient continues to benefit from skilled intervention to address the above impairments. Continue treatment per established plan of care to address goals. Recommend with staff: skin checks, ? Left cheek skin breakdown, redness    Recommend next OT session: ADL's as tolerated    Recommendation for discharge: (in order for the patient to meet his/her long term goals)  To be determined: pending pulmonary progress, pulmonary rehab versus home health    This discharge recommendation:  Has not yet been discussed the attending provider and/or case management    IF patient discharges home will need the following DME: none       SUBJECTIVE:   Patient stated this is pulling.  re: high flow tubing and pulse ox   \"it didn't work\" re: attempt to lay sidelying    OBJECTIVE DATA SUMMARY:   Cognitive/Behavioral Status:  Neurologic State: Alert; Appropriate for age  Orientation Level: Oriented X4  Cognition: Appropriate decision making; Appropriate for age attention/concentration; Appropriate safety awareness; Follows commands             Functional Mobility and Transfers for ADLs:  Bed Mobility:  Rolling: Stand-by assistance  Supine to Sit: Stand-by assistance  Sit to Supine: Stand-by assistance  Scooting: Supervision (instructed on positioning of bed)    Transfers:  Sit to Stand: Contact guard assistance     Bed to Chair: Contact guard assistance    Balance:  Sitting: Intact; High guard  Standing: Impaired; Without support  Standing - Static: Fair  Standing - Dynamic : Fair    ADL Intervention:        Educated on positioning in bed, received with feet on footboard and head of bed elevated, instructed to lower head and foot of bed, bridge LE's and push self to head of bed, completed with min cues and increased time. Elevated head of bed again, verbalized increased comfort.  Attempted to facilitate sidelying/modified prone however declined    Patient with complaint of pain at ears, multiple lines tangled and pulling on pulse ox and high flow tubing, untangled and repositioned to decrease pulse and verbalized increased comfort        Pain:  At ears and cheek from tubing/high flow      Activity Tolerance:   Poor    After treatment patient left in no apparent distress:   Supine in bed, Call bell within reach, and Side rails x 3    COMMUNICATION/COLLABORATION:   The patients plan of care was discussed with: Physical therapist and Registered nurse.      Norman Gardner OTR/L  Time Calculation: 18 mins

## 2022-01-20 NOTE — PROGRESS NOTES
[facility] [address]  [address], [place], [address] [facility]  [phone]      Medical Progress Note      NAME: Michelle Olsen   :  1940  MRM:  [id number]    Date/Time: 2022        Assessment / Plan:     78 yo M w/ hx of AF, HTN, DM presenting w/ fatigue, dyspneic, admitted for AHRF 2/2 COVID-19      Acute respiratory failure with hypoxia: 2/2 COVID. Severe but improving, down to midflow today. Prognosis remains guarded       Pneumonia due to COVID-19 virus: Repeat CXR  worsening b/l PNA. On high dose IV dexamethasone, baricitinib. Low procalcitonin. D-dimer low, unlikely acute PE/VTE, already on Xarelto. Follow inflammatory markers. Pulmonology following      Aortic stenosis: moderate, based on echo in 2020 but repeat TTE  showing mild stenosis     Elevated bilirubin: Improving. ABD US w/ steastosis       Type 2 diabetes mellitus without complication: S1F 4.7%. BG high 2/2 steroids. Cont NPH, cont SSI       SOHAIL: mild, resolved. Currently holding lisinopril. Avoid hypotension      A-fib: monitor on telemetry, cont Xarelto      History of CVA (cerebrovascular accident): cont OAC, statin      Hypertension: currently holding lisinopril. Care Plan discussed with: Patient, Nursing Staff and >50% of time spent in counseling and coordination of care    Discussed:  Care Plan    Prophylaxis:  Xarelto    Disposition:  SNF vs inpt rehab versus           Subjective:     Chief Complaint:  Follow up COVID    Chart/notes/labs/studies reviewed, patient examined. Donte Lomeli, reports  Dyspnea w/ exterion, no cp, no abd pcomplaints. Spoke with son and provided him update yesterday. Objective:       Vitals:        Last 24hrs VS reviewed since prior progress note.  Most recent are:    Visit Vitals  /64   Pulse (!) 105   Temp 97.9 °F (36.6 °C)   Resp 17   Ht 5' 8\" (1.727 m)   Wt 66.7 kg (147 lb 1.6 oz)   SpO2 90%   BMI 22.37 kg/m²     SpO2 Readings from Last 6 Encounters:   01/20/22 90%   11/26/21 95%   11/19/21 93%   05/28/21 95%   02/06/20 98%   02/02/20 93%    O2 Flow Rate (L/min): 12 l/min       Intake/Output Summary (Last 24 hours) at 1/20/2022 1428  Last data filed at 1/20/2022 1032  Gross per 24 hour   Intake 480 ml   Output 900 ml   Net -420 ml          Exam:     Physical Exam:    Gen:  Elderly, ill-appearing. NAD. Pleasant   HEENT:  Atraumatic, normocephalic. Sclerae nonicteric, hearing intact to voice  Neck:  Supple, without apparent masses  Resp:  No accessory muscle use,  diminished breath sounds b/l   Card: RRR, 2/6 systolic murmur, No rubs or gallops. No LE edema  Abd:  +bowel sounds, soft, NTTP, nondistended  Neuro: Face symmetric, speech fluent, follows commands appropriately, no focal weakness or numbness  Psych:  Alert, oriented x 3.  Fair insight     Medications Reviewed: (see below)    Lab Data Reviewed: (see below)    ______________________________________________________________________    Medications:     Current Facility-Administered Medications   Medication Dose Route Frequency    baricitinib (OLUMIANT) tablet 4 mg  4 mg Oral DAILY    insulin NPH (NOVOLIN N, HUMULIN N) injection 12 Units  12 Units SubCUTAneous ACB&D    guaiFENesin-dextromethorphan (ROBITUSSIN DM) 100-10 mg/5 mL syrup 5 mL  5 mL Oral Q6H PRN    rivaroxaban (XARELTO) tablet 20 mg  20 mg Oral DAILY WITH DINNER    [Held by provider] lisinopriL (PRINIVIL, ZESTRIL) tablet 10 mg  10 mg Oral QHS    atorvastatin (LIPITOR) tablet 20 mg  20 mg Oral DAILY    glucose chewable tablet 16 g  4 Tablet Oral PRN    dextrose (D50W) injection syrg 12.5-25 g  25-50 mL IntraVENous PRN    glucagon (GLUCAGEN) injection 1 mg  1 mg IntraMUSCular PRN    insulin lispro (HUMALOG) injection   SubCUTAneous AC&HS    dexamethasone (PF) (DECADRON) 10 mg/mL injection 10 mg  10 mg IntraVENous Q12H    sodium chloride (NS) flush 5-40 mL  5-40 mL IntraVENous Q8H    sodium chloride (NS) flush 5-40 mL  5-40 mL IntraVENous PRN    acetaminophen (TYLENOL) tablet 650 mg  650 mg Oral Q6H PRN    Or    acetaminophen (TYLENOL) suppository 650 mg  650 mg Rectal Q6H PRN    polyethylene glycol (MIRALAX) packet 17 g  17 g Oral DAILY PRN    ondansetron (ZOFRAN ODT) tablet 4 mg  4 mg Oral Q8H PRN    Or    ondansetron (ZOFRAN) injection 4 mg  4 mg IntraVENous Q6H PRN            Lab Review:     Recent Labs     01/20/22  0509 01/19/22  0841   WBC 13.5* 12.9*   HGB 16.5 16.2   HCT 46.9 46.3    345     Recent Labs     01/20/22  0509 01/19/22  0841   * 131*   K 4.4 4.3   CL 97 98   CO2 28 30   * 135*   BUN 31* 29*   CREA 0.87 0.86   CA 9.0 8.8   ALB 3.0* 2.8*   ALT 22 23     No components found for: GLPOC  No results for input(s): PH, PCO2, PO2, HCO3, FIO2 in the last 72 hours. No results for input(s): INR, INREXT, INREXT in the last 72 hours.   No results found for: SDES  Lab Results   Component Value Date/Time    Culture result: Culture performed on Fluid swab specimen 11/25/2021 08:22 PM    Culture result: NO GROWTH 4 DAYS 11/25/2021 08:22 PM              ___________________________________________________    Attending Physician: Yumiko De La Rosa DO

## 2022-01-20 NOTE — PROGRESS NOTES
0700 Bedside and Verbal shift change report given to [address] Shakira Anupam (oncoming nurse) by Gregary Runner (offgoing nurse). Report included the following information SBAR, Kardex, ED Summary, Intake/Output, MAR, Recent Results and Cardiac Rhythm V Paced. This patient was assisted with Intentional Toileting every 2 hours during this shift as appropriate. Documentation of ambulation and output reflected on Flowsheet as appropriate. Purposeful hourly rounding was completed using AIDET and 5Ps. Outcomes of PHR documented as they occurred. Bed alarm in use as appropriate. Dual Suction and ambubag in place. 1930 Bedside and Verbal shift change report given to [facility] (oncoming nurse) by Nguyễn Walker RN (offgoing nurse). Report included the following information SBAR, Kardex, ED Summary, Intake/Output, MAR, Recent Results and Cardiac Rhythm V-Paced.

## 2022-01-20 NOTE — PROGRESS NOTES
Problem: Falls - Risk of  Goal: *Absence of Falls  Description: Document Clearence Skates Fall Risk and appropriate interventions in the flowsheet.   1/20/2022 0547 by Aurelia Oniel RN  Outcome: Progressing Towards Goal  Note: Fall Risk Interventions:  Mobility Interventions: Bed/chair exit alarm,Patient to call before getting OOB         Medication Interventions: Bed/chair exit alarm,Patient to call before getting OOB,Teach patient to arise slowly    Elimination Interventions: Bed/chair exit alarm,Call light in reach,Patient to call for help with toileting needs    History of Falls Interventions: Bed/chair exit alarm,Door open when patient unattended      1/20/2022 0348 by Aurelia Oneil RN  Outcome: Progressing Towards Goal  Note: Fall Risk Interventions:  Mobility Interventions: Bed/chair exit alarm,Patient to call before getting OOB         Medication Interventions: Bed/chair exit alarm,Patient to call before getting OOB,Teach patient to arise slowly    Elimination Interventions: Bed/chair exit alarm,Call light in reach,Patient to call for help with toileting needs    History of Falls Interventions: Bed/chair exit alarm,Door open when patient unattended

## 2022-01-20 NOTE — PROGRESS NOTES
1900 - Bedside and verbal shift change report given to [facility], RN (oncoming nurse) by Ligia Chris RN (offgoing nurse). Report included the following information: SBAR, Kardex, Intake/Output, MAR, Recent Results, and Med Rec Status. This patient was assisted with intentional toileting every 2 hours during this shift as appropriate. Documentation of ambulation and output reflected on flow sheet as appropriate. Purposeful hourly rounding was completed using AIDET and 5 Ps. Outcomes of PHR documented as they occurred. Bed alarm in use as appropriate. Dual suction and ambubag in place. 2300 - Bedside and verbal shift change report given to Ashok Segura RN (oncoming nurse) by [facility], RN (offgoing nurse). Report included the following information: SBAR, Kardex, Intake/Output, MAR, Recent Results, and Med Rec Status.

## 2022-01-21 NOTE — PROGRESS NOTES
Problem: Falls - Risk of  Goal: *Absence of Falls  Description: Document Tegan Mckeon Fall Risk and appropriate interventions in the flowsheet.   Outcome: Progressing Towards Goal  Note: Fall Risk Interventions:  Mobility Interventions: Bed/chair exit alarm,Patient to call before getting OOB         Medication Interventions: Bed/chair exit alarm,Patient to call before getting OOB,Teach patient to arise slowly    Elimination Interventions: Bed/chair exit alarm,Call light in reach,Urinal in reach    History of Falls Interventions: Bed/chair exit alarm

## 2022-01-21 NOTE — PROGRESS NOTES
0700 Bedside and Verbal shift change report given to [address] Shakira Anupam (oncoming nurse) by Jessie Cordero (offgoing nurse). Report included the following information SBAR, Kardex, ED Summary, Intake/Output, MAR, Recent Results and Cardiac Rhythm V-Paced. This patient was assisted with Intentional Toileting every 2 hours during this shift as appropriate. Documentation of ambulation and output reflected on Flowsheet as appropriate. Purposeful hourly rounding was completed using AIDET and 5Ps. Outcomes of PHR documented as they occurred. Bed alarm in use as appropriate. Dual Suction and ambubag in place. 0401 [facility] Updated Pt son Author Alley all questions answered. 1930 Bedside and Verbal shift change report given to [address] [place] [address] (oncoming nurse) by Yulissa Mantilla RN (offgoing nurse). Report included the following information SBAR, Kardex, ED Summary, Intake/Output, MAR, Recent Results and Cardiac Rhythm V-Paced.

## 2022-01-21 NOTE — PROGRESS NOTES
Problem: Mobility Impaired (Adult and Pediatric)  Goal: *Acute Goals and Plan of Care (Insert Text)  Description: FUNCTIONAL STATUS PRIOR TO ADMISSION: Patient was independent and active without use of DME.    HOME SUPPORT PRIOR TO ADMISSION: The patient lived with son and grandson but did not require assist.    Physical Therapy Goals  Initiated 1/17/2022  1. Patient will move from supine to sit and sit to supine  in bed with modified independence within 7 day(s). 2.  Patient will transfer from bed to chair and chair to bed with modified independence using the least restrictive device within 7 day(s). 3.  Patient will perform sit to stand with modified independence within 7 day(s). 4.  Patient will ambulate with modified independence for 150 feet with the least restrictive device within 7 day(s). 5.  Patient will ascend/descend 6 stairs with 2 handrail(s) with modified independence within 7 day(s). Outcome: Not Met     PHYSICAL THERAPY TREATMENT  Patient: Sandi Collado (89 y.o. male)  Date: 1/21/2022  Diagnosis: Acute respiratory failure with hypoxia ([facility] [address] [J96.01]  Pneumonia due to COVID-19 virus [U07.1, J12.82] <principal problem not specified>       Precautions: Fall  Chart, physical therapy assessment, plan of care and goals were reviewed. ASSESSMENT  Patient continues with skilled PT services and is progressing towards goals slowly. Pt received out of bed in chair, requesting to mobilize to bedside commode. He transfers to/from bedside commode and ambulates briefly back to bed. Pt requiring extended rest breaks between all activities and reports significant fatigue after sitting in chair for approximately 4 hours. Encouraged pt to mobilize out of bed to chair 3x/day with staff assist and continue exercises, which he reports performing 3-5 sets/day. Pt utilizing HFNC 20 L/min 90% FiO2 throughout encounter:   At rest SpO2 94%  With activity SpO2 86% with activity at lowest  Post activity 93% Current Level of Function Impacting Discharge (mobility/balance): min A RW    Other factors to consider for discharge: none additional         PLAN :  Patient continues to benefit from skilled intervention to address the above impairments. Continue treatment per established plan of care. to address goals. Recommendation for discharge: (in order for the patient to meet his/her long term goals)  Therapy up to 3 hours per day 5-7 days per week in pulmonary rehab setting vs. HHPT pending progress. This discharge recommendation:  Has not yet been discussed the attending provider and/or case management    IF patient discharges home will need the following DME: to be determined (TBD)       SUBJECTIVE:   Patient stated Can I walk into the bathroom?  Discussed importance of progressive mobility with monitoring of SpO2 and HR. Pt received seated, agreeable to PT and cleared by RN. OBJECTIVE DATA SUMMARY:   Critical Behavior:  Neurologic State: Alert,Appropriate for age  Orientation Level: Appropriate for age,Oriented X4  Cognition: Appropriate decision making,Appropriate for age attention/concentration,Appropriate safety awareness,Follows commands  Safety/Judgement: Awareness of environment  Functional Mobility Training:  Bed Mobility:        Sit to Supine: Stand-by assistance  Scooting: Supervision        Transfers:  Sit to Stand: Minimum assistance; Additional time  Stand to Sit: Minimum assistance; Additional time                    Other: bedside commode with min A        Balance:  Sitting: Without support  Sitting - Static: Good (unsupported)  Sitting - Dynamic: Good (unsupported)  Standing: With support  Standing - Static: Fair  Standing - Dynamic : Fair  Ambulation/Gait Training:  Distance (ft): 5 Feet (ft)  Assistive Device: Walker, rolling;Gait belt  Ambulation - Level of Assistance: Minimal assistance        Gait Abnormalities: Decreased step clearance        Base of Support: Widened Speed/Niesha: Pace decreased (<100 feet/min)                         Therapeutic Exercises:   Reviewed shoulder flexion coordinated with respirations, horizontal abduction coordinated with respirations. Pt reports performing UE and LE exercises throughout the day. Pain Rating:  Denies pain    Activity Tolerance:   desaturates with exertion and requires oxygen    After treatment patient left in no apparent distress:   Supine in bed, Call bell within reach, Bed / chair alarm activated, and Side rails x 3    COMMUNICATION/COLLABORATION:   The patients plan of care was discussed with: Occupational therapist and Registered nurse.      Tanner Fermin, PT, DPT   Time Calculation: 40 mins

## 2022-01-21 NOTE — PROGRESS NOTES
Carlos Eduardo Kingston [facility]  [address] [facility], [place], [id number] [place] [address]  [phone]      Medical Progress Note      NAME: Serjio Infante   :  1940  MRM:  [id number]    Date/Time: 2022        Assessment / Plan:     78 yo M w/ hx of AF, HTN, DM presenting w/ fatigue, dyspneic, admitted for AHRF 2/2 COVID-19      Acute respiratory failure with hypoxia: 2/2 COVID. Severe but improving, back on HFNC today. Prognosis remains guarded       Pneumonia due to COVID-19 virus: Repeat CXR  worsening b/l PNA. On high dose IV dexamethasone, baricitinib. Low procalcitonin. D-dimer low, unlikely acute PE/VTE, already on Xarelto. Follow inflammatory markers. Pulmonology following      Aortic stenosis: moderate, based on echo in 2020 but repeat TTE  showing mild stenosis     Elevated bilirubin: Improving. ABD US w/ steastosis       Type 2 diabetes mellitus without complication: V9I 0.8%. BG high 2/2 steroids. Cont NPH, cont SSI       SOHAIL: mild, resolved. Currently holding lisinopril. Avoid hypotension      A-fib: monitor on telemetry, cont Xarelto      History of CVA (cerebrovascular accident): cont OAC, statin      Hypertension: currently holding lisinopril. Care Plan discussed with: Patient, Nursing Staff and >50% of time spent in counseling and coordination of care    Discussed:  Care Plan    Prophylaxis:  Xarelto    Disposition:  SNF vs inpt rehab versus           Subjective:     Chief Complaint:  Follow up COVID    Chart/notes/labs/studies reviewed, patient examined. NAEON, on HFNC. Reports Dyspnea w/ exterion, no cp, no abd complaints. Objective:       Vitals:        Last 24hrs VS reviewed since prior progress note.  Most recent are:    Visit Vitals  /79   Pulse 91   Temp 98.1 °F (36.7 °C)   Resp 27   Ht 5' 8\" (1.727 m)   Wt 66.7 kg (147 lb 1.6 oz)   SpO2 (!) 89%   BMI 22.37 kg/m²     SpO2 Readings from Last 6 Encounters:   22 (!) 89% 11/26/21 95%   11/19/21 93%   05/28/21 95%   02/06/20 98%   02/02/20 93%    O2 Flow Rate (L/min): 25 l/min       Intake/Output Summary (Last 24 hours) at 1/21/2022 0919  Last data filed at 1/21/2022 0400  Gross per 24 hour   Intake 840 ml   Output 975 ml   Net -135 ml          Exam:     Physical Exam:    Gen:  Elderly, ill-appearing. NAD. Pleasant   HEENT:  Atraumatic, normocephalic. Sclerae nonicteric, hearing intact to voice  Neck:  Supple, without apparent masses  Resp:  No accessory muscle use,  diminished breath sounds b/l   Card: RRR, 2/6 systolic murmur, No rubs or gallops. No LE edema  Abd:  +bowel sounds, soft, NTTP, nondistended  Neuro: Face symmetric, speech fluent, follows commands appropriately, no focal weakness or numbness  Psych:  Alert, oriented x 3.  Fair insight     Medications Reviewed: (see below)    Lab Data Reviewed: (see below)    ______________________________________________________________________    Medications:     Current Facility-Administered Medications   Medication Dose Route Frequency    baricitinib (OLUMIANT) tablet 4 mg  4 mg Oral DAILY    insulin NPH (NOVOLIN N, HUMULIN N) injection 12 Units  12 Units SubCUTAneous ACB&D    guaiFENesin-dextromethorphan (ROBITUSSIN DM) 100-10 mg/5 mL syrup 5 mL  5 mL Oral Q6H PRN    rivaroxaban (XARELTO) tablet 20 mg  20 mg Oral DAILY WITH DINNER    [Held by provider] lisinopriL (PRINIVIL, ZESTRIL) tablet 10 mg  10 mg Oral QHS    atorvastatin (LIPITOR) tablet 20 mg  20 mg Oral DAILY    glucose chewable tablet 16 g  4 Tablet Oral PRN    dextrose (D50W) injection syrg 12.5-25 g  25-50 mL IntraVENous PRN    glucagon (GLUCAGEN) injection 1 mg  1 mg IntraMUSCular PRN    insulin lispro (HUMALOG) injection   SubCUTAneous AC&HS    dexamethasone (PF) (DECADRON) 10 mg/mL injection 10 mg  10 mg IntraVENous Q12H    sodium chloride (NS) flush 5-40 mL  5-40 mL IntraVENous Q8H    sodium chloride (NS) flush 5-40 mL  5-40 mL IntraVENous PRN    acetaminophen (TYLENOL) tablet 650 mg  650 mg Oral Q6H PRN    Or    acetaminophen (TYLENOL) suppository 650 mg  650 mg Rectal Q6H PRN    polyethylene glycol (MIRALAX) packet 17 g  17 g Oral DAILY PRN    ondansetron (ZOFRAN ODT) tablet 4 mg  4 mg Oral Q8H PRN    Or    ondansetron (ZOFRAN) injection 4 mg  4 mg IntraVENous Q6H PRN            Lab Review:     Recent Labs     01/21/22  0352 01/20/22  0509 01/19/22  0841   WBC 13.0* 13.5* 12.9*   HGB 16.4 16.5 16.2   HCT 47.4 46.9 46.3    369 345     Recent Labs     01/21/22  0352 01/20/22  0509 01/19/22  0841   * 131* 131*   K 4.7 4.4 4.3   CL 98 97 98   CO2 28 28 30   * 175* 135*   BUN 33* 31* 29*   CREA 0.82 0.87 0.86   CA 8.8 9.0 8.8   ALB 2.8* 3.0* 2.8*   ALT 21 22 23     No components found for: GLPOC  No results for input(s): PH, PCO2, PO2, HCO3, FIO2 in the last 72 hours. No results for input(s): INR, INREXT, INREXT in the last 72 hours.   No results found for: SDES  Lab Results   Component Value Date/Time    Culture result: Culture performed on Fluid swab specimen 11/25/2021 08:22 PM    Culture result: NO GROWTH 4 DAYS 11/25/2021 08:22 PM              ___________________________________________________    Attending Physician: Tanja Asencio DO

## 2022-01-21 NOTE — PROGRESS NOTES
Transition of Care Plan:  RUR-15%; LOS 10 days; Covid+  1. Pulmonary following; pt on 20L hi-ana maria O2; wean as tolerated  2. Palliative care following  3. PT/OT following and recommending pulmonary rehab vs hh pending progress. 4. Pt lives with his grandson, Becca Qiu ([phone])  5. CM following for any needs prior to d/c  LAITH Hill RN

## 2022-01-21 NOTE — PROGRESS NOTES
Name: Alberta Herd: [employer]   : 1940 Admit Date: 2022   Phone: [phone]  Room: [facility]/01   PCP: Sesar Field MD  MRN: [id number]   Date: 2022  Code: Full Code          Chart and notes reviewed. Data reviewed. I review the patient's current medications in the medical record at each encounter.  I have evaluated and examined the patient. History of Present Illness:  Mikayla Maynard is a very pleasant, 78 yo gentleman that presented to [facility] on  with worsening shortness of breath. He has a PMH of a.fib, aortic stenosis, DM, hypertension, CVA, and tobacco abuse (100 pack years, quit in ). He received two doses of the Covid vaccine, last in 2021. He did not receive a booster dose. He is currently requiring hi flow at 50L/100%. He reports that his symptoms started five days ago after he was exposed to his grandson who tested positive. He became more progressively more SOB with increased cough. No fever/chills. He did initially have diarrhea. Images:  Personally visualized and report reviewed. CXR :  Superimposed patchy bilateral airspace disease on interstitial changes concerning for infection or inflammation      D-dimer . 55  LFTs stable  Procalcitonin negative  Ferritin 759  Trop wnl  Pro-  CRP 3.18  ECHO 2020:  EF 55-60%, mildly reduced systolic function, moderate aortic stenosis, trave MVR, moderately elevated CVP. Interval History:  Afebrile  BP stable  Sats 100% on 25L FiO2 90%  WBC 13.0  Na 133  D-dimer 0.38 - better  CRP < 0.29  LFTs stable    ROS: Denies SOB at rest with midflow in place  Denies fever or chills. Denies CP, abd pain, or LE pain/swelling.      Past Medical History:   Diagnosis Date    Atrial fibrillation ([facility] [address]     Diabetes mellitus, type 2 ([facility] [address]     Hypertension     Internal carotid artery stenosis, right     Chronic occlusion of the right internal carotid artery per MRI of 2020 unchanged from previous study of 2010; study of  also showed mild stenosis of the left internal carotid artery and bilateral patent vertebral arteries    Moderate aortic stenosis     Echocardiogram of 2020:  Severe aortic valve leaflet calcification present with reduced excursion. Moderate aortic valve stenosis was present. LVEF 55-60% .  Stroke [facility])     asymptomatic; small chronic infarcts in the bilateral thalami per MRI of 2020.   Previous MRI of 2010 showed possible tiny old right thalamic infarct versus perivascular space        Past Surgical History:   Procedure Laterality Date    HX CHOLECYSTECTOMY      NH INS NEW/RPLCMT PRM PM W/TRANSV ELTRD ATRIAL&VENT N/A 2020    INSERT PPM DUAL performed by Rose Rubio MD at [place] [address] LAB       Family History   Adopted: Yes   Family history unknown: Yes       Social History     Tobacco Use    Smoking status: Former Smoker     Packs/day: 2.00     Years: 50.00     Pack years: 100.00     Quit date: 2004     Years since quittin.6    Smokeless tobacco: Never Used   Substance Use Topics    Alcohol use: Yes     Comment: 1 beer or 1 glaas of wine daily most of adult life       Allergies   Allergen Reactions    Ampicillin Nausea and Vomiting    Iodine Hives, Itching and Nausea and Vomiting       Current Facility-Administered Medications   Medication Dose Route Frequency    baricitinib (OLUMIANT) tablet 4 mg  4 mg Oral DAILY    insulin NPH (NOVOLIN N, HUMULIN N) injection 12 Units  12 Units SubCUTAneous ACB&D    guaiFENesin-dextromethorphan (ROBITUSSIN DM) 100-10 mg/5 mL syrup 5 mL  5 mL Oral Q6H PRN    rivaroxaban (XARELTO) tablet 20 mg  20 mg Oral DAILY WITH DINNER    [Held by provider] lisinopriL (PRINIVIL, ZESTRIL) tablet 10 mg  10 mg Oral QHS    atorvastatin (LIPITOR) tablet 20 mg  20 mg Oral DAILY    glucose chewable tablet 16 g  4 Tablet Oral PRN    dextrose (D50W) injection syrg 12.5-25 g  25-50 mL IntraVENous PRN    glucagon (GLUCAGEN) injection 1 mg  1 mg IntraMUSCular PRN    insulin lispro (HUMALOG) injection   SubCUTAneous AC&HS    dexamethasone (PF) (DECADRON) 10 mg/mL injection 10 mg  10 mg IntraVENous Q12H    sodium chloride (NS) flush 5-40 mL  5-40 mL IntraVENous Q8H    sodium chloride (NS) flush 5-40 mL  5-40 mL IntraVENous PRN    acetaminophen (TYLENOL) tablet 650 mg  650 mg Oral Q6H PRN    Or    acetaminophen (TYLENOL) suppository 650 mg  650 mg Rectal Q6H PRN    polyethylene glycol (MIRALAX) packet 17 g  17 g Oral DAILY PRN    ondansetron (ZOFRAN ODT) tablet 4 mg  4 mg Oral Q8H PRN    Or    ondansetron (ZOFRAN) injection 4 mg  4 mg IntraVENous Q6H PRN         REVIEW OF SYSTEMS   Negative except as stated in the HPI. Physical Exam:   Visit Vitals  /79   Pulse 72   Temp 98.1 °F (36.7 °C)   Resp 27   Ht 5' 8\" (1.727 m)   Wt 66.7 kg (147 lb 1.6 oz)   SpO2 100%   BMI 22.37 kg/m²       General:  Alert, cooperative, no distress, appears stated age, on HF   Head:  Normocephalic, without obvious abnormality, atraumatic. Eyes:  Conjunctivae/corneas clear. Nose: Nares normal. Septum midline. Mucosa normal.   Throat: Lips, mucosa, and tongue normal.    Neck: Supple, symmetrical, trachea midline, no adenopathy. Lungs:   Clear but diminished. Chest wall:  No tenderness or deformity. Heart:  Regular rate and rhythm, S1, S2 normal, no murmur, click, rub or gallop. Abdomen:   Soft, non-tender. Bowel sounds normal.   Extremities: Extremities normal, atraumatic, no cyanosis or edema. Pulses: 2+ and symmetric all extremities.    Skin: Skin color, texture, turgor normal. No rashes or lesions   Lymph nodes: Cervical  nodes normal.   Neurologic: Grossly nonfocal       Lab Results   Component Value Date/Time    Sodium 133 (L) 01/21/2022 03:52 AM    Potassium 4.7 01/21/2022 03:52 AM    Chloride 98 01/21/2022 03:52 AM    CO2 28 01/21/2022 03:52 AM    BUN 33 (H) 01/21/2022 03:52 AM Creatinine 0.82 01/21/2022 03:52 AM    Glucose 151 (H) 01/21/2022 03:52 AM    Calcium 8.8 01/21/2022 03:52 AM    Magnesium 2.1 01/16/2022 01:52 AM    Phosphorus 3.1 01/16/2022 01:52 AM    Lactic acid 1.3 01/11/2022 06:34 PM       Lab Results   Component Value Date/Time    WBC 13.0 (H) 01/21/2022 03:52 AM    HGB 16.4 01/21/2022 03:52 AM    PLATELET 123 98/05/0476 03:52 AM    MCV 84.8 01/21/2022 03:52 AM       Lab Results   Component Value Date/Time    INR 1.0 02/04/2020 03:50 PM    aPTT 32.0 09/22/2010 03:50 AM    Alk.  phosphatase 53 01/21/2022 03:52 AM    Protein, total 6.3 (L) 01/21/2022 03:52 AM    Albumin 2.8 (L) 01/21/2022 03:52 AM    Globulin 3.5 01/21/2022 03:52 AM       Lab Results   Component Value Date/Time    Ferritin 759 (H) 01/11/2022 06:34 PM       Lab Results   Component Value Date/Time    Sed rate (ESR) 5 09/22/2010 03:50 AM    C-Reactive protein <0.29 01/20/2022 05:09 AM        No results found for: PH, PHI, PCO2, PCO2I, PO2, PO2I, HCO3, HCO3I, FIO2, FIO2I    Lab Results   Component Value Date/Time    CK 79 09/22/2010 03:50 AM    Troponin-I, Qt. 0.05 (H) 09/21/2010 12:10 PM        Lab Results   Component Value Date/Time    Culture result: Culture performed on Fluid swab specimen 11/25/2021 08:22 PM    Culture result: NO GROWTH 4 DAYS 11/25/2021 08:22 PM       No results found for: TOXA1, RPR, HBCM, HBSAG, HAAB, HCAB1, HAAT, G6PD, CRYAC, HIVGT, HIVR, HIV1, HIV12, HIVPC, HIVRPI    Lab Results   Component Value Date/Time    CK 79 09/22/2010 03:50 AM       Lab Results   Component Value Date/Time    Color YELLOW/STRAW 08/02/2019 12:10 AM    Appearance CLEAR 08/02/2019 12:10 AM    pH (UA) 5.0 08/02/2019 12:10 AM    Protein 30 (A) 08/02/2019 12:10 AM    Glucose 500 (A) 08/02/2019 12:10 AM    Ketone NEGATIVE  08/02/2019 12:10 AM    Bilirubin NEGATIVE  08/02/2019 12:10 AM    Blood SMALL (A) 08/02/2019 12:10 AM    Urobilinogen 0.2 08/02/2019 12:10 AM    Nitrites NEGATIVE  08/02/2019 12:10 AM    Leukocyte Esterase NEGATIVE  08/02/2019 12:10 AM    WBC 0-4 08/02/2019 12:10 AM    RBC 0-5 08/02/2019 12:10 AM    Bacteria NEGATIVE  08/02/2019 12:10 AM       Images: personally visualized and report reviewed    CXR (1/16/2022): Worsening of left mid and lower lung pulmonary opacities. CXR (1/20/2022): There is bilateral pneumonia in the periphery of the mid  and lower lung zones left greater than right. This has mildly worsened. IMPRESSION  · Acute Respiratory Failure with Hypoxia  · Covid-19 Pneumonia  · History of A. Fib  · Moderate Aortic Stenosis  · History of Tobacco Abuse  · DM  · Hypertension      PLAN  · Continue HF to keep sats >88%; currently at 25L/90%  · Transition to midflow as sats allow  · Add on proBNP and consider diuresis if elevated  · Continue Dexamethasone 10mg q12h given HF requirement and worsening CXR  · Continue Baricitinib  · On AC with Xarleto  · Trend CRP, d-dimer, LFTs - recheck d-dimer and CRP today  · Encourage IS use  · Prone as able  · OOB to chair  · Would benefit from PFTs as an outpatient  · Will need repeat imaging as an outpatient  · PT/OT       ALFREDA Scanlon

## 2022-01-22 PROBLEM — I47.29 NSVT (NONSUSTAINED VENTRICULAR TACHYCARDIA): Status: ACTIVE | Noted: 2022-01-01

## 2022-01-22 PROBLEM — Z95.0 PACEMAKER: Status: ACTIVE | Noted: 2022-01-01

## 2022-01-22 NOTE — PROGRESS NOTES
Bedside and Verbal shift change report given to YASMANI Young (oncoming nurse) by Amalia Preciado (offgoing nurse). Report included the following information SBAR, Kardex, ED Summary, MAR, Recent Results and Cardiac Rhythm SR. This patient was assisted with Intentional Toileting every 2 hours during this shift as appropriate. Documentation of ambulation and output reflected on Flowsheet as appropriate. Purposeful hourly rounding was completed using AIDET and 5Ps. Outcomes of PHR documented as they occurred. Bed alarm in use as appropriate. Dual Suction and ambubag in place. 1300- Tele called me and told me that the patient had a 30 beat run of V tach that he did not pace. [address] in to assess the patient and the patient stated that he felt lightheaded and his ears got hot for a second. 1315- Notified  and she order a mag repeat and a consult to Cardio. Bedside and Verbal shift change report given to Emmanuel Lawrence RN (oncoming nurse) by Mady Clancy (offgoing nurse).  Report included the following information SBAR, Kardex, ED Summary, MAR, Recent Results and Cardiac Rhythm SR.

## 2022-01-22 NOTE — ROUTINE PROCESS
Bedside and Verbal shift change report given to [address] [place] [address]  (oncoming nurse) by Edith Mejía (offgoing nurse). Report included the following information SBAR, Kardex, ED Summary, Procedure Summary, Intake/Output, MAR, Recent Results and Cardiac Rhythm V-Paced.

## 2022-01-22 NOTE — PROGRESS NOTES
Carlos Eduardo Kingston [facility]  [address], [facility], [address] [facility]  [phone]      Medical Progress Note      NAME: Sherie November   :  1940  MRM:  [id number]    Date/Time: 2022        Assessment / Plan:     78 yo M w/ hx of AF, HTN, DM presenting w/ fatigue, dyspneic, admitted for AHRF 2/2 COVID-19      Acute respiratory failure with hypoxia: 2/2 COVID. Severe but improving overall, on HFNC. Prognosis remains guarded       Pneumonia due to COVID-19 virus: Repeat CXR  worsening b/l PNA. On high dose IV dexamethasone, baricitinib. Low procalcitonin. D-dimer low, unlikely acute PE/VTE, given already on Xarelto. Follow inflammatory markers. Pulmonology following      Aortic stenosis: moderate, based on echo in 2020 but repeat TTE  showing mild stenosis. Monitor volume status     VT: . K > 4 on am labs. Check Mg. Consult cardio     Elevated bilirubin: stable. . ABD US w/ steastosis       Type 2 diabetes mellitus without complication: Y7Y 2.3%. BG high 2/2 steroids. Cont NPH, cont SSI       SOHAIL: mild, resolved. Currently holding lisinopril. Avoid hypotension      A-fib/ Pacemaker: monitor on telemetry, cont Xarelto      History of CVA (cerebrovascular accident): cont OAC, statin      Hypertension: currently holding lisinopril. Care Plan discussed with: Patient, Nursing Staff and >50% of time spent in counseling and coordination of care    Discussed:  Care Plan    Prophylaxis:  Xarelto    Disposition:  SNF vs inpt rehab versus HH          Subjective:     Chief Complaint:  Follow up COVID    Chart/notes/labs/studies reviewed, patient examined. NAEON, on HFNC. Reports Dyspnea w/ exterion, no cp, no abd complaints. No n/v; tolerating diet. Called son no answer; left VM. Objective:       Vitals:        Last 24hrs VS reviewed since prior progress note.  Most recent are:    Visit Vitals  BP (!) 109/92 (BP 1 Location: Right upper arm, BP Patient Position: At rest)   Pulse 78   Temp 97.4 °F (36.3 °C)   Resp 19   Ht 5' 8\" (1.727 m)   Wt 64.1 kg (141 lb 6.4 oz)   SpO2 97%   BMI 21.50 kg/m²     SpO2 Readings from Last 6 Encounters:   01/22/22 97%   11/26/21 95%   11/19/21 93%   05/28/21 95%   02/06/20 98%   02/02/20 93%    O2 Flow Rate (L/min): 20 l/min       Intake/Output Summary (Last 24 hours) at 1/22/2022 1158  Last data filed at 1/22/2022 0731  Gross per 24 hour   Intake 240 ml   Output 875 ml   Net -635 ml          Exam:     Physical Exam:    Gen:  Elderly, ill-appearing, NAD   HEENT:  Atraumatic, normocephalic, Sclerae nonicteric, hard of hearing   Neck:  Supple, without apparent masses  Resp:  No accessory muscle use,  diminished breath sounds b/l   Card: RRR, 2/6 systolic murmur, No rubs or gallops. No LE edema  Abd:  +bowel sounds, soft, NTTP, nondistended  Neuro: Face symmetric, speech fluent, follows commands appropriately, no focal weakness or numbness  Psych:  Alert, oriented x 3.  Fair insight     Medications Reviewed: (see below)    Lab Data Reviewed: (see below)    ______________________________________________________________________    Medications:     Current Facility-Administered Medications   Medication Dose Route Frequency    baricitinib (OLUMIANT) tablet 4 mg  4 mg Oral DAILY    insulin NPH (NOVOLIN N, HUMULIN N) injection 12 Units  12 Units SubCUTAneous ACB&D    guaiFENesin-dextromethorphan (ROBITUSSIN DM) 100-10 mg/5 mL syrup 5 mL  5 mL Oral Q6H PRN    rivaroxaban (XARELTO) tablet 20 mg  20 mg Oral DAILY WITH DINNER    [Held by provider] lisinopriL (PRINIVIL, ZESTRIL) tablet 10 mg  10 mg Oral QHS    atorvastatin (LIPITOR) tablet 20 mg  20 mg Oral DAILY    glucose chewable tablet 16 g  4 Tablet Oral PRN    dextrose (D50W) injection syrg 12.5-25 g  25-50 mL IntraVENous PRN    glucagon (GLUCAGEN) injection 1 mg  1 mg IntraMUSCular PRN    insulin lispro (HUMALOG) injection   SubCUTAneous AC&HS    dexamethasone (PF) (DECADRON) 10 mg/mL injection 10 mg  10 mg IntraVENous Q12H    sodium chloride (NS) flush 5-40 mL  5-40 mL IntraVENous Q8H    sodium chloride (NS) flush 5-40 mL  5-40 mL IntraVENous PRN    acetaminophen (TYLENOL) tablet 650 mg  650 mg Oral Q6H PRN    Or    acetaminophen (TYLENOL) suppository 650 mg  650 mg Rectal Q6H PRN    polyethylene glycol (MIRALAX) packet 17 g  17 g Oral DAILY PRN    ondansetron (ZOFRAN ODT) tablet 4 mg  4 mg Oral Q8H PRN    Or    ondansetron (ZOFRAN) injection 4 mg  4 mg IntraVENous Q6H PRN            Lab Review:     Recent Labs     01/22/22  0554 01/21/22  0352 01/20/22  0509   WBC 14.2* 13.0* 13.5*   HGB 16.8 16.4 16.5   HCT 48.0 47.4 46.9    336 369     Recent Labs     01/22/22  0554 01/21/22  0352 01/20/22  0509   * 133* 131*   K 4.6 4.7 4.4   CL 99 98 97   CO2 27 28 28   * 151* 175*   BUN 33* 33* 31*   CREA 0.94 0.82 0.87   CA 8.6 8.8 9.0   ALB 2.7* 2.8* 3.0*   ALT 27 21 22     No components found for: GLPOC  No results for input(s): PH, PCO2, PO2, HCO3, FIO2 in the last 72 hours. No results for input(s): INR, INREXT, INREXT in the last 72 hours.   No results found for: SDES  Lab Results   Component Value Date/Time    Culture result: Culture performed on Fluid swab specimen 11/25/2021 08:22 PM    Culture result: NO GROWTH 4 DAYS 11/25/2021 08:22 PM              ___________________________________________________    Attending Physician: Alayna Moss DO

## 2022-01-22 NOTE — CONSULTS
Consult Note      Assessment:    Patient Active Problem List   Diagnosis Code    Blurry vision H53.8    Unsteady gait R26.81    A-fib ([address] I48.91    History of CVA (cerebrovascular accident) Z80.78    Second degree AV block I44.1    Type 2 diabetes mellitus without complication ([facility]) F26.0    Cellulitis L03.90    Acute respiratory failure with hypoxia ([facility]) J96.01    Pneumonia due to COVID-19 virus U07.1, J12.82    Hypertension I10    NSVT (nonsustained ventricular tachycardia) ([facility]) I47.2    Pacemaker Z95.0       Recommendations: Will try low dose beta blocker for wide complex tachycardia (same morphology as paced beats), would like to give amiodarone but CXR show progressibe infitrates in setting of Covid. Kingston Zuñiga MD  [phone]  Maggie Kennedy is a 79 y.o. male who presented 1/11/2022 with complaints of shortness of breath and Covid pneumonitis. The symptoms began approximately 1 week ago. He has a history of cardiac disease including atrial fib, arrhythmias/ectopy and pacemaker, also NSVT noted on pacer interrogation last year. He now has significant Covid pneumonia, mostly paced rhythm, had wide complex tachycardia today at , self terminated. No history of CAD or stress test or cath in past, mild AS with borderline EF on recent echo. Examination by the attending physician suggested the possibility of arrhythmias/ectopy. At present the patient is unchanged since initial presentation. Therapy thus far has included O2. Cardiology has been consulted to assist in the management of this patient.     Current Facility-Administered Medications   Medication Dose Route Frequency    metoprolol tartrate (LOPRESSOR) tablet 12.5 mg  12.5 mg Oral BID    baricitinib (OLUMIANT) tablet 4 mg  4 mg Oral DAILY    insulin NPH (NOVOLIN N, HUMULIN N) injection 12 Units  12 Units SubCUTAneous ACB&D    guaiFENesin-dextromethorphan (ROBITUSSIN DM) 100-10 mg/5 mL syrup 5 mL  5 mL Oral Q6H PRN    rivaroxaban (XARELTO) tablet 20 mg  20 mg Oral DAILY WITH DINNER    [Held by provider] lisinopriL (PRINIVIL, ZESTRIL) tablet 10 mg  10 mg Oral QHS    atorvastatin (LIPITOR) tablet 20 mg  20 mg Oral DAILY    glucose chewable tablet 16 g  4 Tablet Oral PRN    dextrose (D50W) injection syrg 12.5-25 g  25-50 mL IntraVENous PRN    glucagon (GLUCAGEN) injection 1 mg  1 mg IntraMUSCular PRN    insulin lispro (HUMALOG) injection   SubCUTAneous AC&HS    dexamethasone (PF) (DECADRON) 10 mg/mL injection 10 mg  10 mg IntraVENous Q12H    sodium chloride (NS) flush 5-40 mL  5-40 mL IntraVENous Q8H    sodium chloride (NS) flush 5-40 mL  5-40 mL IntraVENous PRN    acetaminophen (TYLENOL) tablet 650 mg  650 mg Oral Q6H PRN    Or    acetaminophen (TYLENOL) suppository 650 mg  650 mg Rectal Q6H PRN    polyethylene glycol (MIRALAX) packet 17 g  17 g Oral DAILY PRN    ondansetron (ZOFRAN ODT) tablet 4 mg  4 mg Oral Q8H PRN    Or    ondansetron (ZOFRAN) injection 4 mg  4 mg IntraVENous Q6H PRN     Past Medical History:   Diagnosis Date    Atrial fibrillation (HCC)     Diabetes mellitus, type 2 ([facility]     Hypertension     Internal carotid artery stenosis, right     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 Moderate aortic valve stenosis was present. LVEF 55-60% .  Stroke [facility])     asymptomatic; small chronic infarcts in the bilateral thalami per MRI of 2/5/2020.   Previous MRI of 9/21/2010 showed possible tiny old right thalamic infarct versus perivascular space      Patient Active Problem List   Diagnosis Code    Blurry vision H53.8    Unsteady gait R26.81    A[facility]fib ([facility] I48.91    History of CVA (cerebrovascular accident) Z80.78    Second degree AV block I44.1    Type 2 diabetes mellitus without complication ([facility]) K38.3    Cellulitis L03.90    Acute respiratory failure with hypoxia ([facility]) J96.01    Pneumonia due to COVID-19 virus U07.1, J12.82    Hypertension I10    NSVT (nonsustained ventricular tachycardia) ([facility]) I47.2    Pacemaker Z95.0     Allergies   Allergen Reactions    Ampicillin Nausea and Vomiting    Iodine Hives, Itching and Nausea and Vomiting     Social History     Tobacco Use    Smoking status: Former Smoker     Packs/day: 2.00     Years: 50.00     Pack years: 100.00     Quit date: 2004     Years since quittin.6    Smokeless tobacco: Never Used   Substance Use Topics    Alcohol use: Yes     Comment: 1 beer or 1 glaas of wine daily most of adult life    Drug use: Never     Family History   Adopted: Yes   Family history unknown: Yes       Review of Symptoms:  Review of systems not obtained due to patient factors.      Objective:      Visit Vitals  BP (!) 109/92 (BP 1 Location: Right upper arm, BP Patient Position: At rest)   Pulse (!) 115   Temp 97.4 °F (36.3 °C)   Resp 19   Ht 5' 8\" (1.727 m)   Wt 141 lb 6.4 oz (64.1 kg)   SpO2 97%   BMI 21.50 kg/m²      Physical Exam    Not performed due to Covid infection, chart reviewed    Cardiographics    Telemetry: paced  ECG: paced  Echocardiogram: Abnormal, and reviewed by myself: as above    Labs:   Recent Results (from the past 24 hour(s))   GLUCOSE, POC    Collection Time: 22  4:30 PM   Result Value Ref Range    Glucose (POC) 251 (H) 65 - 117 mg/dL    Performed by Carlos A Shahla (PCT)    GLUCOSE, POC    Collection Time: 22  8:00 PM   Result Value Ref Range    Glucose (POC) 267 (H) 65 - 117 mg/dL    Performed by Alben Levels    CBC WITH AUTOMATED DIFF    Collection Time: 22  5:54 AM   Result Value Ref Range    WBC 14.2 (H) 4.1 - 11.1 K/uL    RBC 5.67 4.10 - 5.70 M/uL    HGB 16.8 12.1 - 17.0 g/dL    HCT 48.0 36.6 - 50.3 %    MCV 84.7 80.0 - 99.0 FL    MCH 29.6 26.0 - 34.0 PG    MCHC 35.0 30.0 - 36.5 g/dL    RDW 13.3 11.5 - 14.5 %    PLATELET 751 646 - 861 K/uL    MPV 11.1 8.9 - 12.9 FL    NRBC 0.0 0  WBC    ABSOLUTE NRBC 0.00 0.00 - 0.01 K/uL    NEUTROPHILS 92 (H) 32 - 75 %    LYMPHOCYTES 4 (L) 12 - 49 %    MONOCYTES 3 (L) 5 - 13 %    EOSINOPHILS 0 0 - 7 %    BASOPHILS 0 0 - 1 %    IMMATURE GRANULOCYTES 1 (H) 0.0 - 0.5 %    ABS. NEUTROPHILS 13.1 (H) 1.8 - 8.0 K/UL    ABS. LYMPHOCYTES 0.6 (L) 0.8 - 3.5 K/UL    ABS. MONOCYTES 0.4 0.0 - 1.0 K/UL    ABS. EOSINOPHILS 0.0 0.0 - 0.4 K/UL    ABS. BASOPHILS 0.0 0.0 - 0.1 K/UL    ABS. IMM. GRANS. 0.1 (H) 0.00 - 0.04 K/UL    DF SMEAR SCANNED      RBC COMMENTS NORMOCYTIC, NORMOCHROMIC     METABOLIC PANEL, COMPREHENSIVE    Collection Time: 01/22/22  5:54 AM   Result Value Ref Range    Sodium 133 (L) 136 - 145 mmol/L    Potassium 4.6 3.5 - 5.1 mmol/L    Chloride 99 97 - 108 mmol/L    CO2 27 21 - 32 mmol/L    Anion gap 7 5 - 15 mmol/L    Glucose 167 (H) 65 - 100 mg/dL    BUN 33 (H) 6 - 20 MG/DL    Creatinine 0.94 0.70 - 1.30 MG/DL    BUN/Creatinine ratio 35 (H) 12 - 20      GFR est AA >60 >60 ml/min/1.73m2    GFR est non-AA >60 >60 ml/min/1.73m2    Calcium 8.6 8.5 - 10.1 MG/DL    Bilirubin, total 1.5 (H) 0.2 - 1.0 MG/DL    ALT (SGPT) 27 12 - 78 U/L    AST (SGOT) 17 15 - 37 U/L    Alk.  phosphatase 52 45 - 117 U/L    Protein, total 6.1 (L) 6.4 - 8.2 g/dL    Albumin 2.7 (L) 3.5 - 5.0 g/dL    Globulin 3.4 2.0 - 4.0 g/dL    A-G Ratio 0.8 (L) 1.1 - 2.2     C REACTIVE PROTEIN, QT    Collection Time: 01/22/22  5:54 AM   Result Value Ref Range    C-Reactive protein <0.29 0.00 - 0.60 mg/dL   MAGNESIUM    Collection Time: 01/22/22  5:54 AM   Result Value Ref Range    Magnesium 2.2 1.6 - 2.4 mg/dL   GLUCOSE, POC    Collection Time: 01/22/22  7:33 AM   Result Value Ref Range    Glucose (POC) 154 (H) 65 - 117 mg/dL    Performed by Isidro 18, POC Collection Time: 01/22/22 11:42 AM   Result Value Ref Range    Glucose (POC) 193 (H) 65 - 117 mg/dL    Performed by Macon Habermann (CON)        Sadie Stone MD

## 2022-01-23 NOTE — ROUTINE PROCESS
Bedside and Verbal shift change report given to [address] [place] [address] (oncoming nurse) by Dexter Elena (offgoing nurse). Report included the following information SBAR, Kardex, ED Summary, Procedure Summary, MAR, Recent Results and Cardiac Rhythm V-Paced.

## 2022-01-23 NOTE — PROGRESS NOTES
Bedside and Verbal shift change report given to Corinna Liang RN (oncoming nurse) by Brodie Jha RN (offgoing nurse). Report included the following information SBAR, Kardex, ED Summary, MAR, Recent Results and Cardiac Rhythm V Paced. This patient was assisted with Intentional Toileting every 2 hours during this shift as appropriate. Documentation of ambulation and output reflected on Flowsheet as appropriate. Purposeful hourly rounding was completed using AIDET and 5Ps. Outcomes of PHR documented as they occurred. Bed alarm in use as appropriate. Dual Suction and ambubag in place. Bedside and Verbal shift change report given to [facility] (oncoming nurse) by Orin Galindo (offgoing nurse). Report included the following information SBAR, Kardex, ED Summary, MAR, Recent Results and Cardiac Rhythm V Paced.

## 2022-01-23 NOTE — PROGRESS NOTES
Carlos Eduardo Kingston [address]  [address] [facility], [address] [place] [address]  [phone]      Medical Progress Note      NAME: Dave Manzanares   :  1940  MRM:  [id number]    Date/Time: 2022        Assessment / Plan:     80 yo M w/ hx of AF, HTN, DM presenting w/ fatigue, dyspneic, admitted for AHRF 2/2 COVID-19    Acute respiratory failure with hypoxia: 2/2 COVID. Severe but improving overall, on HFNC. Prognosis remains guarded     Pneumonia due to COVID-19 virus: Repeat CXR  worsening b/l PNA. On high dose IV dexamethasone, baricitinib. Low procalcitonin. D-dimer low, unlikely acute PE/VTE, given already on Xarelto. Follow inflammatory markers. Pulmonology following    Aortic stenosis: moderate, based on echo in 2020 but repeat TTE  showing mild stenosis. Monitor volume status     VT: Noted . Monitor electrolytes. Cardiology evaluated. Continue BB     Elevated bilirubin: stable. . ABD US w/ steastosis     Type 2 diabetes mellitus without complication: N2L 0.1%. BG high 2/2 steroids. Cont NPH, cont SSI     SOHAIL: mild, resolved. Currently holding lisinopril. Avoid hypotension    A-fib/ Pacemaker: monitor on telemetry, cont Xarelto    History of CVA (cerebrovascular accident): cont OAC, statin    Hypertension: currently holding lisinopril                     Care Plan discussed with: Patient, Nursing Staff and >50% of time spent in counseling and coordination of care    Discussed:  Care Plan    Prophylaxis:  Xarelto    Disposition:  SNF vs inpt rehab versus           Subjective:     Chief Complaint:  Follow up COVID    Chart/notes/labs/studies reviewed, patient examined. NAEON, on HFNC. Reports Dyspnea w/ exterion, no cp, no abd complaints. No n/v; tolerating diet. Spoke with son  and provided him update. Objective:       Vitals:        Last 24hrs VS reviewed since prior progress note.  Most recent are:    Visit Vitals  /71 (BP 1 Location: Right upper arm, BP Patient Position: At rest)   Pulse 84   Temp 97.8 °F (36.6 °C)   Resp 18   Ht 5' 8\" (1.727 m)   Wt 64.1 kg (141 lb 6.4 oz)   SpO2 90%   BMI 21.50 kg/m²     SpO2 Readings from Last 6 Encounters:   01/23/22 90%   11/26/21 95%   11/19/21 93%   05/28/21 95%   02/06/20 98%   02/02/20 93%    O2 Flow Rate (L/min): 20 l/min       Intake/Output Summary (Last 24 hours) at 1/23/2022 1041  Last data filed at 1/23/2022 0402  Gross per 24 hour   Intake 240 ml   Output 725 ml   Net -485 ml          Exam:     Physical Exam:    Gen:  Elderly, ill-appearing, NAD   HEENT:  Atraumatic, normocephalic, Sclerae nonicteric, hard of hearing   Neck:  Supple, without apparent masses  Resp:  No accessory muscle use,  diminished breath sounds b/l   Card: RRR, 2/6 systolic murmur, No rubs or gallops. No LE edema  Abd:  +bowel sounds, soft, NTTP, nondistended  Neuro: Face symmetric, speech fluent, follows commands appropriately, no focal weakness or numbness  Psych:  Alert, oriented x 3.  Fair insight     Medications Reviewed: (see below)    Lab Data Reviewed: (see below)    ______________________________________________________________________    Medications:     Current Facility-Administered Medications   Medication Dose Route Frequency    metoprolol tartrate (LOPRESSOR) tablet 12.5 mg  12.5 mg Oral BID    baricitinib (OLUMIANT) tablet 4 mg  4 mg Oral DAILY    insulin NPH (NOVOLIN N, HUMULIN N) injection 12 Units  12 Units SubCUTAneous ACB&D    guaiFENesin-dextromethorphan (ROBITUSSIN DM) 100-10 mg/5 mL syrup 5 mL  5 mL Oral Q6H PRN    rivaroxaban (XARELTO) tablet 20 mg  20 mg Oral DAILY WITH DINNER    [Held by provider] lisinopriL (PRINIVIL, ZESTRIL) tablet 10 mg  10 mg Oral QHS    atorvastatin (LIPITOR) tablet 20 mg  20 mg Oral DAILY    glucose chewable tablet 16 g  4 Tablet Oral PRN    dextrose (D50W) injection syrg 12.5-25 g  25-50 mL IntraVENous PRN    glucagon (GLUCAGEN) injection 1 mg  1 mg IntraMUSCular PRN    insulin lispro (HUMALOG) injection   SubCUTAneous AC&HS    dexamethasone (PF) (DECADRON) 10 mg/mL injection 10 mg  10 mg IntraVENous Q12H    sodium chloride (NS) flush 5-40 mL  5-40 mL IntraVENous Q8H    sodium chloride (NS) flush 5-40 mL  5-40 mL IntraVENous PRN    acetaminophen (TYLENOL) tablet 650 mg  650 mg Oral Q6H PRN    Or    acetaminophen (TYLENOL) suppository 650 mg  650 mg Rectal Q6H PRN    polyethylene glycol (MIRALAX) packet 17 g  17 g Oral DAILY PRN    ondansetron (ZOFRAN ODT) tablet 4 mg  4 mg Oral Q8H PRN    Or    ondansetron (ZOFRAN) injection 4 mg  4 mg IntraVENous Q6H PRN            Lab Review:     Recent Labs     01/23/22  0350 01/22/22  0554 01/21/22  0352   WBC 16.0* 14.2* 13.0*   HGB 16.2 16.8 16.4   HCT 46.3 48.0 47.4    321 336     Recent Labs     01/23/22  0350 01/22/22  0554 01/21/22  0352   * 133* 133*   K 4.6 4.6 4.7    99 98   CO2 29 27 28   * 167* 151*   BUN 32* 33* 33*   CREA 0.84 0.94 0.82   CA 8.7 8.6 8.8   MG 2.1 2.2  --    ALB 2.6* 2.7* 2.8*   ALT 27 27 21     No components found for: GLPOC  No results for input(s): PH, PCO2, PO2, HCO3, FIO2 in the last 72 hours. No results for input(s): INR, INREXT, INREXT in the last 72 hours.   No results found for: SDES  Lab Results   Component Value Date/Time    Culture result: Culture performed on Fluid swab specimen 11/25/2021 08:22 PM    Culture result: NO GROWTH 4 DAYS 11/25/2021 08:22 PM              ___________________________________________________    Attending Physician: Tanja Asencio DO

## 2022-01-23 NOTE — PROGRESS NOTES
Cardiology Progress Note  1/23/2022 11:17 AM    Admit Date: 1/11/2022    Admit Diagnosis: Acute respiratory failure with hypoxia ([facility] [J96.01]; Pneumonia due to COVID-19 virus [U07.1, J12.82]      Assessment:     Active Problems:    A-fib (HCC) (2/4/2020)      History of CVA (cerebrovascular accident) (2/4/2020)      Type 2 diabetes mellitus without complication ([facility] (0/8/0210)      Acute respiratory failure with hypoxia ([facility] (1/11/2022)      Pneumonia due to COVID-19 virus (1/11/2022)      Hypertension ()      NSVT (nonsustained ventricular tachycardia) ([facility] (1/22/2022)      Pacemaker (1/22/2022)        Plan:     Ventricular Tachycardia  improved   continue low dose beta blocker, no more tachyarrhythmia, agree with holding lisinopril    Zandra Kendrick MD  [phone]  Cell        Subjective:     Not seen or examined due to Covid status, chart and telemetry reviewed. No more VT/SVT. Still quite ill with Covid pneumonia. ROS: negative except as noted above.     Objective:       Visit Vitals  /71 (BP 1 Location: Right upper arm, BP Patient Position: At rest)   Pulse 84   Temp 97.8 °F (36.6 °C)   Resp 18   Ht 5' 8\" (1.727 m)   Wt 141 lb 6.4 oz (64.1 kg)   SpO2 90%   BMI 21.50 kg/m²       Current Facility-Administered Medications   Medication Dose Route Frequency    metoprolol tartrate (LOPRESSOR) tablet 12.5 mg  12.5 mg Oral BID    baricitinib (OLUMIANT) tablet 4 mg  4 mg Oral DAILY    insulin NPH (NOVOLIN N, HUMULIN N) injection 12 Units  12 Units SubCUTAneous ACB&D    guaiFENesin-dextromethorphan (ROBITUSSIN DM) 100-10 mg/5 mL syrup 5 mL  5 mL Oral Q6H PRN    rivaroxaban (XARELTO) tablet 20 mg  20 mg Oral DAILY WITH DINNER    [Held by provider] lisinopriL (PRINIVIL, ZESTRIL) tablet 10 mg  10 mg Oral QHS    atorvastatin (LIPITOR) tablet 20 mg  20 mg Oral DAILY    glucose chewable tablet 16 g  4 Tablet Oral PRN    dextrose (D50W) injection syrg 12.5-25 g  25-50 mL IntraVENous PRN  glucagon (GLUCAGEN) injection 1 mg  1 mg IntraMUSCular PRN    insulin lispro (HUMALOG) injection   SubCUTAneous AC&HS    dexamethasone (PF) (DECADRON) 10 mg/mL injection 10 mg  10 mg IntraVENous Q12H    sodium chloride (NS) flush 5-40 mL  5-40 mL IntraVENous Q8H    sodium chloride (NS) flush 5-40 mL  5-40 mL IntraVENous PRN    acetaminophen (TYLENOL) tablet 650 mg  650 mg Oral Q6H PRN    Or    acetaminophen (TYLENOL) suppository 650 mg  650 mg Rectal Q6H PRN    polyethylene glycol (MIRALAX) packet 17 g  17 g Oral DAILY PRN    ondansetron (ZOFRAN ODT) tablet 4 mg  4 mg Oral Q8H PRN    Or    ondansetron (ZOFRAN) injection 4 mg  4 mg IntraVENous Q6H PRN         Physical Exam:  Not examined due to Covid infection    Telemetry:  paced    Data Review:     Labs:    Recent Results (from the past 24 hour(s))   GLUCOSE, POC    Collection Time: 01/22/22 11:42 AM   Result Value Ref Range    Glucose (POC) 193 (H) 65 - 117 mg/dL    Performed by Jodi LANDRY (CON)    GLUCOSE, POC    Collection Time: 01/22/22  4:25 PM   Result Value Ref Range    Glucose (POC) 226 (H) 65 - 117 mg/dL    Performed by Jodi LANDRY (CON)    GLUCOSE, POC    Collection Time: 01/22/22  8:10 PM   Result Value Ref Range    Glucose (POC) 229 (H) 65 - 117 mg/dL    Performed by Carli Anderson    CBC WITH AUTOMATED DIFF    Collection Time: 01/23/22  3:50 AM   Result Value Ref Range    WBC 16.0 (H) 4.1 - 11.1 K/uL    RBC 5.44 4.10 - 5.70 M/uL    HGB 16.2 12.1 - 17.0 g/dL    HCT 46.3 36.6 - 50.3 %    MCV 85.1 80.0 - 99.0 FL    MCH 29.8 26.0 - 34.0 PG    MCHC 35.0 30.0 - 36.5 g/dL    RDW 13.2 11.5 - 14.5 %    PLATELET 777 770 - 816 K/uL    MPV 11.1 8.9 - 12.9 FL    NRBC 0.0 0  WBC    ABSOLUTE NRBC 0.00 0.00 - 0.01 K/uL    NEUTROPHILS 93 (H) 32 - 75 %    LYMPHOCYTES 3 (L) 12 - 49 %    MONOCYTES 3 (L) 5 - 13 %    EOSINOPHILS 0 0 - 7 %    BASOPHILS 0 0 - 1 %    IMMATURE GRANULOCYTES 1 (H) 0.0 - 0.5 %    ABS.  NEUTROPHILS 14.8 (H) 1.8 - 8.0 K/UL    ABS. LYMPHOCYTES 0.5 (L) 0.8 - 3.5 K/UL    ABS. MONOCYTES 0.5 0.0 - 1.0 K/UL    ABS. EOSINOPHILS 0.0 0.0 - 0.4 K/UL    ABS. BASOPHILS 0.0 0.0 - 0.1 K/UL    ABS. IMM. GRANS. 0.2 (H) 0.00 - 0.04 K/UL    DF SMEAR SCANNED      RBC COMMENTS NORMOCYTIC, NORMOCHROMIC     METABOLIC PANEL, COMPREHENSIVE    Collection Time: 01/23/22  3:50 AM   Result Value Ref Range    Sodium 133 (L) 136 - 145 mmol/L    Potassium 4.6 3.5 - 5.1 mmol/L    Chloride 100 97 - 108 mmol/L    CO2 29 21 - 32 mmol/L    Anion gap 4 (L) 5 - 15 mmol/L    Glucose 130 (H) 65 - 100 mg/dL    BUN 32 (H) 6 - 20 MG/DL    Creatinine 0.84 0.70 - 1.30 MG/DL    BUN/Creatinine ratio 38 (H) 12 - 20      GFR est AA >60 >60 ml/min/1.73m2    GFR est non-AA >60 >60 ml/min/1.73m2    Calcium 8.7 8.5 - 10.1 MG/DL    Bilirubin, total 1.4 (H) 0.2 - 1.0 MG/DL    ALT (SGPT) 27 12 - 78 U/L    AST (SGOT) 16 15 - 37 U/L    Alk.  phosphatase 49 45 - 117 U/L    Protein, total 5.9 (L) 6.4 - 8.2 g/dL    Albumin 2.6 (L) 3.5 - 5.0 g/dL    Globulin 3.3 2.0 - 4.0 g/dL    A-G Ratio 0.8 (L) 1.1 - 2.2     C REACTIVE PROTEIN, QT    Collection Time: 01/23/22  3:50 AM   Result Value Ref Range    C-Reactive protein <0.29 0.00 - 0.60 mg/dL   MAGNESIUM    Collection Time: 01/23/22  3:50 AM   Result Value Ref Range    Magnesium 2.1 1.6 - 2.4 mg/dL   GLUCOSE, POC    Collection Time: 01/23/22  7:57 AM   Result Value Ref Range    Glucose (POC) 124 (H) 65 - 117 mg/dL    Performed by Shakeel Astudillo (CON)        Deejay Smith MD

## 2022-01-24 NOTE — PROGRESS NOTES
Transition of Care Plan:  RUR-16%; LOS 13 days; Covid+  1. Pulmonary following; pt on 20L hi-ana maria O2; wean as tolerated  2. Palliative care following  3. PT/OT following and recommending pulmonary rehab vs hh pending progress. 4. Pt lives with his grandson, Eva Loomis ([phone])  5. CM following for any needs prior to d/c  LAITH Hill RN

## 2022-01-24 NOTE — PROGRESS NOTES
0700- Bedside, Verbal, and Written shift change report given to Jessica Mccray RN (oncoming nurse) by Janie Lr RN (offgoing nurse). Report included the following information SBAR, Kardex, ED Summary, Intake/Output, MAR, Accordion, Recent Results, and Med Rec Status. This patient was assisted with Intentional Toileting every 2 hours during this shift. Documentation of ambulation and output reflected on Flowsheet. 1900- Bedside, Verbal, and Written shift change report given to Tj Major RN  (oncoming nurse) by Fe Nice  (offgoing nurse). Report included the following information SBAR, Kardex, ED Summary, Intake/Output, MAR, Accordion, Recent Results, and Med Rec Status.

## 2022-01-24 NOTE — PROGRESS NOTES
CARDIOLOGY PROGRESS NOTE        [address], [place], [address] [facility]  Phone [phone]; Fax [phone]          2022 8:14 AM       Admit Date:           2022  Admit Diagnosis:  Acute respiratory failure with hypoxia ([facility] [J96.01]  Pneumonia due to COVID-19 virus [U07.1, J12.82]  :          1940   MRN:          [id number]        Assessment/Plan  1. VT: hx of NSVT on pacer interrogation  [facility]). Cont low dose metoprolol. TTE w/ EF 45-50%, mild AS. Amio held d/t infiltrates seen on xray d/t COVID. No further episodes on tele review last 24 hr    2. Hx of a-fib, s/p pacer: on Xarelto, saw Dr. Elias Melissa . Not on any antiarrhythmic therapy PTA. Monitor      3. COVID pneumonia: management per primary team     CARDIOLOGY ATTENDING  Patient personally seen and examined. All the elements of history and examination were personally performed. Assessment and plan was discussed and agree. Exam limited due to COVID. RRR on tele. (V paced)   Chart and tele reviewed. No further prolonged NSVT however does have frequent PVC's at times. 1) NSVT - Cont BB. Amiodarone deferred for now (but may consider later if he has worsening problems with NSVT). 2) Afib/flutter - cont Xarelto   3) COVID PNA - per medicine team     I will sign off, but please call with any questions. Ginny Joyce MD, [facility]              We discussed the expected course, resolution and complications of the diagnosis(es) in detail. Medication risks, benefits, costs, interactions, and alternatives were discussed as indicated. No intake/output data recorded.     Last 3 Recorded Weights in this Encounter    22 1431 22 0302 22 0556   Weight: 68.4 kg (150 lb 12.7 oz) 66.7 kg (147 lb 1.6 oz) 64.1 kg (141 lb 6.4 oz)          1901 -  0700  In: 1200 [P.O.:1200]  Out: 1350 [Urine:1350]    SUBJECTIVE      79 y.o. male who presented 2022 with complaints of shortness of breath and Covid pneumonitis. The symptoms began approximately 1 week ago. He has a history of cardiac disease including atrial fib, arrhythmias/ectopy and pacemaker, also NSVT noted on pacer interrogation last year. He now has significant Covid pneumonia, mostly paced rhythm, had wide complex tachycardia today at , self terminated. [facility] Portal reports breathing improved some.       Current Facility-Administered Medications   Medication Dose Route Frequency    LORazepam (ATIVAN) tablet 0.5 mg  0.5 mg Oral BID PRN    metoprolol tartrate (LOPRESSOR) tablet 12.5 mg  12.5 mg Oral BID    baricitinib (OLUMIANT) tablet 4 mg  4 mg Oral DAILY    insulin NPH (NOVOLIN N, HUMULIN N) injection 12 Units  12 Units SubCUTAneous ACB&D    guaiFENesin-dextromethorphan (ROBITUSSIN DM) 100-10 mg/5 mL syrup 5 mL  5 mL Oral Q6H PRN    rivaroxaban (XARELTO) tablet 20 mg  20 mg Oral DAILY WITH DINNER    [Held by provider] lisinopriL (PRINIVIL, ZESTRIL) tablet 10 mg  10 mg Oral QHS    atorvastatin (LIPITOR) tablet 20 mg  20 mg Oral DAILY    glucose chewable tablet 16 g  4 Tablet Oral PRN    dextrose (D50W) injection syrg 12.5-25 g  25-50 mL IntraVENous PRN    glucagon (GLUCAGEN) injection 1 mg  1 mg IntraMUSCular PRN    insulin lispro (HUMALOG) injection   SubCUTAneous AC&HS    dexamethasone (PF) (DECADRON) 10 mg/mL injection 10 mg  10 mg IntraVENous Q12H    sodium chloride (NS) flush 5-40 mL  5-40 mL IntraVENous Q8H    sodium chloride (NS) flush 5-40 mL  5-40 mL IntraVENous PRN    acetaminophen (TYLENOL) tablet 650 mg  650 mg Oral Q6H PRN    Or    acetaminophen (TYLENOL) suppository 650 mg  650 mg Rectal Q6H PRN    polyethylene glycol (MIRALAX) packet 17 g  17 g Oral DAILY PRN    ondansetron (ZOFRAN ODT) tablet 4 mg  4 mg Oral Q8H PRN    Or    ondansetron (ZOFRAN) injection 4 mg  4 mg IntraVENous Q6H PRN      OBJECTIVE               Intake/Output Summary (Last 24 hours) at 1/24/2022 0814  Last data filed at 1/24/2022 0439  Gross per 24 hour   Intake 840 ml   Output 950 ml   Net -110 ml       Review of Systems - History obtained from the patient AS PER  HPI        PHYSICAL EXAM        Visit Vitals  /84 (BP 1 Location: Right arm, BP Patient Position: At rest)   Pulse 87   Temp 97.8 °F (36.6 °C)   Resp 21   Ht 5' 8\" (1.727 m)   Wt 64.1 kg (141 lb 6.4 oz)   SpO2 93%   BMI 21.50 kg/m²       PE deferred due to COVID infection        DATA REVIEW      2/6/20: [employer] dual chamber pacemaker placement per Dr. Mika Hawthorne    Cardiac monitor: SR/paced w/ some PVCs        Laboratory and Imaging have been reviewed by me and are notable for  No results for input(s): CPK, CKMB, TROIQ in the last 72 hours.   Recent Labs     01/24/22  0455 01/23/22  0350 01/22/22  0554   * 133* 133*   K 4.6 4.6 4.6   CO2 28 29 27   BUN 32* 32* 33*   CREA 0.82 0.84 0.94   * 130* 167*   MG 2.3 2.1 2.2   WBC 15.0* 16.0* 14.2*   HGB 16.0 16.2 16.8   HCT 45.9 46.3 48.0    279 321             Radha Robert NP

## 2022-01-24 NOTE — PROGRESS NOTES
Problem: Mobility Impaired (Adult and Pediatric)  Goal: *Acute Goals and Plan of Care (Insert Text)  Description: FUNCTIONAL STATUS PRIOR TO ADMISSION: Patient was independent and active without use of DME.    HOME SUPPORT PRIOR TO ADMISSION: The patient lived with son and grandson but did not require assist.    Physical Therapy Goals  Initiated 1/17/2022  1. Patient will move from supine to sit and sit to supine  in bed with modified independence within 7 day(s). 2.  Patient will transfer from bed to chair and chair to bed with modified independence using the least restrictive device within 7 day(s). 3.  Patient will perform sit to stand with modified independence within 7 day(s). 4.  Patient will ambulate with modified independence for 150 feet with the least restrictive device within 7 day(s). 5.  Patient will ascend/descend 6 stairs with 2 handrail(s) with modified independence within 7 day(s). Outcome: Progressing Towards Goal  Note:   PHYSICAL THERAPY TREATMENT  Patient: Yordy Gutierres (46 y.o. male)  Date: 1/24/2022  Diagnosis: Acute respiratory failure with hypoxia ([facility] [J96.01]  Pneumonia due to COVID-19 virus [U07.1, J12.82] <principal problem not specified>       Precautions: Fall  Chart, physical therapy assessment, plan of care and goals were reviewed. ASSESSMENT  Patient continues with skilled PT services and  progressing towards goals. Pt fatigued after sitting in chair requesting to return to bed. Assisted with gown change HHA for SPT to EOB desat 87% using hi-flow 20L @ 70 %. Recovered to 94% once returned to supine    Current Level of Function Impacting Discharge (mobility/balance): CGA    Other factors to consider for discharge:          PLAN :  Patient continues to benefit from skilled intervention to address the above impairments. Continue treatment per established plan of care. to address goals.     Recommendation for discharge: (in order for the patient to meet his/her long term goals)  Therapy 3 hours per day 5-7 days per week    This discharge recommendation:  Has been made in collaboration with the attending provider and/or case management    IF patient discharges home will need the following DME: to be determined (TBD)       SUBJECTIVE:   Patient stated Rikki Hole spilled tea all over and I am worn out    OBJECTIVE DATA SUMMARY:   Critical Behavior:  Neurologic State: Alert  Orientation Level: Oriented X4  Cognition: Appropriate decision making,Appropriate for age attention/concentration,Appropriate safety awareness,Follows commands  Safety/Judgement: Awareness of environment,Fall prevention  Functional Mobility Training:  Bed Mobility:     Supine to Sit: Stand-by assistance  Sit to Supine: Stand-by assistance  Scooting: Modified independent        Transfers:  Sit to Stand: Contact guard assistance  Stand to Sit: Contact guard assistance        Bed to Chair: Contact guard assistance                    Balance:  Sitting: Intact  Standing: Intact  Standing - Static: Fair  Standing - Dynamic : Fair  Ambulation/Gait Training:  Distance (ft): 5 Feet (ft)  Assistive Device:  (HHA x 1)  Ambulation - Level of Assistance: Contact guard assistance        Gait Abnormalities: Decreased step clearance        Base of Support: Narrowed     Speed/Niesha: Pace decreased (<100 feet/min)                       Stairs: Therapeutic Exercises:     Pain Rating:  Denies pain    Activity Tolerance:   Good    After treatment patient left in no apparent distress:   Supine in bed, Call bell within reach, and Bed / chair alarm activated    COMMUNICATION/COLLABORATION:   The patients plan of care was discussed with: Registered nurseAbby Mcneal Part   Time Calculation: 24 mins

## 2022-01-24 NOTE — PROGRESS NOTES
Bedside shift change report given to [facility] (oncoming nurse) by Taj Mcallister RN (offgoing nurse). Report included the following information SBAR, Kardex, ED Summary, Intake/Output, and Recent Results. Bedside shift change report given to [facility] (oncoming nurse) by [facility] (offgoing nurse). Report included the following information SBAR, Kardex, ED Summary, Intake/Output, and Recent Results. This patient was assisted with Intentional Toileting every 2 hours during this shift as appropriate. Documentation of ambulation and output reflected on Flowsheet as appropriate. Purposeful hourly rounding was completed using AIDET and 5Ps. Outcomes of PHR documented as they occurred. Bed alarm in use as appropriate. Dual Suction and ambubag in place.

## 2022-01-24 NOTE — PROGRESS NOTES
Name: Nguyễn Huizar: [address]   : 1940 Admit Date: 2022   Phone: [phone]  Room: [facility]/01   PCP: Ena Balderas MD  MRN: [id number]   Date: 2022  Code: Full Code          Chart and notes reviewed. Data reviewed. I review the patient's current medications in the medical record at each encounter.  I have evaluated and examined the patient. History of Present Illness:  Joshua Diaz is a very pleasant, 80 yo gentleman that presented to [facility] on  with worsening shortness of breath. He has a PMH of a.fib, aortic stenosis, DM, hypertension, CVA, and tobacco abuse (100 pack years, quit in ). He received two doses of the Covid vaccine, last in 2021. He did not receive a booster dose. He is currently requiring hi flow at 50L/100%. He reports that his symptoms started five days ago after he was exposed to his grandson who tested positive. He became more progressively more SOB with increased cough. No fever/chills. He did initially have diarrhea. Images:  Personally visualized and report reviewed. CXR :  Superimposed patchy bilateral airspace disease on interstitial changes concerning for infection or inflammation      D-dimer . 55  LFTs stable  Procalcitonin negative  Ferritin 759  Trop wnl  Pro-  CRP 3.18  ECHO 2020:  EF 55-60%, mildly reduced systolic function, moderate aortic stenosis, trave MVR, moderately elevated CVP. Interval History:  Afebrile  BP stable  Sats 93% on 20L/92% HFNC  WBC 15--increased  Na 133   D-dimer 0.38 - better  CRP < 0.29  LFTs stable  Procalcitonin<.05  CRP <.29   pro-BNP 4723      ROS:  Reports feeling better today. Just worked with PT and sitting up in chair.     Past Medical History:   Diagnosis Date    Atrial fibrillation ([facility]     Diabetes mellitus, type 2 ([facility]     Hypertension     Internal carotid artery stenosis, right     Chronic occlusion of the right internal carotid artery per MRI of 2020 unchanged from previous study of 2010; study of  also showed mild stenosis of the left internal carotid artery and bilateral patent vertebral arteries    Moderate aortic stenosis     Echocardiogram of 2020:  Severe aortic valve leaflet calcification present with reduced excursion. Moderate aortic valve stenosis was present. LVEF 55-60% .  Stroke [facility])     asymptomatic; small chronic infarcts in the bilateral thalami per MRI of 2020.   Previous MRI of 2010 showed possible tiny old right thalamic infarct versus perivascular space        Past Surgical History:   Procedure Laterality Date    HX CHOLECYSTECTOMY      LA INS NEW/RPLCMT PRM PM W/TRANSV ELTRD ATRIAL&VENT N/A 2020    INSERT PPM DUAL performed by Esther Renee MD at [facility] LAB       Family History   Adopted: Yes   Family history unknown: Yes       Social History     Tobacco Use    Smoking status: Former Smoker     Packs/day: 2.00     Years: 50.00     Pack years: 100.00     Quit date: 2004     Years since quittin.6    Smokeless tobacco: Never Used   Substance Use Topics    Alcohol use: Yes     Comment: 1 beer or 1 glaas of wine daily most of adult life       Allergies   Allergen Reactions    Ampicillin Nausea and Vomiting    Iodine Hives, Itching and Nausea and Vomiting       Current Facility-Administered Medications   Medication Dose Route Frequency    LORazepam (ATIVAN) tablet 0.5 mg  0.5 mg Oral BID PRN    metoprolol tartrate (LOPRESSOR) tablet 12.5 mg  12.5 mg Oral BID    baricitinib (OLUMIANT) tablet 4 mg  4 mg Oral DAILY    insulin NPH (NOVOLIN N, HUMULIN N) injection 12 Units  12 Units SubCUTAneous ACB&D    guaiFENesin-dextromethorphan (ROBITUSSIN DM) 100-10 mg/5 mL syrup 5 mL  5 mL Oral Q6H PRN    rivaroxaban (XARELTO) tablet 20 mg  20 mg Oral DAILY WITH DINNER    [Held by provider] lisinopriL (PRINIVIL, ZESTRIL) tablet 10 mg  10 mg Oral QHS    atorvastatin (LIPITOR) tablet 20 mg  20 mg Oral DAILY    glucose chewable tablet 16 g  4 Tablet Oral PRN    dextrose (D50W) injection syrg 12.5-25 g  25-50 mL IntraVENous PRN    glucagon (GLUCAGEN) injection 1 mg  1 mg IntraMUSCular PRN    insulin lispro (HUMALOG) injection   SubCUTAneous AC&HS    dexamethasone (PF) (DECADRON) 10 mg/mL injection 10 mg  10 mg IntraVENous Q12H    sodium chloride (NS) flush 5-40 mL  5-40 mL IntraVENous Q8H    sodium chloride (NS) flush 5-40 mL  5-40 mL IntraVENous PRN    acetaminophen (TYLENOL) tablet 650 mg  650 mg Oral Q6H PRN    Or    acetaminophen (TYLENOL) suppository 650 mg  650 mg Rectal Q6H PRN    polyethylene glycol (MIRALAX) packet 17 g  17 g Oral DAILY PRN    ondansetron (ZOFRAN ODT) tablet 4 mg  4 mg Oral Q8H PRN    Or    ondansetron (ZOFRAN) injection 4 mg  4 mg IntraVENous Q6H PRN         REVIEW OF SYSTEMS   Negative except as stated in the HPI. Physical Exam:   Visit Vitals  /84 (BP 1 Location: Right arm, BP Patient Position: At rest)   Pulse 87   Temp 97.8 °F (36.6 °C)   Resp 21   Ht 5' 8\" (1.727 m)   Wt 64.1 kg (141 lb 6.4 oz)   SpO2 93%   BMI 21.50 kg/m²       General:  Alert, cooperative, no distress, appears stated age, on HF   Head:  Normocephalic, without obvious abnormality, atraumatic. Eyes:  Conjunctivae/corneas clear. Nose: Nares normal. Septum midline. Mucosa normal.   Throat: Lips, mucosa, and tongue normal.    Neck: Supple, symmetrical, trachea midline, no adenopathy. Lungs:   Clear but diminished thorughout   Chest wall:  No tenderness or deformity. Heart:  Regular rate and rhythm, S1, S2 normal, no murmur, click, rub or gallop. Abdomen:   Soft, non-tender. Bowel sounds normal.   Extremities: Extremities normal, atraumatic, no cyanosis or edema. Pulses: 2+ and symmetric all extremities.    Skin: Skin color, texture, turgor normal. No rashes or lesions   Lymph nodes: Cervical  nodes normal.   Neurologic: Grossly nonfocal       Lab Results   Component Value Date/Time    Sodium 133 (L) 01/24/2022 04:55 AM    Potassium 4.6 01/24/2022 04:55 AM    Chloride 99 01/24/2022 04:55 AM    CO2 28 01/24/2022 04:55 AM    BUN 32 (H) 01/24/2022 04:55 AM    Creatinine 0.82 01/24/2022 04:55 AM    Glucose 148 (H) 01/24/2022 04:55 AM    Calcium 8.7 01/24/2022 04:55 AM    Magnesium 2.3 01/24/2022 04:55 AM    Phosphorus 3.1 01/16/2022 01:52 AM    Lactic acid 1.3 01/11/2022 06:34 PM       Lab Results   Component Value Date/Time    WBC 15.0 (H) 01/24/2022 04:55 AM    HGB 16.0 01/24/2022 04:55 AM    PLATELET 421 65/35/0831 04:55 AM    MCV 85.0 01/24/2022 04:55 AM       Lab Results   Component Value Date/Time    INR 1.0 02/04/2020 03:50 PM    aPTT 32.0 09/22/2010 03:50 AM    Alk.  phosphatase 51 01/24/2022 04:55 AM    Protein, total 5.7 (L) 01/24/2022 04:55 AM    Albumin 2.8 (L) 01/24/2022 04:55 AM    Globulin 2.9 01/24/2022 04:55 AM       Lab Results   Component Value Date/Time    Ferritin 759 (H) 01/11/2022 06:34 PM       Lab Results   Component Value Date/Time    Sed rate (ESR) 5 09/22/2010 03:50 AM    C-Reactive protein <0.29 01/24/2022 04:55 AM        No results found for: PH, PHI, PCO2, PCO2I, PO2, PO2I, HCO3, HCO3I, FIO2, FIO2I    Lab Results   Component Value Date/Time    CK 79 09/22/2010 03:50 AM    Troponin-I, Qt. 0.05 (H) 09/21/2010 12:10 PM        Lab Results   Component Value Date/Time    Culture result: Culture performed on Fluid swab specimen 11/25/2021 08:22 PM    Culture result: NO GROWTH 4 DAYS 11/25/2021 08:22 PM       No results found for: TOXA1, RPR, HBCM, HBSAG, HAAB, HCAB1, HAAT, G6PD, CRYAC, HIVGT, HIVR, HIV1, HIV12, HIVPC, HIVRPI    Lab Results   Component Value Date/Time    CK 79 09/22/2010 03:50 AM       Lab Results   Component Value Date/Time    Color YELLOW/STRAW 08/02/2019 12:10 AM    Appearance CLEAR 08/02/2019 12:10 AM    pH (UA) 5.0 08/02/2019 12:10 AM    Protein 30 (A) 08/02/2019 12:10 AM    Glucose 500 (A) 08/02/2019 12:10 AM    Ketone NEGATIVE  08/02/2019 12:10 AM    Bilirubin NEGATIVE  08/02/2019 12:10 AM    Blood SMALL (A) 08/02/2019 12:10 AM    Urobilinogen 0.2 08/02/2019 12:10 AM    Nitrites NEGATIVE  08/02/2019 12:10 AM    Leukocyte Esterase NEGATIVE  08/02/2019 12:10 AM    WBC 0-4 08/02/2019 12:10 AM    RBC 0-5 08/02/2019 12:10 AM    Bacteria NEGATIVE  08/02/2019 12:10 AM       Images: personally visualized and report reviewed    CXR (1/16/2022): Worsening of left mid and lower lung pulmonary opacities. CXR (1/20/2022): There is bilateral pneumonia in the periphery of the mid  and lower lung zones left greater than right. This has mildly worsened. IMPRESSION  · Acute Respiratory Failure with Hypoxia  · Covid-19 Pneumonia  · History of A. Fib  · Moderate Aortic Stenosis  · History of Tobacco Abuse  · DM  · Hypertension      PLAN  · Continue HF to keep sats >88%; currently at 20L/90%  · Transition to midflow as sats allow  · Continue Dexamethasone 10mg q12  · Continue Baricitinib  · On AC with Xarleto  · Trend CRP, d-dimer, LFTs   · Encourage IS use  · Prone as able  · OOB to chair  · Would benefit from PFTs as an outpatient  · Will need repeat imaging as an outpatient  · PT/OT       Gavin Santos, NP

## 2022-01-24 NOTE — PROGRESS NOTES
Problem: Self Care Deficits Care Plan (Adult)  Goal: *Acute Goals and Plan of Care (Insert Text)  Description: FUNCTIONAL STATUS PRIOR TO ADMISSION: Patient was independent and active without use of DME.     HOME SUPPORT: The patient lived with grandson but did not require assist.    Occupational Therapy Goals  Initiated 1/17/2022; Goals reviewed 1/24/2022, all remain appropriate. 1. Patient will perform grooming and bathing with supervision standing at the sink with < or = 2 rest breaks and maintain oxygen sats > or = 90% within 7 day(s). 2. Patient will perform upper body dressing and lower body dressing with supervision with < or = 2 rest breaks and maintain oxygen sats > or = 90%  within 7 day(s). 3. Patient will perform toileting and toilet transfer with supervision and maintain oxygen sats > or = 90% within 7 day(s). 4. Patient will demonstrate pursed lip breathing with min cues during functional tasks within 7 day(s). 5. Patient will independently perform and recall home exercise program while maintaining O2 sats at or above 90% within 7 days. 6. Patient will verbalize/demonstrate all energy conservation techniques during ADL tasks with independence within 7 days. Outcome: Progressing Towards Goal     OCCUPATIONAL THERAPY TREATMENT/WEEKLY RE-ASSESSMENT  Patient: Serjio Infante (19 y.o. male)  Date: 1/24/2022  Diagnosis: Acute respiratory failure with hypoxia ([facility] [address] [J96.01]  Pneumonia due to COVID-19 virus [U07.1, J12.82] <principal problem not specified>       Precautions: Fall  Chart, occupational therapy assessment, plan of care, and goals were reviewed. ASSESSMENT  Patient continues with skilled OT services and is progressing towards goals. Patient continues to present with decreased activity tolerance, decreased endurance, impaired standing balance, and decreased strength. He is received in bed on HFNC (20L/90%) with stable saturations.  He is able to participate in distal LB dressing at EOB with set-up/SBA and then requests transfer OOB to chair, which he completes with CGA/min A. Noted desaturation to 89% with activity but he recovers quickly with seated rest and focused PLB. Goals remain appropriate at this time as pt continues to benefit from skilled OT services while in acute setting. Continue to recommend pulmonary rehab at discharge. Current Level of Function Impacting Discharge (ADLs): overall CGA for OOB mobility/ADL transfers; up to min/mod A for ADLs    Other factors to consider for discharge: high flow O2 needs         PLAN :  Goals have been updated based on progression since last assessment. Patient continues to benefit from skilled intervention to address the above impairments. Continue to follow patient 5 times a week to address goals. Recommend with staff: OOB to chair for meals; BSC for toileting with 1 person assist; ADLs with assistance    Recommend next OT session: standing tolerance; UE HEP with PLB    Recommendation for discharge: (in order for the patient to meet his/her long term goals)  Therapy 3 hours per day 5-7 days per week (pulmonary rehab) vs. Home with [facility] pending progress and respiratory status    This discharge recommendation:  Has been made in collaboration with the attending provider and/or case management    IF patient discharges home will need the following DME: TBD       SUBJECTIVE:   Patient stated I'm going to sit up and do my exercises.     OBJECTIVE DATA SUMMARY:   Cognitive/Behavioral Status:  Neurologic State: Alert  Orientation Level: Oriented X4  Cognition: Appropriate decision making; Appropriate for age attention/concentration; Appropriate safety awareness; Follows commands  Perception: Appears intact  Perseveration: No perseveration noted  Safety/Judgement: Awareness of environment; Fall prevention    Functional Mobility and Transfers for ADLs:  Bed Mobility:  Supine to Sit: Stand-by assistance  Scooting: Supervision    Transfers:  Sit to Stand: Minimum assistance  Bed to Chair: Contact guard assistance    Balance:  Sitting: Intact  Standing: Impaired  Standing - Static: Fair  Standing - Dynamic : Fair    ADL Intervention:    Lower Body Dressing Assistance  Socks: Set-up  Leg Crossed Method Used: Yes  Position Performed: Seated edge of bed  Cues: Don    Cognitive Retraining  Safety/Judgement: Awareness of environment; Fall prevention    Functional Outcome Measure:    Barthel Index:  Bathin  Bladder: 10  Bowels: 10  Groomin  Dressin  Feeding: 10  Mobility: 0  Stairs: 0  Toilet Use: 5  Transfer (Bed to Chair and Back): 10  Total: 55/100      The Barthel ADL Index: Guidelines  1. The index should be used as a record of what a patient does, not as a record of what a patient could do. 2. The main aim is to establish degree of independence from any help, physical or verbal, however minor and for whatever reason. 3. The need for supervision renders the patient not independent. 4. A patient's performance should be established using the best available evidence. Asking the patient, friends/relatives and nurses are the usual sources, but direct observation and common sense are also important. However direct testing is not needed. 5. Usually the patient's performance over the preceding 24-48 hours is important, but occasionally longer periods will be relevant. 6. Middle categories imply that the patient supplies over 50 per cent of the effort. 7. Use of aids to be independent is allowed. Score Interpretation (from [address] [facility])    Independent   60-79 Minimally independent   40-59 Partially dependent   20-39 Very dependent   <20 Totally dependent     -Mynor Otero., Barthel, D.W. (1965). Functional evaluation: the Barthel Index. [address] ([address] (1997). The Barthel activities of daily living index: self-reporting versus actual performance in the old (> or = 75 years).  Journal of [address] [place] [address] 45(7), [address] [facility], IDANIA, Wilbert Qiu., Anderson Addison. (1999). Measuring the change in disability after inpatient rehabilitation; comparison of the responsiveness of the Barthel Index and Functional [place] Measure. Journal of Neurology, Neurosurgery, and Psychiatry, 66(4), 574-356. ELYSSA Thomson, BREANNE Rollins, & Christina Cary M.A. (2004) Assessment of post-stroke quality of life in cost-effectiveness studies: The usefulness of the Barthel Index and the EuroQoL-5D. Quality of Life Research, 13, 427-43     Pain:  Pt reporting minimal pain    Activity Tolerance:   Fair, desaturates with exertion and requires oxygen and requires rest breaks    After treatment patient left in no apparent distress:   Sitting in chair, Call bell within reach and Bed / chair alarm activated    COMMUNICATION/COLLABORATION:   The patients plan of care was discussed with: Physical therapist and Registered nurse.      Mikayla Prom, OT  Time Calculation: 23 mins

## 2022-01-24 NOTE — PROGRESS NOTES
Carlos Eduardo [address]  [address] [facility], [address] [place] [address]  [phone]      Medical Progress Note      NAME: Padmini Addison   :  1940  MRM:  [id number]    Date/Time: 2022        Assessment / Plan:     78 yo M w/ hx of AF, HTN, DM presenting w/ fatigue, dyspneic, admitted for AHRF 2/2 COVID-19    Acute respiratory failure with hypoxia: 2/2 COVID. Severe but improving overall, on HFNC. Prognosis remains guarded     Pneumonia due to COVID-19 virus: Repeat CXR  worsening b/l PNA. On high dose IV dexamethasone, baricitinib. Low procalcitonin. D-dimer low, unlikely acute PE/VTE, given already on Xarelto. Follow inflammatory markers. Pulmonology following    Aortic stenosis: moderate, based on echo in 2020 but repeat TTE  showing mild stenosis. Monitor volume status     VT: hx of NSVT. Noted . Monitor electrolytes. Cardiology evaluated. Continue BB     Elevated bilirubin: stable. . ABD US w/ steastosis     Type 2 diabetes mellitus without complication: W3S 2.6%. BG high 2/2 steroids. Cont NPH, cont SSI     SOHAIL: mild, resolved. Currently holding lisinopril. Avoid hypotension    A-fib/ Pacemaker: monitor on telemetry, cont Xarelto    History of CVA (cerebrovascular accident): cont OAC, statin    Hypertension: currently holding lisinopril                     Care Plan discussed with: Patient, Nursing Staff and >50% of time spent in counseling and coordination of care    Discussed:  Care Plan    Prophylaxis:  Xarelto    Disposition:  SNF vs inpt rehab versus           Subjective:     Chief Complaint:  Follow up COVID    Chart/notes/labs/studies reviewed, patient examined. NAEON, on HFNC. Reports dyspnea w/ exterion, no cp, no abd complaints. No n/v; tolerating diet. Objective:       Vitals:        Last 24hrs VS reviewed since prior progress note.  Most recent are:    Visit Vitals  /61 (BP 1 Location: Right arm, BP Patient Position: At rest)   Pulse 81   Temp 98 °F (36.7 °C)   Resp 19   Ht 5' 8\" (1.727 m)   Wt 64.1 kg (141 lb 6.4 oz)   SpO2 92%   BMI 21.50 kg/m²     SpO2 Readings from Last 6 Encounters:   01/24/22 92%   11/26/21 95%   11/19/21 93%   05/28/21 95%   02/06/20 98%   02/02/20 93%    O2 Flow Rate (L/min): 15 l/min       Intake/Output Summary (Last 24 hours) at 1/24/2022 1315  Last data filed at 1/24/2022 5036  Gross per 24 hour   Intake 960 ml   Output 1200 ml   Net -240 ml          Exam:     Physical Exam:    Gen:  Elderly, ill-appearing, NAD   HEENT:  Atraumatic, normocephalic, Sclerae nonicteric, hard of hearing   Neck:  Supple, without apparent masses  Resp:  No accessory muscle use,  diminished breath sounds b/l   Card: RRR, 2/6 systolic murmur, No rubs or gallops. No LE edema  Abd:  +bowel sounds, soft, NTTP, nondistended  Neuro: Face symmetric, speech fluent, follows commands appropriately, no focal weakness or numbness  Psych:  Alert, oriented x 3.  Fair insight     Medications Reviewed: (see below)    Lab Data Reviewed: (see below)    ______________________________________________________________________    Medications:     Current Facility-Administered Medications   Medication Dose Route Frequency    LORazepam (ATIVAN) tablet 0.5 mg  0.5 mg Oral BID PRN    metoprolol tartrate (LOPRESSOR) tablet 12.5 mg  12.5 mg Oral BID    baricitinib (OLUMIANT) tablet 4 mg  4 mg Oral DAILY    insulin NPH (NOVOLIN N, HUMULIN N) injection 12 Units  12 Units SubCUTAneous ACB&D    guaiFENesin-dextromethorphan (ROBITUSSIN DM) 100-10 mg/5 mL syrup 5 mL  5 mL Oral Q6H PRN    rivaroxaban (XARELTO) tablet 20 mg  20 mg Oral DAILY WITH DINNER    [Held by provider] lisinopriL (PRINIVIL, ZESTRIL) tablet 10 mg  10 mg Oral QHS    atorvastatin (LIPITOR) tablet 20 mg  20 mg Oral DAILY    glucose chewable tablet 16 g  4 Tablet Oral PRN    dextrose (D50W) injection syrg 12.5-25 g  25-50 mL IntraVENous PRN    glucagon (GLUCAGEN) injection 1 mg  1 mg IntraMUSCular PRN    insulin lispro (HUMALOG) injection   SubCUTAneous AC&HS    dexamethasone (PF) (DECADRON) 10 mg/mL injection 10 mg  10 mg IntraVENous Q12H    sodium chloride (NS) flush 5-40 mL  5-40 mL IntraVENous Q8H    sodium chloride (NS) flush 5-40 mL  5-40 mL IntraVENous PRN    acetaminophen (TYLENOL) tablet 650 mg  650 mg Oral Q6H PRN    Or    acetaminophen (TYLENOL) suppository 650 mg  650 mg Rectal Q6H PRN    polyethylene glycol (MIRALAX) packet 17 g  17 g Oral DAILY PRN    ondansetron (ZOFRAN ODT) tablet 4 mg  4 mg Oral Q8H PRN    Or    ondansetron (ZOFRAN) injection 4 mg  4 mg IntraVENous Q6H PRN            Lab Review:     Recent Labs     01/24/22  0455 01/23/22  0350 01/22/22  0554   WBC 15.0* 16.0* 14.2*   HGB 16.0 16.2 16.8   HCT 45.9 46.3 48.0    279 321     Recent Labs     01/24/22  0455 01/23/22  0350 01/22/22  0554   * 133* 133*   K 4.6 4.6 4.6   CL 99 100 99   CO2 28 29 27   * 130* 167*   BUN 32* 32* 33*   CREA 0.82 0.84 0.94   CA 8.7 8.7 8.6   MG 2.3 2.1 2.2   ALB 2.8* 2.6* 2.7*   ALT 29 27 27     No components found for: GLPOC  No results for input(s): PH, PCO2, PO2, HCO3, FIO2 in the last 72 hours. No results for input(s): INR, INREXT, INREXT in the last 72 hours.   No results found for: SDES  Lab Results   Component Value Date/Time    Culture result: Culture performed on Fluid swab specimen 11/25/2021 08:22 PM    Culture result: NO GROWTH 4 DAYS 11/25/2021 08:22 PM              ___________________________________________________    Attending Physician: Bonnie Rios DO

## 2022-01-25 NOTE — ROUTINE PROCESS
Bedside and Verbal shift change report given to [facility] (oncoming nurse) by Janet Cruz (offgoing nurse). Report included the following information SBAR, Kardex, ED Summary, Procedure Summary, Intake/Output, MAR, Recent Results and Cardiac Rhythm V- Paced.

## 2022-01-25 NOTE — PROGRESS NOTES
[facility] [address]  [address], [place], [address] [facility]  [phone]      Medical Progress Note      NAME: Dave Manzanares   :  1940  MRM:  [id number]    Date/Time: 2022        Assessment / Plan:     80 yo M w/ hx of AF, HTN, DM presenting w/ fatigue, dyspneic, admitted for AHRF 2/2 COVID-19    Acute respiratory failure with hypoxia: 2/2 COVID. Severe but improving. Now on midflow. Prognosis remains guarded     Pneumonia due to COVID-19 virus: Repeat CXR  worsening b/l PNA. On high dose IV dexamethasone, baricitinib. Low procalcitonin. D-dimer low, unlikely acute PE/VTE, given already on Xarelto. Follow inflammatory markers. Pulmonology following    Aortic stenosis: moderate, based on echo in 2020 but repeat TTE  showing mild stenosis. Monitor volume status     VT: hx of NSVT. Noted . Monitor electrolytes. Cardiology evaluated. Continue BB     Elevated bilirubin: stable. . ABD US w/ steastosis     Type 2 diabetes mellitus without complication: O0Q 6.6%. BG high 2/2 steroids. Cont NPH, cont SSI     SOHAIL: mild, resolved. Currently holding lisinopril. Avoid hypotension    A-fib/ Pacemaker: monitor on telemetry, cont Xarelto    History of CVA (cerebrovascular accident): cont OAC, statin    Hypertension: currently holding lisinopril                     Care Plan discussed with: Patient, Nursing Staff and >50% of time spent in counseling and coordination of care    Discussed:  Care Plan    Prophylaxis:  Xarelto    Disposition:   inpt rehab versus HH          Subjective:     Chief Complaint:  Follow up COVID    Chart/notes/labs/studies reviewed, patient examined. NAEON, on HFNC. Reports dyspnea w/ exterion but improving, no cp, no abd complaints. Called son; no answer, left VM. Objective:       Vitals:        Last 24hrs VS reviewed since prior progress note. Most recent are:    Visit Vitals  /80 (BP 1 Location: Right arm, BP Patient Position:  At rest)   Pulse 68   Temp 97.4 °F (36.3 °C)   Resp 20   Ht 5' 8\" (1.727 m)   Wt 65.7 kg (144 lb 14.4 oz)   SpO2 95%   BMI 22.03 kg/m²     SpO2 Readings from Last 6 Encounters:   01/25/22 95%   11/26/21 95%   11/19/21 93%   05/28/21 95%   02/06/20 98%   02/02/20 93%    O2 Flow Rate (L/min): (S) 6 l/min       Intake/Output Summary (Last 24 hours) at 1/25/2022 1649  Last data filed at 1/25/2022 1538  Gross per 24 hour   Intake 600 ml   Output 940 ml   Net -340 ml          Exam:     Physical Exam:    Gen:  Elderly, ill-appearing, NAD   HEENT:  Atraumatic, normocephalic, Sclerae nonicteric, hard of hearing   Neck:  Supple, without apparent masses  Resp:  No accessory muscle use,  diminished breath sounds b/l   Card: RRR, 2/6 systolic murmur, No rubs or gallops. No LE edema  Abd:  +bowel sounds, soft, NTTP, nondistended  Neuro: Face symmetric, speech fluent, diffuse weakness  Psych:  Alert, oriented x 3.  Fair insight     Medications Reviewed: (see below)    Lab Data Reviewed: (see below)    ______________________________________________________________________    Medications:     Current Facility-Administered Medications   Medication Dose Route Frequency    LORazepam (ATIVAN) tablet 0.5 mg  0.5 mg Oral BID PRN    metoprolol tartrate (LOPRESSOR) tablet 12.5 mg  12.5 mg Oral BID    insulin NPH (NOVOLIN N, HUMULIN N) injection 12 Units  12 Units SubCUTAneous ACB&D    guaiFENesin-dextromethorphan (ROBITUSSIN DM) 100-10 mg/5 mL syrup 5 mL  5 mL Oral Q6H PRN    rivaroxaban (XARELTO) tablet 20 mg  20 mg Oral DAILY WITH DINNER    [Held by provider] lisinopriL (PRINIVIL, ZESTRIL) tablet 10 mg  10 mg Oral QHS    atorvastatin (LIPITOR) tablet 20 mg  20 mg Oral DAILY    glucose chewable tablet 16 g  4 Tablet Oral PRN    dextrose (D50W) injection syrg 12.5-25 g  25-50 mL IntraVENous PRN    glucagon (GLUCAGEN) injection 1 mg  1 mg IntraMUSCular PRN    insulin lispro (HUMALOG) injection   SubCUTAneous AC&HS    dexamethasone (PF) (DECADRON) 10 mg/mL injection 10 mg  10 mg IntraVENous Q12H    sodium chloride (NS) flush 5-40 mL  5-40 mL IntraVENous Q8H    sodium chloride (NS) flush 5-40 mL  5-40 mL IntraVENous PRN    acetaminophen (TYLENOL) tablet 650 mg  650 mg Oral Q6H PRN    Or    acetaminophen (TYLENOL) suppository 650 mg  650 mg Rectal Q6H PRN    polyethylene glycol (MIRALAX) packet 17 g  17 g Oral DAILY PRN    ondansetron (ZOFRAN ODT) tablet 4 mg  4 mg Oral Q8H PRN    Or    ondansetron (ZOFRAN) injection 4 mg  4 mg IntraVENous Q6H PRN            Lab Review:     Recent Labs     01/25/22  0402 01/24/22  0455 01/23/22  0350   WBC 16.0* 15.0* 16.0*   HGB 15.3 16.0 16.2   HCT 44.3 45.9 46.3    299 279     Recent Labs     01/25/22  0402 01/24/22  0455 01/23/22  0350   * 133* 133*   K 4.5 4.6 4.6    99 100   CO2 27 28 29   * 148* 130*   BUN 33* 32* 32*   CREA 0.80 0.82 0.84   CA 8.2* 8.7 8.7   MG 2.2 2.3 2.1   ALB 2.6* 2.8* 2.6*   ALT 29 29 27     No components found for: GLPOC  No results for input(s): PH, PCO2, PO2, HCO3, FIO2 in the last 72 hours. No results for input(s): INR, INREXT, INREXT in the last 72 hours.   No results found for: SDES  Lab Results   Component Value Date/Time    Culture result: Culture performed on Fluid swab specimen 11/25/2021 08:22 PM    Culture result: NO GROWTH 4 DAYS 11/25/2021 08:22 PM              ___________________________________________________    Attending Physician: Nu Villegas DO

## 2022-01-25 NOTE — PROGRESS NOTES
PHYSICAL THERAPY:Pt declined PT this afternoon reporting being to fatigued from other therapies. PT will follow in the AM.

## 2022-01-25 NOTE — PROGRESS NOTES
Problem: Self Care Deficits Care Plan (Adult)  Goal: *Acute Goals and Plan of Care (Insert Text)  Description: FUNCTIONAL STATUS PRIOR TO ADMISSION: Patient was independent and active without use of DME.     HOME SUPPORT: The patient lived with grandson but did not require assist.    Occupational Therapy Goals  Initiated 1/17/2022; Goals reviewed 1/24/2022, all remain appropriate. 1. Patient will perform grooming and bathing with supervision standing at the sink with < or = 2 rest breaks and maintain oxygen sats > or = 90% within 7 day(s). 2. Patient will perform upper body dressing and lower body dressing with supervision with < or = 2 rest breaks and maintain oxygen sats > or = 90%  within 7 day(s). 3. Patient will perform toileting and toilet transfer with supervision and maintain oxygen sats > or = 90% within 7 day(s). 4. Patient will demonstrate pursed lip breathing with min cues during functional tasks within 7 day(s). 5. Patient will independently perform and recall home exercise program while maintaining O2 sats at or above 90% within 7 days. 6. Patient will verbalize/demonstrate all energy conservation techniques during ADL tasks with independence within 7 days. Outcome: Progressing Towards Goal     OCCUPATIONAL THERAPY TREATMENT  Patient: Bernadette Andres (71 y.o. male)  Date: 1/25/2022  Diagnosis: Acute respiratory failure with hypoxia ([facility] [address] [J96.01]  Pneumonia due to COVID-19 virus [U07.1, J12.82] <principal problem not specified>       Precautions: Fall  Chart, occupational therapy assessment, plan of care, and goals were reviewed. ASSESSMENT  Patient continues with skilled OT services and is progressing towards goals. Patient continues to present with decreased activity tolerance and endurance but offers excellent efforts throughout session. Pt had just finished toileting with RN prior to session, is received today on 15L via midflow tubing with stable saturations.  He is progressing with tolerance for seated ADLs but continues to fatigue quickly with standing activity. He demonstrates tailor sit for sock mgmt and completes ADL transfers with overall CGA. Noted desaturation down to 87% with activity and he recovers quickly to >90% with focused PLB and rest.      Current Level of Function Impacting Discharge (ADLs): up to min A for ADLs; CGA for ADL transfers    Other factors to consider for discharge: independent PLOF; continued high flow O2 (on midflow NC this session) requirements         PLAN :  Patient continues to benefit from skilled intervention to address the above impairments. Continue treatment per established plan of care to address goals. Recommend with staff: BSC for toileting with A x 1; encourage participation in ADLs; OOB to chair for meals    Recommend next OT session: standing tolerance; UE HEP    Recommendation for discharge: (in order for the patient to meet his/her long term goals)  To be determined: pulmonary rehab (IPR) vs. home with [facility] pending progress    This discharge recommendation:  Has been made in collaboration with the attending provider and/or case management    IF patient discharges home will need the following DME: TBD       SUBJECTIVE:   Patient stated I am ready to get up.     OBJECTIVE DATA SUMMARY:   Cognitive/Behavioral Status:  Neurologic State: Alert  Orientation Level: Oriented X4  Cognition: Appropriate decision making; Follows commands  Perception: Appears intact  Perseveration: No perseveration noted  Safety/Judgement: Awareness of environment; Fall prevention    Functional Mobility and Transfers for ADLs:  Bed Mobility:  Supine to Sit: Stand-by assistance  Sit to Supine: Stand-by assistance  Scooting: Modified independent    Transfers:  Sit to Stand: Contact guard assistance  Bed to Chair: Contact guard assistance    Balance:  Sitting: Intact  Sitting - Static: Good (unsupported)  Sitting - Dynamic: Good (unsupported)  Standing: Intact  Standing - Static: Good;Fair  Standing - Dynamic : Fair    ADL Intervention:  Lower Body Dressing Assistance  Socks: Set-up  Leg Crossed Method Used: Yes  Position Performed: Seated edge of bed  Cues: Don    Cognitive Retraining  Safety/Judgement: Awareness of environment; Fall prevention    Instructed pt on pursed lip breathing (PLB) to assist with maintaining oxygen saturations >90% during activity and at rest. Pt instructed to inhale through nose and exhale through mouth while creating \"O\" shape with verbal, tactile, and visual cues provided to increase clarity and carry-over of technique. Instructed pt to complete focused PLB during rest breaks in order to increase ease while performing functional tasks. Instructed patient to increase time sitting OOB in chair for pulmonary hygiene, skin integrity, and to increase endurance in preparation for ADLs. Instructed patient to sit OOB for all meals. Patient verbalized understanding of same. Pt instructed on and performed UE chest opening exercises at chair level to assist with pulmonary hygiene and maintain UE ROM in prep for ADLs and ADL transfers. Verbal and visual instruction provided to increase clarity and understanding of exercises and form and pt requires cues for breathing through activity. Pt performed sets of 5 reps of shoulder forward flexion, UE rows, posterior shoulder rolls, and \"goal post\" horizontal shoulder abduction/adduction. Pt verbalized understanding of same and is instructed to perform exercises 2-3x/day with intentional rest breaks to maintain saturations >90% with focused PLB.      Pain:  Pt reporting minimal pain    Activity Tolerance:   Fair, desaturates with exertion and requires oxygen and requires rest breaks    After treatment patient left in no apparent distress:   Sitting in chair, Call bell within reach and Bed / chair alarm activated    COMMUNICATION/COLLABORATION:   The patients plan of care was discussed with: Physical therapist and Registered nurse.      Tru Joshi OT  Time Calculation: 23 mins

## 2022-01-25 NOTE — PROGRESS NOTES
Name: John Steel: [facility]   : 1940 Admit Date: 2022   Phone: [phone]  Room: [facility]/01   PCP: Valentine Estrella MD  MRN: [id number]   Date: 2022  Code: Full Code          Chart and notes reviewed. Data reviewed. I review the patient's current medications in the medical record at each encounter.  I have evaluated and examined the patient. History of Present Illness:  Colt Barker is a very pleasant, 80 yo gentleman that presented to [facility] on  with worsening shortness of breath. He has a PMH of a.fib, aortic stenosis, DM, hypertension, CVA, and tobacco abuse (100 pack years, quit in ). He received two doses of the Covid vaccine, last in 2021. He did not receive a booster dose. He is currently requiring hi flow at 50L/100%. He reports that his symptoms started five days ago after he was exposed to his grandson who tested positive. He became more progressively more SOB with increased cough. No fever/chills. He did initially have diarrhea. Images:  Personally visualized and report reviewed. CXR :  Superimposed patchy bilateral airspace disease on interstitial changes concerning for infection or inflammation      D-dimer . 55  LFTs stable  Procalcitonin negative  Ferritin 759  Trop wnl  Pro-  CRP 3.18  ECHO 2020:  EF 55-60%, mildly reduced systolic function, moderate aortic stenosis, trave MVR, moderately elevated CVP. Interval History:  Afebrile  BP stable  Sats 90% on 15L midflow--better  WBC 16--increased  Na 132   D-dimer 0.38 - better  CRP < 0.29  LFTs stable  Procalcitonin<.05   pro-BNP 4723  1025 mL UOP documented    ECHO : EF 45-50%, mild global hypokinesis present, mildly reduced systolic function, mild AV stenosis    Abdominal US : Limited study. Possible hepatic steatosis. Cholecystectomy. No visible biliary tree. ROS:  Reports feeling better today.   Eating breakfast and has a good appetite. Using IS frequently. Past Medical History:   Diagnosis Date    Atrial fibrillation ([facility] [address]     Diabetes mellitus, type 2 ([facility] [address]     Hypertension     Internal carotid artery stenosis, right     Chronic occlusion of the right internal carotid artery per MRI of 2020 unchanged from previous study of 2010; study of  also showed mild stenosis of the left internal carotid artery and bilateral patent vertebral arteries    Moderate aortic stenosis     Echocardiogram of 2020:  Severe aortic valve leaflet calcification present with reduced excursion. Moderate aortic valve stenosis was present. LVEF 55-60% .  Stroke [facility])     asymptomatic; small chronic infarcts in the bilateral thalami per MRI of 2020.   Previous MRI of 2010 showed possible tiny old right thalamic infarct versus perivascular space        Past Surgical History:   Procedure Laterality Date    HX CHOLECYSTECTOMY      AK INS NEW/RPLCMT PRM PM W/TRANSV ELTRD ATRIAL&VENT N/A 2020    INSERT PPM DUAL performed by Nick Arredondo MD at [place] [address] LAB       Family History   Adopted: Yes   Family history unknown: Yes       Social History     Tobacco Use    Smoking status: Former Smoker     Packs/day: 2.00     Years: 50.00     Pack years: 100.00     Quit date: 2004     Years since quittin.6    Smokeless tobacco: Never Used   Substance Use Topics    Alcohol use: Yes     Comment: 1 beer or 1 glaas of wine daily most of adult life       Allergies   Allergen Reactions    Ampicillin Nausea and Vomiting    Iodine Hives, Itching and Nausea and Vomiting       Current Facility-Administered Medications   Medication Dose Route Frequency    LORazepam (ATIVAN) tablet 0.5 mg  0.5 mg Oral BID PRN    metoprolol tartrate (LOPRESSOR) tablet 12.5 mg  12.5 mg Oral BID    baricitinib (OLUMIANT) tablet 4 mg  4 mg Oral DAILY    insulin NPH (NOVOLIN N, HUMULIN N) injection 12 Units  12 Units SubCUTAneous ACB&D    guaiFENesin-dextromethorphan (ROBITUSSIN DM) 100-10 mg/5 mL syrup 5 mL  5 mL Oral Q6H PRN    rivaroxaban (XARELTO) tablet 20 mg  20 mg Oral DAILY WITH DINNER    [Held by provider] lisinopriL (PRINIVIL, ZESTRIL) tablet 10 mg  10 mg Oral QHS    atorvastatin (LIPITOR) tablet 20 mg  20 mg Oral DAILY    glucose chewable tablet 16 g  4 Tablet Oral PRN    dextrose (D50W) injection syrg 12.5-25 g  25-50 mL IntraVENous PRN    glucagon (GLUCAGEN) injection 1 mg  1 mg IntraMUSCular PRN    insulin lispro (HUMALOG) injection   SubCUTAneous AC&HS    dexamethasone (PF) (DECADRON) 10 mg/mL injection 10 mg  10 mg IntraVENous Q12H    sodium chloride (NS) flush 5-40 mL  5-40 mL IntraVENous Q8H    sodium chloride (NS) flush 5-40 mL  5-40 mL IntraVENous PRN    acetaminophen (TYLENOL) tablet 650 mg  650 mg Oral Q6H PRN    Or    acetaminophen (TYLENOL) suppository 650 mg  650 mg Rectal Q6H PRN    polyethylene glycol (MIRALAX) packet 17 g  17 g Oral DAILY PRN    ondansetron (ZOFRAN ODT) tablet 4 mg  4 mg Oral Q8H PRN    Or    ondansetron (ZOFRAN) injection 4 mg  4 mg IntraVENous Q6H PRN         REVIEW OF SYSTEMS   Negative except as stated in the HPI. Physical Exam:   Visit Vitals  /74 (BP 1 Location: Right lower arm, BP Patient Position: At rest)   Pulse 74   Temp 97.2 °F (36.2 °C)   Resp 22   Ht 5' 8\" (1.727 m)   Wt 65.7 kg (144 lb 14.4 oz)   SpO2 90%   BMI 22.03 kg/m²       General:  Alert, cooperative, no distress, appears stated age, on HF   Head:  Normocephalic, without obvious abnormality, atraumatic. Eyes:  Conjunctivae/corneas clear. Nose: Nares normal. Septum midline. Mucosa normal.   Throat: Lips, mucosa, and tongue normal.    Neck: Supple, symmetrical, trachea midline, no adenopathy. Lungs:   Clear but diminished throughout   Chest wall:  No tenderness or deformity. Heart:  Regular rate and rhythm, S1, S2 normal, no murmur, click, rub or gallop. Abdomen:   Soft, non-tender.  Bowel sounds normal.   Extremities: Extremities normal, atraumatic, no cyanosis or edema. Pulses: 2+ and symmetric all extremities. Skin: Skin color, texture, turgor normal. No rashes or lesions   Lymph nodes: Cervical  nodes normal.   Neurologic: Grossly nonfocal       Lab Results   Component Value Date/Time    Sodium 132 (L) 01/25/2022 04:02 AM    Potassium 4.5 01/25/2022 04:02 AM    Chloride 101 01/25/2022 04:02 AM    CO2 27 01/25/2022 04:02 AM    BUN 33 (H) 01/25/2022 04:02 AM    Creatinine 0.80 01/25/2022 04:02 AM    Glucose 177 (H) 01/25/2022 04:02 AM    Calcium 8.2 (L) 01/25/2022 04:02 AM    Magnesium 2.2 01/25/2022 04:02 AM    Phosphorus 3.1 01/16/2022 01:52 AM    Lactic acid 1.3 01/11/2022 06:34 PM       Lab Results   Component Value Date/Time    WBC 16.0 (H) 01/25/2022 04:02 AM    HGB 15.3 01/25/2022 04:02 AM    PLATELET 658 91/77/9881 04:02 AM    MCV 86.5 01/25/2022 04:02 AM       Lab Results   Component Value Date/Time    INR 1.0 02/04/2020 03:50 PM    aPTT 32.0 09/22/2010 03:50 AM    Alk.  phosphatase 48 01/25/2022 04:02 AM    Protein, total 5.5 (L) 01/25/2022 04:02 AM    Albumin 2.6 (L) 01/25/2022 04:02 AM    Globulin 2.9 01/25/2022 04:02 AM       Lab Results   Component Value Date/Time    Ferritin 759 (H) 01/11/2022 06:34 PM       Lab Results   Component Value Date/Time    Sed rate (ESR) 5 09/22/2010 03:50 AM    C-Reactive protein <0.29 01/25/2022 04:02 AM        No results found for: PH, PHI, PCO2, PCO2I, PO2, PO2I, HCO3, HCO3I, FIO2, FIO2I    Lab Results   Component Value Date/Time    CK 79 09/22/2010 03:50 AM    Troponin-I, Qt. 0.05 (H) 09/21/2010 12:10 PM        Lab Results   Component Value Date/Time    Culture result: Culture performed on Fluid swab specimen 11/25/2021 08:22 PM    Culture result: NO GROWTH 4 DAYS 11/25/2021 08:22 PM       No results found for: TOXA1, RPR, HBCM, HBSAG, HAAB, HCAB1, HAAT, G6PD, CRYAC, HIVGT, HIVR, HIV1, HIV12, HIVPC, HIVRPI    Lab Results   Component Value Date/Time    CK 79 09/22/2010 03:50 AM       Lab Results   Component Value Date/Time    Color YELLOW/STRAW 08/02/2019 12:10 AM    Appearance CLEAR 08/02/2019 12:10 AM    pH (UA) 5.0 08/02/2019 12:10 AM    Protein 30 (A) 08/02/2019 12:10 AM    Glucose 500 (A) 08/02/2019 12:10 AM    Ketone NEGATIVE  08/02/2019 12:10 AM    Bilirubin NEGATIVE  08/02/2019 12:10 AM    Blood SMALL (A) 08/02/2019 12:10 AM    Urobilinogen 0.2 08/02/2019 12:10 AM    Nitrites NEGATIVE  08/02/2019 12:10 AM    Leukocyte Esterase NEGATIVE  08/02/2019 12:10 AM    WBC 0-4 08/02/2019 12:10 AM    RBC 0-5 08/02/2019 12:10 AM    Bacteria NEGATIVE  08/02/2019 12:10 AM       Images: personally visualized and report reviewed    CXR (1/16/2022): Worsening of left mid and lower lung pulmonary opacities. CXR (1/20/2022): There is bilateral pneumonia in the periphery of the mid  and lower lung zones left greater than right. This has mildly worsened. IMPRESSION  · Acute Respiratory Failure with Hypoxia  · Covid-19 Pneumonia  · History of A. Fib  · Moderate Aortic Stenosis  · History of Tobacco Abuse  · DM  · Hypertension      PLAN  · Continue O2 to keep sats >88%; currently at 15L  · Continue Dexamethasone 10mg q12  · Continue Baricitinib  · On AC with Xarleto  · Trend CRP, d-dimer, LFTs   · Encourage IS use  · Prone as able  · OOB to chair  · Would benefit from PFTs as an outpatient  · Will need repeat imaging as an outpatient  · PT/OT       Robyn Orourke NP

## 2022-01-25 NOTE — PROGRESS NOTES
Care Management follow up    Patient admitted for acute hypoxic respiratory failure, COVID19, pneumonia. History of: afib, diabetes, HTN, carotid artery stenosis, aortic stenosis, CVA  COVID Vaccine:  Yes x2, no booster       RUR 16 (Score %) moderate   Is This a Readmission NO  Is this a Bundle NO    Current status  Patient discussed during interdisciplinary rounds. Patient continues to require medical management including oxygen at 10L/mid flow and IV decadron. Ongoing assessment and monitoring. Spoke with patient via phone, discussed recommendation by PT/OT for rehab placement. Patient states he's not sure if he wants placement but will consider it. States his lives with his grandson and great grandson and he is alone during the day when his grandson works and his great grandson goes to school. Transition of Care Plan  1. Monitor patient status and response to treatment. 2. Patient continues to require medical management. 3. Continues on mid flow oxygen. 4. Per PT/OT patient would benefit from inpatient rehab placement. 5. Patient has [employer]. 6. CM to monitor progress and recommendations.     Josiane Long RN, MSN/Care manager

## 2022-01-25 NOTE — PROGRESS NOTES
0700- Bedside, Verbal, and Written shift change report given to Gisele Simpson RN (oncoming nurse) by Barbara Ortega RN (offgoing nurse). Report included the following information SBAR, Kardex, ED Summary, Intake/Output, MAR, Accordion, Recent Results, and Med Rec Status. This patient was assisted with Intentional Toileting every 2 hours during this shift. Documentation of ambulation and output reflected on Flowsheet. 1900- Bedside, Verbal, and Written shift change report given to RN  (oncoming nurse) by Axel Bedolla  (offgoing nurse). Report included the following information SBAR, Kardex, ED Summary, Intake/Output, MAR, Accordion, Recent Results, and Med Rec Status.

## 2022-01-26 NOTE — PROGRESS NOTES
Occupational Therapy: Chart reviewed, patient approached for OT services. Had been sitting up for awhile and reported completing his seated exercises 2 times and was returning to be with assistance of PCT.  Will defer OT services at this time to allow for patient's rest. Will follow up as able  PETR Morales/INÉS

## 2022-01-26 NOTE — PROGRESS NOTES
Problem: Mobility Impaired (Adult and Pediatric)  Goal: *Acute Goals and Plan of Care (Insert Text)  Description: FUNCTIONAL STATUS PRIOR TO ADMISSION: Patient was independent and active without use of DME.    HOME SUPPORT PRIOR TO ADMISSION: The patient lived with son and grandson but did not require assist.    Physical Therapy Goals  Initiated 1/17/2022  1. Patient will move from supine to sit and sit to supine  in bed with modified independence within 7 day(s). 2.  Patient will transfer from bed to chair and chair to bed with modified independence using the least restrictive device within 7 day(s). 3.  Patient will perform sit to stand with modified independence within 7 day(s). 4.  Patient will ambulate with modified independence for 150 feet with the least restrictive device within 7 day(s). 5.  Patient will ascend/descend 6 stairs with 2 handrail(s) with modified independence within 7 day(s). Outcome: Progressing Towards Goal   PHYSICAL THERAPY TREATMENT  Patient: Belén Stapleton (44 y.o. male)  Date: 1/26/2022  Diagnosis: Acute respiratory failure with hypoxia ([facility] [J96.01]  Pneumonia due to COVID-19 virus [U07.1, J12.82] <principal problem not specified>       Precautions: Fall  Chart, physical therapy assessment, plan of care and goals were reviewed. ASSESSMENT  Patient continues with skilled PT services and is progressing towards goals. Performed transfers supine to sit indep and sit <> stand with contact  guard. Reviewed his vital with him and reviewed purpose and technique of pursed lip breathing. Performed sitting ex at side of bed and gave patient printed handout. Also performed sit to stand transfer for exercise. He is very motivated. Limited by hi ana maria lines.   Pulse ox 90 to 96% on 35 L/min     Current Level of Function Impacting Discharge (mobility/balance): contact guard for transfers, limited by hi ana maria lines    Other factors to consider for discharge: no home O2 prior to admit         PLAN :  Patient continues to benefit from skilled intervention to address the above impairments. Continue treatment per established plan of care. to address goals. Recommendation for discharge: (in order for the patient to meet his/her long term goals)  To be determined: This discharge recommendation:  Has not yet been discussed the attending provider and/or case management    IF patient discharges home will need the following DME: to be determined (TBD)       SUBJECTIVE:   Patient stated How long should I sit up? Mendel Romero    OBJECTIVE DATA SUMMARY:   Critical Behavior:  Neurologic State: Alert  Orientation Level: Oriented to time,Oriented to situation,Oriented to place,Oriented to person  Cognition: Appropriate decision making,Follows commands  Safety/Judgement: Awareness of environment,Fall prevention  Functional Mobility Training:  Bed Mobility:     Supine to Sit: Stand-by assistance  Sit to Supine: Stand-by assistance           Transfers:  Sit to Stand: Contact guard assistance  Stand to Sit: Contact guard assistance                    Ambulation/Gait Training:  Distance (ft): 5 Feet (ft)  Assistive Device:  (hand held assist of one)  Ambulation - Level of Assistance: Contact guard assistance                             Therapeutic Exercises:   Performed active range of motion exercises for bilateral lower extremities in sitting for strengthening and pre gait warm up. Exercises included DF, PF, LAQs, hip flex, bilateral hip flex (ab ex) x 20 reps each ex. Pain Rating:  Denies pain    Activity Tolerance:   Good and requires rest breaks    After treatment patient left in no apparent distress:   Sitting in chair, Call bell within reach, and Bed / chair alarm activated    COMMUNICATION/COLLABORATION:   The patients plan of care was discussed with: Registered nurse.      Pranav Villagomez, PT   Time Calculation: 32 mins

## 2022-01-26 NOTE — PROGRESS NOTES
Patient desat to mid [de-identified] when I walked into his room for assessment. He complained that he has a severe deviated septum, and his right nare always feels blocked. Nasal cannula prongs both placed into left nare and o2 sat jumped to 95%. RN  Aware.

## 2022-01-26 NOTE — ROUTINE PROCESS
6381:  Notified Dr. Cleo Hooper that patient is on high flow. Order to upgrade to step down    0700:  Bedside shift change report given to Barbie (oncoming nurse) by Brandon Patrick (offgoing nurse). Report included the following information SBAR, Kardex, ED Summary, Procedure Summary, Intake/Output, MAR, Recent Results and Cardiac Rhythm V-Paced.

## 2022-01-26 NOTE — WOUND CARE
Wound Consult: Follow up Visit. Chart reviewed. Spoke with patients nurse,  Barbie GRUBER. Patient is resting on a lula bed with ARMANDO mattress. Heels off loaded with pillows. Patient is awake, alert, cooperative; requires 1 assit to move side to side in bed. Assessment:  Sacrum/buttocks- light pink- blanches. Removed foam mepilex per patient request.  Right ear- pink,blanches  Left ear- 0.2x0.4cm partial thickness, pink, moist. No drainage noted. Left heel- red, slow to thierry.skin intact  Right heel- pink, blanches, skin intact  Treatment:  Right and left ear - removed hydrocolloid, cleansed with NSS and reapplied hydrocolloid  Heels elevated on pillows  Educated patient on turning while in bed. Reapply foam to sacrum if breakdown noted. Patient verbalized understanding  Wound Recommendations:  Continue current orders  Elevate heels while in bed. Waffle cushion to chair  Skin Care / PI Prevention Recommendations:  1. Minimize friction/shear: minimize layers of linen/pads under patient. 2. Off load pressure/reposition:  turn and reposition approximately every 2 hours; float heels with pillows or use off loading heel boots; waffle cushion for sitting; position wedge. 3. Manage Moisture - keep skin folds dry; incontinence skin care with incontinence wipes. 4. Continue to monitor nutrition, pain, and skin risk scale, and skin assessment. Plan: We will continue to reassess weekly and as needed. Please re-consult should concerns arise despite continued skin/PI prevention measures.     [address] [place], Wound / [address] [facility] Healing Office [phone]

## 2022-01-26 NOTE — PROGRESS NOTES
0700: Bedside shift change report given to [address] [place] [address] Road (oncoming nurse) by Luis Del Valle RN (offgoing nurse). Report included the following information SBAR, Kardex, Procedure Summary, Intake/Output, MAR and Cardiac Rhythm V paced. This patient was assisted with Intentional Toileting every 2 hours during this shift as appropriate. Documentation of ambulation and output reflected on Flowsheet as appropriate. Purposeful hourly rounding was completed using AIDET and 5Ps. Outcomes of PHR documented as they occurred. Bed alarm in use as appropriate. Dual Suction and ambubag in place. 1642: Glucometer not uploading to [facility]. MRN matched with patient on glucometer. Blood sugar is 233.

## 2022-01-26 NOTE — PROGRESS NOTES
Name: Luisa Mendoza: [employer]   : 1940 Admit Date: 2022   Phone: [phone]  Room: [facility]/01   PCP: Mark Ibanez MD  MRN: [id number]   Date: 2022  Code: Full Code          Chart and notes reviewed. Data reviewed. I review the patient's current medications in the medical record at each encounter.  I have evaluated and examined the patient. History of Present Illness:  Allen Jim is a very pleasant, 78 yo gentleman that presented to [facility] on  with worsening shortness of breath. He has a PMH of a.fib, aortic stenosis, DM, hypertension, CVA, and tobacco abuse (100 pack years, quit in ). He received two doses of the Covid vaccine, last in 2021. He did not receive a booster dose. He is currently requiring hi flow at 50L/100%. He reports that his symptoms started five days ago after he was exposed to his grandson who tested positive. He became more progressively more SOB with increased cough. No fever/chills. He did initially have diarrhea. Images:  Personally visualized and report reviewed. CXR :  Superimposed patchy bilateral airspace disease on interstitial changes concerning for infection or inflammation      D-dimer . 55  LFTs stable  Procalcitonin negative  Ferritin 759  Trop wnl  Pro-  CRP 3.18  ECHO 2020:  EF 55-60%, mildly reduced systolic function, moderate aortic stenosis, trave MVR, moderately elevated CVP. Interval History:  Afebrile  BP stable  Sats 93% on 35L/100%--worse  WBC 14.2--decreased  Na 132   D-dimer 0.38 - better  CRP < 0.29  LFTs stable  Procalcitonin<.05   Pro---better  1440 mL UOP documented    Chest CT  personally reviewed: Emphysema with peripheral blebs. Septal thickening and possible fibrosis in the lung bases--limited by motion.     ECHO : EF 45-50%, mild global hypokinesis present, mildly reduced systolic function, mild AV stenosis    Abdominal US : Limited study. Possible hepatic steatosis. Cholecystectomy. No visible biliary tree. ROS:  Feeling better overall. No complaints this morning. Just got back from CT. Past Medical History:   Diagnosis Date    Atrial fibrillation ([facility]     Diabetes mellitus, type 2 ([facility]     Hypertension     Internal carotid artery stenosis, right     Chronic occlusion of the right internal carotid artery per MRI of 2020 unchanged from previous study of 2010; study of  also showed mild stenosis of the left internal carotid artery and bilateral patent vertebral arteries    Moderate aortic stenosis     Echocardiogram of 2020:  Severe aortic valve leaflet calcification present with reduced excursion. Moderate aortic valve stenosis was present. LVEF 55-60% .  Stroke [facility])     asymptomatic; small chronic infarcts in the bilateral thalami per MRI of 2020.   Previous MRI of 2010 showed possible tiny old right thalamic infarct versus perivascular space        Past Surgical History:   Procedure Laterality Date    HX CHOLECYSTECTOMY      [place] INS NEW/RPLCMT PRM PM W/TRANSV ELTRD ATRIAL&VENT N/A 2020    INSERT PPM DUAL performed by Baron Hedrick MD at [facility] CATH LAB       Family History   Adopted: Yes   Family history unknown: Yes       Social History     Tobacco Use    Smoking status: Former Smoker     Packs/day: 2.00     Years: 50.00     Pack years: 100.00     Quit date: 2004     Years since quittin.6    Smokeless tobacco: Never Used   Substance Use Topics    Alcohol use: Yes     Comment: 1 beer or 1 glaas of wine daily most of adult life       Allergies   Allergen Reactions    Ampicillin Nausea and Vomiting    Iodine Hives, Itching and Nausea and Vomiting       Current Facility-Administered Medications   Medication Dose Route Frequency    LORazepam (ATIVAN) tablet 0.5 mg  0.5 mg Oral BID PRN    metoprolol tartrate (LOPRESSOR) tablet 12.5 mg  12.5 mg Oral BID    insulin NPH (NOVOLIN N, HUMULIN N) injection 12 Units  12 Units SubCUTAneous ACB&D    guaiFENesin-dextromethorphan (ROBITUSSIN DM) 100-10 mg/5 mL syrup 5 mL  5 mL Oral Q6H PRN    rivaroxaban (XARELTO) tablet 20 mg  20 mg Oral DAILY WITH DINNER    [Held by provider] lisinopriL (PRINIVIL, ZESTRIL) tablet 10 mg  10 mg Oral QHS    atorvastatin (LIPITOR) tablet 20 mg  20 mg Oral DAILY    glucose chewable tablet 16 g  4 Tablet Oral PRN    dextrose (D50W) injection syrg 12.5-25 g  25-50 mL IntraVENous PRN    glucagon (GLUCAGEN) injection 1 mg  1 mg IntraMUSCular PRN    insulin lispro (HUMALOG) injection   SubCUTAneous AC&HS    dexamethasone (PF) (DECADRON) 10 mg/mL injection 10 mg  10 mg IntraVENous Q12H    sodium chloride (NS) flush 5-40 mL  5-40 mL IntraVENous Q8H    sodium chloride (NS) flush 5-40 mL  5-40 mL IntraVENous PRN    acetaminophen (TYLENOL) tablet 650 mg  650 mg Oral Q6H PRN    Or    acetaminophen (TYLENOL) suppository 650 mg  650 mg Rectal Q6H PRN    polyethylene glycol (MIRALAX) packet 17 g  17 g Oral DAILY PRN    ondansetron (ZOFRAN ODT) tablet 4 mg  4 mg Oral Q8H PRN    Or    ondansetron (ZOFRAN) injection 4 mg  4 mg IntraVENous Q6H PRN         REVIEW OF SYSTEMS   Negative except as stated in the HPI. Physical Exam:   Visit Vitals  /87 (BP 1 Location: Right upper arm, BP Patient Position: At rest)   Pulse 68   Temp 97.4 °F (36.3 °C)   Resp 18   Ht 5' 8\" (1.727 m)   Wt 65.2 kg (143 lb 11.2 oz)   SpO2 93%   BMI 21.85 kg/m²       General:  Alert, cooperative, no distress, appears stated age, on HF   Head:  Normocephalic, without obvious abnormality, atraumatic. Eyes:  Conjunctivae/corneas clear. Nose: Nares normal. Septum midline. Mucosa normal.   Throat: Lips, mucosa, and tongue normal.    Neck: Supple, symmetrical, trachea midline, no adenopathy. Lungs:   Clear but diminished throughout   Chest wall:  No tenderness or deformity.    Heart:  Regular rate and rhythm, S1, S2 normal, no murmur, click, rub or gallop. Abdomen:   Soft, non-tender. Bowel sounds normal.   Extremities: Extremities normal, atraumatic, no cyanosis or edema. Pulses: 2+ and symmetric all extremities. Skin: Skin color, texture, turgor normal. No rashes or lesions   Lymph nodes: Cervical  nodes normal.   Neurologic: Grossly nonfocal       Lab Results   Component Value Date/Time    Sodium 134 (L) 01/26/2022 01:23 AM    Potassium 4.4 01/26/2022 01:23 AM    Chloride 100 01/26/2022 01:23 AM    CO2 28 01/26/2022 01:23 AM    BUN 31 (H) 01/26/2022 01:23 AM    Creatinine 0.78 01/26/2022 01:23 AM    Glucose 126 (H) 01/26/2022 01:23 AM    Calcium 8.4 (L) 01/26/2022 01:23 AM    Magnesium 2.1 01/26/2022 01:23 AM    Phosphorus 3.1 01/16/2022 01:52 AM    Lactic acid 1.3 01/11/2022 06:34 PM       Lab Results   Component Value Date/Time    WBC 14.2 (H) 01/26/2022 01:23 AM    HGB 15.4 01/26/2022 01:23 AM    PLATELET 626 79/04/9710 01:23 AM    MCV 86.0 01/26/2022 01:23 AM       Lab Results   Component Value Date/Time    INR 1.0 02/04/2020 03:50 PM    aPTT 32.0 09/22/2010 03:50 AM    Alk.  phosphatase 50 01/26/2022 01:23 AM    Protein, total 5.5 (L) 01/26/2022 01:23 AM    Albumin 2.6 (L) 01/26/2022 01:23 AM    Globulin 2.9 01/26/2022 01:23 AM       Lab Results   Component Value Date/Time    Ferritin 759 (H) 01/11/2022 06:34 PM       Lab Results   Component Value Date/Time    Sed rate (ESR) 5 09/22/2010 03:50 AM    C-Reactive protein <0.29 01/26/2022 01:23 AM        No results found for: PH, PHI, PCO2, PCO2I, PO2, PO2I, HCO3, HCO3I, FIO2, FIO2I    Lab Results   Component Value Date/Time    CK 79 09/22/2010 03:50 AM    Troponin-I, Qt. 0.05 (H) 09/21/2010 12:10 PM        Lab Results   Component Value Date/Time    Culture result: Culture performed on Fluid swab specimen 11/25/2021 08:22 PM    Culture result: NO GROWTH 4 DAYS 11/25/2021 08:22 PM       No results found for: TOXA1, RPR, HBCM, HBSAG, HAAB, HCAB1, HAAT, G6PD, CRYAC, HIVGT, HIVR, HIV1, HIV12, HIVPC, HIVRPI    Lab Results   Component Value Date/Time    CK 79 09/22/2010 03:50 AM       Lab Results   Component Value Date/Time    Color YELLOW/STRAW 08/02/2019 12:10 AM    Appearance CLEAR 08/02/2019 12:10 AM    pH (UA) 5.0 08/02/2019 12:10 AM    Protein 30 (A) 08/02/2019 12:10 AM    Glucose 500 (A) 08/02/2019 12:10 AM    Ketone NEGATIVE  08/02/2019 12:10 AM    Bilirubin NEGATIVE  08/02/2019 12:10 AM    Blood SMALL (A) 08/02/2019 12:10 AM    Urobilinogen 0.2 08/02/2019 12:10 AM    Nitrites NEGATIVE  08/02/2019 12:10 AM    Leukocyte Esterase NEGATIVE  08/02/2019 12:10 AM    WBC 0-4 08/02/2019 12:10 AM    RBC 0-5 08/02/2019 12:10 AM    Bacteria NEGATIVE  08/02/2019 12:10 AM       Images: personally visualized and report reviewed    CXR (1/16/2022): Worsening of left mid and lower lung pulmonary opacities. CXR (1/20/2022): There is bilateral pneumonia in the periphery of the mid  and lower lung zones left greater than right. This has mildly worsened. IMPRESSION  · Acute Respiratory Failure with Hypoxia  · Covid-19 Pneumonia  · History of A. Fib  · Moderate Aortic Stenosis  · History of Tobacco Abuse  · DM  · Hypertension      PLAN  · Continue O2 to keep sats >88%; back on HF  · Continue Dexamethasone 10mg q12  · Baricitinib complete  · On AC with Xarelto  · Trend CRP, d-dimer, LFTs --d-dimer today  · Encourage IS use  · Prone as able  · OOB to chair  · Would benefit from PFTs as an outpatient  · Will need repeat imaging as an outpatient  · PT/OT     Patient is critically ill, requiring HFNC for acute respiratory failure secondary to Covid pneumonia. Total CC time: 34 minutes EOP and without provider overlap.     Elizabeth Jose NP

## 2022-01-26 NOTE — PROGRESS NOTES
Carl[facility] Eduardo [facility]  [address] [facility], [place], [address] [place] [address]  [phone]      Medical Progress Note      NAME: Dave Manzanares   :  1940  MRM:  [id number]    Date/Time: 2022        Assessment / Plan:     78 yo M w/ hx of AF, HTN, DM presenting w/ fatigue, dyspneic, admitted for AHRF 2/2 COVID-19    #Acute respiratory failure with hypoxia: 2/2 COVID pneumonia. S/p baricitinib. LOS 15 days. Had been on HFNC, then down to as low as 6L yesterday, , but acutely worsened and back on HFNC. 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 Ddimer, procal, CRP all negative. I suspect he will either have progressive fibrosis or improve slightly to the point of needing oxygen lifelong. If can get back to 6L nc, can dispo to inpt rehab/pulmrehab. Fairly well appearing, actually   - Cont dex 10mg BID since     - Reduce to 6mg BID   - Rivarox   - Cont PT, OT; IS, supportive care; add NS nasal spray    #Aortic stenosis: moderate, based on echo in 2020 but repeat TTE  showing mild stenosis. Monitor volume status. Low c/f flash pulm edema above    #VT: hx of NSVT. Noted . Monitor electrolytes. Cardiology evaluated. Continue BB     #Elevated bilirubin: stable. . ABD US w/ steastosis     #Type 2 diabetes mellitus without complication: U7X 5.9%. BG high 2/2 steroids. Cont NPH, cont SSI     #SOHAIL: mild, resolved. Currently holding lisinopril. Avoid hypotension    #A-fib/ Pacemaker: monitor on telemetry, cont Xarelto    #History of CVA (cerebrovascular accident): cont OAC, statin    #Hypertension: currently holding lisinopril                     Care Plan discussed with: Patient, Nursing Staff and >50% of time spent in counseling and coordination of care    Discussed:  Care Plan    Prophylaxis:  Xarelto    Disposition:   inpt rehab versus           Subjective:     Chief Complaint:  Follow up COVID    Chart/notes/labs/studies reviewed, patient examined. Gogo Shows.  Remains on HFNC, but very comfortable and well appearing       Objective:       Vitals:        Last 24hrs VS reviewed since prior progress note. Most recent are:    Visit Vitals  /61 (BP 1 Location: Right upper arm, BP Patient Position: At rest)   Pulse 65   Temp 97.8 °F (36.6 °C)   Resp 20   Ht 5' 8\" (1.727 m)   Wt 65.2 kg (143 lb 11.2 oz)   SpO2 96%   BMI 21.85 kg/m²     SpO2 Readings from Last 6 Encounters:   01/26/22 96%   11/26/21 95%   11/19/21 93%   05/28/21 95%   02/06/20 98%   02/02/20 93%    O2 Flow Rate (L/min): 35 l/min       Intake/Output Summary (Last 24 hours) at 1/26/2022 1541  Last data filed at 1/26/2022 0307  Gross per 24 hour   Intake 580 ml   Output 800 ml   Net -220 ml          Exam:     Physical Exam:    Gen:  Elderly, well-appearing, NAD   HEENT:  Atraumatic, normocephalic, Sclerae nonicteric, hard of hearing   Neck:  Supple, without apparent masses  Resp:  No accessory muscle use,  diminished breath sounds b/l   Card: RRR, 2/6 systolic murmur, No rubs or gallops. No LE edema  Abd:  +bowel sounds, soft, NTTP, nondistended  Neuro: Face symmetric, speech fluent, diffuse weakness  Psych:  Alert, oriented x 3.  Fair insight     Medications Reviewed: (see below)    Lab Data Reviewed: (see below)    ______________________________________________________________________    Medications:     Current Facility-Administered Medications   Medication Dose Route Frequency    LORazepam (ATIVAN) tablet 0.5 mg  0.5 mg Oral BID PRN    metoprolol tartrate (LOPRESSOR) tablet 12.5 mg  12.5 mg Oral BID    insulin NPH (NOVOLIN N, HUMULIN N) injection 12 Units  12 Units SubCUTAneous ACB&D    guaiFENesin-dextromethorphan (ROBITUSSIN DM) 100-10 mg/5 mL syrup 5 mL  5 mL Oral Q6H PRN    rivaroxaban (XARELTO) tablet 20 mg  20 mg Oral DAILY WITH DINNER    [Held by provider] lisinopriL (PRINIVIL, ZESTRIL) tablet 10 mg  10 mg Oral QHS    atorvastatin (LIPITOR) tablet 20 mg  20 mg Oral DAILY    glucose chewable tablet 16 g  4 Tablet Oral PRN    dextrose (D50W) injection syrg 12.5-25 g  25-50 mL IntraVENous PRN    glucagon (GLUCAGEN) injection 1 mg  1 mg IntraMUSCular PRN    insulin lispro (HUMALOG) injection   SubCUTAneous AC&HS    dexamethasone (PF) (DECADRON) 10 mg/mL injection 10 mg  10 mg IntraVENous Q12H    sodium chloride (NS) flush 5-40 mL  5-40 mL IntraVENous Q8H    sodium chloride (NS) flush 5-40 mL  5-40 mL IntraVENous PRN    acetaminophen (TYLENOL) tablet 650 mg  650 mg Oral Q6H PRN    Or    acetaminophen (TYLENOL) suppository 650 mg  650 mg Rectal Q6H PRN    polyethylene glycol (MIRALAX) packet 17 g  17 g Oral DAILY PRN    ondansetron (ZOFRAN ODT) tablet 4 mg  4 mg Oral Q8H PRN    Or    ondansetron (ZOFRAN) injection 4 mg  4 mg IntraVENous Q6H PRN            Lab Review:     Recent Labs     01/26/22  0123 01/25/22  0402 01/24/22  0455   WBC 14.2* 16.0* 15.0*   HGB 15.4 15.3 16.0   HCT 44.1 44.3 45.9    239 299     Recent Labs     01/26/22  0123 01/25/22  0402 01/24/22  0455   * 132* 133*   K 4.4 4.5 4.6    101 99   CO2 28 27 28   * 177* 148*   BUN 31* 33* 32*   CREA 0.78 0.80 0.82   CA 8.4* 8.2* 8.7   MG 2.1 2.2 2.3   ALB 2.6* 2.6* 2.8*   ALT 32 29 29     No components found for: GLPOC  No results for input(s): PH, PCO2, PO2, HCO3, FIO2 in the last 72 hours. No results for input(s): INR, INREXT, INREXT in the last 72 hours.   No results found for: SDES  Lab Results   Component Value Date/Time    Culture result: Culture performed on Fluid swab specimen 11/25/2021 08:22 PM    Culture result: NO GROWTH 4 DAYS 11/25/2021 08:22 PM              ___________________________________________________    Attending Physician: Randolph Rizvi MD

## 2022-01-26 NOTE — PROGRESS NOTES
Care Management follow up, LOS 15 days     Patient admitted for acute hypoxic respiratory failure, COVID19, pneumonia. History of: afib, diabetes, HTN, carotid artery stenosis, aortic stenosis, CVA  COVID Vaccine:  Yes x2, no booster        RUR 16 (Score %) moderate    Is This a Readmission NO  Is this a Bundle NO     Current status  Patient discussed during interdisciplinary rounds. Patient continues to require medical management including increased oxygen needs, patient now on high flow oxygen at 35L/100%. Continues on IV decadron. Ongoing assessment and monitoring.      Transition of Care Plan  1. Monitor patient status and response to treatment. 2. Patient continues to require medical management. 3. Increased oxygen demand, now on high flow oxygen. 4. Per PT/OT patient would benefit from inpatient rehab placement. 5. Patient has [employer]. 6. Patient lives with his grandson and great grandson. 7. CM to monitor progress and recommendations.     Ethan Anders RN, MSN/Care manager  [phone]

## 2022-01-26 NOTE — PROGRESS NOTES
Comprehensive Nutrition Assessment    Type and Reason for Visit: Reassessment    Nutrition Recommendations/Plan:   1. Continue regular, 4 Carb Choice diet  2. Add Glucerna shake daily at HS   3. Consider adjusting insulin choice or dose to reduce BG spikes    Nutrition Assessment:    1/26: follow up. Pt is eating very well at meals. BG will elevate after meals requiring 6-8 units of correction daily. Pt with normal BG prior to meals and corrects easily. Consider Lantus or increasing NPH slightly and/or administrating NPH at the same time as steroids. Pt is eating 100% of all meals. Wt loss of 4% since admission. Current diet order meets 100% of calorie & protein needs, yet steady wt loss. Will try to add HS Glucerna (220 cals, 26g carb, 10g pro) to halt further wt loss, however expect BG may go up slightly. 1/18: Pt is an 79year old male admitted with Acute respiratory failure with hypoxia; Pneumonia due to COVID-19 virus [U07.1, J12.82]. He  has a past medical history of Atrial fibrillation ([address] Diabetes mellitus, type 2 ([address] Hypertension, Internal carotid artery stenosis, right, Moderate aortic stenosis, and Stroke ([address] PO intake averaging 75% of all meals. Attempted to call into patient room - no answer. Currently on 55L O2. Per documentation, patient has lost 12# (7.4%) x ~3 days, unlikely. No noted edema or prior edema this admission. Admission weight of 162 was stated. No noted N/V/D. No hx of chewing/swallowing problems. NKFA. If current PO intake trend continues, patient likely meeting >/= 75% kcal needs and 100% protein needs. Last three BG , 169, 147.     Wt Readings from Last 10 Encounters:   01/26/22 65.2 kg (143 lb 11.2 oz)   11/22/21 73.5 kg (162 lb)   11/19/21 73.5 kg (162 lb)   05/28/21 73.7 kg (162 lb 6.4 oz)   02/05/20 73.3 kg (161 lb 9.6 oz)   02/02/20 70.3 kg (155 lb)   08/01/19 68 kg (150 lb)   07/30/18 72.6 kg (160 lb)   03/27/13 73.9 kg (163 lb)     Last 3 Recorded Weights in this Encounter    01/22/22 0556 01/25/22 0351 01/26/22 0304   Weight: 64.1 kg (141 lb 6.4 oz) 65.7 kg (144 lb 14.4 oz) 65.2 kg (143 lb 11.2 oz)      01/11/22 1810 01/13/22 0304 01/14/22 0409   Weight: 73.5 kg (162 lb) 68.7 kg (151 lb 8 oz) 68.4 kg (150 lb 11.2 oz)       Malnutrition Assessment:  Malnutrition Status:  Mild malnutrition    Context:  Acute illness     Findings of the 6 clinical characteristics of malnutrition:   Energy Intake:  No significant decrease in energy intake  Weight Loss:  7 - Greater than 2% over 1 week     Body Fat Loss:  Unable to assess,   - unable to physically assess patient at this time  Muscle Mass Loss:  Unable to assess,    Fluid Accumulation:  No significant fluid accumulation,     Strength:  Not performed     Estimated Daily Nutrient Needs:  Energy (kcal): 1765 (MSJ x 1.3); Weight Used for Energy Requirements: Current  Protein (g): 68-81 (1.0-1.2); Weight Used for Protein Requirements: Current  Fluid (ml/day): 1765; Method Used for Fluid Requirements: 1 ml/kcal    Nutrition Related Findings:    ABD: Good appetite with active bowel sounds 1/25  Last BM: 1/22  Edema: None noted 1/26    Nutr. Labs:  Lab Results   Component Value Date/Time    GFR est AA >60 01/26/2022 01:23 AM    GFR est non-AA >60 01/26/2022 01:23 AM    Creatinine 0.78 01/26/2022 01:23 AM    BUN 31 (H) 01/26/2022 01:23 AM    Sodium 134 (L) 01/26/2022 01:23 AM    Potassium 4.4 01/26/2022 01:23 AM    Chloride 100 01/26/2022 01:23 AM    CO2 28 01/26/2022 01:23 AM     Lab Results   Component Value Date/Time    Glucose 126 (H) 01/26/2022 01:23 AM    Glucose (POC) 233 (H) 01/26/2022 04:21 PM     Lab Results   Component Value Date/Time    Hemoglobin A1c 7.2 (H) 01/13/2022 03:09 AM       Nutr.  Meds:  Lipitor, Decadron, Humalog, NPH, Zofran PRN, Miralax PRN, Xarelto    Wounds:    None noted - wound care assessed patient and offered prevention    Current Nutrition Therapies:  ADULT DIET Regular; 4 carb choices (60 gm/meal)    Documented Meal intake:  Patient Vitals for the past 168 hrs:   % Diet Eaten   01/25/22 1820 76 - 100%   01/25/22 1438 76 - 100%   01/25/22 0844 76 - 100%   01/24/22 1445 51 - 75%   01/24/22 0939 76 - 100%   01/23/22 1813 76 - 100%   01/23/22 1228 76 - 100%   01/23/22 0753 76 - 100%   01/22/22 1436 51 - 75%   01/22/22 0731 76 - 100%   01/21/22 1439 26 - 50%   01/21/22 0839 76 - 100%   01/20/22 1842 51 - 75%   01/20/22 1503 26 - 50%   01/20/22 1032 51 - 75%     Documentation of supplement intake:  Patient Vitals for the past 168 hrs:   Supplement intake %   01/23/22 1813 0%   01/23/22 1228 0%   01/23/22 0753 0%   01/22/22 1436 0%   01/22/22 0731 0%   01/21/22 1439 0%   01/21/22 0839 0%   01/20/22 1842 0%   01/20/22 1503 0%   01/20/22 1032 0%       Anthropometric Measures:  · Height:  5' 8\" (172.7 cm)  · Current Body Wt:  65.7 kg (144 lb 13.5 oz)   · Admission Body Wt:  162 lb    · Usual Body Wt:        · Ideal Body Wt:  154 lbs:  94.1 %   · Body mass index is 21.85 kg/m². · BMI Category:  Normal weight (BMI 18.5-24. 9)       Nutrition Diagnosis:   · Increased nutrient needs related to catabolic illness,impaired respiratory function as evidenced by  (COVID-19, requiring 55L O2)    Nutrition Interventions:   Food and/or Nutrient Delivery: Continue current diet,Start oral nutrition supplement  Nutrition Education and Counseling: No recommendations at this time  Coordination of Nutrition Care: Continue to monitor while inpatient    Goals:  PO intake continues >/= 75% of all meals within 5 - 7 days       Nutrition Monitoring and Evaluation:   Behavioral-Environmental Outcomes: None identified  Food/Nutrient Intake Outcomes: Food and nutrient intake,Supplement intake  Physical Signs/Symptoms Outcomes: Biochemical data,Weight    Discharge Planning:    Continue oral nutrition supplement     Electronically signed by Kofi Forbes RD on 1/26/2022   Contact: [phone] or via [employer]

## 2022-01-27 NOTE — PROGRESS NOTES
Name: Maria Dolores Rolon: [address]   : 1940 Admit Date: 2022   Phone: [phone]  Room: [facility]/   PCP: Max Roche MD  MRN: [id number]   Date: 2022  Code: Full Code          Chart and notes reviewed. Data reviewed. I review the patient's current medications in the medical record at each encounter.  I have evaluated and examined the patient. History of Present Illness:  Johanny Anders is a very pleasant, 78 yo gentleman that presented to [facility] on  with worsening shortness of breath. He has a PMH of a.fib, aortic stenosis, DM, hypertension, CVA, and tobacco abuse (100 pack years, quit in ). He received two doses of the Covid vaccine, last in 2021. He did not receive a booster dose. He is currently requiring hi flow at 50L/100%. He reports that his symptoms started five days ago after he was exposed to his grandson who tested positive. He became more progressively more SOB with increased cough. No fever/chills. He did initially have diarrhea. Images:  Personally visualized and report reviewed. CXR :  Superimposed patchy bilateral airspace disease on interstitial changes concerning for infection or inflammation      D-dimer . 55  LFTs stable  Procalcitonin negative  Ferritin 759  Trop wnl  Pro-  CRP 3.18  ECHO 2020:  EF 55-60%, mildly reduced systolic function, moderate aortic stenosis, trave MVR, moderately elevated CVP. Interval History:  Afebrile  BP stable  Sats 97% on 15L/100%--better   WBC 14.2--decreased  Na 132   D-dimer wnl   CRP < 0.29   LFTs stable   Procalcitonin<.05   Pro---better  825 mL UOP documented    Chest CT  personally reviewed: Emphysema with peripheral blebs. Septal thickening and possible fibrosis in the lung bases--limited by motion.     ECHO : EF 45-50%, mild global hypokinesis present, mildly reduced systolic function, mild AV stenosis    Abdominal US : Limited study. Possible hepatic steatosis. Cholecystectomy. No visible biliary tree. ROS:  Feeling about the same overall. Got up to chair twice yesterday. Using IS. Appetite fair. Past Medical History:   Diagnosis Date    [facility]rial [facility]     Diabetes mellitus, type 2 ([facility]     Hypertension     Internal carotid artery stenosis, right     Chronic occlusion of the right internal carotid artery per MRI of 2020 unchanged from previous study of 2010; study of  also showed mild stenosis of the left internal carotid artery and bilateral patent vertebral arteries    Moderate aortic stenosis     Echocardiogram of 2020:  Severe aortic valve leaflet calcification present with reduced excursion. Moderate aortic valve stenosis was present. LVEF 55-60% .  Stroke [facility])     asymptomatic; small chronic infarcts in the bilateral thalami per MRI of 2020.   Previous MRI of 2010 showed possible tiny old right thalamic infarct versus perivascular space        Past Surgical History:   Procedure Laterality Date    HX CHOLECYSTECTOMY      MA INS NEW/RPLCMT PRM PM W/TRANSV ELTRD ATRIAL&VENT N/A 2020    INSERT PPM DUAL performed by Ainsley Blake MD at [facility] CATH LAB       Family History   Adopted: Yes   Family history unknown: Yes       Social History     Tobacco Use    Smoking status: Former Smoker     Packs/day: 2.00     Years: 50.00     Pack years: 100.00     Quit date: 2004     Years since quittin.6    Smokeless tobacco: Never Used   Substance Use Topics    Alcohol use: Yes     Comment: 1 beer or 1 glaas of wine daily most of adult life       Allergies   Allergen Reactions    Ampicillin Nausea and Vomiting    Iodine Hives, Itching and Nausea and Vomiting       Current Facility-Administered Medications   Medication Dose Route Frequency    sodium chloride (OCEAN) 0.65 % nasal squeeze bottle 2 Spray  2 Spray Both Nostrils Q2H PRN    dexamethasone (PF) (DECADRON) 10 mg/mL injection 6 mg  6 mg IntraVENous Q12H    LORazepam (ATIVAN) tablet 0.5 mg  0.5 mg Oral BID PRN    metoprolol tartrate (LOPRESSOR) tablet 12.5 mg  12.5 mg Oral BID    insulin NPH (NOVOLIN N, HUMULIN N) injection 12 Units  12 Units SubCUTAneous ACB&D    guaiFENesin-dextromethorphan (ROBITUSSIN DM) 100-10 mg/5 mL syrup 5 mL  5 mL Oral Q6H PRN    rivaroxaban (XARELTO) tablet 20 mg  20 mg Oral DAILY WITH DINNER    [Held by provider] lisinopriL (PRINIVIL, ZESTRIL) tablet 10 mg  10 mg Oral QHS    atorvastatin (LIPITOR) tablet 20 mg  20 mg Oral DAILY    glucose chewable tablet 16 g  4 Tablet Oral PRN    dextrose (D50W) injection syrg 12.5-25 g  25-50 mL IntraVENous PRN    glucagon (GLUCAGEN) injection 1 mg  1 mg IntraMUSCular PRN    insulin lispro (HUMALOG) injection   SubCUTAneous AC&HS    sodium chloride (NS) flush 5-40 mL  5-40 mL IntraVENous Q8H    sodium chloride (NS) flush 5-40 mL  5-40 mL IntraVENous PRN    acetaminophen (TYLENOL) tablet 650 mg  650 mg Oral Q6H PRN    Or    acetaminophen (TYLENOL) suppository 650 mg  650 mg Rectal Q6H PRN    polyethylene glycol (MIRALAX) packet 17 g  17 g Oral DAILY PRN    ondansetron (ZOFRAN ODT) tablet 4 mg  4 mg Oral Q8H PRN    Or    ondansetron (ZOFRAN) injection 4 mg  4 mg IntraVENous Q6H PRN         REVIEW OF SYSTEMS   Negative except as stated in the HPI. Physical Exam:   Visit Vitals  /86 (BP 1 Location: Right upper arm, BP Patient Position: At rest)   Pulse 70   Temp 97.4 °F (36.3 °C)   Resp 16   Ht 5' 8\" (1.727 m)   Wt 65 kg (143 lb 3.2 oz)   SpO2 97%   BMI 21.77 kg/m²       General:  Alert, cooperative, no distress, appears stated age, on HF   Head:  Normocephalic, without obvious abnormality, atraumatic. Eyes:  Conjunctivae/corneas clear. Nose: Nares normal. Septum midline. Mucosa normal.   Throat: Lips, mucosa, and tongue normal.    Neck: Supple, symmetrical, trachea midline, no adenopathy.    Lungs:   Clear but diminished throughout   Chest wall:  No tenderness or deformity. Heart:  Regular rate and rhythm, S1, S2 normal, no murmur, click, rub or gallop. Abdomen:   Soft, non-tender. Bowel sounds normal.   Extremities: Extremities normal, atraumatic, no cyanosis or edema. Pulses: 2+ and symmetric all extremities. Skin: Skin color, texture, turgor normal. No rashes or lesions   Lymph nodes: Cervical  nodes normal.   Neurologic: Grossly nonfocal       Lab Results   Component Value Date/Time    Sodium 134 (L) 01/26/2022 01:23 AM    Potassium 4.4 01/26/2022 01:23 AM    Chloride 100 01/26/2022 01:23 AM    CO2 28 01/26/2022 01:23 AM    BUN 31 (H) 01/26/2022 01:23 AM    Creatinine 0.78 01/26/2022 01:23 AM    Glucose 126 (H) 01/26/2022 01:23 AM    Calcium 8.4 (L) 01/26/2022 01:23 AM    Magnesium 2.1 01/26/2022 01:23 AM    Phosphorus 3.1 01/16/2022 01:52 AM    Lactic acid 1.3 01/11/2022 06:34 PM       Lab Results   Component Value Date/Time    WBC 14.2 (H) 01/26/2022 01:23 AM    HGB 15.4 01/26/2022 01:23 AM    PLATELET 967 69/01/3886 01:23 AM    MCV 86.0 01/26/2022 01:23 AM       Lab Results   Component Value Date/Time    INR 1.0 02/04/2020 03:50 PM    aPTT 32.0 09/22/2010 03:50 AM    Alk.  phosphatase 50 01/26/2022 01:23 AM    Protein, total 5.5 (L) 01/26/2022 01:23 AM    Albumin 2.6 (L) 01/26/2022 01:23 AM    Globulin 2.9 01/26/2022 01:23 AM       Lab Results   Component Value Date/Time    Ferritin 759 (H) 01/11/2022 06:34 PM       Lab Results   Component Value Date/Time    Sed rate (ESR) 5 09/22/2010 03:50 AM    C-Reactive protein <0.29 01/26/2022 01:23 AM        No results found for: PH, PHI, PCO2, PCO2I, PO2, PO2I, HCO3, HCO3I, FIO2, FIO2I    Lab Results   Component Value Date/Time    CK 79 09/22/2010 03:50 AM    Troponin-I, Qt. 0.05 (H) 09/21/2010 12:10 PM        Lab Results   Component Value Date/Time    Culture result: Culture performed on Fluid swab specimen 11/25/2021 08:22 PM    Culture result: NO GROWTH 4 DAYS 11/25/2021 08:22 PM       No results found for: TOXA1, RPR, HBCM, HBSAG, HAAB, HCAB1, HAAT, G6PD, CRYAC, HIVGT, HIVR, HIV1, HIV12, HIVPC, HIVRPI    Lab Results   Component Value Date/Time    CK 79 09/22/2010 03:50 AM       Lab Results   Component Value Date/Time    Color YELLOW/STRAW 08/02/2019 12:10 AM    Appearance CLEAR 08/02/2019 12:10 AM    pH (UA) 5.0 08/02/2019 12:10 AM    Protein 30 (A) 08/02/2019 12:10 AM    Glucose 500 (A) 08/02/2019 12:10 AM    Ketone NEGATIVE  08/02/2019 12:10 AM    Bilirubin NEGATIVE  08/02/2019 12:10 AM    Blood SMALL (A) 08/02/2019 12:10 AM    Urobilinogen 0.2 08/02/2019 12:10 AM    Nitrites NEGATIVE  08/02/2019 12:10 AM    Leukocyte Esterase NEGATIVE  08/02/2019 12:10 AM    WBC 0-4 08/02/2019 12:10 AM    RBC 0-5 08/02/2019 12:10 AM    Bacteria NEGATIVE  08/02/2019 12:10 AM       Images: personally visualized and report reviewed    CXR (1/16/2022): Worsening of left mid and lower lung pulmonary opacities. CXR (1/20/2022): There is bilateral pneumonia in the periphery of the mid  and lower lung zones left greater than right. This has mildly worsened. IMPRESSION  · Acute Respiratory Failure with Hypoxia  · Covid-19 Pneumonia  · History of A. Fib  · Moderate Aortic Stenosis  · History of Tobacco Abuse  · DM  · Hypertension      PLAN  · Continue O2 to keep sats >88%; back on HF  · Continue Dexamethasone 6mg q12  · Baricitinib complete  · On AC with Xarelto  · Trend CRP, d-dimer, LFTs  · Encourage IS use  · Prone as able  · OOB to chair  · Would benefit from PFTs as an outpatient  · Will need repeat imaging as an outpatient  · PT/OT     Tammie Blevins NP

## 2022-01-27 NOTE — PROGRESS NOTES
Occupational Therapy  Chart reviewed and attempted to see, RN reported she just got him back to bed, he was up all morning and tired. Noted oxygen decreased to 10 liters at this time and sleeping. Will continue to follow.    Naila Myers OTR/L

## 2022-01-27 NOTE — PROGRESS NOTES
Attempted to work with the patient for Physical Therapy, chart reviewed and discussed with nurse reported she just got him back to bed, he was up all morning and tired. Noted oxygen decreased to 10 liters at this time and sleeping. We will continue to follow up with the patient for therapy thank you.

## 2022-01-27 NOTE — PROGRESS NOTES
Carlos Eduardo Alonzoelsen [facility] [address]  [address] [facility], [place], [address] [place] [address]  [phone]      Medical Progress Note      NAME: Gavin Hinton   :  1940  MRM:  [id number]    Date/Time: 2022        Assessment / Plan:     78 yo M w/ hx of AF, HTN, DM presenting w/ fatigue, dyspneic, admitted for AHRF 2/2 COVID-19    #Acute respiratory failure with hypoxia: 2/2 COVID pneumonia. S/p baricitinib. LOS 16 days. Had been on HFNC, then down to as low as 6L on , but acutely worsened and back on HFNC ; now improved again to 10L. 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 Ddimer, procal, CRP all negative. I suspect he will either have progressive fibrosis or improve slightly to the point of needing oxygen lifelong. Discussed with son today, would like for him to come home with [facility] PT rather than SNF/Inpt Rehab. Will assess daily, dispo early next week potentially   - Cont dex 10mg BID since     - Reduced to 6mg BID    - Rivarox   - Cont PT, OT; IS, supportive care; NS nasal spray   - Can dc precautions     #Aortic stenosis: moderate, based on echo in 2020 but repeat TTE  showing mild stenosis. Monitor volume status. Low c/f flash pulm edema above    #VT: hx of NSVT. Noted . Monitor electrolytes. Cardiology evaluated. Continue BB     #Elevated bilirubin: stable. . ABD US w/ steastosis     #Type 2 diabetes mellitus without complication: I7V 0.5%. BG high 2/2 steroids. Cont NPH, cont SSI     #SOHAIL: mild, resolved. Currently holding lisinopril.  Avoid hypotension    #A-fib/ Pacemaker: monitor on telemetry, cont Xarelto    #History of CVA (cerebrovascular accident): cont OAC, statin    #Hypertension: currently holding lisinopril                     Care Plan discussed with: Patient, Nursing Staff and >50% of time spent in counseling and coordination of care    Discussed:  Care Plan    Prophylaxis:  Xarelto    Disposition:   inpt rehab versus           Subjective: Chief Complaint:  Farhana Gamez. Improved today, feeling better. Able to wean to 10L       Objective:       Vitals:        Last 24hrs VS reviewed since prior progress note. Most recent are:    Visit Vitals  /71 (BP 1 Location: Left upper arm, BP Patient Position: Sitting)   Pulse 81   Temp 97.4 °F (36.3 °C)   Resp 17   Ht 5' 8\" (1.727 m)   Wt 65 kg (143 lb 3.2 oz)   SpO2 98%   BMI 21.77 kg/m²     SpO2 Readings from Last 6 Encounters:   01/27/22 98%   11/26/21 95%   11/19/21 93%   05/28/21 95%   02/06/20 98%   02/02/20 93%    O2 Flow Rate (L/min): 10 l/min       Intake/Output Summary (Last 24 hours) at 1/27/2022 1249  Last data filed at 1/27/2022 0804  Gross per 24 hour   Intake 440 ml   Output 975 ml   Net -535 ml          Exam:     Physical Exam:    Gen:  Elderly, well-appearing, NAD   HEENT:  Atraumatic, normocephalic, Sclerae nonicteric, hard of hearing   Neck:  Supple, without apparent masses  Resp:  No accessory muscle use,  diminished breath sounds b/l   Card: RRR, 2/6 systolic murmur, No rubs or gallops. No LE edema  Abd:  +bowel sounds, soft, NTTP, nondistended  Neuro: Face symmetric, speech fluent, diffuse weakness  Psych:  Alert, oriented x 3.  Fair insight     Medications Reviewed: (see below)    Lab Data Reviewed: (see below)    ______________________________________________________________________    Medications:     Current Facility-Administered Medications   Medication Dose Route Frequency    sodium chloride (OCEAN) 0.65 % nasal squeeze bottle 2 Spray  2 Spray Both Nostrils Q2H PRN    dexamethasone (PF) (DECADRON) 10 mg/mL injection 6 mg  6 mg IntraVENous Q12H    LORazepam (ATIVAN) tablet 0.5 mg  0.5 mg Oral BID PRN    metoprolol tartrate (LOPRESSOR) tablet 12.5 mg  12.5 mg Oral BID    insulin NPH (NOVOLIN N, HUMULIN N) injection 12 Units  12 Units SubCUTAneous ACB&D    guaiFENesin-dextromethorphan (ROBITUSSIN DM) 100-10 mg/5 mL syrup 5 mL  5 mL Oral Q6H PRN    rivaroxaban (XARELTO) tablet 20 mg  20 mg Oral DAILY WITH DINNER    [Held by provider] lisinopriL (PRINIVIL, ZESTRIL) tablet 10 mg  10 mg Oral QHS    atorvastatin (LIPITOR) tablet 20 mg  20 mg Oral DAILY    glucose chewable tablet 16 g  4 Tablet Oral PRN    dextrose (D50W) injection syrg 12.5-25 g  25-50 mL IntraVENous PRN    glucagon (GLUCAGEN) injection 1 mg  1 mg IntraMUSCular PRN    insulin lispro (HUMALOG) injection   SubCUTAneous AC&HS    sodium chloride (NS) flush 5-40 mL  5-40 mL IntraVENous Q8H    sodium chloride (NS) flush 5-40 mL  5-40 mL IntraVENous PRN    acetaminophen (TYLENOL) tablet 650 mg  650 mg Oral Q6H PRN    Or    acetaminophen (TYLENOL) suppository 650 mg  650 mg Rectal Q6H PRN    polyethylene glycol (MIRALAX) packet 17 g  17 g Oral DAILY PRN    ondansetron (ZOFRAN ODT) tablet 4 mg  4 mg Oral Q8H PRN    Or    ondansetron (ZOFRAN) injection 4 mg  4 mg IntraVENous Q6H PRN            Lab Review:     Recent Labs     01/26/22  0123 01/25/22  0402   WBC 14.2* 16.0*   HGB 15.4 15.3   HCT 44.1 44.3    239     Recent Labs     01/26/22  0123 01/25/22  0402   * 132*   K 4.4 4.5    101   CO2 28 27   * 177*   BUN 31* 33*   CREA 0.78 0.80   CA 8.4* 8.2*   MG 2.1 2.2   ALB 2.6* 2.6*   ALT 32 29     No components found for: GLPOC  No results for input(s): PH, PCO2, PO2, HCO3, FIO2 in the last 72 hours. No results for input(s): INR, INREXT, INREXT in the last 72 hours.   No results found for: SDES  Lab Results   Component Value Date/Time    Culture result: Culture performed on Fluid swab specimen 11/25/2021 08:22 PM    Culture result: NO GROWTH 4 DAYS 11/25/2021 08:22 PM              ___________________________________________________    Attending Physician: Miguel Lincoln MD

## 2022-01-27 NOTE — PROGRESS NOTES
Care Management follow up, LOS 16 days     Patient admitted for acute hypoxic respiratory failure, COVID19, pneumonia. History of: afib, diabetes, HTN, carotid artery stenosis, aortic stenosis, CVA  COVID Vaccine:  Yes x 2, no booster        RUR 16 (Score %) moderate    Is This a Readmission NO  Is this a Bundle NO     Current status  Patient discussed during interdisciplinary rounds. Patient continues to require medical management including oxygen at 15L/mid flow. Continues on IV decadron.  Ongoing assessment and monitoring. Spoke with patient's son Amelia Burns who states he lives within 5 minutes of patient and family will be able to stay with him upon discharge from the hospital.  Family prefers the patient to return home with home health services. Will send home health referral to [facility] via [employer]. DECLINED by [facility], do not cover patient's zip code. Referral sent to [facility] via [employer]. [facility] Transition of Care Plan  1. Monitor patient status and response to treatment. 2. Patient continues to require medical management. 3. Continues on mid flow flow oxygen and IV decadron. 4. Family wants home health services, will send referrals. 5. Patient has [facility]. 6. Will monitor for home oxygen needs. 7. Patient lives with his grandson and great grandson. 8. CM to monitor progress and recommendations.     Mauricio Drake RN, MSN/Care manager  [phone]

## 2022-01-27 NOTE — ROUTINE PROCESS
0000  Patient wanted foam dressing reapplied to sacrum. Dated and signed    0700  Bedside and Verbal shift change report given to Neil Buck (oncoming nurse) by Chante Cat (offgoing nurse). Report included the following information SBAR, Kardex, ED Summary, Intake/Output, MAR, Recent Results and Cardiac Rhythm V-Paced.

## 2022-01-27 NOTE — PROGRESS NOTES
Bedside shift change report given to Terri Schaefer (oncoming nurse) by Rohini Henriquez, YASMANI (offgoing nurse). Report included the following information SBAR, Kardex, ED Summary, Procedure Summary, Intake/Output, MAR, Recent Results, Med Rec Status and Cardiac Rhythm V paced. 1000 Assisted patient up to the chair. Dr. Romayne Nielsen came in and wanted to ween patient to 10L NC. Patient currently on 10L 02 Sat 97%. Bedside shift change report given to Xavi Richter (oncoming nurse) by Osito Reyes RN (offgoing nurse). Report included the following information SBAR, Kardex, ED Summary, Procedure Summary, Intake/Output, MAR, Recent Results, Med Rec Status and Cardiac Rhythm V paced.

## 2022-01-28 NOTE — PROGRESS NOTES
Name: Hina Foley: [address]   : 1940 Admit Date: 2022   Phone: [phone]  Room: [facility]/01   PCP: Herrera Dunlap MD  MRN: [id number]   Date: 2022  Code: Full Code          Chart and notes reviewed. Data reviewed. I review the patient's current medications in the medical record at each encounter.  I have evaluated and examined the patient. History of Present Illness:  Loretta Mclain is a very pleasant, 78 yo gentleman that presented to [facility] on  with worsening shortness of breath. He has a PMH of a.fib, aortic stenosis, DM, hypertension, CVA, and tobacco abuse (100 pack years, quit in ). He received two doses of the Covid vaccine, last in 2021. He did not receive a booster dose. He is currently requiring hi flow at 50L/100%. He reports that his symptoms started five days ago after he was exposed to his grandson who tested positive. He became more progressively more SOB with increased cough. No fever/chills. He did initially have diarrhea. Images:  Personally visualized and report reviewed. CXR :  Superimposed patchy bilateral airspace disease on interstitial changes concerning for infection or inflammation      D-dimer . 55  LFTs stable  Procalcitonin negative  Ferritin 759  Trop wnl  Pro-  CRP 3.18  ECHO 2020:  EF 55-60%, mildly reduced systolic function, moderate aortic stenosis, trave MVR, moderately elevated CVP. Interval History:  Afebrile  BP stable  Sats 98% on 10L!!    WBC 14.2--decreased  Na 132   D-dimer wnl   CRP < 0.29   LFTs stable   Procalcitonin<.05   Pro---better  800 mL UOP documented    Chest CT  personally reviewed: Emphysema with peripheral blebs. Septal thickening and possible fibrosis in the lung bases--limited by motion.     ECHO : EF 45-50%, mild global hypokinesis present, mildly reduced systolic function, mild AV stenosis    Abdominal US : Limited study. Possible hepatic steatosis. Cholecystectomy. No visible biliary tree. ROS:  Resting comfortably. I did not wake him. Past Medical History:   Diagnosis Date    [facility]     Diabetes mellitus, type 2 ([facility]     Hypertension     Internal carotid artery stenosis, right     Chronic occlusion of the right internal carotid artery per MRI of 2020 unchanged from previous study of 2010; study of  also showed mild stenosis of the left internal carotid artery and bilateral patent vertebral arteries    Moderate aortic stenosis     Echocardiogram of 2020:  Severe aortic valve leaflet calcification present with reduced excursion. Moderate aortic valve stenosis was present. LVEF 55-60% .  Stroke [facility])     asymptomatic; small chronic infarcts in the bilateral thalami per MRI of 2020.   Previous MRI of 2010 showed possible tiny old right thalamic infarct versus perivascular space        Past Surgical History:   Procedure Laterality Date    HX CHOLECYSTECTOMY      NE INS NEW/RPLCMT PRM PM W/TRANSV ELTRD ATRIAL&VENT N/A 2020    INSERT PPM DUAL performed by Rose Rubio MD at [facility] CATH LAB       Family History   Adopted: Yes   Family history unknown: Yes       Social History     Tobacco Use    Smoking status: Former Smoker     Packs/day: 2.00     Years: 50.00     Pack years: 100.00     Quit date: 2004     Years since quittin.6    Smokeless tobacco: Never Used   Substance Use Topics    Alcohol use: Yes     Comment: 1 beer or 1 glaas of wine daily most of adult life       Allergies   Allergen Reactions    Ampicillin Nausea and Vomiting    Iodine Hives, Itching and Nausea and Vomiting       Current Facility-Administered Medications   Medication Dose Route Frequency    insulin NPH (NOVOLIN N, HUMULIN N) injection 15 Units  15 Units SubCUTAneous ACB&D    dexAMETHasone (DECADRON) tablet 4 mg  4 mg Oral Q12H    sodium chloride (OCEAN) 0.65 % nasal squeeze bottle 2 Spray  2 Spray Both Nostrils Q2H PRN    LORazepam (ATIVAN) tablet 0.5 mg  0.5 mg Oral BID PRN    metoprolol tartrate (LOPRESSOR) tablet 12.5 mg  12.5 mg Oral BID    guaiFENesin-dextromethorphan (ROBITUSSIN DM) 100-10 mg/5 mL syrup 5 mL  5 mL Oral Q6H PRN    rivaroxaban (XARELTO) tablet 20 mg  20 mg Oral DAILY WITH DINNER    [Held by provider] lisinopriL (PRINIVIL, ZESTRIL) tablet 10 mg  10 mg Oral QHS    atorvastatin (LIPITOR) tablet 20 mg  20 mg Oral DAILY    glucose chewable tablet 16 g  4 Tablet Oral PRN    dextrose (D50W) injection syrg 12.5-25 g  25-50 mL IntraVENous PRN    glucagon (GLUCAGEN) injection 1 mg  1 mg IntraMUSCular PRN    insulin lispro (HUMALOG) injection   SubCUTAneous AC&HS    sodium chloride (NS) flush 5-40 mL  5-40 mL IntraVENous Q8H    sodium chloride (NS) flush 5-40 mL  5-40 mL IntraVENous PRN    acetaminophen (TYLENOL) tablet 650 mg  650 mg Oral Q6H PRN    Or    acetaminophen (TYLENOL) suppository 650 mg  650 mg Rectal Q6H PRN    polyethylene glycol (MIRALAX) packet 17 g  17 g Oral DAILY PRN    ondansetron (ZOFRAN ODT) tablet 4 mg  4 mg Oral Q8H PRN    Or    ondansetron (ZOFRAN) injection 4 mg  4 mg IntraVENous Q6H PRN         REVIEW OF SYSTEMS   Negative except as stated in the HPI. Physical Exam:   Visit Vitals  BP (!) 144/74 (BP 1 Location: Right upper arm, BP Patient Position: At rest)   Pulse 75   Temp 97.8 °F (36.6 °C)   Resp 21   Ht 5' 8\" (1.727 m)   Wt 65 kg (143 lb 3.2 oz)   SpO2 98%   BMI 21.77 kg/m²       General:  Alert, cooperative, no distress, appears stated age, on midflow   Head:  Normocephalic, without obvious abnormality, atraumatic. Eyes:  Conjunctivae/corneas clear. Nose: Nares normal. Septum midline. Mucosa normal.   Throat: Lips, mucosa, and tongue normal.    Neck: Supple, symmetrical, trachea midline, no adenopathy. Lungs:   Clear but diminished throughout   Chest wall:  No tenderness or deformity.    Heart:  Regular rate and rhythm, S1, S2 normal, no murmur, click, rub or gallop. Abdomen:   Soft, non-tender. Bowel sounds normal.   Extremities: Extremities normal, atraumatic, no cyanosis or edema. Pulses: 2+ and symmetric all extremities. Skin: Skin color, texture, turgor normal. No rashes or lesions   Lymph nodes: Cervical  nodes normal.   Neurologic: Grossly nonfocal       Lab Results   Component Value Date/Time    Sodium 134 (L) 01/26/2022 01:23 AM    Potassium 4.4 01/26/2022 01:23 AM    Chloride 100 01/26/2022 01:23 AM    CO2 28 01/26/2022 01:23 AM    BUN 31 (H) 01/26/2022 01:23 AM    Creatinine 0.78 01/26/2022 01:23 AM    Glucose 126 (H) 01/26/2022 01:23 AM    Calcium 8.4 (L) 01/26/2022 01:23 AM    Magnesium 2.1 01/26/2022 01:23 AM    Phosphorus 3.1 01/16/2022 01:52 AM    Lactic acid 1.3 01/11/2022 06:34 PM       Lab Results   Component Value Date/Time    WBC 14.2 (H) 01/26/2022 01:23 AM    HGB 15.4 01/26/2022 01:23 AM    PLATELET 465 91/86/5402 01:23 AM    MCV 86.0 01/26/2022 01:23 AM       Lab Results   Component Value Date/Time    INR 1.0 02/04/2020 03:50 PM    aPTT 32.0 09/22/2010 03:50 AM    Alk.  phosphatase 50 01/26/2022 01:23 AM    Protein, total 5.5 (L) 01/26/2022 01:23 AM    Albumin 2.6 (L) 01/26/2022 01:23 AM    Globulin 2.9 01/26/2022 01:23 AM       Lab Results   Component Value Date/Time    Ferritin 759 (H) 01/11/2022 06:34 PM       Lab Results   Component Value Date/Time    Sed rate (ESR) 5 09/22/2010 03:50 AM    C-Reactive protein <0.29 01/26/2022 01:23 AM        No results found for: PH, PHI, PCO2, PCO2I, PO2, PO2I, HCO3, HCO3I, FIO2, FIO2I    Lab Results   Component Value Date/Time    CK 79 09/22/2010 03:50 AM    Troponin-I, Qt. 0.05 (H) 09/21/2010 12:10 PM        Lab Results   Component Value Date/Time    Culture result: Culture performed on Fluid swab specimen 11/25/2021 08:22 PM    Culture result: NO GROWTH 4 DAYS 11/25/2021 08:22 PM       No results found for: TOXA1, RPR, HBCM, HBSAG, HAAB, HCAB1, HAAT, G6PD, CRYAC, HIVGT, HIVR, HIV1, HIV12, HIVPC, HIVRPI    Lab Results   Component Value Date/Time    CK 79 09/22/2010 03:50 AM       Lab Results   Component Value Date/Time    Color YELLOW/STRAW 08/02/2019 12:10 AM    Appearance CLEAR 08/02/2019 12:10 AM    pH (UA) 5.0 08/02/2019 12:10 AM    Protein 30 (A) 08/02/2019 12:10 AM    Glucose 500 (A) 08/02/2019 12:10 AM    Ketone NEGATIVE  08/02/2019 12:10 AM    Bilirubin NEGATIVE  08/02/2019 12:10 AM    Blood SMALL (A) 08/02/2019 12:10 AM    Urobilinogen 0.2 08/02/2019 12:10 AM    Nitrites NEGATIVE  08/02/2019 12:10 AM    Leukocyte Esterase NEGATIVE  08/02/2019 12:10 AM    WBC 0-4 08/02/2019 12:10 AM    RBC 0-5 08/02/2019 12:10 AM    Bacteria NEGATIVE  08/02/2019 12:10 AM       Images: personally visualized and report reviewed    CXR (1/16/2022): Worsening of left mid and lower lung pulmonary opacities. CXR (1/20/2022): There is bilateral pneumonia in the periphery of the mid  and lower lung zones left greater than right. This has mildly worsened. IMPRESSION  · Acute Respiratory Failure with Hypoxia  · Covid-19 Pneumonia  · History of A. Fib  · Moderate Aortic Stenosis  · History of Tobacco Abuse  · DM  · Hypertension      PLAN  · Continue O2 to keep sats >88%; weaned to 9L  · Continue Dexamethasone-weaned by primary team  · Baricitinib complete  · On AC with Xarelto  · Trend CRP, d-dimer, LFTs  · Encourage IS use  · Prone as able  · OOB to chair  · Would benefit from PFTs as an outpatient  · Will need repeat imaging as an outpatient  · PT/OT     Sotero Galdamez NP

## 2022-01-28 NOTE — PROGRESS NOTES
0700  Bedside and Verbal shift change report given to Kim Rodriguez (oncoming nurse) by Benigno Hassan (offgoing nurse). Report included the following information SBAR, Kardex, Procedure Summary, Intake/Output, MAR, Accordion, Recent Results and Cardiac Rhythm Ventricular Paced. '    Initial assessment done. AOX4. No complaints of pain. Will continue to monitor. Visit Vitals  /60 (BP 1 Location: Right lower arm)   Pulse 75   Temp 97.8 °F (36.6 °C)   Resp 24   Ht 5' 8\" (1.727 m)   Wt 65 kg (143 lb 3.2 oz)   SpO2 96%   BMI 21.77 kg/m²     1917  Bedside and Verbal shift change report given to Benigno Hassan (oncoming nurse) by Jayme Cranker RN (offgoing nurse). Report included the following information SBAR, Kardex, Procedure Summary, Intake/Output, MAR, Accordion and Recent Results.

## 2022-01-28 NOTE — PROGRESS NOTES
Problem: Mobility Impaired (Adult and Pediatric)  Goal: *Acute Goals and Plan of Care (Insert Text)  Description: FUNCTIONAL STATUS PRIOR TO ADMISSION: Patient was independent and active without use of DME.    HOME SUPPORT PRIOR TO ADMISSION: The patient lived with son and grandson but did not require assist.    7-day re-assessment 1/28/2022  1. Patient will move from supine to sit and sit to supine  in bed with modified independence within 7 day(s). 2.  Patient will transfer from bed to chair and chair to bed with supervision/set-up using the least restrictive device within 7 day(s). 3.  Patient will perform sit to stand with supervision/set-up within 7 day(s). 4.  Patient will ambulate with contact guard assistance for 50 feet with the least restrictive device within 7 day(s). Initiated 1/17/2022  1. Patient will move from supine to sit and sit to supine  in bed with modified independence within 7 day(s). 2.  Patient will transfer from bed to chair and chair to bed with modified independence using the least restrictive device within 7 day(s). 3.  Patient will perform sit to stand with modified independence within 7 day(s). 4.  Patient will ambulate with modified independence for 150 feet with the least restrictive device within 7 day(s). 5.  Patient will ascend/descend 6 stairs with 2 handrail(s) with modified independence within 7 day(s). Outcome: Progressing Towards Goal  Note:   PHYSICAL THERAPY TREATMENT: WEEKLY REASSESSMENT  Patient: Gurmeet Harrington (89 y.o. male)  Date: 1/28/2022  Primary Diagnosis: Acute respiratory failure with hypoxia ([facility] [J96.01]  Pneumonia due to COVID-19 virus [U07.1, J12.82]        Precautions:   Fall      ASSESSMENT  Patient continues with skilled PT services and  progressing towards goals. Patient today received sitting up in chair, fatigued from sitting in chair today.   He is on 10L midflow with sats 96% at rest, however patient did desaturate with transfer back to bed. Pleth not the best, and only reading 85% and increased to 15L (due to eating a meal). Patient's progression toward goals since last assessment: patient with increased medical complexity since initial evaluation. Patient initally requiring HighFLow, but able to be weaned down to 6L, but then required return to high flow at 40-50%, over the past 2 days has been able to tolerate midflow. Patient still overall limited with mobility due to O2 requirements and highflow tubing. Patient with continued slow but steady progress, and highly motivated to progress. Current Level of Function Impacting Discharge (mobility/balance): CGA for transfers    Functional Outcome Measure: The patient scored 55/100 on the barthel index outcome measure. Other factors to consider for discharge:          PLAN :  Goals have been updated based on progression since last assessment. Patient continues to benefit from skilled intervention to address the above impairments. Recommendations and Planned Interventions: bed mobility training, transfer training, gait training, therapeutic exercises, and therapeutic activities      Frequency/Duration: Patient will be followed by physical therapy:  5 times a week to address goals. Recommendation for discharge: (in order for the patient to meet his/her long term goals)  To be determined: pulmonary rehab    This discharge recommendation:  Has been made in collaboration with the attending provider and/or case management    IF patient discharges home will need the following DME: to be determined (TBD)         SUBJECTIVE:   Patient stated Yeni Carey you so much, I feel much better.     OBJECTIVE DATA SUMMARY:   HISTORY:    Past Medical History:   Diagnosis Date    Atrial fibrillation ([facility] [address]     Diabetes mellitus, type 2 ([facility] [address] [facility]     Hypertension     Internal carotid artery stenosis, right     Chronic occlusion of the right internal carotid artery per MRI of 2/5/2020 unchanged from previous study of 9/21/2010; study of 2020 also showed mild stenosis of the left internal carotid artery and bilateral patent vertebral arteries    Moderate aortic stenosis     Echocardiogram of 2/5/2020:  Severe aortic valve leaflet calcification present with reduced excursion. Moderate aortic valve stenosis was present. LVEF 55-60% . Stroke [facility])     asymptomatic; small chronic infarcts in the bilateral thalami per MRI of 2/5/2020. Previous MRI of 9/21/2010 showed possible tiny old right thalamic infarct versus perivascular space      Past Surgical History:   Procedure Laterality Date    HX CHOLECYSTECTOMY      NC INS NEW/RPLCMT PRM PM W/TRANSV ELTRD ATRIAL&VENT N/A 2/6/2020    INSERT PPM DUAL performed by Phillip Su MD at [facility] CATH LAB       Personal factors and/or comorbidities impacting plan of care: see above    Home Situation  Home Environment: Private residence  # Steps to Enter: 1  Rails to Enter: Yes  One/Two Story Residence: One story  Living Alone: No  Support Systems: Other Family Member(s)  Patient Expects to be Discharged to[de-identified] Home  Current DME Used/Available at Home: None  Tub or Shower Type: Tub/Shower combination    EXAMINATION/PRESENTATION/DECISION MAKING:   Critical Behavior:  Neurologic State: Alert  Orientation Level: Oriented X4  Cognition: Appropriate decision making,Appropriate for age attention/concentration,Appropriate safety awareness  Safety/Judgement: Awareness of environment,Fall prevention  Hearing:   Auditory  Auditory Impairment: Hard of hearing, bilateral  Skin:  all exposed intact  Edema: none noted  Range Of Motion:  AROM: Within functional limits           PROM: Within functional limits           Strength:    Strength: Generally decreased, functional                       Coordination:  Coordination: Generally decreased, functional  Vision:      Functional Mobility:  Bed Mobility:     Supine to Sit: Contact guard assistance        Transfers:  Sit to Stand: Contact guard assistance  Stand to Sit: Contact guard assistance        Bed to Chair: Contact guard assistance              Balance:   Sitting: Intact  Standing: With support  Ambulation/Gait Training:                                      Functional Measure:  Barthel Index:    Bathin  Bladder: 10  Bowels: 10  Groomin  Dressin  Feeding: 10  Mobility: 0  Stairs: 0  Toilet Use: 5  Transfer (Bed to Chair and Back): 10  Total: 55/100              Pain Rating:  None noted    Activity Tolerance:   Fair    After treatment patient left in no apparent distress:   Supine in bed, Call bell within reach, and Bed / chair alarm activated    COMMUNICATION/EDUCATION:   The patients plan of care was discussed with: Occupational therapist and Registered nurse. Fall prevention education was provided and the patient/caregiver indicated understanding., Patient/family have participated as able in goal setting and plan of care. , and Patient/family agree to work toward stated goals and plan of care.     Thank you for this referral.  Pedrito Crowley, PT, DPT   Time Calculation: 31 mins

## 2022-01-28 NOTE — PROGRESS NOTES
Bedside shift change report given to Myra Huerta (oncoming nurse) by [facility] RN (offgoing nurse). Report included the following information SBAR, Kardex, ED Summary, Intake/Output, and Recent Results. Bedside shift change report given to [address] (oncoming nurse) by Myra Huerta (offgoing nurse). Report included the following information SBAR, Kardex, ED Summary, Intake/Output, and Recent Results. This patient was assisted with Intentional Toileting every 2 hours during this shift as appropriate. Documentation of ambulation and output reflected on Flowsheet as appropriate. Purposeful hourly rounding was completed using AIDET and 5Ps. Outcomes of PHR documented as they occurred. Bed alarm in use as appropriate. Dual Suction and ambubag in place.

## 2022-01-28 NOTE — PROGRESS NOTES
Carlos Eduardo Kingston [facility] 79  [address] [facility], [address] [place] [address]  [phone]      Medical Progress Note      NAME: Gurmeet Harrington   :  1940  MRM:  [id number]    Date/Time: 2022        Assessment / Plan:     80 yo M w/ hx of AF, HTN, DM presenting w/ fatigue, dyspneic, admitted for AHRF 2/2 COVID-19    #Acute respiratory failure with hypoxia: 2/2 COVID pneumonia. S/p baricitinib. LOS 17 days. Had been on HFNC, then down to as low as 6L on , but acutely worsened and back on HFNC ; now improved again to 10L. 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 Ddimer, procal, CRP all negative. I suspect he will either have progressive fibrosis or improve slightly to the point of needing oxygen lifelong. Will assess daily, dispo early next week potentially   - Dex 10mg BID     - Reduced to 6mg BID     - To 4mg BID    - Rivarox   - Cont PT, OT; IS, supportive care; NS nasal spray   - Can dc precautions     #Aortic stenosis: moderate, based on echo in 2020 but repeat TTE  showing mild stenosis. Monitor volume status. Low c/f flash pulm edema above    #VT: hx of NSVT. Noted . Monitor electrolytes. Cardiology evaluated. Continue BB     #Elevated bilirubin: stable. . ABD US w/ steastosis     #Type 2 diabetes mellitus without complication: W4M 6.8%. BG high 2/2 steroids. Cont NPH, cont SSI     #SOHAIL: mild, resolved. Currently holding lisinopril. Avoid hypotension    #A-fib/ Pacemaker: monitor on telemetry, cont Xarelto    #History of CVA (cerebrovascular accident): cont OAC, statin    #Hypertension: currently holding lisinopril                     Care Plan discussed with: Patient, Nursing Staff and >50% of time spent in counseling and coordination of care    Discussed:  Care Plan    Prophylaxis:  Xarelto    Disposition:   inpt rehab versus HH          Subjective:     Chief Complaint:  Hubbard Salvage.  Feels well without complaints, oxygen stable       Objective: Vitals:        Last 24hrs VS reviewed since prior progress note. Most recent are:    Visit Vitals  /73 (BP 1 Location: Right upper arm, BP Patient Position: At rest)   Pulse 79   Temp 97.2 °F (36.2 °C)   Resp 17   Ht 5' 8\" (1.727 m)   Wt 65 kg (143 lb 3.2 oz)   SpO2 96%   BMI 21.77 kg/m²     SpO2 Readings from Last 6 Encounters:   01/28/22 96%   11/26/21 95%   11/19/21 93%   05/28/21 95%   02/06/20 98%   02/02/20 93%    O2 Flow Rate (L/min): 10 l/min       Intake/Output Summary (Last 24 hours) at 1/28/2022 1328  Last data filed at 1/28/2022 0845  Gross per 24 hour   Intake 360 ml   Output 1050 ml   Net -690 ml          Exam:     Physical Exam:    Gen:  Elderly, well-appearing, NAD   HEENT:  Atraumatic, normocephalic, Sclerae nonicteric, hard of hearing   Neck:  Supple, without apparent masses  Resp:  No accessory muscle use,  diminished breath sounds b/l   Card: RRR, 2/6 systolic murmur, No rubs or gallops. No LE edema  Abd:  +bowel sounds, soft, NTTP, nondistended  Neuro: Face symmetric, speech fluent, diffuse weakness  Psych:  Alert, oriented x 3.  Fair insight     Medications Reviewed: (see below)    Lab Data Reviewed: (see below)    ______________________________________________________________________    Medications:     Current Facility-Administered Medications   Medication Dose Route Frequency    insulin NPH (NOVOLIN N, HUMULIN N) injection 15 Units  15 Units SubCUTAneous ACB&D    dexAMETHasone (DECADRON) tablet 4 mg  4 mg Oral Q12H    sodium chloride (OCEAN) 0.65 % nasal squeeze bottle 2 Spray  2 Spray Both Nostrils Q2H PRN    LORazepam (ATIVAN) tablet 0.5 mg  0.5 mg Oral BID PRN    metoprolol tartrate (LOPRESSOR) tablet 12.5 mg  12.5 mg Oral BID    guaiFENesin-dextromethorphan (ROBITUSSIN DM) 100-10 mg/5 mL syrup 5 mL  5 mL Oral Q6H PRN    rivaroxaban (XARELTO) tablet 20 mg  20 mg Oral DAILY WITH DINNER    [Held by provider] lisinopriL (PRINIVIL, ZESTRIL) tablet 10 mg  10 mg Oral QHS    atorvastatin (LIPITOR) tablet 20 mg  20 mg Oral DAILY    glucose chewable tablet 16 g  4 Tablet Oral PRN    dextrose (D50W) injection syrg 12.5-25 g  25-50 mL IntraVENous PRN    glucagon (GLUCAGEN) injection 1 mg  1 mg IntraMUSCular PRN    insulin lispro (HUMALOG) injection   SubCUTAneous AC&HS    sodium chloride (NS) flush 5-40 mL  5-40 mL IntraVENous Q8H    sodium chloride (NS) flush 5-40 mL  5-40 mL IntraVENous PRN    acetaminophen (TYLENOL) tablet 650 mg  650 mg Oral Q6H PRN    Or    acetaminophen (TYLENOL) suppository 650 mg  650 mg Rectal Q6H PRN    polyethylene glycol (MIRALAX) packet 17 g  17 g Oral DAILY PRN    ondansetron (ZOFRAN ODT) tablet 4 mg  4 mg Oral Q8H PRN    Or    ondansetron (ZOFRAN) injection 4 mg  4 mg IntraVENous Q6H PRN            Lab Review:     Recent Labs     01/26/22  0123   WBC 14.2*   HGB 15.4   HCT 44.1        Recent Labs     01/26/22  0123   *   K 4.4      CO2 28   *   BUN 31*   CREA 0.78   CA 8.4*   MG 2.1   ALB 2.6*   ALT 32     No components found for: GLPOC  No results for input(s): PH, PCO2, PO2, HCO3, FIO2 in the last 72 hours. No results for input(s): INR, INREXT, INREXT in the last 72 hours.   No results found for: SDES  Lab Results   Component Value Date/Time    Culture result: Culture performed on Fluid swab specimen 11/25/2021 08:22 PM    Culture result: NO GROWTH 4 DAYS 11/25/2021 08:22 PM              ___________________________________________________    Attending Physician: Shane Cedeño MD

## 2022-01-28 NOTE — PROGRESS NOTES
Problem: Self Care Deficits Care Plan (Adult)  Goal: *Acute Goals and Plan of Care (Insert Text)  Description: FUNCTIONAL STATUS PRIOR TO ADMISSION: Patient was independent and active without use of DME.     HOME SUPPORT: The patient lived with grandson but did not require assist.    Occupational Therapy Goals  Initiated 1/17/2022; Goals reviewed 1/24/2022, all remain appropriate. 1. Patient will perform grooming and bathing with supervision standing at the sink with < or = 2 rest breaks and maintain oxygen sats > or = 90% within 7 day(s). 2. Patient will perform upper body dressing and lower body dressing with supervision with < or = 2 rest breaks and maintain oxygen sats > or = 90%  within 7 day(s). 3. Patient will perform toileting and toilet transfer with supervision and maintain oxygen sats > or = 90% within 7 day(s). 4. Patient will demonstrate pursed lip breathing with min cues during functional tasks within 7 day(s). 5. Patient will independently perform and recall home exercise program while maintaining O2 sats at or above 90% within 7 days. 6. Patient will verbalize/demonstrate all energy conservation techniques during ADL tasks with independence within 7 days. Outcome: Progressing Towards Goal   OCCUPATIONAL THERAPY TREATMENT  Patient: Neymar Skelton (34 y.o. male)  Date: 1/28/2022  Diagnosis: Acute respiratory failure with hypoxia ([facility] [address] [J96.01]  Pneumonia due to COVID-19 virus [U07.1, J12.82] <principal problem not specified>       Precautions: Fall  Chart, occupational therapy assessment, plan of care, and goals were reviewed. ASSESSMENT  Patient continues with skilled OT services and is progressing towards goals. Pt received on 10 L mid flow, sats decrease with activity to 80%, pursed lip breathing to increase to 88%. Pt transfers to chair and bathing and dressing UB stand by assist. He needs cues for pursed lip breathing and to conserve energy during ADL tasks.     Current Level of Function Impacting Discharge (ADLs): stand by assist bathe and dress UB    Other factors to consider for discharge:          PLAN :  Patient continues to benefit from skilled intervention to address the above impairments. Continue treatment per established plan of care to address goals. Recommend with staff: out of bed to chair for ADl's, there ex, there act, meals    Recommend next OT session: cont towards goals    Recommendation for discharge: (in order for the patient to meet his/her long term goals)  Occupational therapy at least 2 days/week in the home vs TBD    This discharge recommendation:  Has not yet been discussed the attending provider and/or case management    IF patient discharges home will need the following DME:        SUBJECTIVE:   Patient stated I can't hear    OBJECTIVE DATA SUMMARY:   Cognitive/Behavioral Status:  Neurologic State: Alert  Orientation Level: Oriented X4  Cognition: Appropriate decision making; Appropriate for age attention/concentration; Appropriate safety awareness             Functional Mobility and Transfers for ADLs:  Bed Mobility:  Supine to Sit: Minimum assistance    Transfers:        Bed to Chair: Minimum assistance    Balance:  Sitting: Intact  Standing: With support    ADL Intervention:       Grooming  Washing Face: Set-up    Upper Body Bathing  Bathing Assistance: Stand-by assistance         Upper Body Dressing Assistance  Dressing Assistance: Stand-by assistance  Hospital Gown: Stand-by assistance    Lower Body Dressing Assistance  Dressing Assistance: Set-up  Socks: Set-up  Leg Crossed Method Used: Yes  Position Performed: Long sitting on bed         Activity Tolerance:   Fair    After treatment patient left in no apparent distress:   Sitting in chair and Call bell within reach    COMMUNICATION/COLLABORATION:   The patients plan of care was discussed with: Occupational therapist and Registered nurse.      SHARLA Pete  Time Calculation: 23 mins

## 2022-01-28 NOTE — PROGRESS NOTES
Care Management follow up, LOS 17 days     Patient admitted for acute hypoxic respiratory failure, COVID19, pneumonia. History of: afib, diabetes, HTN, carotid artery stenosis, aortic stenosis, CVA  COVID Vaccine:  Yes x 2, no booster        RUR 14 (Score %) moderate    Is This a Readmission NO  Is this a Bundle NO     Current status  Patient discussed during interdisciplinary rounds. Patient continues to require medical management including oxygen at 10L/mid flow. Continues on po decadron.  Ongoing assessment and monitoring. Family prefers the patient to return home with home health services. Patient accepted by [facility] for follow up at [place]. CM will monitor progress.     Transition of Care Plan  1. Monitor patient status and response to treatment. 2. Patient continues to require medical management. 3. Continues on mid flow flow oxygen and IV decadron. 4. Patient accepted by [facility]  5. Patient has [employer]. 6. Will monitor for home oxygen needs. 7. Patient lives with his grandson and great grandson, family members to assist with care at home. 8. CM to monitor progress and recommendations.     Viviana Hassan RN, MSN/Care manager  [phone]

## 2022-01-29 NOTE — PROGRESS NOTES
0700  Bedside and Verbal shift change report given to Stacey Pandya (oncoming nurse) by Geneva Parrish (offgoing nurse). Report included the following information SBAR, Kardex, Procedure Summary, Intake/Output, MAR, Accordion, Recent Results and Cardiac Rhythm v paced. Initial assessment done. AOX4. No complaints of pain. Will continue to monitor. Visit Vitals  BP 91/60 (BP 1 Location: Right upper arm, BP Patient Position: At rest)   Pulse 80   Temp 97.7 °F (36.5 °C)   Resp 15   Ht 5' 8\" (1.727 m)   Wt 65 kg (143 lb 3.2 oz)   SpO2 92%   BMI 21.77 kg/m²     1932  Bedside and Verbal shift change report given to Benoit Moreau (oncoming nurse) by Eun Esposito RN (offgoing nurse). Report included the following information SBAR, Kardex, Procedure Summary, Intake/Output, MAR, Accordion and Recent Results.

## 2022-01-29 NOTE — PROGRESS NOTES
Carlos Eduardo Kingston [facility] 79  [address] [facility] [place], [address] [place] [address]  [phone]      Medical Progress Note      NAME: Micky Murray   :  1940  MRM:  [id number]    Date/Time: 2022        Assessment / Plan:     80 yo M w/ hx of AF, HTN, DM presenting w/ fatigue, dyspneic, admitted for AHRF 2/2 COVID-19    #Acute respiratory failure with hypoxia: 2/2 COVID pneumonia. S/p baricitinib. LOS 18 days. Had been on HFNC, then down to as low as 6L on , but acutely worsened and back on HFNC ; now improved again to 10L. As low as 6 this morning. 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 Ddimer, procal, CRP all negative. I suspect he will either have progressive fibrosis or improve slightly to the point of needing oxygen lifelong. Will assess daily, dispo mid next week potentially   - Dex 10mg BID     - Reduced to 6mg BID     - To 4mg BID    - Rivarox   - Cont PT, OT; IS, supportive care; NS nasal spray   - Can dc precautions     #Aortic stenosis: moderate, based on echo in 2020 but repeat TTE  showing mild stenosis. Monitor volume status. Low c/f flash pulm edema above    #VT: hx of NSVT. Noted . Monitor electrolytes. Cardiology evaluated. Continue BB     #Elevated bilirubin: stable. . ABD US w/ steastosis     #Type 2 diabetes mellitus without complication: S1U 6.4%. BG high 2/2 steroids. Cont NPH, cont SSI     #SOHAIL: mild, resolved. Currently holding lisinopril. Avoid hypotension    #A-fib/ Pacemaker: monitor on telemetry, cont Xarelto    #History of CVA (cerebrovascular accident): cont OAC, statin    #Hypertension: currently holding lisinopril                     Care Plan discussed with: Patient, Nursing Staff and >50% of time spent in counseling and coordination of care    Discussed:  Care Plan    Prophylaxis:  Xarelto    Disposition:   inpt rehab versus HH          Subjective:     Chief Complaint:  Doris Arias. Feels very well today.  Able to wean to 6L this morning, but required more with minimal activity such as eating breakfast and talking. Objective:       Vitals:        Last 24hrs VS reviewed since prior progress note. Most recent are:    Visit Vitals  BP 98/66 (BP 1 Location: Right upper arm, BP Patient Position: At rest)   Pulse 60   Temp 97.5 °F (36.4 °C)   Resp 14   Ht 5' 8\" (1.727 m)   Wt 65 kg (143 lb 3.2 oz)   SpO2 95%   BMI 21.77 kg/m²     SpO2 Readings from Last 6 Encounters:   01/29/22 95%   11/26/21 95%   11/19/21 93%   05/28/21 95%   02/06/20 98%   02/02/20 93%    O2 Flow Rate (L/min): 15 l/min       Intake/Output Summary (Last 24 hours) at 1/29/2022 1130  Last data filed at 1/29/2022 0735  Gross per 24 hour   Intake 120 ml   Output 660 ml   Net -540 ml          Exam:     Physical Exam:    Gen:  Elderly, well-appearing, NAD   HEENT:  Atraumatic, normocephalic, Sclerae nonicteric, hard of hearing   Neck:  Supple, without apparent masses  Resp:  No accessory muscle use,  diminished breath sounds b/l   Card: RRR, 2/6 systolic murmur, No rubs or gallops. No LE edema  Abd:  +bowel sounds, soft, NTTP, nondistended  Neuro: Face symmetric, speech fluent, diffuse weakness  Psych:  Alert, oriented x 3.  Fair insight     Medications Reviewed: (see below)    Lab Data Reviewed: (see below)    ______________________________________________________________________    Medications:     Current Facility-Administered Medications   Medication Dose Route Frequency    insulin NPH (NOVOLIN N, HUMULIN N) injection 15 Units  15 Units SubCUTAneous ACB&D    dexAMETHasone (DECADRON) tablet 4 mg  4 mg Oral Q12H    sodium chloride (OCEAN) 0.65 % nasal squeeze bottle 2 Spray  2 Spray Both Nostrils Q2H PRN    LORazepam (ATIVAN) tablet 0.5 mg  0.5 mg Oral BID PRN    metoprolol tartrate (LOPRESSOR) tablet 12.5 mg  12.5 mg Oral BID    guaiFENesin-dextromethorphan (ROBITUSSIN DM) 100-10 mg/5 mL syrup 5 mL  5 mL Oral Q6H PRN    rivaroxaban (XARELTO) tablet 20 mg  20 mg Oral DAILY WITH DINNER    [Held by provider] lisinopriL (PRINIVIL, ZESTRIL) tablet 10 mg  10 mg Oral QHS    atorvastatin (LIPITOR) tablet 20 mg  20 mg Oral DAILY    glucose chewable tablet 16 g  4 Tablet Oral PRN    dextrose (D50W) injection syrg 12.5-25 g  25-50 mL IntraVENous PRN    glucagon (GLUCAGEN) injection 1 mg  1 mg IntraMUSCular PRN    insulin lispro (HUMALOG) injection   SubCUTAneous AC&HS    sodium chloride (NS) flush 5-40 mL  5-40 mL IntraVENous Q8H    sodium chloride (NS) flush 5-40 mL  5-40 mL IntraVENous PRN    acetaminophen (TYLENOL) tablet 650 mg  650 mg Oral Q6H PRN    Or    acetaminophen (TYLENOL) suppository 650 mg  650 mg Rectal Q6H PRN    polyethylene glycol (MIRALAX) packet 17 g  17 g Oral DAILY PRN    ondansetron (ZOFRAN ODT) tablet 4 mg  4 mg Oral Q8H PRN    Or    ondansetron (ZOFRAN) injection 4 mg  4 mg IntraVENous Q6H PRN            Lab Review:     No results for input(s): WBC, HGB, HCT, PLT, HGBEXT, HCTEXT, PLTEXT, HGBEXT, HCTEXT, PLTEXT in the last 72 hours. No results for input(s): NA, K, CL, CO2, GLU, BUN, CREA, CA, MG, PHOS, ALB, TBIL, ALT, INR, INREXT, INREXT in the last 72 hours. No lab exists for component: SGOT  No components found for: GLPOC  No results for input(s): PH, PCO2, PO2, HCO3, FIO2 in the last 72 hours. No results for input(s): INR, INREXT, INREXT in the last 72 hours.   No results found for: SDES  Lab Results   Component Value Date/Time    Culture result: Culture performed on Fluid swab specimen 11/25/2021 08:22 PM    Culture result: NO GROWTH 4 DAYS 11/25/2021 08:22 PM              ___________________________________________________    Attending Physician: Vivi Allan MD

## 2022-01-29 NOTE — PROGRESS NOTES
Bedside shift change report given to [facility] (oncoming nurse) by Moisés Yanez RN (offgoing nurse). Report included the following information SBAR, Kardex, ED Summary, Intake/Output, and Recent Results. Bedside shift change report given to [address] [place] [id number] (oncoming nurse) by [facility] (offgoing nurse). Report included the following information SBAR, Kardex, ED Summary, Intake/Output, and Recent Results. This patient was assisted with Intentional Toileting every 2 hours during this shift as appropriate. Documentation of ambulation and output reflected on Flowsheet as appropriate. Purposeful hourly rounding was completed using AIDET and 5Ps. Outcomes of PHR documented as they occurred. Bed alarm in use as appropriate. Dual Suction and ambubag in place.

## 2022-01-30 NOTE — PROGRESS NOTES
Carlos Eduardo Kingston [facility] 79  [address] [facility] [place], [address] [place] [address]  [phone]      Medical Progress Note      NAME: Baron Cornejo   :  1940  MRM:  [id number]    Date/Time: 2022        Assessment / Plan:     80 yo M w/ hx of AF, HTN, DM presenting w/ fatigue, dyspneic, admitted for AHRF 2/2 COVID-19    #Acute respiratory failure with hypoxia: 2/2 COVID pneumonia. S/p baricitinib. LOS 19 days. Had been on HFNC, then down to as low as 6L on , but acutely worsened and back on HFNC ; now improved again to 7L. As low as. 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 Ddimer, procal, CRP all negative. I suspect he will either have progressive fibrosis or improve slightly to the point of needing oxygen lifelong. Will assess daily, dispo mid next week potentially. Prefers not to go to rehab/snf, but may be limited by how low we can get his oxygen.    - Dex 10mg BID -    - Reduced to 6mg BID     - To 4mg BID    - Rivarox   - Cont PT, OT; IS, supportive care; NS nasal spray   - Can dc precautions     #Aortic stenosis: moderate, based on echo in 2020 but repeat TTE  showing mild stenosis. Monitor volume status. Low c/f flash pulm edema above    #VT: hx of NSVT. Noted . Monitor electrolytes. Cardiology evaluated. Continue BB     #Elevated bilirubin: stable. . ABD US w/ steastosis     #Type 2 diabetes mellitus without complication: Z8M 8.0%. BG high 2/2 steroids. Cont NPH, cont SSI     #SOHAIL: mild, resolved. Currently holding lisinopril.  Avoid hypotension    #A-fib/ Pacemaker: monitor on telemetry, cont Xarelto    #History of CVA (cerebrovascular accident): cont OAC, statin    #Hypertension: currently holding lisinopril                     Care Plan discussed with: Patient, Nursing Staff and >50% of time spent in counseling and coordination of care    Discussed:  Care Plan    Prophylaxis:  Xarelto    Disposition:   in rehab versus  Subjective:     Chief Complaint:  Guy Patel. Feels ok today. He is a little nervous about intermittently needing more oxygen. Still doesn't want to go to rehab, but will do what we recommend. Objective:       Vitals:        Last 24hrs VS reviewed since prior progress note. Most recent are:    Visit Vitals  /68 (BP 1 Location: Right upper arm, BP Patient Position: At rest)   Pulse 76   Temp 97.6 °F (36.4 °C)   Resp 20   Ht 5' 8\" (1.727 m)   Wt 64.5 kg (142 lb 4.8 oz)   SpO2 91%   BMI 21.64 kg/m²     SpO2 Readings from Last 6 Encounters:   01/30/22 91%   11/26/21 95%   11/19/21 93%   05/28/21 95%   02/06/20 98%   02/02/20 93%    O2 Flow Rate (L/min): 7 l/min       Intake/Output Summary (Last 24 hours) at 1/30/2022 1316  Last data filed at 1/30/2022 1148  Gross per 24 hour   Intake 120 ml   Output 1250 ml   Net -1130 ml          Exam:     Physical Exam:    Gen:  Elderly, well-appearing, NAD   HEENT:  Atraumatic, normocephalic, Sclerae nonicteric, hard of hearing   Neck:  Supple, without apparent masses  Resp:  No accessory muscle use,  diminished breath sounds b/l   Card: RRR, 2/6 systolic murmur, No rubs or gallops. No LE edema  Abd:  +bowel sounds, soft, NTTP, nondistended  Neuro: Face symmetric, speech fluent, diffuse weakness  Psych:  Alert, oriented x 3.  Fair insight     Medications Reviewed: (see below)    Lab Data Reviewed: (see below)    ______________________________________________________________________    Medications:     Current Facility-Administered Medications   Medication Dose Route Frequency    insulin NPH (NOVOLIN N, HUMULIN N) injection 20 Units  20 Units SubCUTAneous ACB&D    dexAMETHasone (DECADRON) tablet 4 mg  4 mg Oral Q12H    sodium chloride (OCEAN) 0.65 % nasal squeeze bottle 2 Spray  2 Spray Both Nostrils Q2H PRN    LORazepam (ATIVAN) tablet 0.5 mg  0.5 mg Oral BID PRN    metoprolol tartrate (LOPRESSOR) tablet 12.5 mg  12.5 mg Oral BID    guaiFENesin-dextromethorphan (ROBITUSSIN DM) 100-10 mg/5 mL syrup 5 mL  5 mL Oral Q6H PRN    rivaroxaban (XARELTO) tablet 20 mg  20 mg Oral DAILY WITH DINNER    [Held by provider] lisinopriL (PRINIVIL, ZESTRIL) tablet 10 mg  10 mg Oral QHS    atorvastatin (LIPITOR) tablet 20 mg  20 mg Oral DAILY    glucose chewable tablet 16 g  4 Tablet Oral PRN    dextrose (D50W) injection syrg 12.5-25 g  25-50 mL IntraVENous PRN    glucagon (GLUCAGEN) injection 1 mg  1 mg IntraMUSCular PRN    insulin lispro (HUMALOG) injection   SubCUTAneous AC&HS    sodium chloride (NS) flush 5-40 mL  5-40 mL IntraVENous Q8H    sodium chloride (NS) flush 5-40 mL  5-40 mL IntraVENous PRN    acetaminophen (TYLENOL) tablet 650 mg  650 mg Oral Q6H PRN    Or    acetaminophen (TYLENOL) suppository 650 mg  650 mg Rectal Q6H PRN    polyethylene glycol (MIRALAX) packet 17 g  17 g Oral DAILY PRN    ondansetron (ZOFRAN ODT) tablet 4 mg  4 mg Oral Q8H PRN    Or    ondansetron (ZOFRAN) injection 4 mg  4 mg IntraVENous Q6H PRN            Lab Review:     No results for input(s): WBC, HGB, HCT, PLT, HGBEXT, HCTEXT, PLTEXT, HGBEXT, HCTEXT, PLTEXT in the last 72 hours. No results for input(s): NA, K, CL, CO2, GLU, BUN, CREA, CA, MG, PHOS, ALB, TBIL, ALT, INR, INREXT, INREXT in the last 72 hours. No lab exists for component: SGOT  No components found for: GLPOC  No results for input(s): PH, PCO2, PO2, HCO3, FIO2 in the last 72 hours. No results for input(s): INR, INREXT, INREXT in the last 72 hours.   No results found for: SDES  Lab Results   Component Value Date/Time    Culture result: Culture performed on Fluid swab specimen 11/25/2021 08:22 PM    Culture result: NO GROWTH 4 DAYS 11/25/2021 08:22 PM              ___________________________________________________    Attending Physician: Vivi Allan MD

## 2022-01-30 NOTE — ROUTINE PROCESS
1900 - Bedside shift change report given to [address] RN (oncoming nurse) by Christiane Lennox, RN (offgoing nurse). Report included the following information SBAR, Kardex, Intake/Output, MAR, Accordion and Recent Results. 0700 - Bedside shift change report given to Roman Eller RN (oncoming nurse) by Ede Gonzalez RN (offgoing nurse). Report included the following information SBAR, Kardex, Intake/Output, MAR, Accordion and Recent Results.

## 2022-01-30 NOTE — PROGRESS NOTES
Oxygen saturations slowly downtrended since about 5pm.  Flow increased to 10L, then 12, then 15. Shortly after 6pm saturations on 15L dropped to 83-85%. (Patient states he feels fine and is breathing comfortably.) Respiratory notified and states to continue current treatment if patient can maintain oxygen saturations 85% and above. Will continue to monitor. 1900:  Patient now 90% on 15L. Bedside and Verbal shift change report given to [address] (oncoming nurse) by Leanna Spaulding (offgoing nurse). Report included the following information SBAR, Kardex, MAR, Accordion and Recent Results.

## 2022-01-30 NOTE — PROGRESS NOTES
Problem: Breathing Pattern - Ineffective  Goal: Ability to achieve and maintain a regular respiratory rate  Outcome: Progressing Towards Goal     Problem: Isolation Precautions - Risk of Spread of Infection  Goal: Prevent transmission of infectious organism to others  Outcome: Progressing Towards Goal

## 2022-01-30 NOTE — PROGRESS NOTES
Reprt given to [address] RN. Telemetry status, stable evening, resting quietly, and watching movies on laptop. VSS. Sat % with O2 10L/M NC in use. OOB to BSC, de-sats to mid 80's with activity. See shift assessment and interventions.

## 2022-01-31 NOTE — PROGRESS NOTES
0700: Bedside shift change report given to [address] [place] [address] (oncoming nurse) by Arabella Vigil RN (offgoing nurse). Report included the following information SBAR, Kardex, Intake/Output, MAR, Accordion and Cardiac Rhythm V Paced. This patient was assisted with Intentional Toileting every 2 hours during this shift as appropriate. Documentation of ambulation and output reflected on Flowsheet as appropriate. Purposeful hourly rounding was completed using AIDET and 5Ps. Outcomes of PHR documented as they occurred. Bed alarm in use as appropriate. Dual Suction and ambubag in place. [phone]: Spoke with infection control and Dr. Varsha Mcdonald regarding isolation precautions. Patient has been here for 3 weeks, has been afebrile, and meets criteria for precautions to be discontinued. Dr. Varsha Mcdonald and infection control in agreement to DC precautions.

## 2022-01-31 NOTE — ROUTINE PROCESS
1900 - Bedside shift change report given to [address] RN (oncoming nurse) by Jarret Morales RN (offgoing nurse). Report included the following information SBAR, Kardex, Intake/Output, MAR, Accordion and Recent Results. 2040 - Patient placed on hi-flow. Having anxiety about worsening oxygen saturation. Will administer ativan to help with anxiety. 0700 - Bedside shift change report given to Jaimee Welch RN (oncoming nurse) by Paz Arana RN (offgoing nurse). Report included the following information SBAR, Kardex, Intake/Output, MAR, Accordion and Recent Results. normal

## 2022-01-31 NOTE — PROGRESS NOTES
Occupational Therapy    Completed chart review and attempted to see for OT weekly re-assessment. Nursing cleared for therapy. Patient resting in bed. Reports he tolerated in chair approx 3 hours following PT. He politely declined OT session at this time secondary to fatigue. He asked to return tomorrow. Will continue to follow for OT.      Thank you,    Hugh Ramos, OT

## 2022-01-31 NOTE — PROGRESS NOTES
[facility]  [address] [facility], [place], [address] [place] [address]  [phone]      Medical Progress Note      NAME: Sherie Vázquez   :  1940  MRM:  [id number]    Date/Time: 2022        Assessment / Plan:     78 yo M w/ hx of AF, HTN, DM presenting w/ fatigue, dyspneic, admitted for AHRF 2/2 COVID-19     AHRF: 2/2 pneumonia due to COVID-19 infection. s/p baricitinib. Now on 35L. CXR repeated  showing persistent bilateral pneumonia consistent with COVID pneumonia. Areas consolidation have increased, low procalcitonin. Would have low threshold to start empiric broad-spectrum abx. May have progressive fibrosis or improve slightly to the point of needing lifelong O2. Prefers not to go to rehab/snf, but may be limited by O2 requirement. Continue dexamethasone, Xarelto. Pulmonology following. Overall prognosis remains guarded     Aortic stenosis: TTE  showed mild stenosis. Monitor volume status     VT: hx of NSVT. Noted . Cardiology evaluated. Monitor electrolytes. Continue BB      Type 2 diabetes mellitus without complication: H6V 5.8%. BG high 2/2 steroids. Cont NPH, SSI      SOHAIL: mild, resolved. Currently holding lisinopril. Avoid hypotension     A-fib/ Pacemaker: monitor on telemetry. On Xarelto     History of CVA (cerebrovascular accident): cont OAC, statin     Hypertension: currently holding lisinopril       Total time spent:  35 minutes  Time spent in the care of this patient including reviewing records, discussing with nursing and/or other providers on the treatment team, obtaining history and examining the patient, and discussing treatment plans.                   Care Plan discussed with: Patient, Nursing Staff and >50% of time spent in counseling and coordination of care    Discussed:  Care Plan and D/C Planning    Prophylaxis:  Xarelto    Disposition:  TBD         Subjective:     Chief Complaint:  Follow up COVID    Chart/notes/labs/studies reviewed, patient examined. Feels okay. No CP. Mild dyspnea. No fevers        Objective:       Vitals:        Last 24hrs VS reviewed since prior progress note. Most recent are:    Visit Vitals  BP (!) 110/59 (BP 1 Location: Right upper arm, BP Patient Position: At rest)   Pulse 78   Temp 97.6 °F (36.4 °C)   Resp 23   Ht 5' 8\" (1.727 m)   Wt 64.5 kg (142 lb 4.8 oz)   SpO2 94%   BMI 21.64 kg/m²     SpO2 Readings from Last 6 Encounters:   01/31/22 94%   11/26/21 95%   11/19/21 93%   05/28/21 95%   02/06/20 98%   02/02/20 93%    O2 Flow Rate (L/min): 35 l/min       Intake/Output Summary (Last 24 hours) at 1/31/2022 1857  Last data filed at 1/31/2022 1610  Gross per 24 hour   Intake 240 ml   Output 700 ml   Net -460 ml          Exam:     Physical Exam:    Gen:  Elderly, chronically ill-appearing. NAD. Pleasant  HEENT:  Atraumatic, normocephalic. Sclerae nonicteric, hearing intact to voice  Neck:  Supple, without apparent masses  Resp:  No accessory muscle use, increased WOB. Mildly diminished without wheezes, rales, or rhonchi  Card: RRR, without m/r/g. No LE edema  Abd:  +bowel sounds, soft, NTTP, nondistended. No HSM  Neuro: Face symmetric, speech fluent, follows commands appropriately, no focal weakness or numbness  Psych:  Alert, oriented x 3.  Fair insight     Medications Reviewed: (see below)    Lab Data Reviewed: (see below)    ______________________________________________________________________    Medications:     Current Facility-Administered Medications   Medication Dose Route Frequency    insulin NPH (NOVOLIN N, HUMULIN N) injection 20 Units  20 Units SubCUTAneous ACB&D    artificial tears (dextran-hypromellose-glycerin) (GENTEAL) ophthalmic solution 1 Drop  1 Drop Both Eyes PRN    dexAMETHasone (DECADRON) tablet 4 mg  4 mg Oral Q12H    sodium chloride (OCEAN) 0.65 % nasal squeeze bottle 2 Spray  2 Spray Both Nostrils Q2H PRN    LORazepam (ATIVAN) tablet 0.5 mg  0.5 mg Oral BID PRN    metoprolol tartrate (LOPRESSOR) tablet 12.5 mg  12.5 mg Oral BID    guaiFENesin-dextromethorphan (ROBITUSSIN DM) 100-10 mg/5 mL syrup 5 mL  5 mL Oral Q6H PRN    rivaroxaban (XARELTO) tablet 20 mg  20 mg Oral DAILY WITH DINNER    [Held by provider] lisinopriL (PRINIVIL, ZESTRIL) tablet 10 mg  10 mg Oral QHS    atorvastatin (LIPITOR) tablet 20 mg  20 mg Oral DAILY    glucose chewable tablet 16 g  4 Tablet Oral PRN    dextrose (D50W) injection syrg 12.5-25 g  25-50 mL IntraVENous PRN    glucagon (GLUCAGEN) injection 1 mg  1 mg IntraMUSCular PRN    insulin lispro (HUMALOG) injection   SubCUTAneous AC&HS    sodium chloride (NS) flush 5-40 mL  5-40 mL IntraVENous Q8H    sodium chloride (NS) flush 5-40 mL  5-40 mL IntraVENous PRN    acetaminophen (TYLENOL) tablet 650 mg  650 mg Oral Q6H PRN    Or    acetaminophen (TYLENOL) suppository 650 mg  650 mg Rectal Q6H PRN    polyethylene glycol (MIRALAX) packet 17 g  17 g Oral DAILY PRN    ondansetron (ZOFRAN ODT) tablet 4 mg  4 mg Oral Q8H PRN    Or    ondansetron (ZOFRAN) injection 4 mg  4 mg IntraVENous Q6H PRN            Lab Review:     Recent Labs     01/31/22  0200   WBC 10.1   HGB 14.7   HCT 43.2        Recent Labs     01/31/22  0200   *   K 4.4      CO2 30   *   BUN 27*   CREA 0.80   CA 8.3*   MG 1.1*   PHOS 2.9   ALB 2.5*   ALT 31     No components found for: GLPOC  No results for input(s): PH, PCO2, PO2, HCO3, FIO2 in the last 72 hours. No results for input(s): INR, INREXT in the last 72 hours.   No results found for: SDES  Lab Results   Component Value Date/Time    Culture result: Culture performed on Fluid swab specimen 11/25/2021 08:22 PM    Culture result: NO GROWTH 4 DAYS 11/25/2021 08:22 PM              ___________________________________________________    Attending Physician: Bairon Michelle MD

## 2022-01-31 NOTE — PROGRESS NOTES
Problem: Mobility Impaired (Adult and Pediatric)  Goal: *Acute Goals and Plan of Care (Insert Text)  Description: FUNCTIONAL STATUS PRIOR TO ADMISSION: Patient was independent and active without use of DME.    HOME SUPPORT PRIOR TO ADMISSION: The patient lived with son and grandson but did not require assist.    7-day re-assessment 1/28/2022  1. Patient will move from supine to sit and sit to supine  in bed with modified independence within 7 day(s). 2.  Patient will transfer from bed to chair and chair to bed with supervision/set-up using the least restrictive device within 7 day(s). 3.  Patient will perform sit to stand with supervision/set-up within 7 day(s). 4.  Patient will ambulate with contact guard assistance for 50 feet with the least restrictive device within 7 day(s). Initiated 1/17/2022  1. Patient will move from supine to sit and sit to supine  in bed with modified independence within 7 day(s). 2.  Patient will transfer from bed to chair and chair to bed with modified independence using the least restrictive device within 7 day(s). 3.  Patient will perform sit to stand with modified independence within 7 day(s). 4.  Patient will ambulate with modified independence for 150 feet with the least restrictive device within 7 day(s). 5.  Patient will ascend/descend 6 stairs with 2 handrail(s) with modified independence within 7 day(s). Outcome: Progressing Towards Goal  Note:   PHYSICAL THERAPY TREATMENT  Patient: Ambrosio Rocha (89 y.o. male)  Date: 1/31/2022  Diagnosis: [facility] [J96.01]  Pneumonia due to COVID-19 virus [U07.1, J12.82] <principal problem not specified>       Precautions: Fall  Chart, physical therapy assessment, plan of care and goals were reviewed. ASSESSMENT  Patient continues with skilled PT services and is progressing towards goals. Patient now back on HighFlow from over the weekend.   25L at 100%, patient was uptitarated for PT session to 30L at 100%. Patient stating feelings of depression today, nursing and MD notified. Patient able to ambulate short distance forward and backward (limited by tubing). Initial ambulation without assistive device and required MOD A for steadying due to trunk sway, posterior loss of balance. Patient with RW able to demonstrate imrpvoed ambulation with MIN A for balance. Patient was able to repeat 4 trails of 10 feet ambulation, with seated rest break between for recovery. Transferred to chair at end of session. .     Current Level of Function Impacting Discharge (mobility/balance): MIN A with RW for short distance ambulation    Other factors to consider for discharge:          PLAN :  Patient continues to benefit from skilled intervention to address the above impairments. Continue treatment per established plan of care. to address goals. Recommendation for discharge: (in order for the patient to meet his/her long term goals)  Therapy 3 hours per day 5-7 days per week    This discharge recommendation:  Has been made in collaboration with the attending provider and/or case management    IF patient discharges home will need the following DME: to be determined (TBD)       SUBJECTIVE:   Patient stated people just dont know how long 30 days on your back really is.     OBJECTIVE DATA SUMMARY:   Critical Behavior:  Neurologic State: Alert  Orientation Level: Oriented X4  Cognition: Follows commands  Safety/Judgement: Awareness of environment,Fall prevention  Functional Mobility Training:  Bed Mobility:  Rolling: Contact guard assistance  Supine to Sit: Contact guard assistance              Transfers:  Sit to Stand: Minimum assistance  Stand to Sit: Minimum assistance        Bed to Chair: Minimum assistance                    Balance:  Sitting - Static: Good (unsupported)  Sitting - Dynamic: Good (unsupported)  Standing: Impaired  Standing - Static: Constant support  Standing - Dynamic : Constant support  Ambulation/Gait Training:  Distance (ft): 10 Feet (ft)  Assistive Device: Walker, rolling;Gait belt  Ambulation - Level of Assistance: Minimal assistance; Moderate assistance        Gait Abnormalities: Step to gait; Decreased step clearance; Path deviations           Pain Rating:  None reported    Activity Tolerance:   Good and Fair    After treatment patient left in no apparent distress:   Sitting in chair, Call bell within reach, and Bed / chair alarm activated    COMMUNICATION/COLLABORATION:   The patients plan of care was discussed with: Registered nurse.  MD Shelbi Stallworth, PT, DPT   Time Calculation: 48 mins

## 2022-01-31 NOTE — PROGRESS NOTES
Name: Ben Maxwell: [address]   : 1940 Admit Date: 2022   Phone: [phone]  Room: [facility]/01   PCP: Héctor Sesay MD  MRN: [id number]   Date: 2022  Code: Full Code          Chart and notes reviewed. Data reviewed. I review the patient's current medications in the medical record at each encounter.  I have evaluated and examined the patient. History of Present Illness:  Tracee Sidhu is a very pleasant, 80 yo gentleman that presented to [facility] on  with worsening shortness of breath. He has a PMH of a.fib, aortic stenosis, DM, hypertension, CVA, and tobacco abuse (100 pack years, quit in ). He received two doses of the Covid vaccine, last in 2021. He did not receive a booster dose. He is currently requiring hi flow at 50L/100%. He reports that his symptoms started five days ago after he was exposed to his grandson who tested positive. He became more progressively more SOB with increased cough. No fever/chills. He did initially have diarrhea. Images:  Personally visualized and report reviewed. CXR :  Superimposed patchy bilateral airspace disease on interstitial changes concerning for infection or inflammation      D-dimer . 55  LFTs stable  Procalcitonin negative  Ferritin 759  Trop wnl  Pro-  CRP 3.18  ECHO 2020:  EF 55-60%, mildly reduced systolic function, moderate aortic stenosis, trave MVR, moderately elevated CVP. Interval History:  Afebrile  BP stable  Sats 97% on 30L FIO2 80%   WBC 10.1  Better  D-dimer 0.23  Na 133  CRP 1.04 - bumped up  Ferritin 788  LFTs stable  PCT < 0.05   proBNP 308--better  1100 mL UOP documented    Chest CT  personally reviewed: Emphysema with peripheral blebs. Septal thickening and possible fibrosis in the lung bases--limited by motion.     ECHO : EF 45-50%, mild global hypokinesis present, mildly reduced systolic function, mild AV stenosis    Abdominal US : Limited study. Possible hepatic steatosis. Cholecystectomy. No visible biliary tree. ROS:  Sitting up in bed doing crossword puzzles on his computer. Has no specific complaints this morning. States overnight he became more SOB, HF helpful. Denies fever or chills. Denies CP. A bit discouraged that he isn't getting better. Past Medical History:   Diagnosis Date    Atrial fibrillation ([address]     Diabetes mellitus, type 2 ([facility] [address]     Hypertension     Internal carotid artery stenosis, right     Chronic occlusion of the right internal carotid artery per MRI of 2020 unchanged from previous study of 2010; study of  also showed mild stenosis of the left internal carotid artery and bilateral patent vertebral arteries    Moderate aortic stenosis     Echocardiogram of 2020:  Severe aortic valve leaflet calcification present with reduced excursion. Moderate aortic valve stenosis was present. LVEF 55-60% .  Stroke [facility])     asymptomatic; small chronic infarcts in the bilateral thalami per MRI of 2020.   Previous MRI of 2010 showed possible tiny old right thalamic infarct versus perivascular space        Past Surgical History:   Procedure Laterality Date    HX CHOLECYSTECTOMY      DE INS NEW/RPLCMT PRM PM W/TRANSV ELTRD ATRIAL&VENT N/A 2020    INSERT PPM DUAL performed by Baron Hedrick MD at [facility] LAB       Family History   Adopted: Yes   Family history unknown: Yes       Social History     Tobacco Use    Smoking status: Former Smoker     Packs/day: 2.00     Years: 50.00     Pack years: 100.00     Quit date: 2004     Years since quittin.6    Smokeless tobacco: Never Used   Substance Use Topics    Alcohol use: Yes     Comment: 1 beer or 1 glaas of wine daily most of adult life       Allergies   Allergen Reactions    Ampicillin Nausea and Vomiting    Iodine Hives, Itching and Nausea and Vomiting       Current Facility-Administered Medications   Medication Dose Route Frequency    insulin NPH (NOVOLIN N, HUMULIN N) injection 20 Units  20 Units SubCUTAneous ACB&D    artificial tears (dextran-hypromellose-glycerin) (GENTEAL) ophthalmic solution 1 Drop  1 Drop Both Eyes PRN    dexAMETHasone (DECADRON) tablet 4 mg  4 mg Oral Q12H    sodium chloride (OCEAN) 0.65 % nasal squeeze bottle 2 Spray  2 Spray Both Nostrils Q2H PRN    LORazepam (ATIVAN) tablet 0.5 mg  0.5 mg Oral BID PRN    metoprolol tartrate (LOPRESSOR) tablet 12.5 mg  12.5 mg Oral BID    guaiFENesin-dextromethorphan (ROBITUSSIN DM) 100-10 mg/5 mL syrup 5 mL  5 mL Oral Q6H PRN    rivaroxaban (XARELTO) tablet 20 mg  20 mg Oral DAILY WITH DINNER    [Held by provider] lisinopriL (PRINIVIL, ZESTRIL) tablet 10 mg  10 mg Oral QHS    atorvastatin (LIPITOR) tablet 20 mg  20 mg Oral DAILY    glucose chewable tablet 16 g  4 Tablet Oral PRN    dextrose (D50W) injection syrg 12.5-25 g  25-50 mL IntraVENous PRN    glucagon (GLUCAGEN) injection 1 mg  1 mg IntraMUSCular PRN    insulin lispro (HUMALOG) injection   SubCUTAneous AC&HS    sodium chloride (NS) flush 5-40 mL  5-40 mL IntraVENous Q8H    sodium chloride (NS) flush 5-40 mL  5-40 mL IntraVENous PRN    acetaminophen (TYLENOL) tablet 650 mg  650 mg Oral Q6H PRN    Or    acetaminophen (TYLENOL) suppository 650 mg  650 mg Rectal Q6H PRN    polyethylene glycol (MIRALAX) packet 17 g  17 g Oral DAILY PRN    ondansetron (ZOFRAN ODT) tablet 4 mg  4 mg Oral Q8H PRN    Or    ondansetron (ZOFRAN) injection 4 mg  4 mg IntraVENous Q6H PRN         REVIEW OF SYSTEMS   Negative except as stated in the HPI.       Physical Exam:   Visit Vitals  BP (!) 118/94 (BP 1 Location: Right upper arm, BP Patient Position: At rest)   Pulse 75   Temp 97.5 °F (36.4 °C)   Resp 27   Ht 5' 8\" (1.727 m)   Wt 64.5 kg (142 lb 4.8 oz)   SpO2 91%   BMI 21.64 kg/m²       General:  Alert, cooperative, no distress, appears stated age, on midflow   Head:  Normocephalic, without obvious abnormality, atraumatic. Eyes:  Conjunctivae/corneas clear. Nose: Nares normal. Septum midline. Mucosa normal.   Throat: Lips, mucosa, and tongue normal.    Neck: Supple, symmetrical, trachea midline, no adenopathy. Lungs:   Clear but diminished throughout   Chest wall:  No tenderness or deformity. Heart:  Regular rate and rhythm, S1, S2 normal, no murmur, click, rub or gallop. Abdomen:   Soft, non-tender. Bowel sounds normal.   Extremities: Extremities normal, atraumatic, no cyanosis or edema. Pulses: 2+ and symmetric all extremities. Skin: Skin color, texture, turgor normal. No rashes or lesions   Lymph nodes: Cervical  nodes normal.   Neurologic: Grossly nonfocal       Lab Results   Component Value Date/Time    Sodium 133 (L) 01/31/2022 02:00 AM    Potassium 4.4 01/31/2022 02:00 AM    Chloride 101 01/31/2022 02:00 AM    CO2 30 01/31/2022 02:00 AM    BUN 27 (H) 01/31/2022 02:00 AM    Creatinine 0.80 01/31/2022 02:00 AM    Glucose 118 (H) 01/31/2022 02:00 AM    Calcium 8.3 (L) 01/31/2022 02:00 AM    Magnesium 1.1 (L) 01/31/2022 02:00 AM    Phosphorus 2.9 01/31/2022 02:00 AM    Lactic acid 1.3 01/11/2022 06:34 PM       Lab Results   Component Value Date/Time    WBC 10.1 01/31/2022 02:00 AM    HGB 14.7 01/31/2022 02:00 AM    PLATELET 415 42/86/6126 02:00 AM    MCV 87.1 01/31/2022 02:00 AM       Lab Results   Component Value Date/Time    INR 1.0 02/04/2020 03:50 PM    aPTT 32.0 09/22/2010 03:50 AM    Alk.  phosphatase 54 01/31/2022 02:00 AM    Protein, total 5.6 (L) 01/31/2022 02:00 AM    Albumin 2.5 (L) 01/31/2022 02:00 AM    Globulin 3.1 01/31/2022 02:00 AM       Lab Results   Component Value Date/Time    Ferritin 788 (H) 01/31/2022 02:00 AM       Lab Results   Component Value Date/Time    Sed rate (ESR) 5 09/22/2010 03:50 AM    C-Reactive protein 1.04 (H) 01/31/2022 02:00 AM        No results found for: PH, PHI, PCO2, PCO2I, PO2, PO2I, HCO3, HCO3I, FIO2, FIO2I    Lab Results   Component Value Date/Time    CK 79 09/22/2010 03:50 AM    Troponin-I, Qt. 0.05 (H) 09/21/2010 12:10 PM        Lab Results   Component Value Date/Time    Culture result: Culture performed on Fluid swab specimen 11/25/2021 08:22 PM    Culture result: NO GROWTH 4 DAYS 11/25/2021 08:22 PM       No results found for: TOXA1, RPR, HBCM, HBSAG, HAAB, HCAB1, HAAT, G6PD, CRYAC, HIVGT, HIVR, HIV1, HIV12, HIVPC, HIVRPI    Lab Results   Component Value Date/Time    CK 79 09/22/2010 03:50 AM       Lab Results   Component Value Date/Time    Color YELLOW/STRAW 08/02/2019 12:10 AM    Appearance CLEAR 08/02/2019 12:10 AM    pH (UA) 5.0 08/02/2019 12:10 AM    Protein 30 (A) 08/02/2019 12:10 AM    Glucose 500 (A) 08/02/2019 12:10 AM    Ketone NEGATIVE  08/02/2019 12:10 AM    Bilirubin NEGATIVE  08/02/2019 12:10 AM    Blood SMALL (A) 08/02/2019 12:10 AM    Urobilinogen 0.2 08/02/2019 12:10 AM    Nitrites NEGATIVE  08/02/2019 12:10 AM    Leukocyte Esterase NEGATIVE  08/02/2019 12:10 AM    WBC 0-4 08/02/2019 12:10 AM    RBC 0-5 08/02/2019 12:10 AM    Bacteria NEGATIVE  08/02/2019 12:10 AM       Images: personally visualized and report reviewed    CXR (1/16/2022): Worsening of left mid and lower lung pulmonary opacities. CXR (1/20/2022): There is bilateral pneumonia in the periphery of the mid  and lower lung zones left greater than right. This has mildly worsened. CXR (1/31/2022): Bilateral pneumonia persists in the periphery of the mid lower lung zones. Areas of consolidation have increased in the right mid zone and left mid zone. IMPRESSION  · Acute Respiratory Failure with Hypoxia  · Covid-19 Pneumonia  · History of A. Fib  · Moderate Aortic Stenosis  · History of Tobacco Abuse  · DM  · Hypertension      PLAN  · Continue O2 to keep sats >88%; back on HF 30L FIO2 80%  · Continue Dexamethasone  · Baricitinib complete  · Add on proBNP  · Hold on empiric abx for now given improved WBC and normal PCT  · On AC with Xarelto  · Trend CRP, d-dimer, LFTs  · Encourage IS use  · Prone as able  · OOB to chair  · Would benefit from PFTs as an outpatient  · Will need repeat imaging as an outpatient  · PT/OT     Patient is critically ill with worsening hypoxemic respiratory failure due to COVID pneumonia. CC time 30+ min. Discussed with nursing.     Gladys Deale, [place]

## 2022-01-31 NOTE — PROGRESS NOTES
Care Management follow up, LOS 20 days     Patient admitted for acute hypoxic respiratory failure, COVID19, pneumonia. History of: afib, diabetes, HTN, carotid artery stenosis, aortic stenosis, CVA  COVID Vaccine:  Yes x 2, no booster        RUR 16 (Score %) moderate    Is This a Readmission NO  Is this a Bundle NO     Current status  Patient discussed during interdisciplinary rounds. Patient continues to require medical management including high flow oxygen at 25L/90%. Continues on po decadron.  Ongoing assessment and monitoring.   Family prefers the patient to return home with home health services.  Patient accepted by [facility] for follow up at [place]. CM will monitor progress. Patient with increased oxygen demand with persistent bilateral pneumonia.     Transition of Care Plan  1. Monitor patient status and response to treatment. 2. Patient continues to require medical management. 3. Requiring high flow oxygen and po decadron. 4. Patient accepted by [facility]  5. Patient has [employer]. 6. Will monitor for home oxygen needs. 7. Patient lives with his grandson and great grandson, family members to assist with care at home. 8. CM to monitor progress and recommendations.     Eugenio Grover RN, MSN/Care manager  [phone]

## 2022-02-01 NOTE — PROGRESS NOTES
Name: Mari Christianson: [address]   : 1940 Admit Date: 2022   Phone: [phone]  Room: [facility]/01   PCP: Adrienne Drake MD  MRN: [id number]   Date: 2022  Code: Full Code          Chart and notes reviewed. Data reviewed. I review the patient's current medications in the medical record at each encounter.  I have evaluated and examined the patient. History of Present Illness:  Mr. David Phillips is a very pleasant, 80 yo gentleman that presented to [facility] on  with worsening shortness of breath. He has a PMH of a.fib, aortic stenosis, DM, hypertension, CVA, and tobacco abuse (100 pack years, quit in ). He received two doses of the Covid vaccine, last in 2021. He did not receive a booster dose. He is currently requiring hi flow at 50L/100%. He reports that his symptoms started five days ago after he was exposed to his grandson who tested positive. He became more progressively more SOB with increased cough. No fever/chills. He did initially have diarrhea. Images:  Personally visualized and report reviewed. CXR :  Superimposed patchy bilateral airspace disease on interstitial changes concerning for infection or inflammation      D-dimer . 55  LFTs stable  Procalcitonin negative  Ferritin 759  Trop wnl  Pro-  CRP 3.18  ECHO 2020:  EF 55-60%, mildly reduced systolic function, moderate aortic stenosis, trave MVR, moderately elevated CVP. Interval History:  Afebrile  BP stable  Sats 92-94% on 30L FIO2 100%   WBC 8.8 - better  D-dimer 0.27  Na 134  CRP 0.37 - better  proBNP 578  Ferritin 788  LFTs stable  PCT < 0.05  700 mL UOP documented + 2 unmeasurable occurences    Chest CT  personally reviewed: Emphysema with peripheral blebs. Septal thickening and possible fibrosis in the lung bases--limited by motion.     ECHO : EF 45-50%, mild global hypokinesis present, mildly reduced systolic function, mild AV stenosis    Abdominal US : Limited study. Possible hepatic steatosis. Cholecystectomy. No visible biliary tree. ROS:  Sitting up in bed doing crossword puzzles on his computer. Has no specific complaints this morning. He is a little discouraged about how long he's been hospitalized. Worked with PT yesterday and states his legs are very weak. Sitting in the chair for 3 hours worse him out. Denies fever or chills. Denies CP. He is now off isolation and family was able to visit yesterday which did raise his spirits. Past Medical History:   Diagnosis Date    Atrial fibrillation ([address]     Diabetes mellitus, type 2 ([facility] [address]     Hypertension     Internal carotid artery stenosis, right     Chronic occlusion of the right internal carotid artery per MRI of 2020 unchanged from previous study of 2010; study of  also showed mild stenosis of the left internal carotid artery and bilateral patent vertebral arteries    Moderate aortic stenosis     Echocardiogram of 2020:  Severe aortic valve leaflet calcification present with reduced excursion. Moderate aortic valve stenosis was present. LVEF 55-60% .  Stroke [facility])     asymptomatic; small chronic infarcts in the bilateral thalami per MRI of 2020.   Previous MRI of 2010 showed possible tiny old right thalamic infarct versus perivascular space        Past Surgical History:   Procedure Laterality Date    HX CHOLECYSTECTOMY      [place] INS NEW/RPLCMT PRM PM W/TRANSV ELTRD ATRIAL&VENT N/A 2020    INSERT PPM DUAL performed by Simón Gurrola MD at [facility] LAB       Family History   Adopted: Yes   Family history unknown: Yes       Social History     Tobacco Use    Smoking status: Former Smoker     Packs/day: 2.00     Years: 50.00     Pack years: 100.00     Quit date: 2004     Years since quittin.6    Smokeless tobacco: Never Used   Substance Use Topics    Alcohol use: Yes     Comment: 1 beer or 1 glaas of wine daily most of adult life Allergies   Allergen Reactions    Ampicillin Nausea and Vomiting    Iodine Hives, Itching and Nausea and Vomiting       Current Facility-Administered Medications   Medication Dose Route Frequency    insulin NPH (NOVOLIN N, HUMULIN N) injection 20 Units  20 Units SubCUTAneous ACB&D    artificial tears (dextran-hypromellose-glycerin) (GENTEAL) ophthalmic solution 1 Drop  1 Drop Both Eyes PRN    dexAMETHasone (DECADRON) tablet 4 mg  4 mg Oral Q12H    sodium chloride (OCEAN) 0.65 % nasal squeeze bottle 2 Spray  2 Spray Both Nostrils Q2H PRN    LORazepam (ATIVAN) tablet 0.5 mg  0.5 mg Oral BID PRN    metoprolol tartrate (LOPRESSOR) tablet 12.5 mg  12.5 mg Oral BID    guaiFENesin-dextromethorphan (ROBITUSSIN DM) 100-10 mg/5 mL syrup 5 mL  5 mL Oral Q6H PRN    rivaroxaban (XARELTO) tablet 20 mg  20 mg Oral DAILY WITH DINNER    [Held by provider] lisinopriL (PRINIVIL, ZESTRIL) tablet 10 mg  10 mg Oral QHS    atorvastatin (LIPITOR) tablet 20 mg  20 mg Oral DAILY    glucose chewable tablet 16 g  4 Tablet Oral PRN    dextrose (D50W) injection syrg 12.5-25 g  25-50 mL IntraVENous PRN    glucagon (GLUCAGEN) injection 1 mg  1 mg IntraMUSCular PRN    insulin lispro (HUMALOG) injection   SubCUTAneous AC&HS    sodium chloride (NS) flush 5-40 mL  5-40 mL IntraVENous Q8H    sodium chloride (NS) flush 5-40 mL  5-40 mL IntraVENous PRN    acetaminophen (TYLENOL) tablet 650 mg  650 mg Oral Q6H PRN    Or    acetaminophen (TYLENOL) suppository 650 mg  650 mg Rectal Q6H PRN    polyethylene glycol (MIRALAX) packet 17 g  17 g Oral DAILY PRN    ondansetron (ZOFRAN ODT) tablet 4 mg  4 mg Oral Q8H PRN    Or    ondansetron (ZOFRAN) injection 4 mg  4 mg IntraVENous Q6H PRN         REVIEW OF SYSTEMS   Negative except as stated in the HPI.       Physical Exam:   Visit Vitals  /69 (BP 1 Location: Right upper arm, BP Patient Position: At rest)   Pulse 71   Temp 97.4 °F (36.3 °C)   Resp 18   Ht 5' 8\" (1.727 m)   Wt 64.5 kg (142 lb 4.8 oz)   SpO2 92%   BMI 21.64 kg/m²       General:  Alert, cooperative, no distress, appears stated age, on midflow   Head:  Normocephalic, without obvious abnormality, atraumatic. Eyes:  Conjunctivae/corneas clear. Nose: Nares normal. Septum midline. Mucosa normal.   Throat: Lips, mucosa, and tongue normal.    Neck: Supple, symmetrical, trachea midline, no adenopathy. Lungs:   Scattered crackles. Fair air movement. Chest wall:  No tenderness or deformity. Heart:  Regular rate and rhythm, S1, S2 normal, no murmur, click, rub or gallop. Abdomen:   Soft, non-tender. Bowel sounds normal.   Extremities: Extremities normal, atraumatic, no cyanosis or edema. Pulses: 2+ and symmetric all extremities. Skin: Skin color, texture, turgor normal. No rashes or lesions   Lymph nodes: Cervical  nodes normal.   Neurologic: Grossly nonfocal       Lab Results   Component Value Date/Time    Sodium 134 (L) 02/01/2022 05:32 AM    Potassium 4.6 02/01/2022 05:32 AM    Chloride 100 02/01/2022 05:32 AM    CO2 29 02/01/2022 05:32 AM    BUN 26 (H) 02/01/2022 05:32 AM    Creatinine 0.69 (L) 02/01/2022 05:32 AM    Glucose 159 (H) 02/01/2022 05:32 AM    Calcium 8.6 02/01/2022 05:32 AM    Magnesium 1.9 02/01/2022 05:32 AM    Phosphorus 3.4 02/01/2022 05:32 AM    Lactic acid 1.3 01/11/2022 06:34 PM       Lab Results   Component Value Date/Time    WBC 8.8 02/01/2022 05:32 AM    HGB 14.9 02/01/2022 05:32 AM    PLATELET 068 (L) 85/93/3141 05:32 AM    MCV 86.2 02/01/2022 05:32 AM       Lab Results   Component Value Date/Time    INR 1.0 02/04/2020 03:50 PM    aPTT 32.0 09/22/2010 03:50 AM    Alk.  phosphatase 54 02/01/2022 05:32 AM    Protein, total 5.5 (L) 02/01/2022 05:32 AM    Albumin 2.6 (L) 02/01/2022 05:32 AM    Globulin 2.9 02/01/2022 05:32 AM       Lab Results   Component Value Date/Time    Ferritin 788 (H) 01/31/2022 02:00 AM       Lab Results   Component Value Date/Time    Sed rate (ESR) 5 09/22/2010 03:50 AM C-Reactive protein 0.37 02/01/2022 05:32 AM        No results found for: PH, PHI, PCO2, PCO2I, PO2, PO2I, HCO3, HCO3I, FIO2, FIO2I    Lab Results   Component Value Date/Time    CK 79 09/22/2010 03:50 AM    Troponin-I, Qt. 0.05 (H) 09/21/2010 12:10 PM        Lab Results   Component Value Date/Time    Culture result: Culture performed on Fluid swab specimen 11/25/2021 08:22 PM    Culture result: NO GROWTH 4 DAYS 11/25/2021 08:22 PM       No results found for: TOXA1, RPR, HBCM, HBSAG, HAAB, HCAB1, HAAT, G6PD, CRYAC, HIVGT, HIVR, HIV1, HIV12, HIVPC, HIVRPI    Lab Results   Component Value Date/Time    CK 79 09/22/2010 03:50 AM       Lab Results   Component Value Date/Time    Color YELLOW/STRAW 08/02/2019 12:10 AM    Appearance CLEAR 08/02/2019 12:10 AM    pH (UA) 5.0 08/02/2019 12:10 AM    Protein 30 (A) 08/02/2019 12:10 AM    Glucose 500 (A) 08/02/2019 12:10 AM    Ketone NEGATIVE  08/02/2019 12:10 AM    Bilirubin NEGATIVE  08/02/2019 12:10 AM    Blood SMALL (A) 08/02/2019 12:10 AM    Urobilinogen 0.2 08/02/2019 12:10 AM    Nitrites NEGATIVE  08/02/2019 12:10 AM    Leukocyte Esterase NEGATIVE  08/02/2019 12:10 AM    WBC 0-4 08/02/2019 12:10 AM    RBC 0-5 08/02/2019 12:10 AM    Bacteria NEGATIVE  08/02/2019 12:10 AM       Images: personally visualized and report reviewed    CXR (1/16/2022): Worsening of left mid and lower lung pulmonary opacities. CXR (1/20/2022): There is bilateral pneumonia in the periphery of the mid  and lower lung zones left greater than right. This has mildly worsened. CXR (1/31/2022): Bilateral pneumonia persists in the periphery of the mid lower lung zones. Areas of consolidation have increased in the right mid zone and left mid zone. IMPRESSION  · Acute Respiratory Failure with Hypoxia  · Covid-19 Pneumonia  · History of A. Fib  · Moderate Aortic Stenosis  · History of Tobacco Abuse  · DM  · Hypertension      PLAN  · Continue O2 to keep sats >88%; on HF 30L FIO2 100%  · Switch dexamethasone to IV Solumedrol 60mg q 8hr  · Baricitinib complete  · Hold on empiric abx for now given improved WBC and normal PCT  · On AC with Xarelto  · Trend CRP, d-dimer, LFTs  · Encourage IS use  · Prone as able  · OOB to chair  · Would benefit from PFTs as an outpatient  · Will need repeat imaging as an outpatient  · PT/OT     Patient is critically ill with severe hypoxemic respiratory failure due to COVID pneumonia requiring continuous HF O2.  CC time 32+ min.       Dawson Bravo

## 2022-02-01 NOTE — PROGRESS NOTES
Comprehensive Nutrition Assessment    Type and Reason for Visit: Reassess    Nutrition Recommendations/Plan:   1. Continue regular, 4 Carb Choice diet  2. Provide Glucerna once daily to aid in meeting kcal/protein needs (220 kcal, 26 g carbs, 10 g protein)  3. Consider adjusting insulin choice or dose to reduce BG spikes    Nutrition Assessment:    2/1: Follow up. PO intake continues to average 75% or more of all meals. Patient endorses \"wonderful\" appetite, with no N/V/D. No chewing/swallowing problems. On heated hiflow @ 30 L O2. States he wakes up about three times a night and snacks - has multiple snack bag size chips in room. RD provided brief overview of carbohydrates and blood sugar spikes - patient voiced understanding. Last three BG , 258, 225. No complaints at this time. Weight continues to downtrend slightly. Patient states -160#, but cannot remember when he last weighed this much. Last 3 Recorded Weights in this Encounter    01/26/22 0304 01/27/22 0525 01/30/22 0354   Weight: 65.2 kg (143 lb 11.2 oz) 65 kg (143 lb 3.2 oz) 64.5 kg (142 lb 4.8 oz)       1/26: follow up. Pt is eating very well at meals. BG will elevate after meals requiring 6-8 units of correction daily. Pt with normal BG prior to meals and corrects easily. Consider Lantus or increasing NPH slightly and/or administrating NPH at the same time as steroids. Pt is eating 100% of all meals. Wt loss of 4% since admission. Current diet order meets 100% of calorie & protein needs, yet steady wt loss. Will try to add HS Glucerna (220 cals, 26g carb, 10g pro) to halt further wt loss, however expect BG may go up slightly.        1/18: Pt is an 79year old male admitted with Acute respiratory failure with hypoxia; Pneumonia due to COVID-19 virus [U07.1, J12.82].  He  has a past medical history of Atrial fibrillation ([facility] Diabetes mellitus, type 2 ([facility] Hypertension, Internal carotid artery stenosis, right, Moderate aortic stenosis, and Stroke [facility]). PO intake averaging 75% of all meals. Attempted to call into patient room - no answer. Currently on 55L O2. Per documentation, patient has lost 12# (7.4%) x ~3 days, unlikely. No noted edema or prior edema this admission. Admission weight of 162 was stated. No noted N/V/D. No hx of chewing/swallowing problems. NKFA. If current PO intake trend continues, patient likely meeting >/= 75% kcal needs and 100% protein needs. Last three BG , 169, 147    Malnutrition Assessment:  Malnutrition Status: At risk for malnutrition (specify)    Context:  Acute illness     Findings of the 6 clinical characteristics of malnutrition:   Energy Intake:  No significant decrease in energy intake  Weight Loss:  No significant weight loss     Body Fat Loss:  No significant body fat loss,     Muscle Mass Loss:  No significant muscle mass loss,    Fluid Accumulation:  No significant fluid accumulation,     Strength:  Not performed     Estimated Daily Nutrient Needs:  Energy (kcal): 1765 (MSJ x 1.3); Weight Used for Energy Requirements: Current  Protein (g): 68-81 (1.0-1.2); Weight Used for Protein Requirements: Admission  Fluid (ml/day): 1765; Method Used for Fluid Requirements: 1 ml/kcal    Nutrition Related Findings:       ABD: Good appetite with active bowel sounds 1/29  Last BM: 1/29  Edema: None noted 2/1    Nutr.  Labs:  Lab Results   Component Value Date/Time    GFR est AA >60 02/01/2022 05:32 AM    GFR est non-AA >60 02/01/2022 05:32 AM    Creatinine (POC) 0.8 09/21/2010 12:56 PM    Creatinine 0.69 (L) 02/01/2022 05:32 AM    BUN 26 (H) 02/01/2022 05:32 AM    BUN (POC) 15 09/21/2010 12:56 PM    Sodium (POC) 138 09/21/2010 12:56 PM    Sodium 134 (L) 02/01/2022 05:32 AM    Potassium 4.6 02/01/2022 05:32 AM    Potassium (POC) 3.8 09/21/2010 12:56 PM    Chloride (POC) 103 09/21/2010 12:56 PM    Chloride 100 02/01/2022 05:32 AM    CO2 29 02/01/2022 05:32 AM     Lab Results   Component Value Date/Time    Glucose 159 (H) 02/01/2022 05:32 AM    Glucose (POC) 206 (H) 02/01/2022 07:52 AM     Lab Results   Component Value Date/Time    Hemoglobin A1c 7.2 (H) 01/13/2022 03:09 AM       Nutr. Meds:  Lipitor, Humalog, NPH, Lopressor, Zofran PRN, Miralax PRN, Xarelto    Wounds:    None       Current Nutrition Therapies:  ADULT DIET Regular; 4 carb choices (60 gm/meal)  ADULT ORAL NUTRITION SUPPLEMENT HS Snack; Diabetic Supplement    Anthropometric Measures:  · Height:  5' 8\" (172.7 cm)  · Current Body Wt:  64.5 kg (142 lb 3.2 oz)   · Admission Body Wt:  162 lb    · Usual Body Wt:  68 kg (150 lb)     · Ideal Body Wt:  154 lbs:  92.3 %   Body mass index is 21.64 kg/m².   · BMI Category:  Underweight (BMI less than 22) age over 72       Nutrition Diagnosis:   · Increased nutrient needs related to catabolic illness,impaired respiratory function as evidenced by  (COVID-19, requiring 30 L O2)    Nutrition Interventions:   Food and/or Nutrient Delivery: Continue current diet,Continue oral nutrition supplement  Nutrition Education and Counseling: No recommendations at this time  Coordination of Nutrition Care: Continue to monitor while inpatient,Interdisciplinary rounds    Goals:  PO intake continues >/= 75% of all meals within 5 - 7 days       Nutrition Monitoring and Evaluation:   Behavioral-Environmental Outcomes: None identified  Food/Nutrient Intake Outcomes: Food and nutrient intake,Supplement intake  Physical Signs/Symptoms Outcomes: Biochemical data,Skin,Weight    Discharge Planning:    Continue oral nutrition supplement     Electronically signed by Berna Salas RD on 0/7/8964  Contact: [phone] or via [email or web address]

## 2022-02-01 NOTE — PROGRESS NOTES
Problem: Mobility Impaired (Adult and Pediatric)  Goal: *Acute Goals and Plan of Care (Insert Text)  Description: FUNCTIONAL STATUS PRIOR TO ADMISSION: Patient was independent and active without use of DME.    HOME SUPPORT PRIOR TO ADMISSION: The patient lived with son and grandson but did not require assist.    7-day re-assessment 1/28/2022  1. Patient will move from supine to sit and sit to supine  in bed with modified independence within 7 day(s). 2.  Patient will transfer from bed to chair and chair to bed with supervision/set-up using the least restrictive device within 7 day(s). 3.  Patient will perform sit to stand with supervision/set-up within 7 day(s). 4.  Patient will ambulate with contact guard assistance for 50 feet with the least restrictive device within 7 day(s). Initiated 1/17/2022  1. Patient will move from supine to sit and sit to supine  in bed with modified independence within 7 day(s). 2.  Patient will transfer from bed to chair and chair to bed with modified independence using the least restrictive device within 7 day(s). 3.  Patient will perform sit to stand with modified independence within 7 day(s). 4.  Patient will ambulate with modified independence for 150 feet with the least restrictive device within 7 day(s). 5.  Patient will ascend/descend 6 stairs with 2 handrail(s) with modified independence within 7 day(s). Outcome: Progressing Towards Goal   PHYSICAL THERAPY TREATMENT  Patient: Delma Simpson (68 y.o. male)  Date: 2/1/2022  Diagnosis: Acute respiratory failure with hypoxia ([facility] [J96.01]  Pneumonia due to COVID-19 virus [U07.1, J12.82] <principal problem not specified>      Precautions: Fall  Chart, physical therapy assessment, plan of care and goals were reviewed. ASSESSMENT  Patient continues with skilled PT services and is progressing towards goals. Patient this afternoon with good participation and fair tolerance to physical therapy session.  Patient received on 30L HFNC, patient requesting via RN and mobility team for physical therapy to see today. Patient saturating between 88-89% on this at start of session, thus oxygen titrated up to 40L HFNC in effort to increase participation. Despite increased oxygen support, patient desaturates to low 80s with sit <> stand transfer minAx1 with RW. He attempts to recover in static standing, but after one minute without improvement, patient sits for rest break and oxygen titrated up to 50L HFNC to recover >90%. Patient participates in LE exercises as below with emphasis on breathing coordination. Attempted 2 ft ambulation, with patient desaturating to 83% on 50L HFNC. He requires sitting rest break over several minutes to recover. Rn arrived and assisted with increasing FiO2 from 50% to 80% and patient with improved tolerance on 50L HFNC over 4ft ambulation with RW and minAx1. Patient left in seated  on 40L HFNC and 80% FiO2 in order to prepare patient for chair > bed transfer which he is requesting within the next hour. Current Level of Function Impacting Discharge (mobility/balance): Margarette    Other factors to consider for discharge: patient with good participation and motivation to progress         PLAN :  Patient continues to benefit from skilled intervention to address the above impairments. Continue treatment per established plan of care. to address goals. Recommendation for discharge: (in order for the patient to meet his/her long term goals)  Therapy 3 hours per day 5-7 days per week    This discharge recommendation:  Has been made in collaboration with the attending provider and/or case management    IF patient discharges home will need the following DME: to be determined (TBD)       SUBJECTIVE:   Patient stated Let's do it.  re: another ambulation trial     OBJECTIVE DATA SUMMARY:   Patient received seated in bedside chair, agreeable to participate in PT session.    Patient was cleared by nursing to participate in PT session. Critical Behavior:  Neurologic State: Alert  Orientation Level: Oriented X4  Cognition: Follows commands  Safety/Judgement: Awareness of environment,Fall prevention  Functional Mobility Training:  Bed Mobility:                    Transfers:  Sit to Stand: Minimum assistance  Stand to Sit: Minimum assistance        Bed to Chair: Minimum assistance                    Balance:  Sitting: Intact; Without support  Standing: Impaired; With support;Pull to stand  Standing - Static: Constant support  Standing - Dynamic : Constant support  Ambulation/Gait Training:  Distance (ft): 4 Feet (ft) (x2)     Ambulation - Level of Assistance: Minimal assistance;Assist x1        Gait Abnormalities: Decreased step clearance; Step to gait                 Step Length: Right shortened;Left shortened               Therapeutic Exercises:       EXERCISE   Sets   Reps   Active Active Assist   Passive Self ROM   Comments   Ankle Pumps   [] [] [] []    Glut Sets 1 10 [x] [] [] []    Hamstring Sets   [] [] [] []    Short Arc Quads   [] [] [] []    Heel Slides   [] [] [] []    Straight Leg Raises   [] [] [] []    Hip abd/add   [] [] [] []    Long Arc Quads 1 10 [x] [] [] []    Marching 1 10 [x] [] [] []       [] [] [] []        Pain Rating:  Patient without reports of pain during therapy      Activity Tolerance:   Fair, desaturates with exertion and requires oxygen and requires rest breaks    After treatment patient left in no apparent distress:   Sitting in chair and Call bell within reach  As received     COMMUNICATION/COLLABORATION:   The patients plan of care was discussed with: Occupational therapist and Registered nurse.      [facility] PT, DPT   Time Calculation: 28 mins

## 2022-02-01 NOTE — PROGRESS NOTES
0700: Bedside shift change report given to [address] [place] [address] (oncoming nurse) by Gudelia Lawson RN (offgoing nurse). Report included the following information SBAR, Kardex, Intake/Output, MAR, Accordion and Cardiac Rhythm V paced. This patient was assisted with Intentional Toileting every 2 hours during this shift as appropriate. Documentation of ambulation and output reflected on Flowsheet as appropriate. Purposeful hourly rounding was completed using AIDET and 5Ps. Outcomes of PHR documented as they occurred. Bed alarm in use as appropriate. Dual Suction and ambubag in place.

## 2022-02-01 NOTE — PROGRESS NOTES
[facility]  [address], [facility], [address] [facility]  [phone]      Medical Progress Note      NAME: Sherie Vázquez   :  1940  MRM:  [id number]    Date/Time: 2022        Assessment / Plan:     80 yo M w/ hx of AF, HTN, DM presenting w/ fatigue, dyspneic, admitted for AHRF 2/2 COVID-19     AHRF: admitted on 21. Respirator failure is 2/2 pneumonia due to COVID-19 infection. s/p baricitinib. On 30L and SpO2 drops with ambulation. CXR repeated  showing persistent bilateral pneumonia consistent with COVID pneumonia. Areas consolidation have increased, low procalcitonin. Would have low threshold to start empiric broad-spectrum abx. May have progressive fibrosis or improve slightly to the point of needing lifelong O2. Pt prefers not to go to inpt rehab or SNF, but may be limited by O2 requirement. Appreciate pulmonology input, changing dexamethasone to IV Solu-medrol today. Cont Xarelto. Overall prognosis is poor.      Aortic stenosis: TTE  showed mild stenosis. Monitor volume status     VT: hx of NSVT. Noted . Evaluated by cardiology. Monitor K and Mg. Cont BB      Type 2 diabetes mellitus without complication: F7J 6.2%. BG high 2/2 steroids. Cont NPH, SSI      SOHAIL: mild, resolved. Currently holding lisinopril. Avoid hypotension     A-fib/ Pacemaker: monitor on telemetry. On Xarelto     History of CVA (cerebrovascular accident): cont OAC, statin     Hypertension: currently holding lisinopril       Total time spent: 35 minutes  Time spent in the care of this patient including reviewing records, discussing with nursing and/or other providers on the treatment team, obtaining history and examining the patient, and discussing treatment plans.                   Care Plan discussed with: Patient, Nursing Staff and >50% of time spent in counseling and coordination of care    Discussed:  Care Plan and D/C Planning    Prophylaxis:  Xarelto    Disposition: TBD         Subjective:     Chief Complaint:  Follow up COVID    Chart/notes/labs/studies reviewed, patient examined. Feels a bit more optimistic today. No CP or SOB. Objective:       Vitals:        Last 24hrs VS reviewed since prior progress note. Most recent are:    Visit Vitals  /61 (BP 1 Location: Right upper arm, BP Patient Position: At rest)   Pulse 76   Temp 97.5 °F (36.4 °C)   Resp 16   Ht 5' 8\" (1.727 m)   Wt 64.5 kg (142 lb 4.8 oz)   SpO2 97%   BMI 21.64 kg/m²     SpO2 Readings from Last 6 Encounters:   02/01/22 97%   11/26/21 95%   11/19/21 93%   05/28/21 95%   02/06/20 98%   02/02/20 93%    O2 Flow Rate (L/min): 30 l/min       Intake/Output Summary (Last 24 hours) at 2/1/2022 1705  Last data filed at 2/1/2022 1424  Gross per 24 hour   Intake 240 ml   Output 600 ml   Net -360 ml          Exam:     Physical Exam:    Gen:  Elderly, chronically ill-appearing. NAD. Pleasant  HEENT:  Atraumatic, normocephalic. Sclerae nonicteric, hearing intact to voice  Neck:  Supple, without apparent masses  Resp:  No accessory muscle use, increased WOB. Mildly diminished without wheezes, rales, or rhonchi  Card: RRR, without m/r/g. No LE edema  Abd:  +bowel sounds, soft, NTTP, nondistended. No HSM  Neuro: Face symmetric, speech fluent, follows commands appropriately, no focal weakness or numbness  Psych:  Alert, oriented x 3.  Fair insight     Medications Reviewed: (see below)    Lab Data Reviewed: (see below)    ______________________________________________________________________    Medications:     Current Facility-Administered Medications   Medication Dose Route Frequency    methylPREDNISolone (PF) (SOLU-MEDROL) injection 60 mg  60 mg IntraVENous Q8H    insulin NPH (NOVOLIN N, HUMULIN N) injection 20 Units  20 Units SubCUTAneous ACB&D    artificial tears (dextran-hypromellose-glycerin) (GENTEAL) ophthalmic solution 1 Drop  1 Drop Both Eyes PRN    sodium chloride (OCEAN) 0.65 % nasal squeeze bottle 2 Spray  2 Spray Both Nostrils Q2H PRN    LORazepam (ATIVAN) tablet 0.5 mg  0.5 mg Oral BID PRN    metoprolol tartrate (LOPRESSOR) tablet 12.5 mg  12.5 mg Oral BID    guaiFENesin-dextromethorphan (ROBITUSSIN DM) 100-10 mg/5 mL syrup 5 mL  5 mL Oral Q6H PRN    rivaroxaban (XARELTO) tablet 20 mg  20 mg Oral DAILY WITH DINNER    [Held by provider] lisinopriL (PRINIVIL, ZESTRIL) tablet 10 mg  10 mg Oral QHS    atorvastatin (LIPITOR) tablet 20 mg  20 mg Oral DAILY    glucose chewable tablet 16 g  4 Tablet Oral PRN    dextrose (D50W) injection syrg 12.5-25 g  25-50 mL IntraVENous PRN    glucagon (GLUCAGEN) injection 1 mg  1 mg IntraMUSCular PRN    insulin lispro (HUMALOG) injection   SubCUTAneous AC&HS    sodium chloride (NS) flush 5-40 mL  5-40 mL IntraVENous Q8H    sodium chloride (NS) flush 5-40 mL  5-40 mL IntraVENous PRN    acetaminophen (TYLENOL) tablet 650 mg  650 mg Oral Q6H PRN    Or    acetaminophen (TYLENOL) suppository 650 mg  650 mg Rectal Q6H PRN    polyethylene glycol (MIRALAX) packet 17 g  17 g Oral DAILY PRN    ondansetron (ZOFRAN ODT) tablet 4 mg  4 mg Oral Q8H PRN    Or    ondansetron (ZOFRAN) injection 4 mg  4 mg IntraVENous Q6H PRN            Lab Review:     Recent Labs     02/01/22  0532 01/31/22  0200   WBC 8.8 10.1   HGB 14.9 14.7   HCT 43.6 43.2   * 166     Recent Labs     02/01/22  0532 01/31/22  0200   * 133*   K 4.6 4.4    101   CO2 29 30   * 118*   BUN 26* 27*   CREA 0.69* 0.80   CA 8.6 8.3*   MG 1.9 1.1*   PHOS 3.4 2.9   ALB 2.6* 2.5*   ALT 28 31     No components found for: GLPOC  No results for input(s): PH, PCO2, PO2, HCO3, FIO2 in the last 72 hours. No results for input(s): INR, INREXT, INREXT in the last 72 hours.   No results found for: SDES  Lab Results   Component Value Date/Time    Culture result: Culture performed on Fluid swab specimen 11/25/2021 08:22 PM    Culture result: NO GROWTH 4 DAYS 11/25/2021 08:22 PM ___________________________________________________    Attending Physician: Marquis Malik MD

## 2022-02-01 NOTE — PROGRESS NOTES
Bedside shift change report given to [facility] (oncoming nurse) by Eladio Colon (offgoing nurse). Report included the following information SBAR, Kardex, ED Summary, Intake/Output, and Recent Results. Bedside shift change report given to Eladio Colon (oncoming nurse) by [facility] (offgoing nurse). Report included the following information SBAR, Kardex, ED Summary, Intake/Output, and Recent Results. This patient was assisted with Intentional Toileting every 2 hours during this shift as appropriate. Documentation of ambulation and output reflected on Flowsheet as appropriate. Purposeful hourly rounding was completed using AIDET and 5Ps. Outcomes of PHR documented as they occurred. Bed alarm in use as appropriate. Dual Suction and ambubag in place.

## 2022-02-01 NOTE — PROGRESS NOTES
Care Management follow up, LOS 21 days     Patient admitted for acute hypoxic respiratory failure, COVID19, pneumonia. History of: afib, diabetes, HTN, carotid artery stenosis, aortic stenosis, CVA  COVID Vaccine:  Yes x 2, no booster        RUR 17 (Score %) moderate    Is This a Readmission NO  Is this a Bundle NO     Current status  Patient discussed during interdisciplinary rounds. Patient continues to require medical management including high flow oxygen at 30L/90%. Continues on po decadron.  Ongoing assessment and monitoring.   Family prefers the patient to return home with home health services.  Patient accepted by [facility] for follow up at [facility] will monitor progress. Patient with increased oxygen demand with persistent bilateral pneumonia. Started on IV solumedrol.      Transition of Care Plan  1. Monitor patient status and response to treatment. 2. Patient continues to require medical management. 3. Requiring high flow oxygen and IV solumedrol. 4. Patient accepted by [facility]  5. Patient has [employer]. 6. Will monitor for home oxygen needs. 7. Patient lives with his grandson and great grandson, family members to assist with care at home. 8. CM to monitor progress and recommendations.     Domi Esposito, RN, MSN/Care manager  [phone]

## 2022-02-02 NOTE — PROGRESS NOTES
Problem: Mobility Impaired (Adult and Pediatric)  Goal: *Acute Goals and Plan of Care (Insert Text)  Description: FUNCTIONAL STATUS PRIOR TO ADMISSION: Patient was independent and active without use of DME.    HOME SUPPORT PRIOR TO ADMISSION: The patient lived with son and grandson but did not require assist.    7-day re-assessment 1/28/2022  1. Patient will move from supine to sit and sit to supine  in bed with modified independence within 7 day(s). 2.  Patient will transfer from bed to chair and chair to bed with supervision/set-up using the least restrictive device within 7 day(s). 3.  Patient will perform sit to stand with supervision/set-up within 7 day(s). 4.  Patient will ambulate with contact guard assistance for 50 feet with the least restrictive device within 7 day(s). Initiated 1/17/2022  1. Patient will move from supine to sit and sit to supine  in bed with modified independence within 7 day(s). 2.  Patient will transfer from bed to chair and chair to bed with modified independence using the least restrictive device within 7 day(s). 3.  Patient will perform sit to stand with modified independence within 7 day(s). 4.  Patient will ambulate with modified independence for 150 feet with the least restrictive device within 7 day(s). 5.  Patient will ascend/descend 6 stairs with 2 handrail(s) with modified independence within 7 day(s). Note:   PHYSICAL THERAPY TREATMENT  Patient: Yordy Gutierres (15 y.o. male)  Date: 2/2/2022  Diagnosis: Acute [facility] failure with [facility] [J96.01]  Pneumonia due to COVID-19 virus [U07.1, J12.82] <principal problem not specified>       Precautions: Fall  Chart, physical therapy assessment, plan of care and goals were reviewed. ASSESSMENT  Patient continues with skilled PT services. Pt supine to sit to stand with CGA. Pt paused for 2 minutes sitting EOB as SPO2 dropped from 91% to 85% on 30L. Pt was in observed in no distress. Pt needing 2 minutes to return to 90%. Pt sit to stand with CGA. Pt ambulated 4ft to chair with RW CGA to min assist.Pt dropped to 85% on 30L again but recovered in less than 60 seconds to 91%. Pt progressing slowly. continue goals. PLAN :  Patient continues to benefit from skilled intervention to address the above impairments. Continue treatment per established plan of care. to address goals.     Recommendation for discharge: (in order for the patient to meet his/her long term goals)  Rehab     This discharge recommendation:  Has been made in collaboration with the attending provider and/or case management    IF patient discharges home will need the following DME: rolling walker       SUBJECTIVE:       OBJECTIVE DATA SUMMARY:   Critical Behavior:  Neurologic State: Alert  Orientation Level: Oriented X4  Cognition: Follows commands  Safety/Judgement: Awareness of environment,Fall prevention  Functional Mobility Training:  Bed Mobility:     Supine to Sit: Contact guard assistance              Transfers:  Sit to Stand: Contact guard assistance  Stand to Sit: Contact guard assistance                             Balance:  Sitting: Intact  Sitting - Static: Good (unsupported)  Standing: With support  Standing - Static: Good  Ambulation/Gait Training:  Distance (ft): 4 Feet (ft)  Assistive Device: Gait belt;Walker, rolling  Ambulation - Level of Assistance: Contact guard assistance;Minimal assistance        Gait Abnormalities: Decreased step clearance        Base of Support: Narrowed     Speed/Niesha: Pace decreased (<100 feet/min)  Step Length: Right shortened;Left shortened                Activity Tolerance:   Fair    After treatment patient left in no apparent distress:   Sitting in chair    COMMUNICATION/COLLABORATION:   The patients plan of care was discussed with: Physical therapist.     Gail Cintron, PTA

## 2022-02-02 NOTE — WOUND CARE
Wound Consult: Follow up Visit. Chart reviewed. Spoke with patients nurse,  Alberto Camargo RN. Patient is resting on a lula bed with ARMANDO mattress. Heels off loaded with pillows  Patient is awake, alert, cooperative; moves by self easily  Assessment:  Bilateral ear wounds, resolved, resurfaced  Sacrum/buttocks- pink, blanching. mepilex foam reapplied for protection. Bilateral heels- no redness noted  Wound Recommendations:  Continue current treatment  Skin Care / PI Prevention Recommendations:  1. Minimize friction/shear: minimize layers of linen/pads under patient. 2. Off load pressure/reposition: turn and reposition approximately every 2 hours; float heels with pillows or use off loading heel boots; waffle cushion for sitting; position wedge. 3. Manage Moisture - keep skin folds dry; incontinence skin care with incontinence wipes; zinc guard barrier ointment. 4. Continue to monitor nutrition, pain, and skin risk scale, and skin assessment. Plan: We will continue to reassess weekly and as needed. Please re-consult should concerns arise despite continued skin/PI prevention measures.     [facility] / [address] [facility] Healing Office [phone]

## 2022-02-02 NOTE — PROGRESS NOTES
Problem: Self Care Deficits Care Plan (Adult)  Goal: *Acute Goals and Plan of Care (Insert Text)  Description: FUNCTIONAL STATUS PRIOR TO ADMISSION: Patient was independent and active without use of DME.     HOME SUPPORT: The patient lived with grandson but did not require assist.    Occupational Therapy Goals  Initiated 1/17/2022; Goals reviewed 2/2/2022, all remain appropriate  1. Patient will perform grooming and bathing with supervision standing at the sink with < or = 2 rest breaks and maintain oxygen sats > or = 90% within 7 day(s). 2. Patient will perform upper body dressing and lower body dressing with supervision with < or = 2 rest breaks and maintain oxygen sats > or = 90%  within 7 day(s). 3. Patient will perform toileting and toilet transfer with supervision and maintain oxygen sats > or = 90% within 7 day(s). 4. Patient will demonstrate pursed lip breathing with min cues during functional tasks within 7 day(s). 5. Patient will independently perform and recall home exercise program while maintaining O2 sats at or above 90% within 7 days. 6. Patient will verbalize/demonstrate all energy conservation techniques during ADL tasks with independence within 7 days. Outcome: Progressing Towards Goal   OCCUPATIONAL THERAPY TREATMENT/WEEKLY RE-ASSESSMENT  Patient: Az Kennedy (79 y.o. male)  Date: 2/2/2022  Diagnosis: Acute respiratory failure with hypoxia ([facility] [address] [J96.01]  Pneumonia due to COVID-19 virus [U07.1, J12.82] <principal problem not specified>       Precautions: Fall  Chart, occupational therapy assessment, plan of care, and goals were reviewed. ASSESSMENT  Patient continues with skilled OT services and is progressing towards goals. Mr. Uma Veloz was seen today for weekly re-assessment where progress has been slow but steady within the last week.   He has now been removed from droplet plus precautions, tolerating increased activity, requiring decreased assistance, and demonstrating carryover of education provided from previous txs. Today, patient was received in bed agreeable and motivated for activity. He was received on High Flow, 25L at 100% with SpO2 >90%. Patient performed transfers with CGA; He tolerated stand-pivot transfer from bed to chair. Patient engaged in ADLs with fair tolerance; He needs increased time and assistance for LB and standing ADLs. Education provided regarding the benefits of exercise, proper technique, and coordination of breathing techniques with exercises; Tolerance was good overall. Patient would benefit from continued skilled OT to progress towards goals and improve overall independence. Current Level of Function Impacting Discharge (ADLs): Patient required CGA for functional mobility. Patient required supervision/setup for UB ADLs and CGA overall for LB ADLs. PLAN :  Goals have been updated based on progression since last assessment. Patient continues to benefit from skilled intervention to address the above impairments. Continue to follow patient 5 times a week to address goals. Recommend with staff:   Recommend patient be OOB to chair as frequently as tolerated; Goal of 3x/day for all meals for 60 minutes at a time. For toileting needs, recommend transfers to/from [facility]. Encourage patient involvement in personal care as able. Encourage exercises frequently throughout the day. Recommend next OT session: Continue towards set goals. Recommendation for discharge: (in order for the patient to meet his/her long term goals)  Therapy up to 5 days/week     This discharge recommendation:  Has been made in collaboration with the attending provider and/or case management    IF patient discharges home will need the following DME: none       SUBJECTIVE:   Patient agreeable to OT weekly re-assessment.     OBJECTIVE DATA SUMMARY:   Cognitive/Behavioral Status:  Neurologic State: Alert  Orientation Level: Oriented X4  Cognition: Follows commands  Perception: Appears intact  Perseveration: No perseveration noted  Safety/Judgement: Awareness of environment    Functional Mobility and Transfers for ADLs:  Bed Mobility:  Rolling: Contact guard assistance  Supine to Sit: Contact guard assistance  Sit to Supine: Contact guard assistance    Transfers:  Sit to Stand: CGA to Minimum assistance; Min from low surface; improved to CGA elevation and repetition     Balance:  Sitting: Intact  Sitting - Static: Good (unsupported)  Sitting - Dynamic: Good (unsupported)  Standing: With support  Standing - Static: Good    ADL Intervention:  Feeding  Feeding Assistance: Independent    Grooming  Grooming Assistance: Set-up    Upper Body Bathing  Bathing Assistance: Set-up    Lower Body Bathing  Bathing Assistance: Contact guard assistance    Upper Body Dressing Assistance  Dressing Assistance: Set-up    Lower Body Dressing Assistance  Dressing Assistance: Contact guard assistance    Toileting  Toileting Assistance: Contact guard assistance    Cognitive Retraining  Safety/Judgement: Awareness of environment    Therapeutic Exercises:   Exercises:    Patient educated regarding home exercise program for strengthening, ROM, and respiratory function; they demonstrated appropriate performance. Reviewed importance of symptom monitoring and adjusting HEP based on symptoms and respiratory status. Pt verbalized understanding via teach back, verbal statement, and demonstration.        UE Exercises   Sitting shoulder flexion AAROM 10 3   Sitting shoulder press 10 3   Sitting elbow flexion/extension AROM 10 3   Hand AROM composite flexion 10 3   Sitting shoulder horizontal abduction/adduction 10 3          Activity Tolerance:   Good    After treatment patient left in no apparent distress:   Sitting in chair, Call bell within reach, and Bed / chair alarm activated    COMMUNICATION/COLLABORATION:   The patients plan of care was discussed with: Physical therapy assistant, Registered nurse, and patient .      Lyn Phlegm, OTR/L  Time Calculation: 25 mins

## 2022-02-02 NOTE — PROGRESS NOTES
Name: Virginia Torres: [address]   : 1940 Admit Date: 2022   Phone: [phone]  Room: [facility]/01   PCP: Olivia Medrano MD  MRN: [id number]   Date: 2022  Code: Full Code          Chart and notes reviewed. Data reviewed. I review the patient's current medications in the medical record at each encounter.  I have evaluated and examined the patient. History of Present Illness:  Mr. Tanisha Carcamo is a very pleasant, 80 yo gentleman that presented to [facility] on  with worsening shortness of breath. He has a PMH of a.fib, aortic stenosis, DM, hypertension, CVA, and tobacco abuse (100 pack years, quit in ). He received two doses of the Covid vaccine, last in 2021. He did not receive a booster dose. He is currently requiring hi flow at 50L/100%. He reports that his symptoms started five days ago after he was exposed to his grandson who tested positive. He became more progressively more SOB with increased cough. No fever/chills. He did initially have diarrhea. Images:  Personally visualized and report reviewed. CXR :  Superimposed patchy bilateral airspace disease on interstitial changes concerning for infection or inflammation      D-dimer . 55  LFTs stable  Procalcitonin negative  Ferritin 759  Trop wnl  Pro-  CRP 3.18  ECHO 2020:  EF 55-60%, mildly reduced systolic function, moderate aortic stenosis, trave MVR, moderately elevated CVP. Interval History:  Afebrile  BP stable  Sats 90-91% on 20L FIO2 100%   WBC 7.9  D-dimer 0.27  Na 134  CRP 0.37 - better  proBNP 578  Ferritin 788  LFTs stable  PCT < 0.05  900 mL UOP documented + 3 unmeasurable occurences    Chest CT  personally reviewed: Emphysema with peripheral blebs. Septal thickening and possible fibrosis in the lung bases--limited by motion.     ECHO : EF 45-50%, mild global hypokinesis present, mildly reduced systolic function, mild AV stenosis    Abdominal US : Limited study. Possible hepatic steatosis. Cholecystectomy. No visible biliary tree. ROS:  Sitting up in bed reading the paper. Has no specific complaints this morning other than asking how long he will have to be in the hospital.  Worked with PT yesterday and making progress, though requires increased levels of HF due to desaturations with activity. Denies fever or chills. Denies CP. Past Medical History:   Diagnosis Date    Atrial fibrillation ([facility] [address]     Diabetes mellitus, type 2 ([facility] [address]     Hypertension     Internal carotid artery stenosis, right     Chronic occlusion of the right internal carotid artery per MRI of 2020 unchanged from previous study of 2010; study of  also showed mild stenosis of the left internal carotid artery and bilateral patent vertebral arteries    Moderate aortic stenosis     Echocardiogram of 2020:  Severe aortic valve leaflet calcification present with reduced excursion. Moderate aortic valve stenosis was present. LVEF 55-60% .  Stroke [facility])     asymptomatic; small chronic infarcts in the bilateral thalami per MRI of 2020.   Previous MRI of 2010 showed possible tiny old right thalamic infarct versus perivascular space        Past Surgical History:   Procedure Laterality Date    HX CHOLECYSTECTOMY      NV INS NEW/RPLCMT PRM PM W/TRANSV ELTRD ATRIAL&VENT N/A 2020    INSERT PPM DUAL performed by Barbara Spaulding MD at [place] [address] LAB       Family History   Adopted: Yes   Family history unknown: Yes       Social History     Tobacco Use    Smoking status: Former Smoker     Packs/day: 2.00     Years: 50.00     Pack years: 100.00     Quit date: 2004     Years since quittin.6    Smokeless tobacco: Never Used   Substance Use Topics    Alcohol use: Yes     Comment: 1 beer or 1 glaas of wine daily most of adult life       Allergies   Allergen Reactions    Ampicillin Nausea and Vomiting    Iodine Hives, Itching and Nausea and Vomiting       Current Facility-Administered Medications   Medication Dose Route Frequency    methylPREDNISolone (PF) (SOLU-MEDROL) injection 60 mg  60 mg IntraVENous Q8H    insulin NPH (NOVOLIN N, HUMULIN N) injection 20 Units  20 Units SubCUTAneous ACB&D    artificial tears (dextran-hypromellose-glycerin) (GENTEAL) ophthalmic solution 1 Drop  1 Drop Both Eyes PRN    sodium chloride (OCEAN) 0.65 % nasal squeeze bottle 2 Spray  2 Spray Both Nostrils Q2H PRN    LORazepam (ATIVAN) tablet 0.5 mg  0.5 mg Oral BID PRN    metoprolol tartrate (LOPRESSOR) tablet 12.5 mg  12.5 mg Oral BID    guaiFENesin-dextromethorphan (ROBITUSSIN DM) 100-10 mg/5 mL syrup 5 mL  5 mL Oral Q6H PRN    rivaroxaban (XARELTO) tablet 20 mg  20 mg Oral DAILY WITH DINNER    [Held by provider] lisinopriL (PRINIVIL, ZESTRIL) tablet 10 mg  10 mg Oral QHS    atorvastatin (LIPITOR) tablet 20 mg  20 mg Oral DAILY    glucose chewable tablet 16 g  4 Tablet Oral PRN    dextrose (D50W) injection syrg 12.5-25 g  25-50 mL IntraVENous PRN    glucagon (GLUCAGEN) injection 1 mg  1 mg IntraMUSCular PRN    insulin lispro (HUMALOG) injection   SubCUTAneous AC&HS    sodium chloride (NS) flush 5-40 mL  5-40 mL IntraVENous Q8H    sodium chloride (NS) flush 5-40 mL  5-40 mL IntraVENous PRN    acetaminophen (TYLENOL) tablet 650 mg  650 mg Oral Q6H PRN    Or    acetaminophen (TYLENOL) suppository 650 mg  650 mg Rectal Q6H PRN    polyethylene glycol (MIRALAX) packet 17 g  17 g Oral DAILY PRN    ondansetron (ZOFRAN ODT) tablet 4 mg  4 mg Oral Q8H PRN    Or    ondansetron (ZOFRAN) injection 4 mg  4 mg IntraVENous Q6H PRN         REVIEW OF SYSTEMS   Negative except as stated in the HPI.       Physical Exam:   Visit Vitals  /79 (BP 1 Location: Right upper arm, BP Patient Position: Sitting)   Pulse 75   Temp 97.9 °F (36.6 °C)   Resp 22   Ht 5' 8\" (1.727 m)   Wt 63 kg (138 lb 12.8 oz)   SpO2 90%   BMI 21.10 kg/m²       General:  Alert, cooperative, no distress, appears stated age, on midflow   Head:  Normocephalic, without obvious abnormality, atraumatic. Eyes:  Conjunctivae/corneas clear. Nose: Nares normal. Septum midline. Mucosa normal.   Throat: Lips, mucosa, and tongue normal.    Neck: Supple, symmetrical, trachea midline, no adenopathy. Lungs:   Bilateral crackles. Fair air movement, decreased in the bases. Chest wall:  No tenderness or deformity. Heart:  Regular rate and rhythm, S1, S2 normal, no murmur, click, rub or gallop. Abdomen:   Soft, non-tender. Bowel sounds normal.   Extremities: Extremities normal, atraumatic, no cyanosis or edema. Pulses: 2+ and symmetric all extremities. Skin: Skin color, texture, turgor normal. No rashes or lesions   Lymph nodes: Cervical  nodes normal.   Neurologic: Grossly nonfocal       Lab Results   Component Value Date/Time    Sodium 134 (L) 02/01/2022 05:32 AM    Potassium 4.6 02/01/2022 05:32 AM    Chloride 100 02/01/2022 05:32 AM    CO2 29 02/01/2022 05:32 AM    BUN 26 (H) 02/01/2022 05:32 AM    Creatinine 0.69 (L) 02/01/2022 05:32 AM    Glucose 159 (H) 02/01/2022 05:32 AM    Calcium 8.6 02/01/2022 05:32 AM    Magnesium 1.9 02/01/2022 05:32 AM    Phosphorus 3.4 02/01/2022 05:32 AM    Lactic acid 1.3 01/11/2022 06:34 PM       Lab Results   Component Value Date/Time    WBC 7.9 02/02/2022 12:47 AM    HGB 14.0 02/02/2022 12:47 AM    PLATELET 400 (L) 39/56/7995 12:47 AM    MCV 86.4 02/02/2022 12:47 AM       Lab Results   Component Value Date/Time    INR 1.0 02/04/2020 03:50 PM    aPTT 32.0 09/22/2010 03:50 AM    Alk.  phosphatase 54 02/01/2022 05:32 AM    Protein, total 5.5 (L) 02/01/2022 05:32 AM    Albumin 2.6 (L) 02/01/2022 05:32 AM    Globulin 2.9 02/01/2022 05:32 AM       Lab Results   Component Value Date/Time    Ferritin 788 (H) 01/31/2022 02:00 AM       Lab Results   Component Value Date/Time    Sed rate (ESR) 5 09/22/2010 03:50 AM    C-Reactive protein 0.37 02/01/2022 05:32 AM        No results found for: PH, PHI, PCO2, PCO2I, PO2, PO2I, HCO3, HCO3I, FIO2, FIO2I    Lab Results   Component Value Date/Time    CK 79 09/22/2010 03:50 AM    Troponin-I, Qt. 0.05 (H) 09/21/2010 12:10 PM        Lab Results   Component Value Date/Time    Culture result: Culture performed on Fluid swab specimen 11/25/2021 08:22 PM    Culture result: NO GROWTH 4 DAYS 11/25/2021 08:22 PM       No results found for: TOXA1, RPR, HBCM, HBSAG, HAAB, HCAB1, HAAT, G6PD, CRYAC, HIVGT, HIVR, HIV1, HIV12, HIVPC, HIVRPI    Lab Results   Component Value Date/Time    CK 79 09/22/2010 03:50 AM       Lab Results   Component Value Date/Time    Color YELLOW/STRAW 08/02/2019 12:10 AM    Appearance CLEAR 08/02/2019 12:10 AM    pH (UA) 5.0 08/02/2019 12:10 AM    Protein 30 (A) 08/02/2019 12:10 AM    Glucose 500 (A) 08/02/2019 12:10 AM    Ketone NEGATIVE  08/02/2019 12:10 AM    Bilirubin NEGATIVE  08/02/2019 12:10 AM    Blood SMALL (A) 08/02/2019 12:10 AM    Urobilinogen 0.2 08/02/2019 12:10 AM    Nitrites NEGATIVE  08/02/2019 12:10 AM    Leukocyte Esterase NEGATIVE  08/02/2019 12:10 AM    WBC 0-4 08/02/2019 12:10 AM    RBC 0-5 08/02/2019 12:10 AM    Bacteria NEGATIVE  08/02/2019 12:10 AM       Images: personally visualized and report reviewed    CXR (1/16/2022): Worsening of left mid and lower lung pulmonary opacities. CXR (1/20/2022): There is bilateral pneumonia in the periphery of the mid  and lower lung zones left greater than right. This has mildly worsened. CXR (1/31/2022): Bilateral pneumonia persists in the periphery of the mid lower lung zones. Areas of consolidation have increased in the right mid zone and left mid zone. IMPRESSION  · Acute Respiratory Failure with Hypoxia  · Covid-19 Pneumonia  · History of A. Fib  · Moderate Aortic Stenosis  · History of Tobacco Abuse  · DM  · Hypertension      PLAN  · Continue O2 to keep sats >88%; on HF 20L FIO2 100%  · Continue IV Solumedrol 60mg q 8hr  · Baricitinib complete  · Hold on empiric abx for now given normal WBC and PCT  · Repeat CXR tomorrow am  · On AC with Xarelto  · Trend CRP, d-dimer, LFTs  · Encourage IS use  · Prone as able  · OOB to chair  · Would benefit from PFTs as an outpatient  · Will need repeat imaging as an outpatient  · PT/OT       Darryl Cerna, [address]

## 2022-02-02 NOTE — PROGRESS NOTES
0700: Bedside shift change report given to [address] [place] [address] (oncoming nurse) by Janna Lucas RN (offgoing nurse). Report included the following information SBAR, Kardex, Intake/Output, MAR, Accordion and Cardiac Rhythm V paced. This patient was assisted with Intentional Toileting every 2 hours during this shift as appropriate. Documentation of ambulation and output reflected on Flowsheet as appropriate. Purposeful hourly rounding was completed using AIDET and 5Ps. Outcomes of PHR documented as they occurred. Bed alarm in use as appropriate. Dual Suction and ambubag in place.

## 2022-02-02 NOTE — PROGRESS NOTES
Care Management follow up, GMK 90 ZFQI     Patient admitted for acute hypoxic respiratory failure, COVID19, pneumonia. History of: afib, diabetes, HTN, carotid artery stenosis, aortic stenosis, CVA  COVID Vaccine:  Yes x 2, no booster        RUR 17 (Score %) moderate    Is This a Readmission NO  Is this a Bundle NO     Current status  Patient discussed during interdisciplinary rounds. Patient continues to require medical management including high flow oxygen at 20L/100%. Ongoing assessment and monitoring.   Family prefers the patient to return home with home health services.  Patient accepted by [facility] for follow up at [facility] will monitor progress. Patient with increased oxygen demand with persistent bilateral pneumonia. Started on IV solumedrol. Patient has very poor activity tolerance with episodes of hypoxia with therapy and high flow oxygen required for recovery.     Transition of Care Plan  1. Monitor patient status and response to treatment. 2. Patient continues to require medical management. 3. Requiring high flow oxygen and IV solumedrol. Very poor activity tolerance. 4. Patient accepted by [facility]  5. Patient has [employer]. 6. Will monitor for home oxygen needs. 7. Patient lives with his grandson and great grandson, family members to assist with care at home. 8. CM to monitor progress and recommendations.     Lyric Bonner RN, MSN/Care manager  [phone]

## 2022-02-02 NOTE — PROGRESS NOTES
Carlos Eduardo Kingston [facility]  [address] [facility], [place], [address] [place] [address]  [phone]      Medical Progress Note      NAME: Az Kennedy   :  1940  MRM:  [id number]    Date/Time: 2022        Assessment / Plan:     78 yo M w/ hx of AF, HTN, DM presenting w/ fatigue, dyspneic, admitted for AHRF 2/2 COVID-19     AHRF: 2/2 pneumonia due to COVID-19 infection. s/p baricitinib. Improving slightly to 20L today. O2 desaturation w/ ambuation. CXR repeated  showing persistent bilateral pneumonia consistent with COVID pneumonia. Areas consolidation have increased, low procalcitonin. Defer for now but low threshold for empiric broad-spectrum abx. Pt prefers not to go to inpt rehab or SNF, but may be limited by O2 requirement. Appreciate pulmonology recs; changed dexamethasone to IV Solu-medrol. Cont Xarelto. Overall prognosis is guarded.      Aortic stenosis: mild on TTE . Monitor volume status     VT: hx of NSVT. Noted . Evaluated by cardiology. Monitor K and Mg. Cont BB      Type 2 diabetes mellitus without complication: U8O 1.2%. BG high 2/2 steroids. Cont NPH, SSI      SOHAIL: mild, resolved. Currently holding lisinopril. Avoid hypotension     A-fib/ Pacemaker: monitor on telemetry. On Xarelto     History of CVA (cerebrovascular accident): cont OAC, statin     Hypertension: currently holding lisinopril       Total time spent: 35 minutes, d/w pulmonology  Time spent in the care of this patient including reviewing records, discussing with nursing and/or other providers on the treatment team, obtaining history and examining the patient, and discussing treatment plans.                   Care Plan discussed with: Patient, Nursing Staff and >50% of time spent in counseling and coordination of care    Discussed:  Care Plan and D/C Planning    Prophylaxis:  Xarelto    Disposition:  TBD         Subjective:     Chief Complaint:  Follow up COVID    Chart/notes/labs/studies reviewed, patient examined. Feels better today. No CP, SOB improved. No fevers       Objective:       Vitals:        Last 24hrs VS reviewed since prior progress note. Most recent are:    Visit Vitals  BP (!) 102/57 (BP 1 Location: Right arm, BP Patient Position: At rest)   Pulse 84   Temp 97.3 °F (36.3 °C)   Resp 17   Ht 5' 8\" (1.727 m)   Wt 63 kg (138 lb 12.8 oz)   SpO2 93%   BMI 21.10 kg/m²     SpO2 Readings from Last 6 Encounters:   02/02/22 93%   11/26/21 95%   11/19/21 93%   05/28/21 95%   02/06/20 98%   02/02/20 93%    O2 Flow Rate (L/min): 20 l/min       Intake/Output Summary (Last 24 hours) at 2/2/2022 1656  Last data filed at 2/2/2022 3316  Gross per 24 hour   Intake 600 ml   Output 600 ml   Net 0 ml          Exam:     Physical Exam:    Gen:  Elderly, chronically ill-appearing. NAD. Pleasant  HEENT:  Atraumatic, normocephalic. Sclerae nonicteric, hearing intact to voice  Neck:  Supple, without apparent masses  Resp:  No accessory muscle use, increased WOB. Mildly diminished without wheezes, rales, or rhonchi  Card: RRR, without m/r/g. No LE edema  Abd:  +bowel sounds, soft, NTTP, nondistended. No HSM  Neuro: Face symmetric, speech fluent, follows commands appropriately, no focal weakness or numbness  Psych:  Alert, oriented x 3.  Fair insight     Medications Reviewed: (see below)    Lab Data Reviewed: (see below)    ______________________________________________________________________    Medications:     Current Facility-Administered Medications   Medication Dose Route Frequency    insulin NPH (NOVOLIN N, HUMULIN N) injection 25 Units  25 Units SubCUTAneous ACB&D    methylPREDNISolone (PF) (SOLU-MEDROL) injection 60 mg  60 mg IntraVENous Q8H    artificial tears (dextran-hypromellose-glycerin) (GENTEAL) ophthalmic solution 1 Drop  1 Drop Both Eyes PRN    sodium chloride (OCEAN) 0.65 % nasal squeeze bottle 2 Spray  2 Spray Both Nostrils Q2H PRN    LORazepam (ATIVAN) tablet 0.5 mg  0.5 mg Oral BID PRN    metoprolol tartrate (LOPRESSOR) tablet 12.5 mg  12.5 mg Oral BID    guaiFENesin-dextromethorphan (ROBITUSSIN DM) 100-10 mg/5 mL syrup 5 mL  5 mL Oral Q6H PRN    rivaroxaban (XARELTO) tablet 20 mg  20 mg Oral DAILY WITH DINNER    [Held by provider] lisinopriL (PRINIVIL, ZESTRIL) tablet 10 mg  10 mg Oral QHS    atorvastatin (LIPITOR) tablet 20 mg  20 mg Oral DAILY    glucose chewable tablet 16 g  4 Tablet Oral PRN    dextrose (D50W) injection syrg 12.5-25 g  25-50 mL IntraVENous PRN    glucagon (GLUCAGEN) injection 1 mg  1 mg IntraMUSCular PRN    insulin lispro (HUMALOG) injection   SubCUTAneous AC&HS    sodium chloride (NS) flush 5-40 mL  5-40 mL IntraVENous Q8H    sodium chloride (NS) flush 5-40 mL  5-40 mL IntraVENous PRN    acetaminophen (TYLENOL) tablet 650 mg  650 mg Oral Q6H PRN    Or    acetaminophen (TYLENOL) suppository 650 mg  650 mg Rectal Q6H PRN    polyethylene glycol (MIRALAX) packet 17 g  17 g Oral DAILY PRN    ondansetron (ZOFRAN ODT) tablet 4 mg  4 mg Oral Q8H PRN    Or    ondansetron (ZOFRAN) injection 4 mg  4 mg IntraVENous Q6H PRN            Lab Review:     Recent Labs     02/02/22  0047 02/01/22  0532 01/31/22  0200   WBC 7.9 8.8 10.1   HGB 14.0 14.9 14.7   HCT 40.8 43.6 43.2   * 136* 166     Recent Labs     02/01/22  0532 01/31/22  0200   * 133*   K 4.6 4.4    101   CO2 29 30   * 118*   BUN 26* 27*   CREA 0.69* 0.80   CA 8.6 8.3*   MG 1.9 1.1*   PHOS 3.4 2.9   ALB 2.6* 2.5*   ALT 28 31     No components found for: GLPOC  No results for input(s): PH, PCO2, PO2, HCO3, FIO2 in the last 72 hours. No results for input(s): INR, INREXT, INREXT in the last 72 hours.   No results found for: SDES  Lab Results   Component Value Date/Time    Culture result: Culture performed on Fluid swab specimen 11/25/2021 08:22 PM    Culture result: NO GROWTH 4 DAYS 11/25/2021 08:22 PM              ___________________________________________________    Attending Physician: Poly Tran MD

## 2022-02-03 NOTE — PROGRESS NOTES
Carlos Eduardo Kingston [facility] 79  [address] [facility], [place], [address] [place] [address]  [phone]      Medical Progress Note      NAME: Yarely Sagastume   :  1940  MRM:  [id number]    Date/Time: 2/3/2022        Assessment / Plan:     78 yo M w/ hx of AF, HTN, DM presenting w/ fatigue, dyspneic, admitted for AHRF 2/2 COVID-19     AHRF: 2/2 pneumonia due to COVID-19 infection. s/p baricitinib. Still on HFNC, O2 requirement fluctuates b/n 20-35L. O2 desaturation w/ ambuation. Pt has significant hx of tobacco abuse (50 pack-year hx, quit smoking in ). 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 Pt prefers not to go to inpt rehab or SNF, but may be limited by O2 requirement. Appreciate pulmonology recs; changed dexamethasone to IV Solu-medrol on . Cont Xarelto. Overall prognosis is poor.      Aortic stenosis: mild on TTE . Monitor volume status     VT: hx of NSVT. Noted . Evaluated by cardiology. Monitor K and Mg. Cont BB      Type 2 diabetes mellitus without complication: D3L 2.8%. BG high / steroids. Increase NPH and titrate PRN, SSI      SOHAIL: mild, resolved. Currently holding lisinopril. Avoid hypotension     A-fib/ Pacemaker: monitor on telemetry. On Xarelto     History of CVA (cerebrovascular accident): cont OAC, statin     Hypertension: BP well controlled resume lisinopril. Cont metoprolol      Total time spent: 35 minutes, d/w pulmonology  Time spent in the care of this patient including reviewing records, discussing with nursing and/or other providers on the treatment team, obtaining history and examining the patient, and discussing treatment plans.                   Care Plan discussed with: Patient, Nursing Staff and >50% of time spent in counseling and coordination of care    Discussed:  Care Plan and D/C Planning    Prophylaxis:  Xarelto    Disposition:  TBD         Subjective:     Chief Complaint:  Follow up COVID    Chart/notes/labs/studies reviewed, patient examined. Feeling fine. Denies CP or SOB at rest. No fever       Objective:       Vitals:        Last 24hrs VS reviewed since prior progress note. Most recent are:    Visit Vitals  /67 (BP 1 Location: Left arm, BP Patient Position: At rest)   Pulse 71   Temp 97 °F (36.1 °C)   Resp 22   Ht 5' 8\" (1.727 m)   Wt 64.5 kg (142 lb 1.6 oz)   SpO2 94%   BMI 21.61 kg/m²     SpO2 Readings from Last 6 Encounters:   02/03/22 94%   11/26/21 95%   11/19/21 93%   05/28/21 95%   02/06/20 98%   02/02/20 93%    O2 Flow Rate (L/min): 30 l/min       Intake/Output Summary (Last 24 hours) at 2/3/2022 1649  Last data filed at 2/3/2022 1230  Gross per 24 hour   Intake    Output 1100 ml   Net -1100 ml          Exam:     Physical Exam:    Gen:  Elderly, chronically ill-appearing. NAD. Pleasant  HEENT:  Atraumatic, normocephalic. Sclerae nonicteric, hearing intact to voice  Neck:  Supple, without apparent masses  Resp:  No accessory muscle use, increased WOB. Mildly diminished without wheezes, rales, or rhonchi  Card: RRR, without m/r/g. No LE edema  Abd:  +bowel sounds, soft, NTTP, nondistended. No HSM  Neuro: Face symmetric, speech fluent, follows commands appropriately, no focal weakness or numbness  Psych:  Alert, oriented x 3.  Fair insight     Medications Reviewed: (see below)    Lab Data Reviewed: (see below)    ______________________________________________________________________    Medications:     Current Facility-Administered Medications   Medication Dose Route Frequency    insulin NPH (NOVOLIN N, HUMULIN N) injection 30 Units  30 Units SubCUTAneous ACB&D    methylPREDNISolone (PF) (SOLU-MEDROL) injection 60 mg  60 mg IntraVENous Q8H    artificial tears (dextran-hypromellose-glycerin) (GENTEAL) ophthalmic solution 1 Drop  1 Drop Both Eyes PRN    sodium chloride (OCEAN) 0.65 % nasal squeeze bottle 2 Spray  2 Spray Both Nostrils Q2H PRN    LORazepam (ATIVAN) tablet 0.5 mg  0.5 mg Oral BID PRN    metoprolol tartrate (LOPRESSOR) tablet 12.5 mg  12.5 mg Oral BID    guaiFENesin-dextromethorphan (ROBITUSSIN DM) 100-10 mg/5 mL syrup 5 mL  5 mL Oral Q6H PRN    rivaroxaban (XARELTO) tablet 20 mg  20 mg Oral DAILY WITH DINNER    lisinopriL (PRINIVIL, ZESTRIL) tablet 10 mg  10 mg Oral QHS    atorvastatin (LIPITOR) tablet 20 mg  20 mg Oral DAILY    glucose chewable tablet 16 g  4 Tablet Oral PRN    dextrose (D50W) injection syrg 12.5-25 g  25-50 mL IntraVENous PRN    glucagon (GLUCAGEN) injection 1 mg  1 mg IntraMUSCular PRN    insulin lispro (HUMALOG) injection   SubCUTAneous AC&HS    sodium chloride (NS) flush 5-40 mL  5-40 mL IntraVENous Q8H    sodium chloride (NS) flush 5-40 mL  5-40 mL IntraVENous PRN    acetaminophen (TYLENOL) tablet 650 mg  650 mg Oral Q6H PRN    Or    acetaminophen (TYLENOL) suppository 650 mg  650 mg Rectal Q6H PRN    polyethylene glycol (MIRALAX) packet 17 g  17 g Oral DAILY PRN    ondansetron (ZOFRAN ODT) tablet 4 mg  4 mg Oral Q8H PRN    Or    ondansetron (ZOFRAN) injection 4 mg  4 mg IntraVENous Q6H PRN            Lab Review:     Recent Labs     02/03/22  0629 02/02/22  0047 02/01/22  0532   WBC 10.6 7.9 8.8   HGB 14.0 14.0 14.9   HCT 40.0 40.8 43.6   * 122* 136*     Recent Labs     02/01/22  0532   *   K 4.6      CO2 29   *   BUN 26*   CREA 0.69*   CA 8.6   MG 1.9   PHOS 3.4   ALB 2.6*   ALT 28     No components found for: GLPOC  No results for input(s): PH, PCO2, PO2, HCO3, FIO2 in the last 72 hours. No results for input(s): INR, INREXT, INREXT in the last 72 hours.   No results found for: SDES  Lab Results   Component Value Date/Time    Culture result: Culture performed on Fluid swab specimen 11/25/2021 08:22 PM    Culture result: NO GROWTH 4 DAYS 11/25/2021 08:22 PM              ___________________________________________________    Attending Physician: Sal Cruz MD

## 2022-02-03 NOTE — ROUTINE PROCESS
Bedside shift change report given to Shayy Siddiqui (oncoming nurse) by Christiano Malik (offgoing nurse). Report included the following information SBAR, Kardex, Intake/Output, MAR, Accordion and Recent Results.

## 2022-02-03 NOTE — PROGRESS NOTES
Bedside and Verbal shift change report given to Eric Benitez RN (oncoming nurse) by Phillip Epstein RN (offgoing nurse). Report included the following information SBAR, Kardex and ED Summary.

## 2022-02-03 NOTE — PROGRESS NOTES
Name: Amber Salazar: [address]   : 1940 Admit Date: 2022   Phone: [phone]  Room: 321/01   PCP: Abimbola Rowan MD  MRN: [id number]   Date: 2/3/2022  Code: Full Code          Chart and notes reviewed. Data reviewed. I review the patient's current medications in the medical record at each encounter.  I have evaluated and examined the patient. History of Present Illness:  Mr. Jose Guadalupe Foley is a very pleasant, 78 yo gentleman that presented to [facility] on  with worsening shortness of breath. He has a PMH of a.fib, aortic stenosis, DM, hypertension, CVA, and tobacco abuse (100 pack years, quit in ). He received two doses of the Covid vaccine, last in 2021. He did not receive a booster dose. He is currently requiring hi flow at 50L/100%. He reports that his symptoms started five days ago after he was exposed to his grandson who tested positive. He became more progressively more SOB with increased cough. No fever/chills. He did initially have diarrhea. Images:  Personally visualized and report reviewed. CXR :  Superimposed patchy bilateral airspace disease on interstitial changes concerning for infection or inflammation      D-dimer . 55  LFTs stable  Procalcitonin negative  Ferritin 759  Trop wnl  Pro-  CRP 3.18  ECHO 2020:  EF 55-60%, mildly reduced systolic function, moderate aortic stenosis, trave MVR, moderately elevated CVP. Interval History:  Afebrile  BP stable  Sats % on 35L FiO2 100% during my exam; gradually weaned down to 20L FiO2 65% with sats remaining in the low to mid 90s  WBC 10.6  D-dimer 0.27  Na 134  CRP 0.37 - better  proBNP 578  Ferritin 788  LFTs stable  PCT < 0.05  400 mL UOP documented + 2 unmeasurable occurences    Chest CT  personally reviewed: Emphysema with peripheral blebs. Septal thickening and possible fibrosis in the lung bases--limited by motion.     ECHO : EF 45-50%, mild global hypokinesis present, mildly reduced systolic function, mild AV stenosis    Abdominal US : Limited study. Possible hepatic steatosis. Cholecystectomy. No visible biliary tree. ROS:  Sitting up in chair playing solitaire on his computer. Has no specific complaints this morning. Continues to make progress with therapy and taking less time to recover sats after session. Denies fever or chills. Denies CP. Past Medical History:   Diagnosis Date    [facility]     Diabetes mellitus, type 2 ([facility]     Hypertension     Internal carotid artery stenosis, right     Chronic occlusion of the right internal carotid artery per MRI of 2020 unchanged from previous study of 2010; study of  also showed mild stenosis of the left internal carotid artery and bilateral patent vertebral arteries    Moderate aortic stenosis     Echocardiogram of 2020:  Severe aortic valve leaflet calcification present with reduced excursion. Moderate aortic valve stenosis was present. LVEF 55-60% .  Stroke [facility])     asymptomatic; small chronic infarcts in the bilateral thalami per MRI of 2020.   Previous MRI of 2010 showed possible tiny old right thalamic infarct versus perivascular space        Past Surgical History:   Procedure Laterality Date    HX CHOLECYSTECTOMY      ND INS NEW/RPLCMT PRM PM W/TRANSV ELTRD ATRIAL&VENT N/A 2020    INSERT PPM DUAL performed by Kojo Marshall MD at [facility] LAB       Family History   Adopted: Yes   Family history unknown: Yes       Social History     Tobacco Use    Smoking status: Former Smoker     Packs/day: 2.00     Years: 50.00     Pack years: 100.00     Quit date: 2004     Years since quittin.6    Smokeless tobacco: Never Used   Substance Use Topics    Alcohol use: Yes     Comment: 1 beer or 1 glaas of wine daily most of adult life       Allergies   Allergen Reactions    Ampicillin Nausea and Vomiting    Iodine Hives, Itching and Nausea and Vomiting       Current Facility-Administered Medications   Medication Dose Route Frequency    insulin NPH (NOVOLIN N, HUMULIN N) injection 30 Units  30 Units SubCUTAneous ACB&D    methylPREDNISolone (PF) (SOLU-MEDROL) injection 60 mg  60 mg IntraVENous Q8H    artificial tears (dextran-hypromellose-glycerin) (GENTEAL) ophthalmic solution 1 Drop  1 Drop Both Eyes PRN    sodium chloride (OCEAN) 0.65 % nasal squeeze bottle 2 Spray  2 Spray Both Nostrils Q2H PRN    LORazepam (ATIVAN) tablet 0.5 mg  0.5 mg Oral BID PRN    metoprolol tartrate (LOPRESSOR) tablet 12.5 mg  12.5 mg Oral BID    guaiFENesin-dextromethorphan (ROBITUSSIN DM) 100-10 mg/5 mL syrup 5 mL  5 mL Oral Q6H PRN    rivaroxaban (XARELTO) tablet 20 mg  20 mg Oral DAILY WITH DINNER    lisinopriL (PRINIVIL, ZESTRIL) tablet 10 mg  10 mg Oral QHS    atorvastatin (LIPITOR) tablet 20 mg  20 mg Oral DAILY    glucose chewable tablet 16 g  4 Tablet Oral PRN    dextrose (D50W) injection syrg 12.5-25 g  25-50 mL IntraVENous PRN    glucagon (GLUCAGEN) injection 1 mg  1 mg IntraMUSCular PRN    insulin lispro (HUMALOG) injection   SubCUTAneous AC&HS    sodium chloride (NS) flush 5-40 mL  5-40 mL IntraVENous Q8H    sodium chloride (NS) flush 5-40 mL  5-40 mL IntraVENous PRN    acetaminophen (TYLENOL) tablet 650 mg  650 mg Oral Q6H PRN    Or    acetaminophen (TYLENOL) suppository 650 mg  650 mg Rectal Q6H PRN    polyethylene glycol (MIRALAX) packet 17 g  17 g Oral DAILY PRN    ondansetron (ZOFRAN ODT) tablet 4 mg  4 mg Oral Q8H PRN    Or    ondansetron (ZOFRAN) injection 4 mg  4 mg IntraVENous Q6H PRN         REVIEW OF SYSTEMS   Negative except as stated in the HPI.       Physical Exam:   Visit Vitals  /73 (BP 1 Location: Left upper arm, BP Patient Position: At rest)   Pulse 71   Temp 98.4 °F (36.9 °C)   Resp 18   Ht 5' 8\" (1.727 m)   Wt 64.5 kg (142 lb 1.6 oz)   SpO2 97%   BMI 21.61 kg/m²       General:  Alert, cooperative, no distress, appears stated age, on midflow   Head:  Normocephalic, without obvious abnormality, atraumatic. Eyes:  Conjunctivae/corneas clear. Nose: Nares normal. Septum midline. Mucosa normal.   Throat: Lips, mucosa, and tongue normal.    Neck: Supple, symmetrical, trachea midline, no adenopathy. Lungs:   Crackles in the bases. Upper lungs fields fairly clear. Fair air movement. Resp nonlabored. Chest wall:  No tenderness or deformity. Heart:  Regular rate and rhythm, S1, S2 normal, no murmur, click, rub or gallop. Abdomen:   Soft, non-tender. Bowel sounds normal.   Extremities: Extremities normal, atraumatic, no cyanosis or edema. Pulses: 2+ and symmetric all extremities. Skin: Skin color, texture, turgor normal. No rashes or lesions   Lymph nodes: Cervical  nodes normal.   Neurologic: Grossly nonfocal       Lab Results   Component Value Date/Time    Sodium 134 (L) 02/01/2022 05:32 AM    Potassium 4.6 02/01/2022 05:32 AM    Chloride 100 02/01/2022 05:32 AM    CO2 29 02/01/2022 05:32 AM    BUN 26 (H) 02/01/2022 05:32 AM    Creatinine 0.69 (L) 02/01/2022 05:32 AM    Glucose 159 (H) 02/01/2022 05:32 AM    Calcium 8.6 02/01/2022 05:32 AM    Magnesium 1.9 02/01/2022 05:32 AM    Phosphorus 3.4 02/01/2022 05:32 AM    Lactic acid 1.3 01/11/2022 06:34 PM       Lab Results   Component Value Date/Time    WBC 10.6 02/03/2022 06:29 AM    HGB 14.0 02/03/2022 06:29 AM    PLATELET 164 (L) 93/58/0393 06:29 AM    MCV 85.5 02/03/2022 06:29 AM       Lab Results   Component Value Date/Time    INR 1.0 02/04/2020 03:50 PM    aPTT 32.0 09/22/2010 03:50 AM    Alk.  phosphatase 54 02/01/2022 05:32 AM    Protein, total 5.5 (L) 02/01/2022 05:32 AM    Albumin 2.6 (L) 02/01/2022 05:32 AM    Globulin 2.9 02/01/2022 05:32 AM       Lab Results   Component Value Date/Time    Ferritin 788 (H) 01/31/2022 02:00 AM       Lab Results   Component Value Date/Time    Sed rate (ESR) 5 09/22/2010 03:50 AM    C-Reactive protein 0.37 02/01/2022 05:32 AM        No results found for: PH, PHI, PCO2, PCO2I, PO2, PO2I, HCO3, HCO3I, FIO2, FIO2I    Lab Results   Component Value Date/Time    CK 79 09/22/2010 03:50 AM    Troponin-I, Qt. 0.05 (H) 09/21/2010 12:10 PM        Lab Results   Component Value Date/Time    Culture result: Culture performed on Fluid swab specimen 11/25/2021 08:22 PM    Culture result: NO GROWTH 4 DAYS 11/25/2021 08:22 PM       No results found for: TOXA1, RPR, HBCM, HBSAG, HAAB, HCAB1, HAAT, G6PD, CRYAC, HIVGT, HIVR, HIV1, HIV12, HIVPC, HIVRPI    Lab Results   Component Value Date/Time    CK 79 09/22/2010 03:50 AM       Lab Results   Component Value Date/Time    Color YELLOW/STRAW 08/02/2019 12:10 AM    Appearance CLEAR 08/02/2019 12:10 AM    pH (UA) 5.0 08/02/2019 12:10 AM    Protein 30 (A) 08/02/2019 12:10 AM    Glucose 500 (A) 08/02/2019 12:10 AM    Ketone NEGATIVE  08/02/2019 12:10 AM    Bilirubin NEGATIVE  08/02/2019 12:10 AM    Blood SMALL (A) 08/02/2019 12:10 AM    Urobilinogen 0.2 08/02/2019 12:10 AM    Nitrites NEGATIVE  08/02/2019 12:10 AM    Leukocyte Esterase NEGATIVE  08/02/2019 12:10 AM    WBC 0-4 08/02/2019 12:10 AM    RBC 0-5 08/02/2019 12:10 AM    Bacteria NEGATIVE  08/02/2019 12:10 AM       Images: personally visualized and report reviewed    CXR (1/16/2022): Worsening of left mid and lower lung pulmonary opacities. CXR (1/20/2022): There is bilateral pneumonia in the periphery of the mid  and lower lung zones left greater than right. This has mildly worsened. CXR (1/31/2022): Bilateral pneumonia persists in the periphery of the mid lower lung zones. Areas of consolidation have increased in the right mid zone and left mid zone. CXR (2/3/2022): Chronic volume loss and interstitial thickening have been present since at least 2020. This complicates evaluation for underlying COVID-19 pneumonia. The radiographic appearance of the chest on 2/6/2020, 1/11/2022, and 1/20/2022 is similar.  CT better shows underlying panlobular emphysema as well.   More dense fibrosis adjacent bullous emphysema in the lateral aspects of the bilateral mid lungs is not significantly changed. 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 Fib  · Moderate Aortic Stenosis  · History of Tobacco Abuse  · DM  · Hypertension      PLAN  · Continue O2 to keep sats >88%; on HF 20L FIO2 65%  · Continue IV Solumedrol 60mg q 8hr. If resp status continues to improve, will plan to start slow wean tomorrow.   · Baricitinib complete  · On AC with Xarelto  · Trend CRP, d-dimer, LFTs  · Encourage IS use  · Prone as able  · OOB to chair  · Would benefit from PFTs as an outpatient  · Will need repeat imaging as an outpatient  · PT/OT       Dawson Astorga

## 2022-02-03 NOTE — PROGRESS NOTES
Care Management follow up, YTD 05 TISD     Patient admitted for acute hypoxic respiratory failure, COVID19, pneumonia. History of: afib, diabetes, HTN, carotid artery stenosis, aortic stenosis, CVA  COVID Vaccine:  Yes x 2, no booster        RUR 17 (Score %) moderate    Is This a Readmission NO  Is this a Bundle NO     Current status  Patient discussed during interdisciplinary rounds. Patient continues to require medical management including high flow oxygen at 30L/80%. Ongoing assessment and monitoring.   Family prefers the patient to return home with home health services.  Patient accepted by [facility] for follow up at [facility] will monitor progress. Patient with increased oxygen demand with persistent bilateral pneumonia. Started on IV solumedrol. Patient has very poor activity tolerance with episodes of hypoxia with therapy and high flow oxygen required for recovery.     Transition of Care Plan  1. Monitor patient status and response to treatment. 2. Patient continues to require medical management. 3. Requiring high flow oxygen and IV solumedrol. Very poor activity tolerance. 4. Patient accepted by [facility]  5. Patient has [employer]. 6. Will monitor for home oxygen needs. 7. Patient lives with his grandson and great grandson, family members to assist with care at home. 8. CM to monitor progress and recommendations.     Rema Armstrong RN, MSN/Care manager  [phone]

## 2022-02-04 NOTE — PROGRESS NOTES
Problem: Self Care Deficits Care Plan (Adult)  Goal: *Acute Goals and Plan of Care (Insert Text)  Description: FUNCTIONAL STATUS PRIOR TO ADMISSION: Patient was independent and active without use of DME.     HOME SUPPORT: The patient lived with grandson but did not require assist.    Occupational Therapy Goals  Initiated 1/17/2022; Goals reviewed 2/2/2022, all remain appropriate  1. Patient will perform grooming and bathing with supervision standing at the sink with < or = 2 rest breaks and maintain oxygen sats > or = 90% within 7 day(s). 2. Patient will perform upper body dressing and lower body dressing with supervision with < or = 2 rest breaks and maintain oxygen sats > or = 90%  within 7 day(s). 3. Patient will perform toileting and toilet transfer with supervision and maintain oxygen sats > or = 90% within 7 day(s). 4. Patient will demonstrate pursed lip breathing with min cues during functional tasks within 7 day(s). 5. Patient will independently perform and recall home exercise program while maintaining O2 sats at or above 90% within 7 days. 6. Patient will verbalize/demonstrate all energy conservation techniques during ADL tasks with independence within 7 days. Outcome: Progressing Towards Goal   OCCUPATIONAL THERAPY TREATMENT  Patient: Mauro Peres (64 y.o. male)  Date: 2/4/2022  Diagnosis: Acute respiratory failure with [facility] ([facility] [J96.01]  Pneumonia due to COVID-19 virus [U07.1, J12.82] <principal problem not specified>       Precautions: Fall  Chart, occupational therapy assessment, plan of care, and goals were reviewed. ASSESSMENT  Patient continues with skilled OT services and is progressing towards goals. Pt eager to participate wanting to improve standing and balance. Contact guard for sit to stand with initial loss of balance that he regained to transfer to chair.  O2 sats decrease to 82% on 20 L per min with activity and pt needs sitting rest break for recovery to 90% after 2 min. Pt stood to clean his glasses and sat to wash his face. He engaged with shoulder abduction 5 x and instructed to perform during the day. Current Level of Function Impacting Discharge (ADLs): contact guard for standing ADL tasks    Other factors to consider for discharge:          PLAN :  Patient continues to benefit from skilled intervention to address the above impairments. Continue treatment per established plan of care to address goals. Recommend with staff: out of bed to chair for ADL's, there ex, there act, meals    Recommend next OT session: cont towards goals    Recommendation for discharge: (in order for the patient to meet his/her long term goals)  Therapy up to 5 days a week in a rehab setting    This discharge recommendation:  Has not yet been discussed the attending provider and/or case management    IF patient discharges home will need the following DME:        SUBJECTIVE:   Patient stated Mario Land was a good session    OBJECTIVE DATA SUMMARY:   Cognitive/Behavioral Status:  Neurologic State: Alert; Appropriate for age  Orientation Level: Oriented X4  Cognition: Follows commands             Functional Mobility and Transfers for ADLs:  Bed Mobility:   Contact guard    Transfers:  Sit to Stand: Contact guard assistance          Balance:Impaired standing balance       ADL Intervention:       Grooming  Washing Face: Set-up          Therapeutic Exercises:   Shoulder abduction 5 x      Activity Tolerance:   Fair    After treatment patient left in no apparent distress:   Sitting in chair    COMMUNICATION/COLLABORATION:   The patients plan of care was discussed with: Occupational therapist and Registered nurse.      ANJELICA Pete/L  Time Calculation: 32 mins

## 2022-02-04 NOTE — PROGRESS NOTES
Name: Aki Hope: [employer]   : 1940 Admit Date: 2022   Phone: [phone]  Room: [facility]/01   PCP: Geri Almendarez MD  MRN: [id number]   Date: 2022  Code: Full Code          Chart and notes reviewed. Data reviewed. I review the patient's current medications in the medical record at each encounter.  I have evaluated and examined the patient. History of Present Illness:  Mr. Governor Hammans is a very pleasant, 78 yo gentleman that [facility] to [facility] on  with worsening shortness of breath. He has a PMH of a.fib, aortic stenosis, DM, hypertension, CVA, and tobacco abuse (100 pack years, quit in ). He received two doses of the Covid vaccine, last in 2021. He did not receive a booster dose. He is currently requiring hi flow at 50L/100%. He reports that his symptoms started five days ago after he was exposed to his grandson who tested positive. He became more progressively more SOB with increased cough. No fever/chills. He did initially have diarrhea. Images:  Personally visualized and report reviewed. CXR :  Superimposed patchy bilateral airspace disease on interstitial changes concerning for infection or inflammation      D-dimer . 55  LFTs stable  Procalcitonin negative  Ferritin 759  Trop wnl  Pro-  CRP 3.18  ECHO 2020:  EF 55-60%, mildly reduced systolic function, moderate aortic stenosis, trave MVR, moderately elevated CVP. Interval History:  Afebrile  BP stable  Sats low-mid% on 20L FiO2 90% during my exam  WBC 9.2  D-dimer 0.27  Na 135  CRP 0.37 - better  proBNP 578  Ferritin 788  LFTs stable  PCT < 0.05  1600 mL UOP documented     Chest CT  personally reviewed: Emphysema with peripheral blebs. Septal thickening and possible fibrosis in the lung bases--limited by motion. ECHO : EF 45-50%, mild global hypokinesis present, mildly reduced systolic function, mild AV stenosis    Abdominal US : Limited study. Possible hepatic steatosis. Cholecystectomy. No visible biliary tree. ROS:  Sitting up in bed playing on his computer. Has no specific complaints this morning, but he is interested and doing more therapy today. Denies fever or chills. Denies CP. Past Medical History:   Diagnosis Date    Atrial fibrillation ([facility]     Diabetes mellitus, type 2 ([facility]     Hypertension     Internal carotid artery stenosis, right     Chronic occlusion of the right internal carotid artery per MRI of 2020 unchanged from previous study of 2010; study of  also showed mild stenosis of the left internal carotid artery and bilateral patent vertebral arteries    Moderate aortic stenosis     Echocardiogram of 2020:  Severe aortic valve leaflet calcification present with reduced excursion. Moderate aortic valve stenosis was present. LVEF 55-60% .  Stroke [facility])     asymptomatic; small chronic infarcts in the bilateral thalami per MRI of 2020.   Previous MRI of 2010 showed possible tiny old right thalamic infarct versus perivascular space        Past Surgical History:   Procedure Laterality Date    HX CHOLECYSTECTOMY      VA INS NEW/RPLCMT PRM PM W/TRANSV ELTRD ATRIAL&VENT N/A 2020    INSERT PPM DUAL performed by Dasha Gamez MD at [facility] LAB       Family History   Adopted: Yes   Family history unknown: Yes       Social History     Tobacco Use    Smoking status: Former Smoker     Packs/day: 2.00     Years: 50.00     Pack years: 100.00     Quit date: 2004     Years since quittin.7    Smokeless tobacco: Never Used   Substance Use Topics    Alcohol use: Yes     Comment: 1 beer or 1 glaas of wine daily most of adult life       Allergies   Allergen Reactions    Ampicillin Nausea and Vomiting    Iodine Hives, Itching and Nausea and Vomiting       Current Facility-Administered Medications   Medication Dose Route Frequency    insulin NPH (NOVOLIN N, HUMULIN N) injection 30 Units  30 Units SubCUTAneous ACB&D    methylPREDNISolone (PF) (SOLU-MEDROL) injection 60 mg  60 mg IntraVENous Q8H    artificial tears (dextran-hypromellose-glycerin) (GENTEAL) ophthalmic solution 1 Drop  1 Drop Both Eyes PRN    sodium chloride (OCEAN) 0.65 % nasal squeeze bottle 2 Spray  2 Spray Both Nostrils Q2H PRN    LORazepam (ATIVAN) tablet 0.5 mg  0.5 mg Oral BID PRN    metoprolol tartrate (LOPRESSOR) tablet 12.5 mg  12.5 mg Oral BID    guaiFENesin-dextromethorphan (ROBITUSSIN DM) 100-10 mg/5 mL syrup 5 mL  5 mL Oral Q6H PRN    rivaroxaban (XARELTO) tablet 20 mg  20 mg Oral DAILY WITH DINNER    lisinopriL (PRINIVIL, ZESTRIL) tablet 10 mg  10 mg Oral QHS    atorvastatin (LIPITOR) tablet 20 mg  20 mg Oral DAILY    glucose chewable tablet 16 g  4 Tablet Oral PRN    dextrose (D50W) injection syrg 12.5-25 g  25-50 mL IntraVENous PRN    glucagon (GLUCAGEN) injection 1 mg  1 mg IntraMUSCular PRN    insulin lispro (HUMALOG) injection   SubCUTAneous AC&HS    sodium chloride (NS) flush 5-40 mL  5-40 mL IntraVENous Q8H    sodium chloride (NS) flush 5-40 mL  5-40 mL IntraVENous PRN    acetaminophen (TYLENOL) tablet 650 mg  650 mg Oral Q6H PRN    Or    acetaminophen (TYLENOL) suppository 650 mg  650 mg Rectal Q6H PRN    polyethylene glycol (MIRALAX) packet 17 g  17 g Oral DAILY PRN    ondansetron (ZOFRAN ODT) tablet 4 mg  4 mg Oral Q8H PRN    Or    ondansetron (ZOFRAN) injection 4 mg  4 mg IntraVENous Q6H PRN         REVIEW OF SYSTEMS   Negative except as stated in the HPI. Physical Exam:   Visit Vitals  BP 97/61 (BP 1 Location: Left upper arm, BP Patient Position: At rest)   Pulse 70   Temp 97.6 °F (36.4 °C)   Resp 17   Ht 5' 8\" (1.727 m)   Wt 65.5 kg (144 lb 6.4 oz)   SpO2 93%   BMI 21.96 kg/m²       General:  Alert, cooperative, no distress, appears stated age, on midflow   Head:  Normocephalic, without obvious abnormality, atraumatic. Eyes:  Conjunctivae/corneas clear.     Nose: Nares normal. Septum midline. Mucosa normal.   Throat: Lips, mucosa, and tongue normal.    Neck: Supple, symmetrical, trachea midline, no adenopathy. Lungs:   Crackles in the bases. Upper lungs fields fairly clear. Fair air movement. Resp nonlabored. Chest wall:  No tenderness or deformity. Heart:  Regular rate and rhythm, S1, S2 normal, no murmur, click, rub or gallop. Abdomen:   Soft, non-tender. Bowel sounds normal.   Extremities: Extremities normal, atraumatic, no cyanosis or edema. Pulses: 2+ and symmetric all extremities. Skin: Skin color, texture, turgor normal. No rashes or lesions   Lymph nodes: Cervical  nodes normal.   Neurologic: Grossly nonfocal       Lab Results   Component Value Date/Time    Sodium 135 (L) 02/04/2022 01:23 AM    Potassium 4.2 02/04/2022 01:23 AM    Chloride 101 02/04/2022 01:23 AM    CO2 28 02/04/2022 01:23 AM    BUN 29 (H) 02/04/2022 01:23 AM    Creatinine 0.76 02/04/2022 01:23 AM    Glucose 163 (H) 02/04/2022 01:23 AM    Calcium 8.3 (L) 02/04/2022 01:23 AM    Magnesium 1.9 02/01/2022 05:32 AM    Phosphorus 3.4 02/01/2022 05:32 AM    Lactic acid 1.3 01/11/2022 06:34 PM       Lab Results   Component Value Date/Time    WBC 9.2 02/04/2022 01:23 AM    HGB 13.4 02/04/2022 01:23 AM    PLATELET 96 (L) 59/34/8118 01:23 AM    MCV 87.3 02/04/2022 01:23 AM       Lab Results   Component Value Date/Time    INR 1.0 02/04/2020 03:50 PM    aPTT 32.0 09/22/2010 03:50 AM    Alk.  phosphatase 52 02/04/2022 01:23 AM    Protein, total 5.1 (L) 02/04/2022 01:23 AM    Albumin 2.3 (L) 02/04/2022 01:23 AM    Globulin 2.8 02/04/2022 01:23 AM       Lab Results   Component Value Date/Time    Ferritin 788 (H) 01/31/2022 02:00 AM       Lab Results   Component Value Date/Time    Sed rate (ESR) 5 09/22/2010 03:50 AM    C-Reactive protein 0.37 02/01/2022 05:32 AM        No results found for: PH, PHI, PCO2, PCO2I, PO2, PO2I, HCO3, HCO3I, FIO2, FIO2I    Lab Results   Component Value Date/Time    CK 79 09/22/2010 03:50 AM    Troponin-I, Qt. 0.05 (H) 09/21/2010 12:10 PM        Lab Results   Component Value Date/Time    Culture result: Culture performed on Fluid swab specimen 11/25/2021 08:22 PM    Culture result: NO GROWTH 4 DAYS 11/25/2021 08:22 PM       No results found for: TOXA1, RPR, HBCM, HBSAG, HAAB, HCAB1, HAAT, G6PD, CRYAC, HIVGT, HIVR, HIV1, HIV12, HIVPC, HIVRPI    Lab Results   Component Value Date/Time    CK 79 09/22/2010 03:50 AM       Lab Results   Component Value Date/Time    Color YELLOW/STRAW 08/02/2019 12:10 AM    Appearance CLEAR 08/02/2019 12:10 AM    pH (UA) 5.0 08/02/2019 12:10 AM    Protein 30 (A) 08/02/2019 12:10 AM    Glucose 500 (A) 08/02/2019 12:10 AM    Ketone NEGATIVE  08/02/2019 12:10 AM    Bilirubin NEGATIVE  08/02/2019 12:10 AM    Blood SMALL (A) 08/02/2019 12:10 AM    Urobilinogen 0.2 08/02/2019 12:10 AM    Nitrites NEGATIVE  08/02/2019 12:10 AM    Leukocyte Esterase NEGATIVE  08/02/2019 12:10 AM    WBC 0-4 08/02/2019 12:10 AM    RBC 0-5 08/02/2019 12:10 AM    Bacteria NEGATIVE  08/02/2019 12:10 AM       Images: personally visualized and report reviewed    CXR (1/16/2022): Worsening of left mid and lower lung pulmonary opacities. CXR (1/20/2022): There is bilateral pneumonia in the periphery of the mid  and lower lung zones left greater than right. This has mildly worsened. CXR (1/31/2022): Bilateral pneumonia persists in the periphery of the mid lower lung zones. Areas of consolidation have increased in the right mid zone and left mid zone. CXR (2/3/2022): Chronic volume loss and interstitial thickening have been present since at least 2020. This complicates evaluation for underlying COVID-19 pneumonia. The radiographic appearance of the chest on 2/6/2020, 1/11/2022, and 1/20/2022 is similar. CT better shows underlying panlobular emphysema as well.   More dense fibrosis adjacent bullous emphysema in the lateral aspects of the bilateral mid lungs is not significantly changed.   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 Fib  · Moderate Aortic Stenosis  · History of Tobacco Abuse  · DM  · Hypertension      PLAN  · Continue O2 to keep sats >88%; on HF 20L FIO2 65%  · Continue IV Solumedrol 60mg q 8hr.  Will wean some today  · Baricitinib complete  · On AC with Xarelto  · Trend CRP, d-dimer, LFTs  · Encourage IS use  · Prone as able  · OOB to chair  · Would benefit from PFTs as an outpatient  · Will need repeat imaging as an outpatient  · PT/OT       Isi Pacheco MD

## 2022-02-04 NOTE — ROUTINE PROCESS
Bedside and Verbal shift change report given to Je Gross (oncoming nurse) by Sean Batista (offgoing nurse). Report included the following information SBAR, Kardex, ED Summary, Procedure Summary, Intake/Output, MAR, Accordion, Recent Results and Cardiac Rhythm V-Paced.

## 2022-02-04 NOTE — PROGRESS NOTES
0730- Bedside and Verbal shift change report given to Lori Murillo RN (oncoming nurse) by Emelina Henry RN (offgoing nurse). Report included the following information SBAR, Kardex, ED Summary, OR Summary, Procedure Summary, Intake/Output, MAR, Accordion, and Recent Results. This patient was assisted with Intentional Toileting every 2 hours during this shift. Documentation of ambulation and output reflected on Flowsheet. 1930-Bedside and Verbal shift change report given to RN (oncoming nurse) by Lori Murillo RN(offgoing nurse). Report included the following information SBAR, Kardex, ED Summary, OR Summary, Procedure Summary, Intake/Output, MAR, Accordion, Recent Results, and Med Rec Status.

## 2022-02-04 NOTE — PROGRESS NOTES
Physical Therapy Note:    PT treatment deferred. Pt declining participation in PT treatment. He reports recently performing bed to/from commode transfer with RN assist, fatigue after mobility, and intent to rest at this time. He reports performing 3 sets of LE exercises today. Pt encouraged to mobilize out of bed to chair for meals with staff assist and continue exercises. RN reports pt with improving SpO2 during transfers today compared with yesterday. Will continue to follow per POC.     Quyen Walters, PT, DPT, [place]

## 2022-02-04 NOTE — PROGRESS NOTES
Care Management follow up, WIF 70 PDJQ     Patient admitted for acute hypoxic respiratory failure, COVID19, pneumonia. History of: afib, diabetes, HTN, carotid artery stenosis, aortic stenosis, CVA  COVID Vaccine:  Yes x 2, no booster        RUR 17 (Score %) moderate    Is This a Readmission NO  Is this a Bundle NO     Current status  Patient discussed during interdisciplinary rounds. Patient continues to require medical management including high flow oxygen at 20L/96%. Ongoing assessment and monitoring.   Family prefers the patient to return home with home health services.  Patient accepted by [facility] for follow up at [facility] will monitor progress. Patient with increased oxygen demand with persistent bilateral pneumonia. Continues on IV solumedrol. Patient has very poor activity tolerance with episodes of hypoxia with therapy and high flow oxygen required for recovery.     Transition of Care Plan  1. Monitor patient status and response to treatment. 2. Patient continues to require medical management. 3. Requiring high flow oxygen and IV solumedrol. Very poor activity tolerance. 4. Patient accepted by [facility]  5. Patient has [employer]. 6. Will monitor for home oxygen needs. 7. Patient lives with his grandson and great grandson, family members to assist with care at home. 8. CM to monitor progress and recommendations.     Gildardo Cramer RN, MSN/Care manager  [phone]

## 2022-02-05 NOTE — PROGRESS NOTES
Progress Note      Pt Name  Micky Murray   Date of Birth 1940   Medical Record Number  [id number]      Age  79 y.o. PCP Valentine Estrella MD   Admit date:  1/11/2022    Room Number  321/01  @ [facility]   Date of Service  2/5/2022     Admission Diagnoses:  Acute hypoxic resp failure   COVID 19 PNA      Admission Summary:  \" Micky Murray is a 79 y.o. male who presents emergency department with complaints of \"not being able to eat\". States that he has not had any appetite since Friday, January 7. He states that he has been feeling weak, and either on January 8 or 9, almost fell on the front steps of his home, but his grandson caught him. Patient states that he has been having diarrhea 3-4 times a day from January 7 onward. He states that he has not noted any black stools or red blood in the stools.        Asked if he was having any shortness of breath, patient answered \"not really\" and that he really does not feel short of breath or tired walking around home. He states that he does not have a full flight of stairs in his home. Patient admits to a cough here and there, but also admits to having a chronic smoker's cough. Dates that he stopped smoking cigarettes in 2004 after 50 years X 2 packs/day. He states that he has never used home oxygen. Patient denies wheezing. He states that he does not have a nebulizer machine at home. Patient denies any phlegm production.     Patient denies fever, chills, sore throat, nasal congestion, postnasal drip, sinus congestion, chest pain, abdominal pain, nausea, vomiting, loss of taste, loss of smell, generalized body aches, headache. He admits to fatigue and loss of appetite.     Patient states that he has had 2 doses of the COVID-19 vaccine has not had the booster. \"     Assessment and plan:     Acute hypoxic resp failure due to   COVID 19 Viral Pneumonia   s/p Baricitinib.    Still requiring  HFNC, O2 requirement fluctuates between 20-35L. O2 desaturation w/ ambulation noted. 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 Hx of Tobacco abuse disorder /100 pack-year hx, quit smoking in 2004   Appreciate pulmonology recs; Changed dexamethasone to IV Solu-medrol on 2/1,   Cont Xarelto.      Aortic valve stenosis:   mild on TTE 1/18. Monitor volume status     Ventricular tachycardia   Mild Aortic stenosis   hx of NSVT. Evaluated by cardiology. Optimize K and Mg. Cont BB      N9HU  without complication:   N5G 8.8%. BG is running higher due to steroids. Increased NPH and titrate PRN, SSI      SOHAIL: mild, resolved. ACEi resumed but avoid hypotension     A-fib  S/p Pacemaker:   Continue telemetry. Continue Xarelto     History of CVA (cerebrovascular accident):   Continue OAC and Statin      Hypertension:   BP well controlled   Continue Lisinopril + Metoprolol              CODE STATUS   Full     Functional Status  Pt lives with his grandson and other family members  He is a retired  who was active with all ADLs and daily exercises PTA     Surrogate decision maker:  Pt's son      Prophylaxis   Xarelto    Discharge Plan:  Home w/Family,  ? Misc   Benefit:  Payor: [employer] MEDICARE / Plan: [employer] MEDICARE CHOICE PPO/PFFS / Product Type: Managed Care Medicare /    Isolation :  There are currently no Active Isolations   ADT status:  INPATIENT      Query   None noted today    Prognosis      Social issues  Date  Comment     2/5/22  3:42 PM met with pt's son in the room.    We talked about patho-physiology of residual damage from the PNA (fibrosis)   Answered all questions                   Subjective Data     \"I feel stronger, but I need to get out of here \"  Review of Systems - History obtained from the patient  Respiratory ROS: negative for - sputum changes  Cardiovascular ROS: no chest pain or dyspnea on exertion    Objective Data       Comments  Pleasant elderly man lying in bed in no distress   His son is in the room      Patient Vitals for the past 24 hrs:   BP   02/05/22 1116 104/61   02/05/22 0959 (!) 107/55   02/05/22 0831 (!) 108/55   02/05/22 0748 126/84   02/05/22 0500 111/89   02/05/22 0108 (!) 146/73   02/04/22 2010 117/68   02/04/22 1842 (!) 110/52   02/04/22 1526 (!) 135/57      Patient Vitals for the past 24 hrs:   Pulse   02/05/22 1116 70   02/05/22 0748 70   02/05/22 0700 70   02/05/22 0500 70   02/05/22 0108 70   02/04/22 2216 70   02/04/22 2010 70   02/04/22 1842 70   02/04/22 1526 71   02/04/22 1500 76      Patient Vitals for the past 24 hrs:   Resp   02/05/22 1116 22   02/05/22 0748 23   02/05/22 0500 19   02/05/22 0108 20   02/04/22 2010 20   02/04/22 1526 18      Patient Vitals for the past 24 hrs:   Temp   02/05/22 1116 96.9 °F (36.1 °C)   02/05/22 0748 97.9 °F (36.6 °C)   02/05/22 0500 97.7 °F (36.5 °C)   02/05/22 0108 97.8 °F (36.6 °C)   02/04/22 2010 97.5 °F (36.4 °C)   02/04/22 1526 97.3 °F (36.3 °C)        SpO2 Readings from Last 6 Encounters:   02/05/22 93%   11/26/21 95%   11/19/21 93%   05/28/21 95%   02/06/20 98%   02/02/20 93%       O2 Flow Rate (L/min): (S) 20 l/min  O2 Device: Hi flow nasal cannula,Humidifier,Heated Body mass index is 21.96 kg/m². -  Wt Readings from Last 10 Encounters:   02/04/22 65.5 kg (144 lb 6.4 oz)   11/22/21 73.5 kg (162 lb)   11/19/21 73.5 kg (162 lb)   05/28/21 73.7 kg (162 lb 6.4 oz)   02/05/20 73.3 kg (161 lb 9.6 oz)   02/02/20 70.3 kg (155 lb)   08/01/19 68 kg (150 lb)   07/30/18 72.6 kg (160 lb)   03/27/13 73.9 kg (163 lb)        Intake/Output Summary (Last 24 hours) at 2/5/2022 1435  Last data filed at 2/5/2022 0749  Gross per 24 hour   Intake    Output 925 ml   Net -925 ml         Physical Exam:             General:  Alert, cooperative,   well noursished,   well developed,   appears stated age    Ears/Eyes:  Hearing intact  Sclera anicteric.    Pupils equal   Mouth/Throat:  Mucous membranes normal pink and moist     Neck: Lungs:  Chest excursion symmetrical   Auscultation B/L Symmetrical with   Vesicular breath sounds     I didn't heat any Crepitations          CVS:  Regular rate and rhythm   AS   murmur,   No click, rub or gallop  S1 normal   S2 normal   Pedal pulses  b/l symmetrical   Abdomen:    Soft, non-tender=  Bowel sounds normal  No distension   Percussion note tympanitic   Extremities:    No cyanosis, jaundice  No edema noted   No sign of DVT/cord like lesion on palpation  No sign of acute trauma .     Skin:    Skin color, texture, turgor normal. no acute rash or lesions    Lymph nodes:     Musculoskeletal Muscle bulk B/L symmetrical   Neuro Cranial nerves are intact,   motor movement b/l symmetrical,      Psych:  Alert and oriented,   normal mood & affect          Medications reviewed     Current Facility-Administered Medications   Medication Dose Route Frequency    methylPREDNISolone (PF) (Solu-MEDROL) injection 40 mg  40 mg IntraVENous Q8H    insulin NPH (NOVOLIN N, HUMULIN N) injection 30 Units  30 Units SubCUTAneous ACB&D    artificial tears (dextran-hypromellose-glycerin) (GENTEAL) ophthalmic solution 1 Drop  1 Drop Both Eyes PRN    sodium chloride (OCEAN) 0.65 % nasal squeeze bottle 2 Spray  2 Spray Both Nostrils Q2H PRN    LORazepam (ATIVAN) tablet 0.5 mg  0.5 mg Oral BID PRN    metoprolol tartrate (LOPRESSOR) tablet 12.5 mg  12.5 mg Oral BID    guaiFENesin-dextromethorphan (ROBITUSSIN DM) 100-10 mg/5 mL syrup 5 mL  5 mL Oral Q6H PRN    rivaroxaban (XARELTO) tablet 20 mg  20 mg Oral DAILY WITH DINNER    lisinopriL (PRINIVIL, ZESTRIL) tablet 10 mg  10 mg Oral QHS    [Held by provider] atorvastatin (LIPITOR) tablet 20 mg  20 mg Oral DAILY    glucose chewable tablet 16 g  4 Tablet Oral PRN    dextrose (D50W) injection syrg 12.5-25 g  25-50 mL IntraVENous PRN    glucagon (GLUCAGEN) injection 1 mg  1 mg IntraMUSCular PRN    insulin lispro (HUMALOG) injection   SubCUTAneous AC&HS    sodium chloride (NS) flush 5-40 mL  5-40 mL IntraVENous Q8H    sodium chloride (NS) flush 5-40 mL  5-40 mL IntraVENous PRN    acetaminophen (TYLENOL) tablet 650 mg  650 mg Oral Q6H PRN    Or    acetaminophen (TYLENOL) suppository 650 mg  650 mg Rectal Q6H PRN    polyethylene glycol (MIRALAX) packet 17 g  17 g Oral DAILY PRN    ondansetron (ZOFRAN ODT) tablet 4 mg  4 mg Oral Q8H PRN    Or    ondansetron (ZOFRAN) injection 4 mg  4 mg IntraVENous Q6H PRN       Relevant other informations: Other medical conditions listed in [facility] problem list section; all of these and other pertinent data were taken into consideration when treatment plan is developed and customized to this patient's unique overall circumstances and needs. We have reviewed available old medical records within the constraints of this admission process. Data Review:   Recent Days:  All Micro Results     Procedure Component Value Units Date/Time    COVID-19 RAPID TEST [[id number]]  (Abnormal) Collected: 01/11/22 1834    Order Status: Completed Specimen: Nasopharyngeal Updated: 01/11/22 1943     Specimen source Nasopharyngeal        COVID-19 rapid test Detected        Comment: Rapid Abbott ID Now       The specimen is POSITIVE for SARS-CoV-2, the novel coronavirus associated with COVID-19. This test has been authorized by the FDA under an Emergency Use Authorization (EUA) for use by authorized laboratories.         Fact sheet for Healthcare Providers: ConventionUpdate.co.nz  Fact sheet for Patients: ConventionUpdate.co.nz       Methodology: Isothermal Nucleic Acid Amplification  CALLED TO AND READ BACK BY  EMMA GRUBER @1942/YWG               Recent Labs     02/05/22  0508 02/04/22  0123 02/03/22  0629   WBC 8.5 9.2 10.6   HGB 13.6 13.4 14.0   HCT 39.8 38.6 40.0   PLT 88* 96* 112*     Recent Labs     02/05/22  0508 02/04/22  0123   * 135*   K 4.2 4.2    101   CO2 27 28   * 163*   BUN 27* 29* CREA 0.88 0.76   CA 8.3* 8.3*   MG 1.8  --    PHOS 3.1  --    ALB 2.6* 2.3*   TBILI 0.5 0.5   ALT 96* 111*   INR 1.2*  --       Lab Results   Component Value Date/Time    TSH 0.32 (L) 02/05/2022 05:08 AM            Care Plan discussed with:Patient/Family and Nurse   Other medical conditions are listed in the active hospital problem list section; these and other pertinent data were taken into consideration when the treatment plan was developed and customized to this patient's unique overall circumstances and needs. High complexity decision making was performed for this patient who is at high risk for decompensation with multiple organ involvement. Today total floor/unit time was 35 minutes while caring for this patient and greater than 50% of that time was spent with patient (and/or family) coordinating patients clinical issues; this includes time spent during multidisciplinary rounds.         Shaila Ma MD MPH FACP    2/5/2022

## 2022-02-05 NOTE — PROGRESS NOTES
0700  Bedside and Verbal shift change report given to Rose Moore (oncoming nurse) by Genesis Diaz RN (offgoing nurse). Report included the following information SBAR, Kardex, Procedure Summary, Intake/Output, MAR, Accordion, Recent Results and Cardiac Rhythm NSR. Initial assessment done. AOX4. No complaints of pain. Visit Vitals  /62 (BP 1 Location: Right upper arm, BP Patient Position: At rest)   Pulse 70   Temp 97.6 °F (36.4 °C)   Resp 22   Ht 5' 8\" (1.727 m)   Wt 65.5 kg (144 lb 6.4 oz)   SpO2 93%   BMI 21.96 kg/m²     1915  Bedside and Verbal shift change report given to [employer] (oncoming nurse) by Nancy Bee RN (offgoing nurse). Report included the following information SBAR, Kardex, Procedure Summary, Intake/Output, MAR, Accordion and Recent Results.

## 2022-02-05 NOTE — PROGRESS NOTES
Attempted a follow up visit with pt. Pt is on Covid isolation precautions. A call to his room went unanswered. Pt's chart was consulted.   Chaplain Hollis MDiv, MS, [facility]

## 2022-02-05 NOTE — PROGRESS NOTES
1930 Bedside shift change report given to YASMANI Calloway (oncoming nurse) by Amarjit Cruz RN (offgoing nurse). Report included the following information SBAR, Kardex, Intake/Output, MAR, Recent Results and Cardiac Rhythm vpaced. This patient was assisted with Intentional Toileting every 2 hours during this shift as appropriate. Documentation of ambulation and output reflected on Flowsheet as appropriate. Purposeful hourly rounding was completed using AIDET and 5Ps. Outcomes of PHR documented as they occurred. Bed alarm in use as appropriate. Dual Suction and ambubag in place. 0710 Bedside and Verbal shift change report given to Declan Landa RN (oncoming nurse) by Yudy Dawn RN (offgoing nurse). Report included the following information SBAR, Kardex, Intake/Output, MAR, Recent Results and Cardiac Rhythm Vpaced.

## 2022-02-05 NOTE — PROGRESS NOTES
[facility]  [address] [facility], [place], [id number] [place] [address]  [phone]      Medical Progress Note      NAME: Willy Mackenzie   :  1940  MRM:  [id number]    Date/Time: 2022        Assessment / Plan:     80 yo M w/ hx of AF, HTN, DM presenting w/ fatigue, dyspneic, admitted for AHRF 2/2 COVID-19     AHRF: 2/2 pneumonia due to COVID-19 infection. s/p baricitinib. Still on HFNC, O2 requirement fluctuates b/n 20-35L. O2 desaturation w/ ambuation. Pt has significant hx of tobacco abuse (100 pack-year hx, quit smoking in ). 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 Pt prefers not to go to inpt rehab or SNF, but his choice may be limited by O2 requirement. Appreciate pulmonology recs; changed dexamethasone to IV Solu-medrol on , weaning as tolerated. Cont Xarelto. Overall prognosis guarded     Aortic stenosis: mild on TTE . Monitor volume status     VT: hx of NSVT. Noted . Evaluated by cardiology. Monitor K and Mg. Cont BB      Type 2 diabetes mellitus without complication: S8N 1.0%. BG higher due to steroids. Increased NPH and titrate PRN, SSI      SOHAIL: mild, resolved. ACEi resumed but avoid hypotension     A-fib/ Pacemaker: monitor on telemetry. On Xarelto     History of CVA (cerebrovascular accident): cont OAC, statin     Hypertension: BP well controlled resume lisinopril. Cont metoprolol      Total time spent: 25 minutes, d/w IDR Team  Time spent in the care of this patient including reviewing records, discussing with nursing and/or other providers on the treatment team, obtaining history and examining the patient, and discussing treatment plans.                   Care Plan discussed with: Patient, Nursing Staff and >50% of time spent in counseling and coordination of care    Discussed:  Care Plan and D/C Planning    Prophylaxis:  Xarelto    Disposition:  TBD         Subjective:     Chief Complaint:  Follow up COVID    Chart/notes/labs/studies reviewed, patient examined. Feeling optimistic. Denies CP. Feels fine. No fevers       Objective:       Vitals:        Last 24hrs VS reviewed since prior progress note. Most recent are:    Visit Vitals  BP (!) 110/52   Pulse 70   Temp 97.3 °F (36.3 °C)   Resp 18   Ht 5' 8\" (1.727 m)   Wt 65.5 kg (144 lb 6.4 oz)   SpO2 92%   BMI 21.96 kg/m²     SpO2 Readings from Last 6 Encounters:   02/04/22 92%   11/26/21 95%   11/19/21 93%   05/28/21 95%   02/06/20 98%   02/02/20 93%    O2 Flow Rate (L/min): 20 l/min       Intake/Output Summary (Last 24 hours) at 2/4/2022 1901  Last data filed at 2/4/2022 0251  Gross per 24 hour   Intake 400 ml   Output 600 ml   Net -200 ml          Exam:     Physical Exam:    Gen:  Elderly, chronically ill-appearing. NAD. Pleasant  HEENT:  Atraumatic, normocephalic. Sclerae nonicteric, hearing intact to voice  Neck:  Supple, without apparent masses  Resp:  No accessory muscle use, increased WOB. Mildly diminished without wheezes, rales, or rhonchi  Card: RRR, without m/r/g. No LE edema  Abd:  +bowel sounds, soft, NTTP, nondistended. No HSM  Neuro: Face symmetric, speech fluent, follows commands appropriately, no focal weakness or numbness  Psych:  Alert, oriented x 3.  Fair insight     Medications Reviewed: (see below)    Lab Data Reviewed: (see below)    ______________________________________________________________________    Medications:     Current Facility-Administered Medications   Medication Dose Route Frequency    methylPREDNISolone (PF) (Solu-MEDROL) injection 40 mg  40 mg IntraVENous Q8H    insulin NPH (NOVOLIN N, HUMULIN N) injection 30 Units  30 Units SubCUTAneous ACB&D    artificial tears (dextran-hypromellose-glycerin) (GENTEAL) ophthalmic solution 1 Drop  1 Drop Both Eyes PRN    sodium chloride (OCEAN) 0.65 % nasal squeeze bottle 2 Spray  2 Spray Both Nostrils Q2H PRN    LORazepam (ATIVAN) tablet 0.5 mg  0.5 mg Oral BID PRN    metoprolol tartrate (LOPRESSOR) tablet 12.5 mg  12.5 mg Oral BID    guaiFENesin-dextromethorphan (ROBITUSSIN DM) 100-10 mg/5 mL syrup 5 mL  5 mL Oral Q6H PRN    rivaroxaban (XARELTO) tablet 20 mg  20 mg Oral DAILY WITH DINNER    lisinopriL (PRINIVIL, ZESTRIL) tablet 10 mg  10 mg Oral QHS    [Held by provider] atorvastatin (LIPITOR) tablet 20 mg  20 mg Oral DAILY    glucose chewable tablet 16 g  4 Tablet Oral PRN    dextrose (D50W) injection syrg 12.5-25 g  25-50 mL IntraVENous PRN    glucagon (GLUCAGEN) injection 1 mg  1 mg IntraMUSCular PRN    insulin lispro (HUMALOG) injection   SubCUTAneous AC&HS    sodium chloride (NS) flush 5-40 mL  5-40 mL IntraVENous Q8H    sodium chloride (NS) flush 5-40 mL  5-40 mL IntraVENous PRN    acetaminophen (TYLENOL) tablet 650 mg  650 mg Oral Q6H PRN    Or    acetaminophen (TYLENOL) suppository 650 mg  650 mg Rectal Q6H PRN    polyethylene glycol (MIRALAX) packet 17 g  17 g Oral DAILY PRN    ondansetron (ZOFRAN ODT) tablet 4 mg  4 mg Oral Q8H PRN    Or    ondansetron (ZOFRAN) injection 4 mg  4 mg IntraVENous Q6H PRN            Lab Review:     Recent Labs     02/04/22  0123 02/03/22  0629 02/02/22  0047   WBC 9.2 10.6 7.9   HGB 13.4 14.0 14.0   HCT 38.6 40.0 40.8   PLT 96* 112* 122*     Recent Labs     02/04/22  0123   *   K 4.2      CO2 28   *   BUN 29*   CREA 0.76   CA 8.3*   ALB 2.3*   *     No components found for: GLPOC  No results for input(s): PH, PCO2, PO2, HCO3, FIO2 in the last 72 hours. No results for input(s): INR, INREXT, INREXT in the last 72 hours.   No results found for: SDES  Lab Results   Component Value Date/Time    Culture result: Culture performed on Fluid swab specimen 11/25/2021 08:22 PM    Culture result: NO GROWTH 4 DAYS 11/25/2021 08:22 PM              ___________________________________________________    Attending Physician: Catarino Aguayo MD

## 2022-02-06 NOTE — PROGRESS NOTES
1915 Bedside and Verbal shift change report given to YASMANI Calloway (oncoming nurse) by Declan Landa RN (offgoing nurse). Report included the following information SBAR, Kardex, Intake/Output, MAR, Recent Results and Cardiac Rhythm vpaced. This patient was assisted with Intentional Toileting every 2 hours during this shift as appropriate. Documentation of ambulation and output reflected on Flowsheet as appropriate. Purposeful hourly rounding was completed using AIDET and 5Ps. Outcomes of PHR documented as they occurred. Bed alarm in use as appropriate. Dual Suction and ambubag in place. 2100 Notified Dr. aHnnah Reinoso of  BG of 394. New order to give one time dose of humalog 6 units. 0715 Bedside and Verbal shift change report given to Declna Landa RN (oncoming nurse) by Yudy Dawn RN (offgoing nurse). Report included the following information SBAR, Kardex, Intake/Output, MAR, Recent Results and Cardiac Rhythm vpaced.

## 2022-02-06 NOTE — PROGRESS NOTES
Progress Note      Pt Name  Howie Olivares   Date of Birth 1940   Medical Record Number  [id number]      Age  79 y.o. PCP Natty Heredia MD   Admit date:  1/11/2022    Room Number  321/01  @ [facility]   Date of Service  2/6/2022     Admission Diagnoses:  Acute hypoxic resp failure   COVID 19 PNA      Admission Summary:  \" Howie Olivares is a 79 y.o. male who presents emergency department with complaints of \"not being able to eat\". States that he has not had any appetite since Friday, January 7. He states that he has been feeling weak, and either on January 8 or 9, almost fell on the front steps of his home, but his grandson caught him. Patient states that he has been having diarrhea 3-4 times a day from January 7 onward. He states that he has not noted any black stools or red blood in the stools.        Asked if he was having any shortness of breath, patient answered \"not really\" and that he really does not feel short of breath or tired walking around home. He states that he does not have a full flight of stairs in his home. Patient admits to a cough here and there, but also admits to having a chronic smoker's cough. Dates that he stopped smoking cigarettes in 2004 after 50 years X 2 packs/day. He states that he has never used home oxygen. Patient denies wheezing. He states that he does not have a nebulizer machine at home. Patient denies any phlegm production.     Patient denies fever, chills, sore throat, nasal congestion, postnasal drip, sinus congestion, chest pain, abdominal pain, nausea, vomiting, loss of taste, loss of smell, generalized body aches, headache. He admits to fatigue and loss of appetite.     Patient states that he has had 2 doses of the COVID-19 vaccine has not had the booster. \"     Assessment and plan:     Acute hypoxic resp failure due to   COVID 19 Viral Pneumonia   s/p Baricitinib.    Still requiring  HFNC, O2 requirement - today down to ~20L/min O2 desaturation w/ ambulation noted. 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 Hx of Tobacco abuse disorder /100 pack-year hx, quit smoking in 2004   Appreciate pulmonology recs; Changed dexamethasone to IV Solu-medrol on 2/1,   Cont Xarelto.      Aortic valve stenosis:   mild on TTE 1/18. Monitor volume status     Ventricular tachycardia   Aortic stenosis (mild)   hx of NSVT. Evaluated by cardiology. Optimize K and Mg. Cont BB      C5MK  without complication:   L3F 9.8%. BG is running higher due to steroids. Increased NPH and titrate PRN, SSI      SOHAIL: mild, resolved. ACEi resumed but avoid hypotension     A-fib  S/p Pacemaker:   Continue telemetry. Continue Xarelto     History of CVA (cerebrovascular accident):   Continue OAC and Statin      Hypertension:   BP well controlled   Continue Lisinopril + Metoprolol              CODE STATUS   Full     Functional Status  Pt lives with his grandson and other family members  He is a retired  who was active with all ADLs and daily exercises PTA     Surrogate decision maker:  Pt's son      Prophylaxis   Xarelto    Discharge Plan:  Home w/Family,  ? Misc   Benefit:  Payor: [employer] MEDICARE / Plan: [employer] MEDICARE CHOICE PPO/PFFS / Product Type: Managed Care Medicare /    Isolation :  There are currently no Active Isolations   ADT status:  INPATIENT      Query   None noted today    Prognosis      Social issues  Date  Comment     2/5/22  3:42 PM met with pt's son in the room.    We talked about patho-physiology of residual damage from the PNA (fibrosis)   Answered all questions    2/6/22  12:58 PM met with pt's son in the room               Subjective Data     \"I feel stronger, but I need to get out of here \"  Review of Systems - History obtained from the patient  Respiratory ROS: negative for - sputum changes  Cardiovascular ROS: no chest pain or dyspnea on exertion    Objective Data Comments  Pleasant elderly man lying in bed in no distress   His son is in the room      Patient Vitals for the past 24 hrs:   BP   02/06/22 1000 102/89   02/06/22 0802 121/74   02/06/22 0408 (!) 122/57   02/06/22 0014 109/67   02/05/22 1933 119/67   02/05/22 1837 121/62   02/05/22 1639 103/62      Patient Vitals for the past 24 hrs:   Pulse   02/06/22 1212 70   02/06/22 1000 70   02/06/22 0802 70   02/06/22 0700 70   02/06/22 0408 70   02/06/22 0014 70   02/05/22 2212 70   02/05/22 1933 70   02/05/22 1837 70   02/05/22 1639 70   02/05/22 1500 70      Patient Vitals for the past 24 hrs:   Resp   02/06/22 1212 19   02/06/22 1000 19   02/06/22 0802 25   02/06/22 0408 15   02/06/22 0014 20   02/05/22 1933 15   02/05/22 1837 22   02/05/22 1639 26      Patient Vitals for the past 24 hrs:   Temp   02/06/22 1000 97.9 °F (36.6 °C)   02/06/22 0802 97.8 °F (36.6 °C)   02/06/22 0408 97.7 °F (36.5 °C)   02/06/22 0014 98.3 °F (36.8 °C)   02/05/22 1933 97.7 °F (36.5 °C)   02/05/22 1837 97.6 °F (36.4 °C)   02/05/22 1639 97.3 °F (36.3 °C)        SpO2 Readings from Last 6 Encounters:   02/06/22 96%   11/26/21 95%   11/19/21 93%   05/28/21 95%   02/06/20 98%   02/02/20 93%       O2 Flow Rate (L/min): 25 l/min  O2 Device: Heated,Hi flow nasal cannula Body mass index is 21.96 kg/m². -  Wt Readings from Last 10 Encounters:   02/04/22 65.5 kg (144 lb 6.4 oz)   11/22/21 73.5 kg (162 lb)   11/19/21 73.5 kg (162 lb)   05/28/21 73.7 kg (162 lb 6.4 oz)   02/05/20 73.3 kg (161 lb 9.6 oz)   02/02/20 70.3 kg (155 lb)   08/01/19 68 kg (150 lb)   07/30/18 72.6 kg (160 lb)   03/27/13 73.9 kg (163 lb)        Intake/Output Summary (Last 24 hours) at 2/6/2022 1257  Last data filed at 2/6/2022 0505  Gross per 24 hour   Intake    Output 925 ml   Net -925 ml         Physical Exam:             General:  Alert, cooperative,   well noursished,   well developed,   appears stated age    Ears/Eyes:  Hearing intact  Sclera anicteric.    Pupils equal Mouth/Throat:  Mucous membranes normal pink and moist     Neck:     Lungs:  Chest excursion symmetrical   Auscultation B/L Symmetrical with   Vesicular breath sounds     I didn't heat any Crepitations          CVS:  Regular rate and rhythm   No change in systolic  murmur,   No click, rub or gallop  S1 normal   S2 normal   Pedal pulses  b/l symmetrical   Abdomen:    Soft, non-tender=  Bowel sounds normal  No distension   Percussion note tympanitic   Extremities:    No cyanosis, jaundice  No edema noted   No sign of DVT/cord like lesion on palpation  No sign of acute trauma .     Skin:    Skin color, texture, turgor normal. no acute rash or lesions    Lymph nodes:     Musculoskeletal Muscle bulk B/L symmetrical   Neuro Cranial nerves are intact,   motor movement b/l symmetrical,      Psych:  Alert and oriented,   normal mood & affect          Medications reviewed     Current Facility-Administered Medications   Medication Dose Route Frequency    methylPREDNISolone (PF) (Solu-MEDROL) injection 40 mg  40 mg IntraVENous Q8H    insulin NPH (NOVOLIN N, HUMULIN N) injection 30 Units  30 Units SubCUTAneous ACB&D    artificial tears (dextran-hypromellose-glycerin) (GENTEAL) ophthalmic solution 1 Drop  1 Drop Both Eyes PRN    sodium chloride (OCEAN) 0.65 % nasal squeeze bottle 2 Spray  2 Spray Both Nostrils Q2H PRN    LORazepam (ATIVAN) tablet 0.5 mg  0.5 mg Oral BID PRN    metoprolol tartrate (LOPRESSOR) tablet 12.5 mg  12.5 mg Oral BID    guaiFENesin-dextromethorphan (ROBITUSSIN DM) 100-10 mg/5 mL syrup 5 mL  5 mL Oral Q6H PRN    rivaroxaban (XARELTO) tablet 20 mg  20 mg Oral DAILY WITH DINNER    lisinopriL (PRINIVIL, ZESTRIL) tablet 10 mg  10 mg Oral QHS    atorvastatin (LIPITOR) tablet 20 mg  20 mg Oral DAILY    glucose chewable tablet 16 g  4 Tablet Oral PRN    dextrose (D50W) injection syrg 12.5-25 g  25-50 mL IntraVENous PRN    glucagon (GLUCAGEN) injection 1 mg  1 mg IntraMUSCular PRN    insulin lispro (HUMALOG) injection   SubCUTAneous AC&HS    sodium chloride (NS) flush 5-40 mL  5-40 mL IntraVENous Q8H    sodium chloride (NS) flush 5-40 mL  5-40 mL IntraVENous PRN    acetaminophen (TYLENOL) tablet 650 mg  650 mg Oral Q6H PRN    Or    acetaminophen (TYLENOL) suppository 650 mg  650 mg Rectal Q6H PRN    polyethylene glycol (MIRALAX) packet 17 g  17 g Oral DAILY PRN    ondansetron (ZOFRAN ODT) tablet 4 mg  4 mg Oral Q8H PRN    Or    ondansetron (ZOFRAN) injection 4 mg  4 mg IntraVENous Q6H PRN       Relevant other informations: Other medical conditions listed in [facility] problem list section; all of these and other pertinent data were taken into consideration when treatment plan is developed and customized to this patient's unique overall circumstances and needs. We have reviewed available old medical records within the constraints of this admission process. Data Review:   Recent Days:  All Micro Results     Procedure Component Value Units Date/Time    COVID-19 RAPID TEST [[id number]]  (Abnormal) Collected: 01/11/22 1834    Order Status: Completed Specimen: Nasopharyngeal Updated: 01/11/22 1943     Specimen source Nasopharyngeal        COVID-19 rapid test Detected        Comment: Rapid Abbott ID Now       The specimen is POSITIVE for SARS-CoV-2, the novel coronavirus associated with COVID-19. This test has been authorized by the FDA under an Emergency Use Authorization (EUA) for use by authorized laboratories.         Fact sheet for Healthcare Providers: ConventionUpdate.co.nz  Fact sheet for Patients: ConventionUpdate.co.nz       Methodology: Isothermal Nucleic Acid Amplification  CALLED TO AND READ BACK BY  EMMA GRUBER @1942/YWG               Recent Labs     02/05/22 0508 02/04/22 0123   WBC 8.5 9.2   HGB 13.6 13.4   HCT 39.8 38.6   PLT 88* 96*     Recent Labs     02/05/22  0508 02/04/22 0123   * 135*   K 4.2 4.2    101   CO2 27 28   * 163*   BUN 27* 29*   CREA 0.88 0.76   CA 8.3* 8.3*   MG 1.8  --    PHOS 3.1  --    ALB 2.6* 2.3*   TBILI 0.5 0.5   ALT 96* 111*   INR 1.2*  --       Lab Results   Component Value Date/Time    TSH 0.32 (L) 02/05/2022 05:08 AM            Care Plan discussed with:Patient/Family and Nurse   Other medical conditions are listed in the active hospital problem list section; these and other pertinent data were taken into consideration when the treatment plan was developed and customized to this patient's unique overall circumstances and needs. High complexity decision making was performed for this patient who is at high risk for decompensation with multiple organ involvement. Today total floor/unit time was 35 minutes while caring for this patient and greater than 50% of that time was spent with patient (and/or family) coordinating patients clinical issues; this includes time spent during multidisciplinary rounds.         Mer Perdomo MD MPH FACP    2/6/2022

## 2022-02-06 NOTE — PROGRESS NOTES
0700  Bedside and Verbal shift change report given to Veronica Gallo (oncoming nurse) by Marisela Wylie RN (offgoing nurse). Report included the following information SBAR, Kardex, Procedure Summary, Intake/Output, MAR, Accordion and Recent Results. Initial assessment done. AOx4. No complaints of pain. Wean O2 to 25L. Visit Vitals  BP (!) 117/58 (BP 1 Location: Right upper arm, BP Patient Position: At rest)   Pulse 86   Temp 97.6 °F (36.4 °C)   Resp 18   Ht 5' 8\" (1.727 m)   Wt 65.5 kg (144 lb 6.4 oz)   SpO2 95%   BMI 21.96 kg/m²       1930  Bedside and Verbal shift change report given to [place] (oncoming nurse) by Madie Damon RN (offgoing nurse). Report included the following information SBAR, Kardex, Procedure Summary, Intake/Output, MAR, Accordion and Recent Results.

## 2022-02-07 NOTE — PROGRESS NOTES
Bedside shift change report given to [facility] (oncoming nurse) by Jose Kunz RN (offgoing nurse). Report included the following information SBAR, Kardex, ED Summary, Intake/Output, and Recent Results. Bedside shift change report given to Afua Barriga (oncoming nurse) by [facility] (offgoing nurse). Report included the following information SBAR, Kardex, ED Summary, Intake/Output, and Recent Results. This patient was assisted with Intentional Toileting every 2 hours during this shift as appropriate. Documentation of ambulation and output reflected on Flowsheet as appropriate. Purposeful hourly rounding was completed using AIDET and 5Ps. Outcomes of PHR documented as they occurred. Bed alarm in use as appropriate. Dual Suction and ambubag in place.

## 2022-02-07 NOTE — PROGRESS NOTES
Problem: Mobility Impaired (Adult and Pediatric)  Goal: *Acute Goals and Plan of Care (Insert Text)  Description: FUNCTIONAL STATUS PRIOR TO ADMISSION: Patient was independent and active without use of DME.    HOME SUPPORT PRIOR TO ADMISSION: The patient lived with son and grandson but did not require assist.    7-day re-assessment 1/28/2022  1. Patient will move from supine to sit and sit to supine  in bed with modified independence within 7 day(s). 2.  Patient will transfer from bed to chair and chair to bed with supervision/set-up using the least restrictive device within 7 day(s). 3.  Patient will perform sit to stand with supervision/set-up within 7 day(s). 4.  Patient will ambulate with contact guard assistance for 50 feet with the least restrictive device within 7 day(s). Initiated 1/17/2022  1. Patient will move from supine to sit and sit to supine  in bed with modified independence within 7 day(s). 2.  Patient will transfer from bed to chair and chair to bed with modified independence using the least restrictive device within 7 day(s). 3.  Patient will perform sit to stand with modified independence within 7 day(s). 4.  Patient will ambulate with modified independence for 150 feet with the least restrictive device within 7 day(s). 5.  Patient will ascend/descend 6 stairs with 2 handrail(s) with modified independence within 7 day(s). Outcome: Progressing Towards Goal  Note:   PHYSICAL THERAPY TREATMENT  Patient: Michelle Olsen (10 y.o. male)  Date: 2/7/2022  Diagnosis: [facility] [J96.01]  Pneumonia due to COVID-19 virus [U07.1, J12.82] <principal problem not specified>       Precautions: Fall  Chart, physical therapy assessment, plan of care and goals were reviewed. ASSESSMENT  Patient continues with skilled PT services and progressing towards goals. Patient continues with skilled PT services and progressing towards goals.  O2 sat 88% using 25L @80% at rest, increased to  30L@ 80% for activity. pt tends to perform all tasks quickly Reviewed PLB extensively with thera ex for BUE and LE. Pt desats with limited activity 85% and requires increased time with PLB to recover to 90-91%. CGA/Min A for transfer to chair, RN at bedside. Current Level of Function Impacting Discharge (mobility/balance): CGA    Other factors to consider for discharge:          PLAN :  Patient continues to benefit from skilled intervention to address the above impairments. Continue treatment per established plan of care. to address goals. Recommendation for discharge: (in order for the patient to meet his/her long term goals)  Physical therapy at least 2 days/week in the home AND ensure assist and/or supervision for safety with mobility    This discharge recommendation:  Has been made in collaboration with the attending provider and/or case management    IF patient discharges home will need the following DME: to be determined (TBD)       SUBJECTIVE:   Patient stated i like these exercises.     OBJECTIVE DATA SUMMARY:   Critical Behavior:  Neurologic State: Alert  Orientation Level: Oriented X4  Cognition: Follows commands  Safety/Judgement: Awareness of environment  Functional Mobility Training:  Bed Mobility:     Supine to Sit: Contact guard assistance  Sit to Supine: Contact guard assistance           Transfers:  Sit to Stand: Contact guard assistance  Stand to Sit: Contact guard assistance        Bed to Chair: Contact guard assistance                    Balance:  Sitting: Intact  Standing: With support  Ambulation/Gait Training:                                                        Stairs: Therapeutic Exercises:   Exercises:    Patient educated regarding home exercise program for strengthening, ROM, and respiratory function; they demonstrated appropriate performance.  Reviewed importance of symptom monitoring and adjusting HEP based on symptoms and respiratory status. Pt verbalized understanding via demonstration. LE exercises    Seated ankle pump 10 3   Seated LAQ 10 3   Seated march 10 3   Seated toe raise 10 3   Seated heel raise 10 3   Seated knee extension 10 3        Standing toe raises with counter support 10 3   Sit to Stand 5-10 3         Pain Rating:      Activity Tolerance:   Good    After treatment patient left in no apparent distress:   Sitting in chair, Call bell within reach, and Bed / chair alarm activated    COMMUNICATION/COLLABORATION:   The patients plan of care was discussed with: Registered nurseAbby Connell   Time Calculation: 28 mins

## 2022-02-07 NOTE — PROGRESS NOTES
Problem: Self Care Deficits Care Plan (Adult)  Goal: *Acute Goals and Plan of Care (Insert Text)  Description: FUNCTIONAL STATUS PRIOR TO ADMISSION: Patient was independent and active without use of DME.     HOME SUPPORT: The patient lived with grandson but did not require assist.    Occupational Therapy Goals  Initiated 1/17/2022; Goals reviewed 2/2/2022, all remain appropriate  1. Patient will perform grooming and bathing with supervision standing at the sink with < or = 2 rest breaks and maintain oxygen sats > or = 90% within 7 day(s). 2. Patient will perform upper body dressing and lower body dressing with supervision with < or = 2 rest breaks and maintain oxygen sats > or = 90%  within 7 day(s). 3. Patient will perform toileting and toilet transfer with supervision and maintain oxygen sats > or = 90% within 7 day(s). 4. Patient will demonstrate pursed lip breathing with min cues during functional tasks within 7 day(s). 5. Patient will independently perform and recall home exercise program while maintaining O2 sats at or above 90% within 7 days. 6. Patient will verbalize/demonstrate all energy conservation techniques during ADL tasks with independence within 7 days. Outcome: Progressing Towards Goal   OCCUPATIONAL THERAPY TREATMENT  Patient: Mauro Peres (22 y.o. male)  Date: 2/7/2022  Diagnosis: Acute respiratory failure with hypoxia ([facility] [J96.01]  Pneumonia due to COVID-19 virus [U07.1, J12.82] <principal problem not specified>       Precautions: Fall  Chart, occupational therapy assessment, plan of care, and goals were reviewed. ASSESSMENT  Patient continues with skilled OT services and is slowly progressing towards goals. Pt O2 sats at rest 88% on 25 L high flow nasal cannula , titrated to 30 L for activity. Pt states he feels \"the same\" and that he is discouraged about his condition. Pt sat edge of bed for UE exercises with cues for pursed lip breathing and rest breaks as needed.  Pt transfers to chair contact guard in prep for ADl's. He stood for 2 min for standing there ex, with sats decreasing  to 80%, sitting rest break sats increase to 90%. Pt left seated in chair for lunch. Current Level of Function Impacting Discharge (ADLs): min assist UB bathe/dress, set-up grooming    Other factors to consider for discharge:          PLAN :  Patient continues to benefit from skilled intervention to address the above impairments. Continue treatment per established plan of care to address goals. Recommend with staff: out of bed to chair for ADL's, there ex, there act, meals    Recommend next OT session: cont towards goals    Recommendation for discharge: (in order for the patient to meet his/her long term goals)  Therapy 5 x a week in a rehab setting    This discharge recommendation:  Has not yet been discussed the attending provider and/or case management    IF patient discharges home will need the following DME:        SUBJECTIVE:   Patient stated I am discouraged.     OBJECTIVE DATA SUMMARY:   Cognitive/Behavioral Status:  Neurologic State: Alert  Orientation Level: Oriented X4  Cognition: Follows commands             Functional Mobility and Transfers for ADLs:  Bed Mobility:     Contact guard supine to sit  Transfers:        Bed to Chair: Contact guard assistance    Balance:  Sitting: Intact  Standing: With support    ADL Intervention:     Min assist UB bathe/dress             Therapeutic Exercises:     EXERCISE   Sets   Reps   Active Active Assist   Passive   Comments   Shoulder ab/add 1 10 [x]           []           []              Shoulder shrugs 1 10 [x]           []           []              Shoulder protraction/retraction 1 10 [x]           []           []                 []           []           []                 []           []           []                 []           []           []                 []           []           []                 []           []           [] []           []           []                 []           []           []                 []           []           []                 Activity Tolerance:   Fair    After treatment patient left in no apparent distress:   Sitting in chair and Call bell within reach    COMMUNICATION/COLLABORATION:   The patients plan of care was discussed with: Physical therapy assistant, Occupational therapist, and Registered nurse.      SHARLA Pete  Time Calculation: 28 mins

## 2022-02-07 NOTE — PROGRESS NOTES
I was notified by Dr. Debo Reyes that the patient is complaining of back pain and had some hemoptysis. CT chest ordered.

## 2022-02-07 NOTE — PROGRESS NOTES
0700: Bedside shift change report given to [address] [place] [address] Road (oncoming nurse) by [facility] RN (offgoing nurse). Report included the following information SBAR, Kardex, Intake/Output, MAR, Accordion and Cardiac Rhythm V paced. This patient was assisted with Intentional Toileting every 2 hours during this shift as appropriate. Documentation of ambulation and output reflected on Flowsheet as appropriate. Purposeful hourly rounding was completed using AIDET and 5Ps. Outcomes of PHR documented as they occurred. Bed alarm in use as appropriate. Dual Suction and ambubag in place. 1600: Patient complaining of left posterior rib/lower shoulder pain. Dr. Chata Causey notified. Orders for Stat Chest X ray placed as well as chest CT. EKG completed. No changes.

## 2022-02-07 NOTE — PROGRESS NOTES
Name: Denise Martínez: Bozena Francisco   : 1940 Admit Date: 2022   Phone: [phone]  Room: [facility]/01   PCP: Debo Alvarez MD  MRN: [id number]   Date: 2022  Code: Full Code          Chart and notes reviewed. Data reviewed. I review the patient's current medications in the medical record at each encounter.  I have evaluated and examined the patient. History of Present Illness:  Mr. Lupe Adler is a very pleasant, 80 yo gentleman that presented to [facility] on  with worsening shortness of breath. He has a PMH of a.fib, aortic stenosis, DM, hypertension, CVA, and tobacco abuse (100 pack years, quit in ). He received two doses of the Covid vaccine, last in 2021. He did not receive a booster dose. He is currently requiring hi flow at 50L/100%. He reports that his symptoms started five days ago after he was exposed to his grandson who tested positive. He became more progressively more SOB with increased cough. No fever/chills. He did initially have diarrhea. Images:  Personally visualized and report reviewed. CXR :  Superimposed patchy bilateral airspace disease on interstitial changes concerning for infection or inflammation      D-dimer . 55  LFTs stable  Procalcitonin negative  Ferritin 759  Trop wnl  Pro-  CRP 3.18  ECHO 2020:  EF 55-60%, mildly reduced systolic function, moderate aortic stenosis, trave MVR, moderately elevated CVP. Interval History:  Afebrile  BP stable  Sats 91% on 30L/100% FIO2--down as low as 15L yesterday  WBC 8.5  Plts 88--decreased  D-dimer 0.21  Na 135  LFTs stable and decreased  CRP <.29 - better   proBNP 578  Ferritin 788  LFTs stable  PCT < 0.05  750 UOP documented    Chest CT  personally reviewed: Emphysema with peripheral blebs. Septal thickening and possible fibrosis in the lung bases--limited by motion.     ECHO : EF 45-50%, mild global hypokinesis present, mildly reduced systolic function, mild AV stenosis    Abdominal US : Limited study. Possible hepatic steatosis. Cholecystectomy. No visible biliary tree. ROS: Sleeping when I came into room. Past Medical History:   Diagnosis Date    Atrial fibrillation ([address]     Diabetes mellitus, type 2 ([facility] [address]     Hypertension     Internal carotid artery stenosis, right     Chronic occlusion of the right internal carotid artery per MRI of 2020 unchanged from previous study of 2010; study of  also showed mild stenosis of the left internal carotid artery and bilateral patent vertebral arteries    Moderate aortic stenosis     Echocardiogram of 2020:  Severe aortic valve leaflet calcification present with reduced excursion. Moderate aortic valve stenosis was present. LVEF 55-60% .  Stroke [facility])     asymptomatic; small chronic infarcts in the bilateral thalami per MRI of 2020.   Previous MRI of 2010 showed possible tiny old right thalamic infarct versus perivascular space        Past Surgical History:   Procedure Laterality Date    HX CHOLECYSTECTOMY      IL INS NEW/RPLCMT PRM PM W/TRANSV ELTRD ATRIAL&VENT N/A 2020    INSERT PPM DUAL performed by Raul Rhoades MD at [facility] LAB       Family History   Adopted: Yes   Family history unknown: Yes       Social History     Tobacco Use    Smoking status: Former Smoker     Packs/day: 2.00     Years: 50.00     Pack years: 100.00     Quit date: 2004     Years since quittin.7    Smokeless tobacco: Never Used   Substance Use Topics    Alcohol use: Yes     Comment: 1 beer or 1 glaas of wine daily most of adult life       Allergies   Allergen Reactions    Ampicillin Nausea and Vomiting    Iodine Hives, Itching and Nausea and Vomiting       Current Facility-Administered Medications   Medication Dose Route Frequency    methylPREDNISolone (PF) (Solu-MEDROL) injection 40 mg  40 mg IntraVENous Q8H    insulin NPH (NOVOLIN N, HUMULIN N) injection 30 Units  30 Units SubCUTAneous ACB&D    artificial tears (dextran-hypromellose-glycerin) (GENTEAL) ophthalmic solution 1 Drop  1 Drop Both Eyes PRN    sodium chloride (OCEAN) 0.65 % nasal squeeze bottle 2 Spray  2 Spray Both Nostrils Q2H PRN    LORazepam (ATIVAN) tablet 0.5 mg  0.5 mg Oral BID PRN    metoprolol tartrate (LOPRESSOR) tablet 12.5 mg  12.5 mg Oral BID    guaiFENesin-dextromethorphan (ROBITUSSIN DM) 100-10 mg/5 mL syrup 5 mL  5 mL Oral Q6H PRN    rivaroxaban (XARELTO) tablet 20 mg  20 mg Oral DAILY WITH DINNER    lisinopriL (PRINIVIL, ZESTRIL) tablet 10 mg  10 mg Oral QHS    atorvastatin (LIPITOR) tablet 20 mg  20 mg Oral DAILY    glucose chewable tablet 16 g  4 Tablet Oral PRN    dextrose (D50W) injection syrg 12.5-25 g  25-50 mL IntraVENous PRN    glucagon (GLUCAGEN) injection 1 mg  1 mg IntraMUSCular PRN    insulin lispro (HUMALOG) injection   SubCUTAneous AC&HS    sodium chloride (NS) flush 5-40 mL  5-40 mL IntraVENous Q8H    sodium chloride (NS) flush 5-40 mL  5-40 mL IntraVENous PRN    acetaminophen (TYLENOL) tablet 650 mg  650 mg Oral Q6H PRN    Or    acetaminophen (TYLENOL) suppository 650 mg  650 mg Rectal Q6H PRN    polyethylene glycol (MIRALAX) packet 17 g  17 g Oral DAILY PRN    ondansetron (ZOFRAN ODT) tablet 4 mg  4 mg Oral Q8H PRN    Or    ondansetron (ZOFRAN) injection 4 mg  4 mg IntraVENous Q6H PRN         REVIEW OF SYSTEMS   Negative except as stated in the HPI. Physical Exam:   Visit Vitals  /70 (BP 1 Location: Right upper arm, BP Patient Position: At rest)   Pulse 74   Temp 98.3 °F (36.8 °C)   Resp 25   Ht 5' 8\" (1.727 m)   Wt 65.5 kg (144 lb 6.4 oz)   SpO2 91%   BMI 21.96 kg/m²       General:  Asleep,, no distress, appears stated age, on HF   Head:  Normocephalic, without obvious abnormality, atraumatic. Eyes:  Conjunctivae/corneas clear. Nose: Nares normal. Septum midline.  Mucosa normal.   Throat: Lips, mucosa, and tongue normal.    Neck: Supple, symmetrical, trachea midline, no adenopathy. Lungs:   Crackles in the bases. Resp nonlabored. Chest wall:  No tenderness or deformity. Heart:  Regular rate and rhythm, S1, S2 normal, no murmur, click, rub or gallop. Abdomen:   Soft, non-tender. Bowel sounds normal.   Extremities: Extremities normal, atraumatic, no cyanosis or edema. Pulses: 2+ and symmetric all extremities. Skin: Skin color, texture, turgor normal. No rashes or lesions   Lymph nodes: Cervical  nodes normal.   Neurologic: Grossly nonfocal       Lab Results   Component Value Date/Time    Sodium 135 (L) 02/05/2022 05:08 AM    Potassium 4.2 02/05/2022 05:08 AM    Chloride 100 02/05/2022 05:08 AM    CO2 27 02/05/2022 05:08 AM    BUN 27 (H) 02/05/2022 05:08 AM    Creatinine 0.88 02/05/2022 05:08 AM    Glucose 272 (H) 02/05/2022 05:08 AM    Calcium 8.3 (L) 02/05/2022 05:08 AM    Magnesium 1.8 02/05/2022 05:08 AM    Phosphorus 3.1 02/05/2022 05:08 AM    Lactic acid 1.3 01/11/2022 06:34 PM       Lab Results   Component Value Date/Time    WBC 8.5 02/05/2022 05:08 AM    HGB 13.6 02/05/2022 05:08 AM    PLATELET 88 (L) 92/67/0450 05:08 AM    MCV 87.3 02/05/2022 05:08 AM       Lab Results   Component Value Date/Time    INR 1.2 (H) 02/05/2022 05:08 AM    aPTT 26.4 02/05/2022 05:08 AM    Alk.  phosphatase 60 02/05/2022 05:08 AM    Protein, total 5.3 (L) 02/05/2022 05:08 AM    Albumin 2.6 (L) 02/05/2022 05:08 AM    Globulin 2.7 02/05/2022 05:08 AM       Lab Results   Component Value Date/Time    Ferritin 788 (H) 01/31/2022 02:00 AM       Lab Results   Component Value Date/Time    Sed rate (ESR) 5 09/22/2010 03:50 AM    C-Reactive protein <0.29 02/05/2022 05:08 AM    TSH 0.32 (L) 02/05/2022 05:08 AM        No results found for: PH, PHI, PCO2, PCO2I, PO2, PO2I, HCO3, HCO3I, FIO2, FIO2I    Lab Results   Component Value Date/Time    CK 79 09/22/2010 03:50 AM    Troponin-I, Qt. 0.05 (H) 09/21/2010 12:10 PM        Lab Results   Component Value Date/Time Culture result: Culture performed on Fluid swab specimen 11/25/2021 08:22 PM    Culture result: NO GROWTH 4 DAYS 11/25/2021 08:22 PM       No results found for: TOXA1, RPR, HBCM, HBSAG, HAAB, HCAB1, HAAT, G6PD, CRYAC, HIVGT, HIVR, HIV1, HIV12, HIVPC, HIVRPI    Lab Results   Component Value Date/Time    CK 79 09/22/2010 03:50 AM       Lab Results   Component Value Date/Time    Color YELLOW/STRAW 08/02/2019 12:10 AM    Appearance CLEAR 08/02/2019 12:10 AM    pH (UA) 5.0 08/02/2019 12:10 AM    Protein 30 (A) 08/02/2019 12:10 AM    Glucose 500 (A) 08/02/2019 12:10 AM    Ketone NEGATIVE  08/02/2019 12:10 AM    Bilirubin NEGATIVE  08/02/2019 12:10 AM    Blood SMALL (A) 08/02/2019 12:10 AM    Urobilinogen 0.2 08/02/2019 12:10 AM    Nitrites NEGATIVE  08/02/2019 12:10 AM    Leukocyte Esterase NEGATIVE  08/02/2019 12:10 AM    WBC 0-4 08/02/2019 12:10 AM    RBC 0-5 08/02/2019 12:10 AM    Bacteria NEGATIVE  08/02/2019 12:10 AM       Images: personally visualized and report reviewed    CXR (1/16/2022): Worsening of left mid and lower lung pulmonary opacities. CXR (1/20/2022): There is bilateral pneumonia in the periphery of the mid  and lower lung zones left greater than right. This has mildly worsened. CXR (1/31/2022): Bilateral pneumonia persists in the periphery of the mid lower lung zones. Areas of consolidation have increased in the right mid zone and left mid zone. CXR (2/3/2022): Chronic volume loss and interstitial thickening have been present since at least 2020. This complicates evaluation for underlying COVID-19 pneumonia. The radiographic appearance of the chest on 2/6/2020, 1/11/2022, and 1/20/2022 is similar. CT better shows underlying panlobular emphysema as well.   More dense fibrosis adjacent bullous emphysema in the lateral aspects of the bilateral mid lungs is not significantly changed.   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 Fib  · Moderate Aortic Stenosis  · History of Tobacco Abuse  · DM  · Hypertension      PLAN  · Continue O2 to keep sats >88%; on HF 20L FIO2 65%  · Continue IV Solumedrol 40mg q 8hr; will keep at current dose today  · Baricitinib complete  · On AC with Xarelto  · Trend CRP, d-dimer, LFTs  · Encourage IS use  · Prone as able  · OOB to chair  · Would benefit from PFTs as an outpatient  · Will need repeat imaging as an outpatient  · PT/OT       Elizabeth Jose NP

## 2022-02-07 NOTE — PROGRESS NOTES
[facility]os [facility] [address]  [address] [facility], [address] [place] [address]  [phone]      Medical Progress Note      NAME: Deena Dorsey   :  1940  MRM:  [id number]    Date of service: 2022  10:32 AM       Assessment and Plan:   1. AHRF secondary to  COVID-19 PNA. s/p baricitinib. Still on HFNC O2.  Pt has significant hx of tobacco abuse (100 pack-year hx, quit smoking in ). 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 Appreciate pulmonology recs; changed dexamethasone to IV Solu-medrol on , weaning as tolerated.      2. Aortic stenosis: mild on TTE . Monitor volume status     3. VT: hx of NSVT. Noted . Evaluated by cardiology. Monitor K and Mg. Cont BB      4. Type 2 diabetes mellitus without complication: E0I 9.9%. watch BG as pt is on steroid. On NPH and titrate PRN, SSI      5. A-fib/ Pacemaker: monitor on telemetry. On Xarelto     6. History of CVA (cerebrovascular accident): cont OAC, statin     7. Hypertension: on lisinopril and metoprolo     Subjective:     Chief Complaint[de-identified] Patient was seen and examined as a follow up for AHRF. Chart was reviewed. still in SOB     ROS:  (bold if positive, if negative)    Tolerating PT  Tolerating Diet        Objective:     Last 24hrs VS reviewed since prior progress note.  Most recent are:    Visit Vitals  /70 (BP 1 Location: Right upper arm, BP Patient Position: At rest)   Pulse 74   Temp 98.3 °F (36.8 °C)   Resp 25   Ht 5' 8\" (1.727 m)   Wt 65.5 kg (144 lb 6.4 oz)   SpO2 98%   BMI 21.96 kg/m²     SpO2 Readings from Last 6 Encounters:   22 98%   21 95%   21 93%   21 95%   20 98%   20 93%    O2 Flow Rate (L/min): 20 l/min       Intake/Output Summary (Last 24 hours) at 2022 1032  Last data filed at 2022 0800  Gross per 24 hour   Intake 360 ml   Output 1050 ml   Net -690 ml        Physical Exam:    Gen:  Well-developed, well-nourished, in no acute distress  HEENT:  Pink conjunctivae, PERRL, hearing intact to voice, moist mucous membranes  Neck:  Supple, without masses, thyroid non-tender  Resp:  No accessory muscle use, rales BL  Card:  No murmurs, normal S1, S2 without thrills, bruits or peripheral edema  Abd:  Soft, non-tender, non-distended, normoactive bowel sounds are present, no palpable organomegaly and no detectable hernias  Lymph:  No cervical or inguinal adenopathy  Musc:  No cyanosis or clubbing  Skin:  No rashes or ulcers, skin turgor is good  Neuro:  Cranial nerves are grossly intact, no focal motor weakness, follows commands appropriately  Psych:  Good insight, oriented to person, place and time, alert  __________________________________________________________________  Medications Reviewed: (see below)  Medications:     Current Facility-Administered Medications   Medication Dose Route Frequency    methylPREDNISolone (PF) (Solu-MEDROL) injection 40 mg  40 mg IntraVENous Q8H    insulin NPH (NOVOLIN N, HUMULIN N) injection 30 Units  30 Units SubCUTAneous ACB&D    artificial tears (dextran-hypromellose-glycerin) (GENTEAL) ophthalmic solution 1 Drop  1 Drop Both Eyes PRN    sodium chloride (OCEAN) 0.65 % nasal squeeze bottle 2 Spray  2 Spray Both Nostrils Q2H PRN    LORazepam (ATIVAN) tablet 0.5 mg  0.5 mg Oral BID PRN    metoprolol tartrate (LOPRESSOR) tablet 12.5 mg  12.5 mg Oral BID    guaiFENesin-dextromethorphan (ROBITUSSIN DM) 100-10 mg/5 mL syrup 5 mL  5 mL Oral Q6H PRN    rivaroxaban (XARELTO) tablet 20 mg  20 mg Oral DAILY WITH DINNER    lisinopriL (PRINIVIL, ZESTRIL) tablet 10 mg  10 mg Oral QHS    atorvastatin (LIPITOR) tablet 20 mg  20 mg Oral DAILY    glucose chewable tablet 16 g  4 Tablet Oral PRN    dextrose (D50W) injection syrg 12.5-25 g  25-50 mL IntraVENous PRN    glucagon (GLUCAGEN) injection 1 mg  1 mg IntraMUSCular PRN    insulin lispro (HUMALOG) injection   SubCUTAneous AC&HS    sodium chloride (NS) flush 5-40 mL  5-40 mL IntraVENous Q8H    sodium chloride (NS) flush 5-40 mL  5-40 mL IntraVENous PRN    acetaminophen (TYLENOL) tablet 650 mg  650 mg Oral Q6H PRN    Or    acetaminophen (TYLENOL) suppository 650 mg  650 mg Rectal Q6H PRN    polyethylene glycol (MIRALAX) packet 17 g  17 g Oral DAILY PRN    ondansetron (ZOFRAN ODT) tablet 4 mg  4 mg Oral Q8H PRN    Or    ondansetron (ZOFRAN) injection 4 mg  4 mg IntraVENous Q6H PRN        Lab Data Reviewed: (see below)  Lab Review:     Recent Labs     02/05/22  0508   WBC 8.5   HGB 13.6   HCT 39.8   PLT 88*     Recent Labs     02/05/22  0508   *   K 4.2      CO2 27   *   BUN 27*   CREA 0.88   CA 8.3*   MG 1.8   PHOS 3.1   ALB 2.6*   TBILI 0.5   ALT 96*   INR 1.2*     Lab Results   Component Value Date/Time    Glucose (POC) 298 (H) 02/07/2022 08:10 AM    Glucose (POC) 255 (H) 02/06/2022 09:17 PM    Glucose (POC) 242 (H) 02/06/2022 04:03 PM    Glucose (POC) 229 (H) 02/06/2022 11:12 AM    Glucose (POC) 148 (H) 02/06/2022 08:07 AM     No results for input(s): PH, PCO2, PO2, HCO3, FIO2 in the last 72 hours. Recent Labs     02/05/22  0508   INR 1.2*     All Micro Results     Procedure Component Value Units Date/Time    COVID-19 RAPID TEST [[id number]]  (Abnormal) Collected: 01/11/22 1834    Order Status: Completed Specimen: Nasopharyngeal Updated: 01/11/22 1943     Specimen source Nasopharyngeal        COVID-19 rapid test Detected        Comment: Rapid Abbott ID Now       The specimen is POSITIVE for SARS-CoV-2, the novel coronavirus associated with COVID-19. This test has been authorized by the FDA under an Emergency Use Authorization (EUA) for use by authorized laboratories.         Fact sheet for Healthcare Providers: ConventionUpdate.co.nz  Fact sheet for Patients: ConventionUpdate.co.nz       Methodology: Isothermal Nucleic Acid Amplification  CALLED TO AND READ BACK BY  EMMA GRUBER @1942/YWG               I have reviewed notes of prior 24hr. Other pertinent lab: Total time spent with patient: 28 I personally reviewed chart, notes, data and current medications in the medical record. I have personally examined and treated the patient at bedside during this period.                  Care Plan discussed with: Patient, Nursing Staff and >50% of time spent in counseling and coordination of care    Discussed:  Care Plan    Prophylaxis:  xarelto     Disposition:  Home w/Family           ___________________________________________________    Attending Physician: Katlin Gomez MD

## 2022-02-07 NOTE — PROGRESS NOTES
Care Management follow up, WRU 11 XEBI     Patient admitted for acute hypoxic respiratory failure, COVID19, pneumonia. History of: afib, diabetes, HTN, carotid artery stenosis, aortic stenosis, CVA  COVID Vaccine:  Yes x 2, no booster        RUR 17 (Score %) moderate    Is This a Readmission NO  Is this a Bundle NO     Current status  Patient discussed during interdisciplinary rounds. Patient continues to require medical management including high flow oxygen at 35L/90%. Ongoing assessment and monitoring.   Patient still having issues with hypoxia and poor activity tolerance.     Transition of Care Plan  1. Monitor patient status and response to treatment. 2. Patient continues to require medical management. 3. Requiring high flow oxygen and IV solumedrol. Very poor activity tolerance. 4. Patient accepted by [facility]  5. Patient has [employer]. 6. Will monitor for home oxygen needs. 7. Patient lives with his grandson and great grandson, family members to assist with care at home. 8. CM to monitor progress and recommendations.     Rema Armstrong RN, MSN/Care manager  [phone]

## 2022-02-08 NOTE — PROGRESS NOTES
Occupational Therapy Note:  Chart reviewed and spoke with RN and treating PT. Patient just completed PT tx where he desaturated on HFNC and HR elevated with minimal activity. He is now fatigued and not able to engage in further activity. Will continue to follow.    Torrie Ocasio OTR/L

## 2022-02-08 NOTE — PROGRESS NOTES
Name: Janene Gins: [address]   : 1940 Admit Date: 2022   Phone: [phone]  Room: [facility]/01   PCP: Boris De La Vega MD  MRN: [id number]   Date: 2022  Code: Full Code          Chart and notes reviewed. Data reviewed. I review the patient's current medications in the medical record at each encounter.  I have evaluated and examined the patient. History of Present Illness:  Mr. Lizet Hull is a very pleasant, 78 yo gentleman that presented to [facility] on  with worsening shortness of breath. He has a PMH of a.fib, aortic stenosis, DM, hypertension, CVA, and tobacco abuse (100 pack years, quit in ). He received two doses of the Covid vaccine, last in 2021. He did not receive a booster dose. He is currently requiring hi flow at 50L/100%. He reports that his symptoms started five days ago after he was exposed to his grandson who tested positive. He became more progressively more SOB with increased cough. No fever/chills. He did initially have diarrhea. Images:  Personally visualized and report reviewed. CXR :  Superimposed patchy bilateral airspace disease on interstitial changes concerning for infection or inflammation      D-dimer . 55  LFTs stable  Procalcitonin negative  Ferritin 759  Trop wnl  Pro-  CRP 3.18  ECHO 2020:  EF 55-60%, mildly reduced systolic function, moderate aortic stenosis, trave MVR, moderately elevated CVP. Interval History:  Afebrile  BP soft  Sats 91% on 35L/100% FIO2--FiO2 requirements continue to fluctuate  WBC 8.5  Plts 75--decreased  2 D-dimer 0.21  Na 135  LFTs stable  CRP <.29 --stable   proBNP 578   Ferritin 788  LFTs stable  CT < 0.05  600 UOP documented    Chest CT  personally reviewed: extensive emphysema with fibrosis. RUL asd.     CXR  personally reviewed: Chronic fibrosis without definite consolidation    Chest CT  personally reviewed: Emphysema with peripheral blebs. Septal thickening and possible fibrosis in the lung bases--limited by motion. ECHO : EF 45-50%, mild global hypokinesis present, mildly reduced systolic function, mild AV stenosis    Abdominal US : Limited study. Possible hepatic steatosis. Cholecystectomy. No visible biliary tree. ROS: Sitting up in chair playing on his computer. No complaints. 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 Moderate aortic valve stenosis was present. LVEF 55-60% .  Stroke [facility])     asymptomatic; small chronic infarcts in the bilateral thalami per MRI of 2020.   Previous MRI of 2010 showed possible tiny old right thalamic infarct versus perivascular space        Past Surgical History:   Procedure Laterality Date    HX CHOLECYSTECTOMY      W[place] INS NEW/RPLCMT PRM PM W/TRANSV ELTRD ATRIAL&VENT N/A 2020    INSERT PPM DUAL performed by Katalina Wong MD at [place] [id number] LAB       Family History   Adopted: Yes   Family history unknown: Yes       Social History     Tobacco Use    Smoking status: Former Smoker     Packs/day: 2.00     Years: 50.00     Pack years: 100.00     Quit date: 2004     Years since quittin.7    Smokeless tobacco: Never Used   Substance Use Topics    Alcohol use: Yes     Comment: 1 beer or 1 glaas of wine daily most of adult life       Allergies   Allergen Reactions    Ampicillin Nausea and Vomiting    Iodine Hives, Itching and Nausea and Vomiting       Current Facility-Administered Medications   Medication Dose Route Frequency    albuterol-ipratropium (DUO-NEB) 2.5 MG-0.5 MG/3 ML  3 mL Nebulization Q4H RT    arformoteroL (BROVANA) neb solution 15 mcg  15 mcg Nebulization BID RT    budesonide (PULMICORT) 500 mcg/2 ml nebulizer suspension  500 mcg Nebulization BID RT    methylPREDNISolone (PF) (Solu-MEDROL) injection 40 mg  40 mg IntraVENous Q8H    insulin NPH (NOVOLIN N, HUMULIN N) injection 30 Units  30 Units SubCUTAneous ACB&D    artificial tears (dextran-hypromellose-glycerin) (GENTEAL) ophthalmic solution 1 Drop  1 Drop Both Eyes PRN    sodium chloride (OCEAN) 0.65 % nasal squeeze bottle 2 Spray  2 Spray Both Nostrils Q2H PRN    LORazepam (ATIVAN) tablet 0.5 mg  0.5 mg Oral BID PRN    metoprolol tartrate (LOPRESSOR) tablet 12.5 mg  12.5 mg Oral BID    guaiFENesin-dextromethorphan (ROBITUSSIN DM) 100-10 mg/5 mL syrup 5 mL  5 mL Oral Q6H PRN    rivaroxaban (XARELTO) tablet 20 mg  20 mg Oral DAILY WITH DINNER    lisinopriL (PRINIVIL, ZESTRIL) tablet 10 mg  10 mg Oral QHS    atorvastatin (LIPITOR) tablet 20 mg  20 mg Oral DAILY    glucose chewable tablet 16 g  4 Tablet Oral PRN    dextrose (D50W) injection syrg 12.5-25 g  25-50 mL IntraVENous PRN    glucagon (GLUCAGEN) injection 1 mg  1 mg IntraMUSCular PRN    insulin lispro (HUMALOG) injection   SubCUTAneous AC&HS    sodium chloride (NS) flush 5-40 mL  5-40 mL IntraVENous Q8H    sodium chloride (NS) flush 5-40 mL  5-40 mL IntraVENous PRN    acetaminophen (TYLENOL) tablet 650 mg  650 mg Oral Q6H PRN    Or    acetaminophen (TYLENOL) suppository 650 mg  650 mg Rectal Q6H PRN    polyethylene glycol (MIRALAX) packet 17 g  17 g Oral DAILY PRN    ondansetron (ZOFRAN ODT) tablet 4 mg  4 mg Oral Q8H PRN    Or    ondansetron (ZOFRAN) injection 4 mg  4 mg IntraVENous Q6H PRN         REVIEW OF SYSTEMS   Negative except as stated in the HPI.       Physical Exam:   Visit Vitals  BP (!) 97/54 (BP 1 Location: Right arm, BP Patient Position: At rest)   Pulse 86   Temp 98 °F (36.7 °C)   Resp 22   Ht 5' 8\" (1.727 m)   Wt 65.5 kg (144 lb 6.4 oz)   SpO2 91%   BMI 21.96 kg/m²       General:  Alert, cooperative, no distress, appears stated age, on HF   Head:  Normocephalic, without obvious abnormality, atraumatic. Eyes:  Conjunctivae/corneas clear. Nose: Nares normal. Septum midline. Mucosa normal.   Throat: Lips, mucosa, and tongue normal.    Neck: Supple, symmetrical, trachea midline, no adenopathy. Lungs:   Crackles in the bases. Upper lung fields pretty clear. Respirations non labored. Chest wall:  No tenderness or deformity. Heart:  Regular rate and rhythm, S1, S2 normal, no murmur, click, rub or gallop. Abdomen:   Soft, non-tender. Bowel sounds normal.   Extremities: Extremities normal, atraumatic, no cyanosis or edema. Pulses: 2+ and symmetric all extremities. Skin: Skin color, texture, turgor normal. No rashes or lesions   Lymph nodes: Cervical  nodes normal.   Neurologic: Grossly nonfocal       Lab Results   Component Value Date/Time    Sodium 135 (L) 02/08/2022 05:55 AM    Potassium 4.4 02/08/2022 05:55 AM    Chloride 100 02/08/2022 05:55 AM    CO2 27 02/08/2022 05:55 AM    BUN 25 (H) 02/08/2022 05:55 AM    Creatinine 0.84 02/08/2022 05:55 AM    Glucose 191 (H) 02/08/2022 05:55 AM    Calcium 8.2 (L) 02/08/2022 05:55 AM    Magnesium 1.8 02/05/2022 05:08 AM    Phosphorus 3.1 02/05/2022 05:08 AM    Lactic acid 1.3 01/11/2022 06:34 PM       Lab Results   Component Value Date/Time    WBC 8.9 02/08/2022 05:55 AM    HGB 13.6 02/08/2022 05:55 AM    PLATELET 75 (L) 48/80/2842 05:55 AM    MCV 87.1 02/08/2022 05:55 AM       Lab Results   Component Value Date/Time    INR 1.2 (H) 02/05/2022 05:08 AM    aPTT 26.4 02/05/2022 05:08 AM    Alk.  phosphatase 67 02/08/2022 05:55 AM    Protein, total 5.3 (L) 02/08/2022 05:55 AM    Albumin 2.5 (L) 02/08/2022 05:55 AM    Globulin 2.8 02/08/2022 05:55 AM       Lab Results Component Value Date/Time    Ferritin 788 (H) 01/31/2022 02:00 AM       Lab Results   Component Value Date/Time    Sed rate (ESR) 5 09/22/2010 03:50 AM    C-Reactive protein <0.29 02/08/2022 05:55 AM    TSH 0.32 (L) 02/05/2022 05:08 AM        No results found for: PH, PHI, PCO2, PCO2I, PO2, PO2I, HCO3, HCO3I, FIO2, FIO2I    Lab Results   Component Value Date/Time    CK 79 09/22/2010 03:50 AM    Troponin-I, Qt. 0.05 (H) 09/21/2010 12:10 PM        Lab Results   Component Value Date/Time    Culture result: Culture performed on Fluid swab specimen 11/25/2021 08:22 PM    Culture result: NO GROWTH 4 DAYS 11/25/2021 08:22 PM       No results found for: TOXA1, RPR, HBCM, HBSAG, HAAB, HCAB1, HAAT, G6PD, CRYAC, HIVGT, HIVR, HIV1, HIV12, HIVPC, HIVRPI    Lab Results   Component Value Date/Time    CK 79 09/22/2010 03:50 AM       Lab Results   Component Value Date/Time    Color YELLOW/STRAW 08/02/2019 12:10 AM    Appearance CLEAR 08/02/2019 12:10 AM    pH (UA) 5.0 08/02/2019 12:10 AM    Protein 30 (A) 08/02/2019 12:10 AM    Glucose 500 (A) 08/02/2019 12:10 AM    Ketone NEGATIVE  08/02/2019 12:10 AM    Bilirubin NEGATIVE  08/02/2019 12:10 AM    Blood SMALL (A) 08/02/2019 12:10 AM    Urobilinogen 0.2 08/02/2019 12:10 AM    Nitrites NEGATIVE  08/02/2019 12:10 AM    Leukocyte Esterase NEGATIVE  08/02/2019 12:10 AM    WBC 0-4 08/02/2019 12:10 AM    RBC 0-5 08/02/2019 12:10 AM    Bacteria NEGATIVE  08/02/2019 12:10 AM       Images: personally visualized and report reviewed    CXR (1/16/2022): Worsening of left mid and lower lung pulmonary opacities. CXR (1/20/2022): There is bilateral pneumonia in the periphery of the mid  and lower lung zones left greater than right. This has mildly worsened. CXR (1/31/2022): Bilateral pneumonia persists in the periphery of the mid lower lung zones. Areas of consolidation have increased in the right mid zone and left mid zone.     CXR (2/3/2022): Chronic volume loss and interstitial thickening have been present since at least 2020. This complicates evaluation for underlying COVID-19 pneumonia. The radiographic appearance of the chest on 2/6/2020, 1/11/2022, and 1/20/2022 is similar. CT better shows underlying panlobular emphysema as well.   More dense fibrosis adjacent bullous emphysema in the lateral aspects of the bilateral mid lungs is not significantly changed. 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 Fib  · Moderate Aortic Stenosis  · History of Tobacco Abuse  · DM  · Hypertension      PLAN  · Continue O2 to keep sats >88%; on HF with varying amounts  · Continue IV Solumedrol 40mg q 8hr; will wean slightly  · Baricitinib complete  · On AC with Xarelto-- may need to hold if platelets continue to decline  · Trend CRP, d-dimer, LFTs  · Encourage IS use  · Prone as able  · OOB to chair  · Would benefit from PFTs as an outpatient  · Will need repeat imaging as an outpatient  · PT/OT   · Palliative following      Sommer Ritter NP

## 2022-02-08 NOTE — PROGRESS NOTES
Bedside shift change report given to Jamal (oncoming nurse) by Isaias Roche (offgoing nurse). Report included the following information SBAR, Kardex, ED Summary, Intake/Output, and Recent Results. Bedside shift change report given to Guillermina [address] (oncoming nurse) by Jamal (offgoing nurse). Report included the following information SBAR, Kardex, ED Summary, Intake/Output, and Recent Results. This patient was assisted with Intentional Toileting every 2 hours during this shift as appropriate. Documentation of ambulation and output reflected on Flowsheet as appropriate. Purposeful hourly rounding was completed using AIDET and 5Ps. Outcomes of PHR documented as they occurred. Bed alarm in use as appropriate. Dual Suction and ambubag in place.

## 2022-02-08 NOTE — PROGRESS NOTES
Comprehensive Nutrition Assessment    Type and Reason for Visit: Reassess    Nutrition Recommendations/Plan:   1. Continue regular, 4 Carb Choice diet  2. Provide Glucerna once daily to aid in meeting kcal/protein needs (220 kcal, 26 g carbs, 10 g protein)  3. Consider adjusting insulin choice or dose to reduce BG spikes    Nutrition Assessment:    2/8: Follow up. No further meal or supplement intake documented. Patient endorses continued \"great\" PO intake, consuming 75% or more of all meals and supplements. Endorses decreased snacking throughout night d/t needing more O2 and feeling SOB getting out of bed. Currently on 35 L O2. Last three BG , 233, 246. Slight weight gain since last RD encounter, but overall weight is down 18# (11%) since admission x ~ 1 month ago, clinically significant for timeframe. Believed admission weight stated, so actual weight loss likely less than this. Last 3 Recorded Weights in this Encounter    02/02/22 0502 02/03/22 0438 02/04/22 0357   Weight: 63 kg (138 lb 12.8 oz) 64.5 kg (142 lb 1.6 oz) 65.5 kg (144 lb 6.4 oz)       2/1: Follow up. PO intake continues to average 75% or more of all meals. Patient endorses \"wonderful\" appetite, with no N/V/D. No chewing/swallowing problems. On heated hiflow @ 30 L O2. States he wakes up about three times a night and snacks - has multiple snack bag size chips in room. RD provided brief overview of carbohydrates and blood sugar spikes - patient voiced understanding. Last three BG , 258, 225. No complaints at this time. Weight continues to downtrend slightly. Patient states -160#, but cannot remember when he last weighed this much.     1/26: follow up. Pt is eating very well at meals. BG will elevate after meals requiring 6-8 units of correction daily. Pt with normal BG prior to meals and corrects easily. Consider Lantus or increasing NPH slightly and/or administrating NPH at the same time as steroids.  Pt is eating 100% of all meals. Wt loss of 4% since admission. Current diet order meets 100% of calorie & protein needs, yet steady wt loss. Will try to add HS Glucerna (220 cals, 26g carb, 10g pro) to halt further wt loss, however expect BG may go up slightly.        1/18: Pt is an 79year old male admitted with Acute respiratory failure with hypoxia; Pneumonia due to COVID-19 virus [U07.1, J12.82]. He  has a past medical history of Atrial fibrillation ([facility] Diabetes mellitus, type 2 ([facility] Hypertension, Internal carotid artery stenosis, right, Moderate aortic stenosis, and Stroke ([facility] PO intake averaging 75% of all meals. Attempted to call into patient room - no answer. Currently on 55L O2. Per documentation, patient has lost 12# (7.4%) x ~3 days, unlikely. No noted edema or prior edema this admission. Admission weight of 162 was stated. No noted N/V/D. No hx of chewing/swallowing problems. NKFA. If current PO intake trend continues, patient likely meeting >/= 75% kcal needs and 100% protein needs. Last three BG , 169, 147    Malnutrition Assessment:  Malnutrition Status: At risk for malnutrition (specify)  - prolonged hospitalization, long COVID-19  Context:  Acute illness     Findings of the 6 clinical characteristics of malnutrition:   Energy Intake:  No significant decrease in energy intake  Weight Loss:  No significant weight loss     Body Fat Loss:  No significant body fat loss,     Muscle Mass Loss:  No significant muscle mass loss,    Fluid Accumulation:  No significant fluid accumulation,     Strength:  Not performed     Estimated Daily Nutrient Needs:  Energy (kcal): 1727 (MSJ x 1.3); Weight Used for Energy Requirements: Current  Protein (g): 65-78 (1.0-1.2); Weight Used for Protein Requirements: Admission  Fluid (ml/day): 1727; Method Used for Fluid Requirements: 1 ml/kcal    Nutrition Related Findings:       ABD: Good appetite with active bowel sounds 2/8  Last BM: 2/4  Edema: No data recorded    Nutr. Labs:  Lab Results   Component Value Date/Time    GFR est AA >60 02/08/2022 05:55 AM    GFR est non-AA >60 02/08/2022 05:55 AM    Creatinine (POC) 0.8 09/21/2010 12:56 PM    Creatinine 0.84 02/08/2022 05:55 AM    BUN 25 (H) 02/08/2022 05:55 AM    BUN (POC) 15 09/21/2010 12:56 PM    Sodium (POC) 138 09/21/2010 12:56 PM    Sodium 135 (L) 02/08/2022 05:55 AM    Potassium 4.4 02/08/2022 05:55 AM    Potassium (POC) 3.8 09/21/2010 12:56 PM    Chloride (POC) 103 09/21/2010 12:56 PM    Chloride 100 02/08/2022 05:55 AM    CO2 27 02/08/2022 05:55 AM     Lab Results   Component Value Date/Time    Glucose 191 (H) 02/08/2022 05:55 AM    Glucose (POC) 287 (H) 02/08/2022 11:42 AM     Lab Results   Component Value Date/Time    Hemoglobin A1c 7.2 (H) 01/13/2022 03:09 AM       Nutr. Meds:  Lipitor, Humalog, NPH, lisinopril, Lopressor, Zofran PRN, Miralax PRN, Xarelto    Wounds:    None       Current Nutrition Therapies:  ADULT DIET Regular; 4 carb choices (60 gm/meal)  ADULT ORAL NUTRITION SUPPLEMENT HS Snack; Diabetic Supplement    Anthropometric Measures:  · Height:  5' 8\" (172.7 cm)  · Current Body Wt:  65.5 kg (144 lb 6.4 oz)   · Admission Body Wt:  144 lb 6.4 oz    · Usual Body Wt:  68 kg (150 lb)     · Ideal Body Wt:  154 lbs:  93.8 %   Body mass index is 21.96 kg/m².   · BMI Category:  Normal weight (BMI 22.0-24.9) age over 72       Nutrition Diagnosis:   · Increased nutrient needs related to catabolic illness,impaired respiratory function as evidenced by  (COVID-19, requiring hiflow O2)    Nutrition Interventions:   Food and/or Nutrient Delivery: Continue current diet,Continue oral nutrition supplement  Nutrition Education and Counseling: No recommendations at this time  Coordination of Nutrition Care: Continue to monitor while inpatient,Interdisciplinary rounds    Goals:  PO intake continues >/= 75% of all meals and supplements within 5 - 7 days       Nutrition Monitoring and Evaluation:   Behavioral-Environmental Outcomes: None identified  Food/Nutrient Intake Outcomes: Food and nutrient intake,Supplement intake  Physical Signs/Symptoms Outcomes: Biochemical data,Skin,Weight    Discharge Planning:    Continue oral nutrition supplement     Electronically signed by Fausto Reyna RD on 3/4/7628  Contact: [phone] or via [employer]

## 2022-02-08 NOTE — PROGRESS NOTES
0730- Bedside and Verbal shift change report given to Amarjit Cruz RN (oncoming nurse) by Shaina Villegas RN (offgoing nurse). Report included the following information SBAR, Kardex, ED Summary, OR Summary, Procedure Summary, Intake/Output, MAR, Accordion, and Recent Results. This patient was assisted with Intentional Toileting every 2 hours during this shift. Documentation of ambulation and output reflected on Flowsheet. 1930-Bedside and Verbal shift change report given to RN (oncoming nurse) by Amarjit Cruz RN(offgoing nurse). Report included the following information SBAR, Kardex, ED Summary, OR Summary, Procedure Summary, Intake/Output, MAR, Accordion, Recent Results, and Med Rec Status.

## 2022-02-08 NOTE — PROGRESS NOTES
Palliative Medicine Consult  Sunil: [phone] ([phone])    Patient Name: Francoise East  YOB: 1940    Date of Initial Consult: 1/13/2022  Reason for Consult: Care Decisions  Requesting Provider: Dr. Edin Clark  Primary Care Physician: Harinder Lewis MD     SUMMARY:   Francoise East is a 79 y.o. 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 Patient presented to ER w/ cc of poor appetite, loss of taste, arthralgia and diarrhea x a few days. Patient also reported recent COVID exposure. He received  COVID 19 vaccine x 2 doses in February 2021, but had no booster. Course of hospitalization: O2 needs increased rapidly since admission, now on 50L HFNC. Renal function improved. Current medical issues leading to Palliative Medicine involvement include: GOC discussion with gentleman of advanced age currently hospitalized for hypoxic respiratory failure 2/2/ covid. Ahsan Silva Psychosocial: Born and raised in [place], he was adopted, only child, very loving parents. Served as a  in [employer] x 10+ years. 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 He is , has 1 child, son Tanesha Aguilar, 2 grandchildren and a few great grandchildren. Patients grandson and  great grandson, who will be turning 14 yo on 1/15, live with him. Patient continues to play Golf, works on cars. Cognitive and Functional baseline: Alert and oriented, independent with ADLs, drives etc,  does have chronic slightly unsteady gait (etiology unknown). PALLIATIVE DIAGNOSES:   1. Palliative care encounter  2. COVID 19  3. Hypoxia  4. Fatigue  5. Weakness, generalized  6. DNR discussion  7.  Goals of care discussion PLAN:   1. Chart reviewed for updated information. Nurse Prashant Brock RN was able to provide clinical update. 2. Patient now off COVID restrictions  3. Patient was sitting up in bedside chair, sleeping, but woke to voice. He is hard of hearing, but alert and oriented, has good insight regarding hospitalization. 4. Patient with good insight regarding hospitalization. 5. Mr. Evens Roche acknowledged he is feeling frustrated and discouraged by prolonged hospitalization, that his O2 requirements had been down, until thought he had been doing fairly well, oxygen needs have been much lower and he had been feeling optimistic that he will be able to be discharged home soon, but since yesterday, his oxygen needs have rapidly increased. 6. We discussed results of chest CT, significant for emphysema and pulmonary fibrosis. 7. Mr. Evens Roche requested that I call his son Kashif Carmichael, to provide medical update and discussed potential options. 6. Spoke with son Kashif Carmichael, he was aware of the COPD diagnosis, however did not know about the fibrosis. We discussed high O2 needs as being a barrier to discharge. We discussed the possibility of being discharged to acute care facility like [facility] for PT/OT and pulmonary therapy, but only if he is O2 needs meet their criteria. We also discussed possibility of SNF, but that his O2 needs will need to be much less mature currently. 9. We also discussed option for comfort with hospice  10. [address] discussion-at this time patient wants to continue with full restorative treatments and interventions, however undecided regarding discharge plan. Patient's preference is to be discharged home, if his supplemental O2 needs permit. However if if he is unable to return home due to high O2 needs and it was recommended that he goes to facility for pulmonary rehab, with a goal weaning O2 needs down further so that he could return home, he would consider it.      · Weakness, generalized-but not at baseline, multifactorial etiology including: Prolonged hospitalization, deconditioning, hypoxia. Patient working with PT OT however he continues to desat with activity, significantly limiting his activity and progress. PT/OT following  11. Poor nutrition-improved,  Patient reported eating fairly well, 75% of most meals      12. DNR discussion- Patient continues to have a Full Code status. 13. Initial consult note routed to primary continuity provider and/or primary health care team members  15. Communicated plan of care with: Palliative IDT, [address] [address] Team, Dr. Woodfin Claude RN, Idania Pepe PT     GOALS OF CARE / TREATMENT PREFERENCES:     GOALS OF CARE:  Patient/Health Care Proxy Stated Goals: Cure    TREATMENT PREFERENCES:   Code Status: Full Code    Patient and family's personal goals include: Get well and return home    Important upcoming milestones or family events: Unknown. The patient identifies the following as important for living well: unknown      Advance Care Planning:  [x] [facility] Interdisciplinary Team has updated the ACP Navigator with Health Care Decision Maker and Patient Capacity      Primary Decision Maker: Monica Lao - [phone]    Secondary Decision Maker: Tena Kauffman 1/14/2022   Patient's Healthcare Decision Maker is: Patient declined (Legal Next of Kin remains as decision maker)   Confirm Advance Directive None   Patient Would Like to Complete Advance Directive No       Medical Interventions: Full interventions       Other:    As far as possible, the palliative care team has discussed with patient / health care proxy about goals of care / treatment preferences for patient.      HISTORY:     History obtained from: medical record/nurse/ED and patient    CHIEF COMPLAINT: Feeling a bit discouraged    HPI/SUBJECTIVE:    The patient is:   [x] Verbal and participatory  [] Non-participatory due to:   Denied pain, denied SOB, stated that he is feeling a bit discouraged, just wants to go home. 2/8-patients O2 needs continue to fluctuate, over the weekend had been down, patient hopeful to be discharged home this week, however O2 needs have increased significantly, back on 55 L  high flow nasal cannula. Patient continues to have poor tolerance to PT 2/2 fatigue and prolonged desaturation of 80% on 50 L HFNC tachycardia    2/7-CT chest significant for extensive emphysema with fibrotic changes in the lung bases, and focal airspace opacity in the right upper lobe which may represent infection, also+ cardiomegaly and CAD    1/18-echo mildly decreased LV systolic function, EF 24-98%    Clinical Pain Assessment (nonverbal scale for severity on nonverbal patients):   Clinical Pain Assessment  Severity: 0          Duration: for how long has pt been experiencing pain (e.g., 2 days, 1 month, years)  Frequency: how often pain is an issue (e.g., several times per day, once every few days, constant)     FUNCTIONAL ASSESSMENT:     Palliative Performance Scale (PPS):  PPS: 40       PSYCHOSOCIAL/SPIRITUAL SCREENING:     Palliative IDT has assessed this patient for cultural preferences / practices and a referral made as appropriate to needs (Cultural Services, Patient Advocacy, Ethics, etc.)    Any spiritual / Restorationism concerns:  [] Yes /  [x] No   If \"Yes\" to discuss with pastoral care during IDT     Does caregiver feel burdened by caring for their loved one:   [] Yes /  [x] No /  [] No Caregiver Present    If \"Yes\" to discuss with social work during IDT    Anticipatory grief assessment:   [x] Normal  / [] Maladaptive     If \"Maladaptive\" to discuss with social work during IDT    ESAS Anxiety: Anxiety: 0    ESAS Depression: Depression: 0        REVIEW OF SYSTEMS:     Positive and pertinent negative findings in ROS are noted above in HPI.   The following systems were [x] reviewed / [] unable to be reviewed as noted in HPI  Other findings are noted below.  Systems: constitutional, ears/nose/mouth/throat, respiratory, gastrointestinal, genitourinary, musculoskeletal, integumentary, neurologic, psychiatric, endocrine. Positive findings noted below. Modified ESAS Completed by: provider   Fatigue: 3 Drowsiness: 0   Depression: 0 Pain: 0   Anxiety: 0 Nausea: 0   Anorexia: 5 Dyspnea: 0           Stool Occurrence(s): 1        PHYSICAL EXAM:     From RN flowsheet:  Wt Readings from Last 3 Encounters:   02/04/22 144 lb 6.4 oz (65.5 kg)   11/22/21 162 lb (73.5 kg)   11/19/21 162 lb (73.5 kg)     Blood pressure (!) 146/70, pulse 86, temperature 98.3 °F (36.8 °C), resp. rate 24, height 5' 8\" (1.727 m), weight 144 lb 6.4 oz (65.5 kg), SpO2 90 %.     Pain Scale 1: Numeric (0 - 10)  Pain Intensity 1: 1  Pain Onset 1: acute  Pain Location 1: Chest  Pain Orientation 1: Anterior  Pain Description 1: Aching  Pain Intervention(s) 1: Rest  Last bowel movement, if known:     Constitutional: Appears stated age, awake, alert, conversant, NAD  Eyes: pupils equal, anicteric  ENMT: no nasal discharge, moist mucous membranes  Cardiovascular: regular rhythm, distal pulses intact  Respiratory: breathing  mild labored, symmetric, on HFNC  Gastrointestinal: soft non-tender, +bowel sounds  Musculoskeletal: no deformity, no tenderness to palpation  Skin: warm, dry  Neurologic: Alert and oriented, following commands, moving all extremities  Psychiatric: Depressed affect, no hallucinations  Other:       HISTORY:     Active Problems:    A-fib ([address] (2/4/2020)      History of CVA (cerebrovascular accident) (2/4/2020)      Type 2 diabetes mellitus without complication ([address] (4/9/2722)      Acute respiratory failure with hypoxia ([address] (1/11/2022)      Pneumonia due to COVID-19 virus (1/11/2022)      Hypertension ()      NSVT (nonsustained ventricular tachycardia) ([address] (1/22/2022)      Pacemaker (1/22/2022)      Past Medical History:   Diagnosis Date    Atrial fibrillation ([address]     [facility] mellitus, type 2 [facility]     Hypertension     Internal carotid artery stenosis, right     Chronic occlusion of the right internal carotid artery per MRI of 2020 unchanged from previous study of 2010; study of  also showed mild stenosis of the left internal carotid artery and bilateral patent vertebral arteries    Moderate aortic stenosis     Echocardiogram of 2020:  Severe aortic valve leaflet calcification present with reduced excursion. Moderate aortic valve stenosis was present. LVEF 55-60% .  Stroke [facility])     asymptomatic; small chronic infarcts in the bilateral thalami per MRI of 2020. Previous MRI of 2010 showed possible tiny old right thalamic infarct versus perivascular space       Past Surgical History:   Procedure Laterality Date    HX CHOLECYSTECTOMY      RI INS NEW/RPLCMT PRM PM W/TRANSV ELTRD ATRIAL&VENT N/A 2020    INSERT PPM DUAL performed by Devaughn Duffy MD at [place] [address] LAB      Family History   Adopted: Yes   Family history unknown: Yes      History reviewed, no pertinent family history.   Social History     Tobacco Use    Smoking status: Former Smoker     Packs/day: 2.00     Years: 50.00     Pack years: 100.00     Quit date: 2004     Years since quittin.7    Smokeless tobacco: Never Used   Substance Use Topics    Alcohol use: Yes     Comment: 1 beer or 1 glaas of wine daily most of adult life     Allergies   Allergen Reactions    Ampicillin Nausea and Vomiting    Iodine Hives, Itching and Nausea and Vomiting      Current Facility-Administered Medications   Medication Dose Route Frequency    methylPREDNISolone (PF) (Solu-MEDROL) injection 40 mg  40 mg IntraVENous Q12H    albuterol-ipratropium (DUO-NEB) 2.5 MG-0.5 MG/3 ML  3 mL Nebulization Q4H RT    arformoteroL (BROVANA) neb solution 15 mcg  15 mcg Nebulization BID RT    budesonide (PULMICORT) 500 mcg/2 ml nebulizer suspension  500 mcg Nebulization BID RT    insulin NPH (NOVOLIN N, HUMULIN N) injection 30 Units  30 Units SubCUTAneous ACB&D    artificial tears (dextran-hypromellose-glycerin) (GENTEAL) ophthalmic solution 1 Drop  1 Drop Both Eyes PRN    sodium chloride (OCEAN) 0.65 % nasal squeeze bottle 2 Spray  2 Spray Both Nostrils Q2H PRN    LORazepam (ATIVAN) tablet 0.5 mg  0.5 mg Oral BID PRN    metoprolol tartrate (LOPRESSOR) tablet 12.5 mg  12.5 mg Oral BID    guaiFENesin-dextromethorphan (ROBITUSSIN DM) 100-10 mg/5 mL syrup 5 mL  5 mL Oral Q6H PRN    rivaroxaban (XARELTO) tablet 20 mg  20 mg Oral DAILY WITH DINNER    lisinopriL (PRINIVIL, ZESTRIL) tablet 10 mg  10 mg Oral QHS    atorvastatin (LIPITOR) tablet 20 mg  20 mg Oral DAILY    glucose chewable tablet 16 g  4 Tablet Oral PRN    dextrose (D50W) injection syrg 12.5-25 g  25-50 mL IntraVENous PRN    glucagon (GLUCAGEN) injection 1 mg  1 mg IntraMUSCular PRN    insulin lispro (HUMALOG) injection   SubCUTAneous AC&HS    sodium chloride (NS) flush 5-40 mL  5-40 mL IntraVENous Q8H    sodium chloride (NS) flush 5-40 mL  5-40 mL IntraVENous PRN    acetaminophen (TYLENOL) tablet 650 mg  650 mg Oral Q6H PRN    Or    acetaminophen (TYLENOL) suppository 650 mg  650 mg Rectal Q6H PRN    polyethylene glycol (MIRALAX) packet 17 g  17 g Oral DAILY PRN    ondansetron (ZOFRAN ODT) tablet 4 mg  4 mg Oral Q8H PRN    Or    ondansetron (ZOFRAN) injection 4 mg  4 mg IntraVENous Q6H PRN          LAB AND IMAGING FINDINGS:     Lab Results   Component Value Date/Time    WBC 8.9 02/08/2022 05:55 AM    HGB 13.6 02/08/2022 05:55 AM    PLATELET 75 (L) 04/51/1677 05:55 AM     Lab Results   Component Value Date/Time    Sodium 135 (L) 02/08/2022 05:55 AM    Potassium 4.4 02/08/2022 05:55 AM    Chloride 100 02/08/2022 05:55 AM    CO2 27 02/08/2022 05:55 AM    BUN 25 (H) 02/08/2022 05:55 AM    Creatinine 0.84 02/08/2022 05:55 AM    Calcium 8.2 (L) 02/08/2022 05:55 AM    Magnesium 1.8 02/05/2022 05:08 AM    Phosphorus 3.1 02/05/2022 05:08 AM Lab Results   Component Value Date/Time    Alk. phosphatase 67 02/08/2022 05:55 AM    Protein, total 5.3 (L) 02/08/2022 05:55 AM    Albumin 2.5 (L) 02/08/2022 05:55 AM    Globulin 2.8 02/08/2022 05:55 AM     Lab Results   Component Value Date/Time    INR 1.2 (H) 02/05/2022 05:08 AM    Prothrombin time 12.0 (H) 02/05/2022 05:08 AM    aPTT 26.4 02/05/2022 05:08 AM      Lab Results   Component Value Date/Time    Ferritin 788 (H) 01/31/2022 02:00 AM      No results found for: PH, PCO2, PO2  No components found for: [place]   Lab Results   Component Value Date/Time    CK 79 09/22/2010 03:50 AM                Total time: 65 min  Counseling / coordination time, spent as noted above: 50 min   > 50% counseling / coordination?: yes    Prolonged service was provided for  [x]30 min   []75 min in face to face time in the presence of the patient, spent as noted above. Time Start: 1600 hrs. time End: 1710  Note: this can only be billed with  (initial) or 21 [phone] (follow up). If multiple start / stop times, list each separately.

## 2022-02-08 NOTE — PROGRESS NOTES
Care Management follow up, XSX 09 [facility]     Patient admitted for acute hypoxic respiratory failure, COVID19, pneumonia. History of: afib, diabetes, HTN, carotid artery stenosis, aortic stenosis, CVA  COVID Vaccine:  Yes x 2, no booster        RUR 18 (Score %) moderate    Is This a Readmission NO  Is this a Bundle NO     Current status  Patient discussed during interdisciplinary rounds. Patient continues to require medical management including high flow oxygen at 40L/100%. Ongoing assessment and monitoring.   Patient still having issues with hypoxia and poor activity tolerance. Palliative seeing patient today. Pulmonology, palliative, PT/OT following. High risk for decline.     Transition of Care Plan  1. Monitor patient status and response to treatment. 2. Patient continues to require medical management. 3. Requiring high flow oxygen and IV solumedrol. Very poor activity tolerance. 4. Patient accepted by [facility]  5. Patient has [employer]. 6. Will monitor for home oxygen needs. 7. Patient lives with his grandson and great grandson, family members to assist with care at home. 8. CM to monitor progress and recommendations.     Jewel Blair, RN, MSN/Care manager  [phone]

## 2022-02-08 NOTE — PROGRESS NOTES
[facility] [address]  [address] [facility], [place], [address] [place] [address]  [phone]      Medical Progress Note      NAME: Yarely Sagastume   :  1940  MRM:  [id number]    Date of service: 2022  10:32 AM       Assessment and Plan:   1. AHRF secondary to  COVID-19 PNA. s/p baricitinib. Still on HFNC O2.  Pt has significant hx of tobacco abuse (100 pack-year hx, quit smoking in ). 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 Appreciate pulmonology recs; changed dexamethasone to IV Solu-medrol on , continue duo neb, brovana, pulmicort. CT of the chest done  due to LT side chest pain, which is negative for PE. Shows extensive emphysema with fibrotic changes in the lung bases.      2. Aortic stenosis: mild on TTE . Monitor volume status     3. VT: hx of NSVT. Noted . Evaluated by cardiology. Monitor K and Mg. Cont BB      4. Type 2 diabetes mellitus without complication: P0Y 0.1%. watch BG as pt is on steroid. On NPH and titrate PRN, SSI      5. A-fib/ Pacemaker: monitor on telemetry. On Xarelto     6. History of CVA (cerebrovascular accident): cont OAC, statin     7. Hypertension: on lisinopril and metoprolol    Tried to reach his son, Tra Lawler , only answering service     Subjective:     Chief Complaint[de-identified] Patient was seen and examined as a follow up for AHRF. Chart was reviewed. SOB and LT side back pain worse with breathing     ROS:  (bold if positive, if negative)    Tolerating PT  Tolerating Diet        Objective:     Last 24hrs VS reviewed since prior progress note.  Most recent are:    Visit Vitals  /80   Pulse 70   Temp 97.8 °F (36.6 °C)   Resp 22   Ht 5' 8\" (1.727 m)   Wt 65.5 kg (144 lb 6.4 oz)   SpO2 93%   BMI 21.96 kg/m²     SpO2 Readings from Last 6 Encounters:   22 93%   21 95%   21 93%   21 95%   20 98%   20 93%    O2 Flow Rate (L/min): 50 l/min       Intake/Output Summary (Last 24 hours) at 2/8/2022 0740  Last data filed at 2/7/2022 2025  Gross per 24 hour   Intake 240 ml   Output 600 ml   Net -360 ml        Physical Exam:    Gen:  Well-developed, well-nourished, in no acute distress  HEENT:  Pink conjunctivae, PERRL, hearing intact to voice, moist mucous membranes  Neck:  Supple, without masses, thyroid non-tender  Resp:  No accessory muscle use, rales BL  Card:  No murmurs, normal S1, S2 without thrills, bruits or peripheral edema  Abd:  Soft, non-tender, non-distended, normoactive bowel sounds are present, no palpable organomegaly and no detectable hernias  Lymph:  No cervical or inguinal adenopathy  Musc:  No cyanosis or clubbing  Skin:  No rashes or ulcers, skin turgor is good  Neuro:  Cranial nerves are grossly intact, no focal motor weakness, follows commands appropriately  Psych:  Good insight, oriented to person, place and time, alert  __________________________________________________________________  Medications Reviewed: (see below)  Medications:     Current Facility-Administered Medications   Medication Dose Route Frequency    albuterol-ipratropium (DUO-NEB) 2.5 MG-0.5 MG/3 ML  3 mL Nebulization Q4H RT    arformoteroL (BROVANA) neb solution 15 mcg  15 mcg Nebulization BID RT    budesonide (PULMICORT) 500 mcg/2 ml nebulizer suspension  500 mcg Nebulization BID RT    methylPREDNISolone (PF) (Solu-MEDROL) injection 40 mg  40 mg IntraVENous Q8H    insulin NPH (NOVOLIN N, HUMULIN N) injection 30 Units  30 Units SubCUTAneous ACB&D    artificial tears (dextran-hypromellose-glycerin) (GENTEAL) ophthalmic solution 1 Drop  1 Drop Both Eyes PRN    sodium chloride (OCEAN) 0.65 % nasal squeeze bottle 2 Spray  2 Spray Both Nostrils Q2H PRN    LORazepam (ATIVAN) tablet 0.5 mg  0.5 mg Oral BID PRN    metoprolol tartrate (LOPRESSOR) tablet 12.5 mg  12.5 mg Oral BID    guaiFENesin-dextromethorphan (ROBITUSSIN DM) 100-10 mg/5 mL syrup 5 mL  5 mL Oral Q6H PRN    rivaroxaban (XARELTO) tablet 20 mg  20 mg Oral DAILY WITH DINNER    lisinopriL (PRINIVIL, ZESTRIL) tablet 10 mg  10 mg Oral QHS    atorvastatin (LIPITOR) tablet 20 mg  20 mg Oral DAILY    glucose chewable tablet 16 g  4 Tablet Oral PRN    dextrose (D50W) injection syrg 12.5-25 g  25-50 mL IntraVENous PRN    glucagon (GLUCAGEN) injection 1 mg  1 mg IntraMUSCular PRN    insulin lispro (HUMALOG) injection   SubCUTAneous AC&HS    sodium chloride (NS) flush 5-40 mL  5-40 mL IntraVENous Q8H    sodium chloride (NS) flush 5-40 mL  5-40 mL IntraVENous PRN    acetaminophen (TYLENOL) tablet 650 mg  650 mg Oral Q6H PRN    Or    acetaminophen (TYLENOL) suppository 650 mg  650 mg Rectal Q6H PRN    polyethylene glycol (MIRALAX) packet 17 g  17 g Oral DAILY PRN    ondansetron (ZOFRAN ODT) tablet 4 mg  4 mg Oral Q8H PRN    Or    ondansetron (ZOFRAN) injection 4 mg  4 mg IntraVENous Q6H PRN        Lab Data Reviewed: (see below)  Lab Review:     Recent Labs     02/08/22  0555   WBC 8.9   HGB 13.6   HCT 39.0   PLT 75*     Recent Labs     02/08/22  0555   *   K 4.4      CO2 27   *   BUN 25*   CREA 0.84   CA 8.2*   ALB 2.5*   TBILI 1.1*   ALT 70     Lab Results   Component Value Date/Time    Glucose (POC) 233 (H) 02/07/2022 08:00 PM    Glucose (POC) 246 (H) 02/07/2022 03:13 PM    Glucose (POC) 208 (H) 02/07/2022 11:07 AM    Glucose (POC) 298 (H) 02/07/2022 08:10 AM    Glucose (POC) 255 (H) 02/06/2022 09:17 PM     No results for input(s): PH, PCO2, PO2, HCO3, FIO2 in the last 72 hours. No results for input(s): INR, INREXT, INREXT in the last 72 hours.   All Micro Results     Procedure Component Value Units Date/Time    COVID-19 RAPID TEST [[id number]]  (Abnormal) Collected: 01/11/22 1834    Order Status: Completed Specimen: Nasopharyngeal Updated: 01/11/22 1943     Specimen source Nasopharyngeal        COVID-19 rapid test Detected        Comment: Rapid Abbott ID Now       The specimen is POSITIVE for SARS-CoV-2, the novel coronavirus associated with COVID-19. This test has been authorized by the FDA under an Emergency Use Authorization (EUA) for use by authorized laboratories. Fact sheet for Healthcare Providers: ConventionUpdate.co.nz  Fact sheet for Patients: ConventionUpdate.co.nz       Methodology: Isothermal Nucleic Acid Amplification  CALLED TO AND READ BACK BY  EMMA GRUBER @1942/YWG               I have reviewed notes of prior 24hr. Other pertinent lab: Total time spent with patient: 28 I personally reviewed chart, notes, data and current medications in the medical record. I have personally examined and treated the patient at bedside during this period.                  Care Plan discussed with: Patient, Nursing Staff and >50% of time spent in counseling and coordination of care    Discussed:  Care Plan    Prophylaxis:  xarelto     Disposition:  Home w/Family           ___________________________________________________    Attending Physician: Ijeoma Sparrow MD

## 2022-02-08 NOTE — PROGRESS NOTES
Physical therapy note    Chart reviewed, attempted PT treatment this afternoon at 12:35 pm. Patient just received his lunch and is requesting PT return this afternoon. PT will re-attempt this afternoon as able.     Thank you,  Anuradha Shaffer, PT, DPT

## 2022-02-08 NOTE — PROGRESS NOTES
Problem: Mobility Impaired (Adult and Pediatric)  Goal: *Acute Goals and Plan of Care (Insert Text)  Description: FUNCTIONAL STATUS PRIOR TO ADMISSION: Patient was independent and active without use of DME.    HOME SUPPORT PRIOR TO ADMISSION: The patient lived with son and grandson but did not require assist.    7-day re-assessment 1/28/2022; reviewed 2/8/2022 and all goals remain appropriate as below. 1.  Patient will move from supine to sit and sit to supine  in bed with modified independence within 7 day(s). 2.  Patient will transfer from bed to chair and chair to bed with supervision/set-up using the least restrictive device within 7 day(s). 3.  Patient will perform sit to stand with supervision/set-up within 7 day(s). 4.  Patient will ambulate with contact guard assistance for 50 feet with the least restrictive device within 7 day(s). Initiated 1/17/2022  1. Patient will move from supine to sit and sit to supine  in bed with modified independence within 7 day(s). 2.  Patient will transfer from bed to chair and chair to bed with modified independence using the least restrictive device within 7 day(s). 3.  Patient will perform sit to stand with modified independence within 7 day(s). 4.  Patient will ambulate with modified independence for 150 feet with the least restrictive device within 7 day(s). 5.  Patient will ascend/descend 6 stairs with 2 handrail(s) with modified independence within 7 day(s). Outcome: Progressing Towards Goal   PHYSICAL THERAPY TREATMENT: Weekly re-assessment  Patient: Delma Simpson (2 y.o. male)  Date: 2/8/2022  Diagnosis: Acute respiratory failure with hypoxia ([facility] [J96.01]  Pneumonia due to COVID-19 virus [U07.1, J12.82] <principal problem not specified>       Precautions: Fall  Chart, physical therapy assessment, plan of care and goals were reviewed.     ASSESSMENT  Patient this afternoon with poor tolerance to physical therapy treatment 2/2 fatigue, prolonged desaturation to 80% on 50L HFNC and tachycardia. Patient received on 35L HFNC at 75% FiO2. Initiated LE seated therapeutic exercises as below with patient requiring increased oxygen titration to 50L HFNC in order to maintain oxygen saturation >90% and in order to prepare for chair to bed transfer. Patient requiring intermittent rest breaks throughout therapeutic exercises. With transfer to bed, patient requiring increased support, MinAx1 with RW compared to previous treatments. Patient reporting feeling as though he is going to fall - however no overt instability noted. Patient heart rate tachycardic to 130s with transfer intermittently, however he recovers to 80s within 1 minute. Oxygen titrated up to 55L HFNC and 100% FiO2 with RT present. Once recovered and comfortable in bed, titrated patient oxygen back down to 35L HFNC and 100% fiO2 at conclusion of session. Current Level of Function Impacting Discharge (mobility/balance): Margarette    Other factors to consider for discharge: of note, patient with very sad and discouraged affect throughout physical therapy. He requires maximal encouragement and support at this time. Spoke with patient about whether he is Sikhism and would like to speak with a  with patient responding, \"am I that close to dying? \" PT explaining patient only seemed like might benefit from talking to someone right now. Patient declining  support saying, \"I say my prayers in the morning alone and that's enough right now. \" Spoke with palliative NP and RN regarding. PLAN :  Patient continues to benefit from skilled intervention to address the above impairments. Continue treatment per established plan of care. to address goals.     Recommendation for discharge: (in order for the patient to meet his/her long term goals)  To be determined: rehab vs LTAC  based on current presentation    This discharge recommendation:  Has been made in collaboration with the attending provider and/or case management    IF patient discharges home will need the following DME: to be determined (TBD)       SUBJECTIVE:   Patient stated, \"am I making any meaningful progress? \"    OBJECTIVE DATA SUMMARY:   Patient received seated in bedside chair, agreeable to participate in PT session. Patient was cleared by nursing to participate in PT session. Vitals:    22 1415 22 1500 22 1501 22 1608   BP:  (!) 130/59  (!) 146/70   BP 1 Location:  Left upper arm  Left arm   BP Patient Position:  Sitting  At rest   Pulse:  77 83 86   Temp:    98.3 °F (36.8 °C)   Resp:     Height: 5' 8\" (1.727 m)      Weight:       SpO2:  93%  90%       Strength:    Strength: Generally decreased, functional                    AROM: Within functional limits                              Critical Behavior:  Neurologic State: Alert  Orientation Level: Oriented X4  Cognition: Follows commands  Safety/Judgement: Awareness of environment  Functional Mobility Training:  Bed Mobility:     Supine to Sit: Contact guard assistance  Sit to Supine: Moderate assistance;Assist x1  Scooting: Contact guard assistance        Transfers:  Sit to Stand: Minimum assistance;Assist x1  Stand to Sit: Minimum assistance;Assist x1        Bed to Chair: Minimum assistance;Assist x1                    Balance:  Sitting: Intact  Standing: Impaired; With support  Standing - Static: Good  Standing - Dynamic : Fair  Ambulation/Gait Training:  Distance (ft): 3 Feet (ft)  Assistive Device: Gait belt;Walker, rolling  Ambulation - Level of Assistance: Minimal assistance        Gait Abnormalities: Decreased step clearance        Base of Support: Narrowed     Speed/Niesha: Pace decreased (<100 feet/min)  Step Length: Right shortened;Left shortened            Barthel Index:    Bathin  Bladder: 10  Bowels: 10  Groomin  Dressin  Feeding: 10  Mobility: 0  Stairs: 0  Toilet Use: 5  Transfer (Bed to Chair and Back): 10  Total: 55/100 The Barthel ADL Index: Guidelines  1. The index should be used as a record of what a patient does, not as a record of what a patient could do. 2. The main aim is to establish degree of independence from any help, physical or verbal, however minor and for whatever reason. 3. The need for supervision renders the patient not independent. 4. A patient's performance should be established using the best available evidence. Asking the patient, friends/relatives and nurses are the usual sources, but direct observation and common sense are also important. However direct testing is not needed. 5. Usually the patient's performance over the preceding 24-48 hours is important, but occasionally longer periods will be relevant. 6. Middle categories imply that the patient supplies over 50 per cent of the effort. 7. Use of aids to be independent is allowed. Score Interpretation (from [address] [address])    Independent   60-79 Minimally independent   40-59 Partially dependent   20-39 Very dependent   <20 Totally dependent     -Mynor Otero., Barthel, D.W. (1965). Functional evaluation: the Barthel Index. [facility] ([address] (1997). The Barthel activities of daily living index: self-reporting versus actual performance in the old (> or = 75 years). Journal [address] 45(7), 14 [facility], JIDANIA, David Heller., Ria Villanueva. (1999). Measuring the change in disability after inpatient rehabilitation; comparison of the responsiveness of the Barthel Index and Functional Bonner Measure. Journal of Neurology, Neurosurgery, and Psychiatry, 66(4), 279-504. Maksim Davenport, N.WINIFRED.JELLY, BREANNE Rollins, & Nayeli Montejo MAbbyA. (2004) Assessment of post-stroke quality of life in cost-effectiveness studies: The usefulness of the Barthel Index and the EuroQoL-5D.  Quality of Life Research, 13, 436-68       Therapeutic Exercises:       EXERCISE   Sets   Reps Active Active Assist   Passive Self ROM   Comments   Ankle Pumps 1 15 [x] [] [] []    Glut Sets 1 10 [x] [] [] []    Hamstring Sets   [] [] [] []    Short Arc Quads   [] [] [] []    Heel Slides   [] [] [] []    Straight Leg Raises   [] [] [] []    Hip abd/add   [] [] [] []    Long Arc Quads 1 10 [x] [] [] []    Marching 1 10 [x] [] [] []       [] [] [] []        Pain Rating:  Patient without reports of pain during therapy      Activity Tolerance:   Poor, desaturates with exertion and requires oxygen, and requires frequent rest breaks    After treatment patient left in no apparent distress:   Supine in bed, Call bell within reach, and Side rails x 3    COMMUNICATION/COLLABORATION:   The patients plan of care was discussed with: Occupational therapist, Registered nurse, Physician, Respiratory therapist, and palliative NP .    Nani Seo PT, DPT   Time Calculation: 46 mins

## 2022-02-09 NOTE — PROGRESS NOTES
[facility] Dosing Services: 2/9/22    Pharmacist Renal Dosing Progress Note for famotidine  Physician Dr. Alonso Landa    The following medication: famotidine was automatically dose-adjusted per [facility] P&T Committee Protocol, with respect to renal function. Consult provided for this   79 y.o. , male , for the indication of SUP. Pt Weight:   Wt Readings from Last 1 Encounters:   02/04/22 65.5 kg (144 lb 6.4 oz)         Previous Regimen Famotidine 20mg IV q12h   Serum Creatinine Lab Results   Component Value Date/Time    Creatinine 1.20 02/09/2022 12:56 AM    Creatinine (POC) 0.8 09/21/2010 12:56 PM       Creatinine Clearance Estimated Creatinine Clearance: 44 mL/min (based on SCr of 1.2 mg/dL). BUN Lab Results   Component Value Date/Time    BUN 28 (H) 02/09/2022 12:56 AM    BUN (POC) 15 09/21/2010 12:56 PM       Dosage changed to:  famotidine 20mg IV daily for CrCl <50mL/min          Pharmacy to continue to monitor patient daily. Will make dosage adjustments based upon changing renal function. Signed Mark Nathan.  Contact information:  [phone]

## 2022-02-09 NOTE — CONSULTS
Consult History and Physical Exam    NAME:  Gonzales Cee   :   1940   MRN:  [id number]     PCP:  Corwin Pleitez MD   Referring: Bret Jacobson MD     Date/Time:  2022         Subjective:   REASON FOR CONSULT: shock, acute respiratory failure with hypoxia    CHIEF COMPLAINT: SOA     HISTORY OF PRESENT ILLNESS:     Mr. Maday Wisdom is a 79 y.o. male with a h/o PAF, s/p PPM, HTN, CVA, and DM2 who was admitted on 22 from home for acute hypoxemic respiratory failure secondary to COVID 19 PNA. He is s/p baricitinib. He has been on HFNC. Rapid response was called this evening for hypoxia with sats in the upper 80s despite being on 100% HFNC and NRB. ABG showed a PaO2 of 45. He was placed on bipap. He was given ativan and morphine. Upon arrival to the ICU, he was hypotensive and received an IVF bolus with some improvement in his BP but he remains hypotensive. He was started on a precedex drip for agitation. He is being admitted to the ICU for further management. 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 Moderate aortic valve stenosis was present. LVEF 55-60% .  Stroke [facility])     asymptomatic; small chronic infarcts in the bilateral thalami per MRI of 2020.   Previous MRI of 2010 showed possible tiny old right thalamic infarct versus perivascular space         Past Surgical History:   Procedure Laterality Date    HX CHOLECYSTECTOMY      OH INS NEW/RPLCMT PRM PM W/TRANSV ELTRD ATRIAL&VENT N/A 2020    INSERT PPM DUAL performed by Courtney Townsend MD at [facility]       Social History     Tobacco Use    Smoking status: Former Smoker     Packs/day: 2.00     Years: 50.00     Pack years: 100.00     Quit date: 2004     Years since quittin.7    Smokeless tobacco: Never Used   Substance Use Topics    Alcohol use: Yes     Comment: 1 beer or 1 glaas of wine daily most of adult life        Family History   Adopted: Yes   Family history unknown: Yes        Allergies   Allergen Reactions    Ampicillin Nausea and Vomiting    Iodine Hives, Itching and Nausea and Vomiting        Prior to Admission medications    Medication Sig Start Date End Date Taking? Authorizing Provider   clotrimazole-betamethasone (LOTRISONE) topical cream Apply  to affected area two (2) times a day. Yes Provider, Historical   rivaroxaban (Xarelto) 20 mg tab tablet Take 1 Tablet by mouth daily (with dinner). 21  Yes Courtney Townsend MD   glipiZIDE (GLUCOTROL) 5 mg tablet Take 5 mg by mouth daily (with dinner). 21  Yes Provider, Historical   lisinopril (PRINIVIL, ZESTRIL) 10 mg tablet Take 10 mg by mouth nightly. Yes Provider, Historical   simvastatin (ZOCOR) 40 mg tablet Take 40 mg by mouth nightly. Yes Provider, Historical   predniSONE (DELTASONE) 20 mg tablet Please take 40mg starting 11/27 x 2 days then 20mg x 3 days then 10mg x 3 days then stop  Patient not taking: Reported on 21   Shey Moser MD         ROS is unobtainable secondary to patient's clinical condition.          Objective:      VITALS:    Vital signs reviewed; most recent are:    Visit Vitals  BP (!) 73/45   Pulse 85   Temp (!) 100.6 °F (38.1 °C)   Resp 24   Ht 5' 8\" (1.727 m)   Wt 65.5 kg (144 lb 6.4 oz)   SpO2 92%   BMI 21.96 kg/m²     SpO2 Readings from Last 6 Encounters:   22 92%   21 95%   21 93%   21 95%   20 98%   20 93%    O2 Flow Rate (L/min): 50 l/min Intake/Output Summary (Last 24 hours) at 2/9/2022 0036  Last data filed at 2/8/2022 2120  Gross per 24 hour   Intake 960 ml   Output 575 ml   Net 385 ml            Exam:     Physical Exam:  Performed via video assessment. Gen: Patient is lethargic. In NAD  HEENT: NC/AT. On bipap  Chest: Chest movement is equal bilaterally  Cardiac: Cardiac monitor reveals normal rate  Extremities: Extremities appear well perfused with no obvious edema  Neuro: Patient is sedated    Labs:    Recent Labs     02/08/22  0555   WBC 8.9   HGB 13.6   HCT 39.0   PLT 75*     Recent Labs     02/08/22  0555   *   K 4.4      CO2 27   *   BUN 25*   CREA 0.84   CA 8.2*   ALB 2.5*   ALT 70     No components found for: GLPOC    No results for input(s): INR, INREXT in the last 72 hours. Assessment/Plan:    1. Shock: medication-induced vs septic vs cardiogenic vs other  2. Acute respiratory failure with hypoxia  3. SOHAIL  4. COVID 19 PNA  5. Emphysema  6. PAF  7. DM2  8. S/p PPM    -Admit to ICU  -CXR  -Stat labs including LA, BNP, and d dimer  -roney to keep MAP>65  -Give 1 more liter LR bolus  -Bipap. Wean FiO2 to keep SpO2>90%  -Precedex for goal RASS 0 to -1  -DUONEBs q4 hours  -Continue steroids  -Hold lisinopril and metoprolol  -Continue xarelto. Pt will need to be transitioned to therapeutic lovenox should he be unable to take PO  -Will start vanco and cefepime for now and get blood cultures given persistent shock    Discussed:  Bedside RN    Prophylaxis:  Xarelto    Critical Care Time  The patient is critically ill with acute respiratory failure with hypoxia. If I do not acutely intervene upon these illnesses, the patient's life is in danger.   These illnesses have required me to: (1) perform high complexity decision making for assessment and support; (2) assess the patient via video; (3) personally review the medical record and laboratory and diagnostic imaging results; (4) actively titrate high-alert medications; (5) manage the ventilator and actively titrate oxygen; (6) discuss this patient's case management with other healthcare providers; and (7) apply and interpret advanced monitoring techniques. As a result of this, I personally spent 35 minutes providing critical care services exclusively to this patient.   This was in exclusion of the time spent performing procedures or teaching.           ___________________________________________________    Song Veronica DO

## 2022-02-09 NOTE — PROGRESS NOTES
TRANSFER - IN REPORT:    Verbal report received from Bedside RN on [place]  being received from icu 11 for ordered procedure      Report consisted of patients Situation, Background, Assessment and   Recommendations(SBAR). Information from the following report(s) SBAR was reviewed with the receiving nurse. Opportunity for questions and clarification was provided. Assessment completed upon patients arrival to unit and care assumed.

## 2022-02-09 NOTE — PROGRESS NOTES
TRANSFER - IN REPORT:    Verbal report received from [facility]) on Keanu Links  being received from [facility](unit) for change in patient condition(Needed max BiPAP and Precedex drip)      Report consisted of patients Situation, Background, Assessment and   Recommendations(SBAR). Information from the following report(s) SBAR, Intake/Output, MAR, Recent Results, Cardiac Rhythm NSR, ST and Alarm Parameters  was reviewed with the receiving nurse. Paced Rhythm     Opportunity for questions and clarification was provided. Assessment completed upon patients arrival to unit and care assumed. Primary Nurse Romana Pam, RN and Earnest booth RN performed a dual skin assessment on this patient Impairment noted- see wound doc flow sheet  Alexy score is See Flowsheets    See Flowsheets for all assessments. See MAR for all medication administrations. 2330  Rapid response called again for patient increased agitation and trying to rip off BiPAP. Dr. Hill Hark 1mg IV ativan and to transfer to ICU for precedex drip.    0000  Patient arrived to ICU and was hypotensive, therefore precedex was held. Patient received 1500ml of boluses and Intensivist was consulted and ordered Gabriel and labs. Once pressors are up will start precedex. 0500  Notified Intensivist of pacemaker appearing to be pacing inapropriately. Dr. Elizabeth Dickerson said he would pass on in report to day team for the need to be interrogated. 0600  Notified intensivist of patient not voiding, Bladder scan completed, 465ml in bladder. Order for gan received. Order for Blood culture and urine culture as well. Gan inserted and blood and urine culture sent. Next Lactic Acid trending due at 8am. Gabriel @ 85 notified intensivist and they ordered bolus. Bedside and Verbal shift change report given to Meir GRUBER (oncoming nurse) by Kaycee Ybarra RN (offgoing nurse).  Report included the following information SBAR, Intake/Output, MAR, Recent Results, Cardiac Rhythm Paced Rhythm and Alarm Parameters .

## 2022-02-09 NOTE — PROGRESS NOTES
[facility] Pharmacy Dosing Services: Antimicrobial Stewardship Daily Doc 2/9/2022    Consult for antibiotic dosing of Vancomycin + Cefepime by Dr. Fredi Espinoza  Indication: Empiric- ? HAP in setting of COVID-19  Day of Therapy: 1    Ht Readings from Last 1 Encounters:   02/08/22 172.7 cm (68\")        Wt Readings from Last 1 Encounters:   02/04/22 65.5 kg (144 lb 6.4 oz)      Vancomycin therapy:  Current maintenance dose: Initial dosing  Last level: Initial dosing   Dose calculated to approximate a           a. Target AUC/GUERO of 400-600          b. Trough of N/A     Plan: SOHAIL stage I this AM (Scr inc >0.3 mg/dL; UOP ~0.5 ml/kg/hr). Patient s/p 1500 mg loading dose. Will conservatively dose as 1000 mg (~15 mg/kg) every 24 hours for anticipated  trough 14 mcg/ml. Will monitor creatinine closely and consider dosing by levels if continues to trend up. Will avoid Q12 hr dosing interval at this time. Daily creatinine x 3 for monitoring (no labs ordered on review). Recommend MRSA screen. Dose administration notes:   Doses given appropriately as scheduled    Date Dose & Interval Measured (mcg/mL) Extrapolated (mcg/mL)   ? ? ? ?   ? ? ? ?   ? ? ? ? Non-Kinetic Antimicrobial Dosing Regimen:   Current Regimen:  Cefepime 2 g Q12 hrs  Recommendation: Dose appropriate for CrCl 30-60 ml/min  Dose administration notes:   Doses given appropriately as scheduled    Other Antimicrobial   (not dosed by pharmacist) None   Cultures 2/9 Blood: Pending   Serum Creatinine Lab Results   Component Value Date/Time    Creatinine 1.20 02/09/2022 12:56 AM    Creatinine (POC) 0.8 09/21/2010 12:56 PM         Creatinine Clearance Estimated Creatinine Clearance: 44 mL/min (based on SCr of 1.2 mg/dL).      Temp Temp: 97.1 °F (36.2 °C)       WBC Lab Results   Component Value Date/Time    WBC 5.8 02/09/2022 12:56 AM        Procalcitonin Lab Results   Component Value Date/Time    Procalcitonin <0.05 02/05/2022 05:08 AM        For Antifungals, Metronidazole and Nafcillin: Lab Results   Component Value Date/Time    ALT (SGPT) 62 02/09/2022 12:56 AM    AST (SGOT) 28 02/09/2022 12:56 AM    Alk.  phosphatase 60 02/09/2022 12:56 AM    Bilirubin, total 0.9 02/09/2022 12:56 AM        Thanks,  Pharmacist Rosanne Nguyễn, PHARMD

## 2022-02-09 NOTE — PROGRESS NOTES
0700- Bedside and Verbal shift change report received from YASMANI woodson (offgoing nurse) by Sia Art RN (oncoming nurse). Report included the following information SBAR, Kardex, ED Summary, Procedure Summary, Intake/Output, MAR, Recent Results, Cardiac Rhythm V.paced and Alarm Parameters . Primary Nurse Sia Art RN and YASMANI woodson performed a dual skin assessment on this patient Impairment noted- see wound doc flow sheet. All drips verified. 0800- Patient shift assessment performed All pump settings verified. VAP bundle performed. Patient turned and resting comfortably. Patient educated on call bell and patient safety measures. Call bell in reach, bed in low and locked position, and reminded patient to use call bell. Patient board updated and care plan discussed with patient's son. 0900:given due medication . Applied Zinc to the Rt  Buttocks      1000: VAP bundle performed. Patient turned and resting comfortably. please see the medication trituration . pillows and wedges used for reposition . heels boots on .sacrum Mapilex on.        1200- Reassessment performed. No changes noted. VAP bundle performed. Patient turned and resting comfortably. pt PIV are leaking . VAT placed a Endurance and able to use the lv for now . 1300: IR at bedside  For central line placement . Interogate and pacemaker check done . Result in the chart. 1400- VAP bundle performed. Patient turned and resting comfortably. son at bedside . 1600- Reassessment performed. No changes noted. VAP bundle performed. Patient turned and resting comfortably. 1800- VAP bundle performed. Patient turned and resting comfortably. 1900- Bedside and Verbal shift change report given to [id number] Julissa Gaming RN(oncoming nurse) by Sia Art RN (offgoing nurse). Report included the following information SBAR, Kardex, ED Summary, Procedure Summary, Intake/Output, MAR, Recent Results, Cardiac Rhythm v-PACED and Alarm Parameters .

## 2022-02-09 NOTE — PROGRESS NOTES
Occupational Therapy:  02/09/22    Chart reviewed in prep for OT intervention, noted pt with decline in medical status and transferred to ICU. Will defer and continue to follow. 1330: Noted plan per cardiology to interrogate PPM this afternoon. Will continue to follow and resume OT interventions when stable and appropriate.      Thank you,  Clearance SHELLY GaffneyR/L

## 2022-02-09 NOTE — PROGRESS NOTES
Bailey Omalley RN placed an Endurance indwell PIV 20 g x 8 cm . LFT cephalic, positive blood return, flushed with saline and secured with stat lock and Tegaderm.

## 2022-02-09 NOTE — MANAGEMENT PLAN
Full consult done by night intensivist.  Seen this am and NPH decreased due to low blood sugars. Added pepcid and MRSA nasal screen to help with abx de-escalation. Ativan d/c'd and will use precedex if sedation or anxiety mgmt needed.

## 2022-02-09 NOTE — PROGRESS NOTES
Physical Therapy Note:    PT treatment deferred. Pt with decline in medical status and transferred to ICU. Will continue to follow per POC and resume PT interventions when pt is stable and appropriate.     Geronimo Deleon, PT, DPT, Reina Martinez

## 2022-02-09 NOTE — PROGRESS NOTES
Problem: Falls - Risk of  Goal: *Absence of Falls  Description: Document Marques Lanza Fall Risk and appropriate interventions in the flowsheet.   Outcome: Not Progressing Towards Goal  Note: Fall Risk Interventions:  Mobility Interventions: Bed/chair exit alarm,Communicate number of staff needed for ambulation/transfer,OT consult for ADLs,Patient to call before getting OOB,PT Consult for mobility concerns,PT Consult for assist device competence,Strengthening exercises (ROM-active/passive),Utilize walker, cane, or other assistive device    Mentation Interventions: Bed/chair exit alarm,Adequate sleep, hydration, pain control,Evaluate medications/consider consulting pharmacy,Gait belt with transfers/ambulation,More frequent rounding,Room close to nurse's station,Toileting rounds,Door open when patient unattended    Medication Interventions: Evaluate medications/consider consulting pharmacy,Bed/chair exit alarm,Patient to call before getting OOB,Teach patient to arise slowly    Elimination Interventions: Call light in reach,Bed/chair exit alarm,Patient to call for help with toileting needs,Toilet paper/wipes in reach,Toileting schedule/hourly rounds    History of Falls Interventions: Bed/chair exit alarm,Consult care management for discharge planning,Door open when patient unattended,Evaluate medications/consider consulting pharmacy,Room close to nurse's station         Problem: Patient Education: Go to Patient Education Activity  Goal: Patient/Family Education  Outcome: Not Progressing Towards Goal     Problem: Airway Clearance - Ineffective  Goal: Achieve or maintain patent airway  Outcome: Not Progressing Towards Goal     Problem: Gas Exchange - Impaired  Goal: Absence of hypoxia  Outcome: Not Progressing Towards Goal  Goal: Promote optimal lung function  Outcome: Not Progressing Towards Goal     Problem: Breathing Pattern - Ineffective  Goal: Ability to achieve and maintain a regular respiratory rate  Outcome: Not Progressing Towards Goal     Problem: Body Temperature -  Risk of, Imbalanced  Goal: Ability to maintain a body temperature within defined limits  Outcome: Not Progressing Towards Goal  Goal: Will regain or maintain usual level of consciousness  Outcome: Not Progressing Towards Goal  Goal: Complications related to the disease process, condition or treatment will be avoided or minimized  Outcome: Not Progressing Towards Goal     Problem: Isolation Precautions - Risk of Spread of Infection  Goal: Prevent transmission of infectious organism to others  Outcome: Not Progressing Towards Goal     Problem: Nutrition Deficits  Goal: Optimize nutrtional status  Outcome: Not Progressing Towards Goal     Problem: Risk for Fluid Volume Deficit  Goal: Maintain normal heart rhythm  Outcome: Not Progressing Towards Goal  Goal: Maintain absence of muscle cramping  Outcome: Not Progressing Towards Goal  Goal: Maintain normal serum potassium, sodium, calcium, phosphorus, and pH  Outcome: Not Progressing Towards Goal     Problem: Loneliness or Risk for Loneliness  Goal: Demonstrate positive use of time alone when socialization is not possible  Outcome: Not Progressing Towards Goal     Problem: Fatigue  Goal: Verbalize increase energy and improved vitality  Outcome: Not Progressing Towards Goal     Problem: Patient Education: Go to Patient Education Activity  Goal: Patient/Family Education  Outcome: Not Progressing Towards Goal     Problem: Diabetes Self-Management  Goal: *Disease process and treatment process  Description: Define diabetes and identify own type of diabetes; list 3 options for treating diabetes. Outcome: Not Progressing Towards Goal  Goal: *Incorporating nutritional management into lifestyle  Description: Describe effect of type, amount and timing of food on blood glucose; list 3 methods for planning meals.   Outcome: Not Progressing Towards Goal  Goal: *Incorporating physical activity into lifestyle  Description: [facility] effect of exercise on blood glucose levels. Outcome: Not Progressing Towards Goal  Goal: *Developing strategies to promote health/change behavior  Description: Define the ABC's of diabetes; identify appropriate screenings, schedule and personal plan for screenings. Outcome: Not Progressing Towards Goal  Goal: *Using medications safely  Description: State effect of diabetes medications on diabetes; name diabetes medication taking, action and side effects. Outcome: Not Progressing Towards Goal  Goal: *Monitoring blood glucose, interpreting and using results  Description: Identify recommended blood glucose targets  and personal targets. Outcome: Not Progressing Towards Goal  Goal: *Prevention, detection, treatment of acute complications  Description: List symptoms of hyper- and hypoglycemia; describe how to treat low blood sugar and actions for lowering  high blood glucose level. Outcome: Not Progressing Towards Goal  Goal: *Prevention, detection and treatment of chronic complications  Description: Define the natural course of diabetes and describe the relationship of blood glucose levels to long term complications of diabetes.   Outcome: Not Progressing Towards Goal  Goal: *Developing strategies to address psychosocial issues  Description: Describe feelings about living with diabetes; identify support needed and support network  Outcome: Not Progressing Towards Goal  Goal: *Insulin pump training  Outcome: Not Progressing Towards Goal  Goal: *Sick day guidelines  Outcome: Not Progressing Towards Goal  Goal: *Patient Specific Goal (EDIT GOAL, INSERT TEXT)  Outcome: Not Progressing Towards Goal     Problem: Patient Education: Go to Patient Education Activity  Goal: Patient/Family Education  Outcome: Not Progressing Towards Goal     Problem: Patient Education: Go to Patient Education Activity  Goal: Patient/Family Education  Outcome: Not Progressing Towards Goal     Problem: Patient Education: Go to Patient Education Activity  Goal: Patient/Family Education  Outcome: Not Progressing Towards Goal     Problem: Pain  Goal: *Control of Pain  Outcome: Not Progressing Towards Goal     Problem: Patient Education: Go to Patient Education Activity  Goal: Patient/Family Education  Outcome: Not Progressing Towards Goal     Problem: Pressure Injury - Risk of  Goal: *Prevention of pressure injury  Description: Document Alexy Scale and appropriate interventions in the flowsheet.   Outcome: Not Progressing Towards Goal     Problem: Patient Education: Go to Patient Education Activity  Goal: Patient/Family Education  Outcome: Not Progressing Towards Goal     Problem: Delirium Treatment  Goal: *Level of consciousness restored to baseline  Outcome: Not Progressing Towards Goal  Goal: *Level of environmental perceptions restored to baseline  Outcome: Not Progressing Towards Goal  Goal: *Sensory perception restored to baseline  Outcome: Not Progressing Towards Goal  Goal: *Emotional stability restored to baseline  Outcome: Not Progressing Towards Goal  Goal: *Functional assessment restored to baseline  Outcome: Not Progressing Towards Goal  Goal: *Absence of falls  Outcome: Not Progressing Towards Goal  Goal: *Will remain free of delirium, CAM Score negative  Outcome: Not Progressing Towards Goal  Goal: *Cognitive status will be restored to baseline  Outcome: Not Progressing Towards Goal  Goal: Interventions  Outcome: Not Progressing Towards Goal     Problem: Patient Education: Go to Patient Education Activity  Goal: Patient/Family Education  Outcome: Not Progressing Towards Goal

## 2022-02-09 NOTE — PROGRESS NOTES
Patient transferred to the ICU overnight. Will sign off. Please reconsult when patient is ready to transfer out of the ICU if there are ongoing pulmonary needs.

## 2022-02-09 NOTE — PROGRESS NOTES
Carlos Eduardo [address] [place] [address]  [address] [facility], [address] [place] [address]  [phone]      Medical Progress Note      NAME: Sandi Collado   :  1940  MRM:  [id number]    Date of service: 2022  10:32 AM       Assessment and Plan:   1. AHRF secondary to  COVID-19 PNA. s/p baricitinib. On BiPAP. Pt has significant hx of tobacco abuse (100 pack-year hx, quit smoking in ). 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 On IV Solu-medrol on , continue duo neb, brovana, pulmicort. CT of the chest done  due to LT side chest pain, which is negative for PE. Shows extensive emphysema with fibrotic changes in the lung bases. Now in ICU on BiPAP. intensivist following     2. Hypotension. ? Sepsis. Started on roney and broad spectrum ABx. Hold lisinopril and metoprolol. Check BC.     3.  Bradycardia. Pacemaker not capturing sometimes. May need pacemaker interrogation. Consult cardiology      4. Aortic stenosis: mild on TTE . Monitor volume status     3. VT: hx of NSVT. Noted . Evaluated by cardiology. Monitor K and Mg. Cont BB      4. Type 2 diabetes mellitus without complication: N0Q 6.5%. watch BG as pt is on steroid. On NPH and titrate PRN, SSI      5. A-fib/ Pacemaker: monitor on telemetry. On Xarelto     6. History of CVA (cerebrovascular accident): cont OAC, statin     7. Hypertension: Hold lisinopril and metoprolol due to hypotension. Subjective:     Chief Complaint[de-identified] Patient was seen and examined as a follow up for AHRF. Chart was reviewed. overnight pt was decompensated and transferred to ICU     ROS:  (bold if positive, if negative)    Tolerating PT  Tolerating Diet        Objective:     Last 24hrs VS reviewed since prior progress note.  Most recent are:    Visit Vitals  BP (!) 85/66   Pulse 76   Temp 97.1 °F (36.2 °C)   Resp 17   Ht 5' 8\" (1.727 m)   Wt 65.5 kg (144 lb 6.4 oz)   SpO2 91%   BMI 21.96 kg/m²     SpO2 Readings from Last 6 Encounters:   02/09/22 91%   11/26/21 95%   11/19/21 93%   05/28/21 95%   02/06/20 98%   02/02/20 93%    O2 Flow Rate (L/min): 50 l/min       Intake/Output Summary (Last 24 hours) at 2/9/2022 0940  Last data filed at 2/9/2022 0800  Gross per 24 hour   Intake 4088.62 ml   Output 940 ml   Net 3148.62 ml        Physical Exam:    Gen:  Well-developed, well-nourished, in no acute distress  HEENT:  Pink conjunctivae, PERRL, hearing intact to voice, moist mucous membranes  Neck:  Supple, without masses, thyroid non-tender  Resp:  No accessory muscle use, rales BL  Card:  No murmurs, normal S1, S2 without thrills, bruits or peripheral edema  Abd:  Soft, non-tender, non-distended, normoactive bowel sounds are present, no palpable organomegaly and no detectable hernias  Lymph:  No cervical or inguinal adenopathy  Musc:  No cyanosis or clubbing  Skin:  No rashes or ulcers, skin turgor is good  Neuro:  Cranial nerves are grossly intact, no focal motor weakness, follows commands appropriately  Psych:  Good insight, oriented to person, place and time, alert  __________________________________________________________________  Medications Reviewed: (see below)  Medications:     Current Facility-Administered Medications   Medication Dose Route Frequency    cefepime (MAXIPIME) 2 g in 0.9% sodium chloride 10 mL IV syringe  2 g IntraVENous Q12H    vancomycin (VANCOCIN) 1500 mg in  ml infusion  1,500 mg IntraVENous ONCE    PHENYLephrine (JOAN-SYNEPHRINE) 30 mg in 0.9% sodium chloride 250 mL infusion   mcg/min IntraVENous TITRATE    [START ON 2/10/2022] vancomycin (VANCOCIN) 1,000 mg in 0.9% sodium chloride 250 mL (VIAL-MATE)  1,000 mg IntraVENous Q24H    methylPREDNISolone (PF) (Solu-MEDROL) injection 40 mg  40 mg IntraVENous Q12H    LORazepam (ATIVAN) tablet 0.5 mg  0.5 mg Oral BID PRN    dexmedeTOMidine in 0.9 % NaCl (PRECEDEX) 400 mcg/100 mL (4 mcg/mL) infusion soln  0.1-1.5 mcg/kg/hr IntraVENous TITRATE    albuterol-ipratropium (DUO-NEB) 2.5 MG-0.5 MG/3 ML  3 mL Nebulization Q4H RT    arformoteroL (BROVANA) neb solution 15 mcg  15 mcg Nebulization BID RT    budesonide (PULMICORT) 500 mcg/2 ml nebulizer suspension  500 mcg Nebulization BID RT    insulin NPH (NOVOLIN N, HUMULIN N) injection 30 Units  30 Units SubCUTAneous ACB&D    artificial tears (dextran-hypromellose-glycerin) (GENTEAL) ophthalmic solution 1 Drop  1 Drop Both Eyes PRN    sodium chloride (OCEAN) 0.65 % nasal squeeze bottle 2 Spray  2 Spray Both Nostrils Q2H PRN    [Held by provider] metoprolol tartrate (LOPRESSOR) tablet 12.5 mg  12.5 mg Oral BID    guaiFENesin-dextromethorphan (ROBITUSSIN DM) 100-10 mg/5 mL syrup 5 mL  5 mL Oral Q6H PRN    rivaroxaban (XARELTO) tablet 20 mg  20 mg Oral DAILY WITH DINNER    [Held by provider] lisinopriL (PRINIVIL, ZESTRIL) tablet 10 mg  10 mg Oral QHS    atorvastatin (LIPITOR) tablet 20 mg  20 mg Oral DAILY    glucose chewable tablet 16 g  4 Tablet Oral PRN    dextrose (D50W) injection syrg 12.5-25 g  25-50 mL IntraVENous PRN    glucagon (GLUCAGEN) injection 1 mg  1 mg IntraMUSCular PRN    insulin lispro (HUMALOG) injection   SubCUTAneous AC&HS    sodium chloride (NS) flush 5-40 mL  5-40 mL IntraVENous Q8H    sodium chloride (NS) flush 5-40 mL  5-40 mL IntraVENous PRN    acetaminophen (TYLENOL) tablet 650 mg  650 mg Oral Q6H PRN    Or    acetaminophen (TYLENOL) suppository 650 mg  650 mg Rectal Q6H PRN    polyethylene glycol (MIRALAX) packet 17 g  17 g Oral DAILY PRN    ondansetron (ZOFRAN ODT) tablet 4 mg  4 mg Oral Q8H PRN    Or    ondansetron (ZOFRAN) injection 4 mg  4 mg IntraVENous Q6H PRN        Lab Data Reviewed: (see below)  Lab Review:     Recent Labs     02/09/22  0056 02/08/22  0555   WBC 5.8 8.9   HGB 12.4 13.6   HCT 37.0 39.0   PLT 60* 75*     Recent Labs     02/09/22  0056 02/08/22  0555    135*   K 4.6 4.4    100   CO2 29 27   GLU 67 191*   BUN 28* 25*   CREA 1.20 0.84 CA 7.9* 8.2*   ALB 2.3* 2.5*   TBILI 0.9 1.1*   ALT 62 70     Lab Results   Component Value Date/Time    Glucose (POC) 96 02/08/2022 10:43 PM    Glucose (POC) 145 (H) 02/08/2022 09:16 PM    Glucose (POC) 268 (H) 02/08/2022 05:04 PM    Glucose (POC) 297 (H) 02/08/2022 04:34 PM    Glucose (POC) 287 (H) 02/08/2022 11:42 AM     Recent Labs     02/08/22  2250   PH 7.37   PCO2 36   PO2 45*   HCO3 20*   FIO2 100     No results for input(s): INR, INREXT, INREXT in the last 72 hours. All Micro Results     Procedure Component Value Units Date/Time    CULTURE, BLOOD [[id number]] Collected: 02/09/22 0658    Order Status: Completed Specimen: Blood Updated: 02/09/22 0802    COVID-19 RAPID TEST [[id number]]  (Abnormal) Collected: 01/11/22 1834    Order Status: Completed Specimen: Nasopharyngeal Updated: 01/11/22 1943     Specimen source Nasopharyngeal        COVID-19 rapid test Detected        Comment: Rapid Abbott ID Now       The specimen is POSITIVE for SARS-CoV-2, the novel coronavirus associated with COVID-19. This test has been authorized by the FDA under an Emergency Use Authorization (EUA) for use by authorized laboratories. Fact sheet for Healthcare Providers: ConventionUpdate.co.nz  Fact sheet for Patients: ConventionUpdate.co.nz       Methodology: Isothermal Nucleic Acid Amplification  CALLED TO AND READ BACK BY  EMMA GRUBER @194/YWG               I have reviewed notes of prior 24hr. Other pertinent lab: Total time spent with patient: 28 I personally reviewed chart, notes, data and current medications in the medical record. I have personally examined and treated the patient at bedside during this period.                  Care Plan discussed with: Patient, Nursing Staff and >50% of time spent in counseling and coordination of care    Discussed:  Care Plan    Prophylaxis:  xarelto     Disposition:  Home w/Family           ___________________________________________________    Attending Physician: Ijeoma Sparrow MD

## 2022-02-09 NOTE — CONSULTS
Palliative medicine brief note- Full visit documentation pending      Chart reviewed. Discussed with attending Dr. Alfredo Kaminski. I met with Mr. Kalen Granda, sleeping  Soundly on BIPAP. He had been on precedex earlier, but has since been stopped . Mr. Rui Wills son, Amelia Burns, was at bedside. We discussed course of events since we spoke yesterday. Discussed HR for continued decline and death. Discussed CPR and intubation in setting of prolonged hospitalization, COPD, pulmonary fibrosis, advanced age, his significant deconditioning and chronic mod to high O2 needs since admission. DNR discussion- Edwige Bauer verbalized understanding that at this time, CPR/Intubation would only serve to prolong his fathers suffering. · Edwige Bauer is agreeable to ACLS meds, but  No Intubation, No Chest Compressions and  No shock. · Partial Code order placed in EMR. [address] discussion- Continue with restorative treatments and interventions, while honoring DNI. We had discussed possible discharge plans yesterday before events leading to transfer to ICU. · In the absence of an acute event or rapid decline, Edwige Bauer requested  that we revisit [address] discussion on Friday 2/11. Please contact me via [employer] with any urgent needs, questions or concerns. Thank you.

## 2022-02-09 NOTE — PROGRESS NOTES
Vascular access order acknowledged for PICC. Chart reviewed and patient had fever 2/9/22 00:00 and then blood cultures were drawn 07:00 AM today. Current recommendation is for a preliminary blood culture report to be resulted and fever free for 24 hours prior to placement of a PICC line. If a central line is necessary today for pressure support /central line medications,  a triple lumen should be placed on this patient until blood cultures clear. Ordering physician Briana MOON advised of this and acknowledges he will order TLC placement. Attending nurse Meir advised of this as well. Please reach out to VAT with any questions or concerns.   Rebecca Nicholas RN VAT  [phone]

## 2022-02-09 NOTE — PROGRESS NOTES
Bedside shift change report given to [facility] (oncoming nurse) by Moreno Vega (offgoing nurse). Report included the following information SBAR, Kardex, ED Summary, Intake/Output, and Recent Results. 2239: RRT called, RN found pt in the bed with hi-flow ripped off and unresponsive to voice/pain, 15L non-rebreather placed on pt as hi-flow was broken, respiratory arrived as placed pt on max hi-flow and non-rebreather with sats staying at 88%. Pt combative and still not responding to voice or pain. ICU RN Leigha Murdock contacted Dr Mariah Boyd, placed orders for ABG and 1mg IV morphine. Respiratory placed the pt on BIPAP, sats came up to the low 90's, pt stable, will continue to monitor    2320: pt BIPAP alarm going off, RN entered room to find pt pulling at BIPAP. Two RNs held pt arms away from BIPAP and called RRT. Contacted Dr Mariah Boyd and asked if he wanted to try anything else or start precedex, Dr Mariah Boyd ordered 1mg IV ativan before starting precedex, ativan administered and pt was taken to ICU. TRANSFER - OUT REPORT:     Verbal report given to Leigha Murdock RN(name) on Delma Simpson  being transferred to ICU(unit) for urgent transfer       Report consisted of patients Situation, Background, Assessment and   Recommendations(SBAR). Information from the following report(s) SBAR, Kardex, Intake/Output and Recent Results was reviewed with the receiving nurse. Lines:   Peripheral IV 01/20/22 Anterior; Left Forearm (Active)   Site Assessment Clean, dry, & intact 02/08/22 1905   Phlebitis Assessment 0 02/08/22 1905   Infiltration Assessment 0 02/08/22 1905   Dressing Status Clean, dry, & intact 02/08/22 1905   Dressing Type Transparent 02/08/22 1905   Hub Color/Line Status Capped 02/08/22 1905   Action Taken Open ports on tubing capped 02/08/22 1905   Alcohol Cap Used Yes 02/08/22 1905        Opportunity for questions and clarification was provided.       Patient transported with:   Monitor  O2 @ BIPAP liters  Registered Nurse      This patient was assisted with Intentional Toileting every 2 hours during this shift as appropriate. Documentation of ambulation and output reflected on Flowsheet as appropriate. Purposeful hourly rounding was completed using AIDET and 5Ps. Outcomes of PHR documented as they occurred. Bed alarm in use as appropriate. Dual Suction and ambubag in place.

## 2022-02-09 NOTE — PROGRESS NOTES
TRANSFER - OUT REPORT:    Verbal report given to Bedside RN on [facility] for ordered procedure       Report consisted of patient's Situation, Background, Assessment and   Recommendations(SBAR). Information from the following report(s) Procedure Summary was reviewed with the receiving nurse. Opportunity for questions and clarification was provided.

## 2022-02-09 NOTE — PROGRESS NOTES
Rapid Response called around 2245    Arrived to find patient super lethargic, withdrawn, and hypoxic. Patient satting in 62s with hi-flow ripped off. Patient maxed on hi-flow and Non-rebreather. With respiratory at bedside and sats only came up to 88%. Patient seems extremely anxious. Contacted Dr. Richy Cage, order for ABG and 1 mg IV morphine received. Patient placed on BiPAP 16/8 100%, current sats up to 93%, HR 93, RR 27. Plan to continue to monitor patient on [facility]. Primary RN made aware to contact ICU if they need any other assistance.

## 2022-02-09 NOTE — PROGRESS NOTES
TRANSFER - IN REPORT:    Verbal report received  From Ana Allred on Ishmael Guerra being transferred to ICU for urgent transfer   Report consisted of patients Situation, Background, Assessment and   Recommendations(SBAR). Transition of Care Plan     RUR 19%(Score %) moderate   Is This a Readmission NO  Is this a Bundle NO    1. Covid pneumonia  2.acute respiratory failure with hypoxia  3. History of afib,DM,carotid artery stenosis  4.transferred to ICU due to increased oxygenation needs  5.now on continuous biPaP  6,vaccinated x2 with pfizer but no booster  7.blood and urine cultures are pending. 8.Palliative Medicine is following and pt is now a partial code. Current Code Status    Partial Code - Set by Yoselin Chan NP at 2/9/2022 1037 (View report)     Questions for Current Code Status    Question Answer   Intubation? No   Shock? No   ACLS/PALS/NRP Meds? Yes   Chest Compressions?  No        [address] Serve or  [phone]

## 2022-02-09 NOTE — WOUND CARE
Wound Consult: Follow up Visit. Chart reviewed. Consulted for transfer to ICU. Spoke with patients nurseMARY at bedside for assessment. Patient is resting on a progressa bed with supportive mattress. Heels off loaded with heel boots. Patient is awake, BIPAP mask; requires 2 assists to move side to side in bed. Assessment:  bilateral pink heels, blanches. Right buttock- scattered denuded areas from chafing, sacrum- pink, blanching. Bilateral ears- no redness. Treatment:  mepilex to sacrum  Aloe vesta to chafed skin and zinc guard to denude areas. Heel boots applied  Wound Recommendations:  Heel boots  Sacrum- mepilex foam, change every 3 days  Buttocks- zinc guard to open areas. Apply daily and incontinent care  Skin Care / PI Prevention Recommendations:  1. Minimize friction/shear: minimize layers of linen/pads under patient. 2. Off load pressure/reposition:  turn and reposition approximately every 2 hours; float heels with pillows or use off loading heel boots; waffle cushion for sitting; position wedge. 3. Manage Moisture - keep skin folds dry; incontinence skin care with incontinence wipes; zinc guard barrier ointment; gan in use to help contain urine. 4. Continue to monitor nutrition, pain, and skin risk scale, and skin assessment. Plan:  Spoke with Dr. Misha Lieberman regarding findings and proposed orders for treatment. We will continue to reassess and as needed. Please re-consult should concerns arise despite continued skin/PI prevention measures.     [address] [facility] [place], Wound / [address] [facility] Healing Office [phone]

## 2022-02-09 NOTE — PROGRESS NOTES
[facility] Dosing Services:      Pharmacist Renal Dosing Progress Note for Cefepime   Physician Nahomy    The following medication: Cefepime was automatically dose-adjusted per [facility] P&T Committee Protocol, with respect to renal function. Consult provided for this   79 y.o. , male , for the indication of sepsis. Pt Weight:   Wt Readings from Last 1 Encounters:   02/04/22 65.5 kg (144 lb 6.4 oz)         Previous Regimen 1 gram every 12 hours   Serum Creatinine Lab Results   Component Value Date/Time    Creatinine 1.20 02/09/2022 12:56 AM    Creatinine (POC) 0.8 09/21/2010 12:56 PM       Creatinine Clearance Estimated Creatinine Clearance: 44 mL/min (based on SCr of 1.2 mg/dL). BUN Lab Results   Component Value Date/Time    BUN 28 (H) 02/09/2022 12:56 AM    BUN (POC) 15 09/21/2010 12:56 PM       Dosage changed to:  2 grams every 12 hours for CrCl 40-45          Pharmacy to continue to monitor patient daily. Will make dosage adjustments based upon changing renal function. Signed Herlinda Colby.  Contact information:  [phone]

## 2022-02-09 NOTE — PROGRESS NOTES
CARDIOLOGY PROGRESS NOTE        [address], [place], [address] [facility]  Phone [phone]; Fax [phone]          2022 8:14 AM       Admit Date:           2022  Admit Diagnosis:  Acute respiratory failure with hypoxia ([facility] [address] [J96.01]  Pneumonia due to COVID-19 virus [U07.1, J12.82]  :          1940   MRN:          [id number]        Assessment/Plan  1. ? Loss of capture on pacemaker earlier this am: will interrogate device again today. 2. VT: hx of NSVT on pacer interrogation  [address]). PO meds on hold given need for Bi Pap     3. . Hx of a-fib, s/p pacer: on Xarelto, saw Dr. Melendez Camera . Not on any antiarrhythmic therapy PTA. Monitor      4. COVID     5 AHRF: on cont  Bi Pap. CXR on 2/3, showing likely pulmonary fibrosis superimposed on emphysema. On IV Solu-medrol on , continue duo neb, brovana, pulmicort. CT of the chest done  due to LT side chest pain, which is negative for PE. Shows extensive emphysema with fibrotic changes in the lung bases. · Pt is partial code: family agreeable to ACLS meds, but  No Intubation, No Chest Compressions and  No shock. CARDIOLOGY ATTENDING  Patient personally seen and examined. All the elements of history and examination were personally performed. Assessment and plan was discussed and agree. On Bipap. Hrt RRR. Normal resp effort. No sig edema    I reviewed telemetry ---> it appears OK to me (underlying atrial flutter with V paced rhythm) --> Will interrogate device     Continue 934  for Atrial flutter / Jose Phillip MD, [facility]              We discussed the expected course, resolution and complications of the diagnosis(es) in detail.      0701 -  1900  In: 1444.4 [I.V.:2664.4]  Out: 750 [Urine:750]    Last 3 Recorded Weights in this Encounter    22 0502 22 0438 22 0357   Weight: 63 kg (138 lb 12.8 oz) 64.5 kg (142 lb 1.6 oz) 65.5 kg (144 lb 6.4 oz)         02/07 1901 - 02/09 0700  In: 4808.6 [P.O.:1200; I.V.:3608.6]  Out: 46 [Urine:1140]    SUBJECTIVE      79 y.o. male who presented 1/11/2022 with complaints of shortness of breath and Covid pneumonitis. The symptoms began approximately 1 week ago. He has a history of cardiac disease including atrial fib, arrhythmias/ectopy and pacemaker, also NSVT noted on pacer interrogation last year. He now has significant Covid pneumonia, mostly paced rhythm, had wide complex tachycardia today at , self terminated. Baron Cornejo reports none on Bi Pap asleep .       Current Facility-Administered Medications   Medication Dose Route Frequency    cefepime (MAXIPIME) 2 g in 0.9% sodium chloride 10 mL IV syringe  2 g IntraVENous Q12H    PHENYLephrine (JOAN-SYNEPHRINE) 30 mg in 0.9% sodium chloride 250 mL infusion   mcg/min IntraVENous TITRATE    [START ON 2/10/2022] vancomycin (VANCOCIN) 1,000 mg in 0.9% sodium chloride 250 mL (VIAL-MATE)  1,000 mg IntraVENous Q24H    methylPREDNISolone (PF) (Solu-MEDROL) injection 40 mg  40 mg IntraVENous Q12H    LORazepam (ATIVAN) tablet 0.5 mg  0.5 mg Oral BID PRN    dexmedeTOMidine in 0.9 % NaCl (PRECEDEX) 400 mcg/100 mL (4 mcg/mL) infusion soln  0.1-1.5 mcg/kg/hr IntraVENous TITRATE    albuterol-ipratropium (DUO-NEB) 2.5 MG-0.5 MG/3 ML  3 mL Nebulization Q4H RT    arformoteroL (BROVANA) neb solution 15 mcg  15 mcg Nebulization BID RT    budesonide (PULMICORT) 500 mcg/2 ml nebulizer suspension  500 mcg Nebulization BID RT    insulin NPH (NOVOLIN N, HUMULIN N) injection 30 Units  30 Units SubCUTAneous ACB&D    artificial tears (dextran-hypromellose-glycerin) (GENTEAL) ophthalmic solution 1 Drop  1 Drop Both Eyes PRN    sodium chloride (OCEAN) 0.65 % nasal squeeze bottle 2 Spray  2 Spray Both Nostrils Q2H PRN    [Held by provider] metoprolol tartrate (LOPRESSOR) tablet 12.5 mg  12.5 mg Oral BID    guaiFENesin-dextromethorphan (ROBITUSSIN DM) 100-10 mg/5 mL syrup 5 mL  5 mL Oral Q6H PRN    rivaroxaban (XARELTO) tablet 20 mg  20 mg Oral DAILY WITH DINNER    [Held by provider] lisinopriL (PRINIVIL, ZESTRIL) tablet 10 mg  10 mg Oral QHS    atorvastatin (LIPITOR) tablet 20 mg  20 mg Oral DAILY    glucose chewable tablet 16 g  4 Tablet Oral PRN    dextrose (D50W) injection syrg 12.5-25 g  25-50 mL IntraVENous PRN    glucagon (GLUCAGEN) injection 1 mg  1 mg IntraMUSCular PRN    insulin lispro (HUMALOG) injection   SubCUTAneous AC&HS    sodium chloride (NS) flush 5-40 mL  5-40 mL IntraVENous Q8H    sodium chloride (NS) flush 5-40 mL  5-40 mL IntraVENous PRN    acetaminophen (TYLENOL) tablet 650 mg  650 mg Oral Q6H PRN    Or    acetaminophen (TYLENOL) suppository 650 mg  650 mg Rectal Q6H PRN    polyethylene glycol (MIRALAX) packet 17 g  17 g Oral DAILY PRN    ondansetron (ZOFRAN ODT) tablet 4 mg  4 mg Oral Q8H PRN    Or    ondansetron (ZOFRAN) injection 4 mg  4 mg IntraVENous Q6H PRN      OBJECTIVE               Intake/Output Summary (Last 24 hours) at 2/9/2022 1059  Last data filed at 2/9/2022 1000  Gross per 24 hour   Intake 6753 ml   Output 1240 ml   Net 5513 ml       Review of Systems - History obtained from the patient AS PER  HPI        PHYSICAL EXAM        Visit Vitals  /63   Pulse 70   Temp 97.1 °F (36.2 °C)   Resp 17   Ht 5' 8\" (1.727 m)   Wt 65.5 kg (144 lb 6.4 oz)   SpO2 96%   BMI 21.96 kg/m²     Gen:  Elderly, ill-appearing,  Somnolent   HEENT:  NC/AT, [place]   Neck:  Supple no JVD   Resp: on Bi PAp diminished throughout   Card: RRR, 2/6 systolic murmur, No rubs or gallops.    Ext: No LE edema  Abd:  +bowel sounds,  nondistended  Neuro: follows commands appropriately, no focal weakness or numbness  Psych:  Arousable          DATA REVIEW      2/6/20: [employer] dual chamber pacemaker placement per Dr. Sherri Pierre    Cardiac monitor: SR/paced w/ some PVCs    01/11/22    ECHO ADULT COMPLETE 01/18/2022 1/18/2022    Interpretation Summary    Left Ventricle: Left ventricle size is normal. Normal wall thickness. Mild global hypokinesis present. Mildly reduced left ventricular systolic function with a visually estimated EF of 45 - 50%.   Right Ventricle: Mildly reduced systolic function.   Aortic Valve: Valve structure is normal. Mildly calcified cusps. Mild sclerosis of the aortic valve cusps. Mild stenosis.   Mitral Valve: Mildly thickened leaflets. Signed by: Jeremiah Hairston MD on 1/18/2022  4:38 PM        Laboratory and Imaging have been reviewed by me and are notable for  No results for input(s): CPK, CKMB, TROIQ in the last 72 hours.   Recent Labs     02/09/22  0056 02/08/22  0555    135*   K 4.6 4.4   CO2 29 27   BUN 28* 25*   CREA 1.20 0.84   GLU 67 191*   WBC 5.8 8.9   HGB 12.4 13.6   HCT 37.0 39.0   PLT 60* 75*             Guillermo Condon, NP

## 2022-02-09 NOTE — PROGRESS NOTES
TRANSFER - OUT REPORT:    Verbal report given to Mary Ellen Croft (name) on Eloy Mariscal (patient name)  being transferred to ICU 3011 (unit) for change in patient condition(increased oxygen demands)   Report consisted of patients Situation, Background, Assessment and   Recommendations(SBAR).      Jean Florian, RN, MSN/Care manager  [phone]

## 2022-02-10 NOTE — PROGRESS NOTES
[address] Dosing Services: Antimicrobial Stewardship Daily Doc 2/10/2022    Consult for antibiotic dosing of Vancomycin + Cefepime by Dr. Taylor Anderson  Indication: Empiric-Bacteremia; ? HAP in setting of COVID-19  Day of Therapy: 2    Ht Readings from Last 1 Encounters:   02/09/22 172.7 cm (68\")        Wt Readings from Last 1 Encounters:   02/04/22 65.5 kg (144 lb 6.4 oz)      Vancomycin therapy:  Current maintenance dose: 1000 mg Q24 hours  Last level: Initial dosing   Dose calculated to approximate a           a. Target AUC/GUERO of 400-600          b. Trough of N/A     Plan: Renal fxn back to baseline- kinetics model predicts subtherapeutic AUC/trough w/ only 29% change to achieve therapeutic levels based on current creatinine. Will adjust dose to 750 mg Q12 hrs empirically but draw an early level to ensure clearance on 2/11. Predicted  trough 15.3 on new regimen. Dose administration notes: Doses given appropriately as scheduled    Date Dose & Interval Measured (mcg/mL) Extrapolated (mcg/mL)   ? 2/11 750 mg Q12 hrs? ? ?   ? ? ? ?   ? ? ? ? Non-Kinetic Antimicrobial Dosing Regimen:   Current Regimen:  Cefepime 2 g Q12 hrs  Recommendation: Cefepime dose adjusted to 2 grams Q8 hrs for CrCl >60 ml/min  Dose administration notes:   Doses given appropriately as scheduled    Other Antimicrobial   (not dosed by pharmacist) None   Cultures 2/9 Blood: GPC in clusters 2/2 bottles; Prelim  2/9 MRSA: Pending   Serum Creatinine Lab Results   Component Value Date/Time    Creatinine 0.70 02/10/2022 12:57 AM    Creatinine (POC) 0.8 09/21/2010 12:56 PM         Creatinine Clearance Estimated Creatinine Clearance: 75.4 mL/min (based on SCr of 0.7 mg/dL).      Temp Temp: 98.4 °F (36.9 °C)       WBC Lab Results   Component Value Date/Time    WBC 5.3 02/10/2022 12:57 AM        Procalcitonin Lab Results   Component Value Date/Time    Procalcitonin <0.05 02/05/2022 05:08 AM        For Antifungals, Metronidazole and Nafcillin: Lab Results   Component Value Date/Time    ALT (SGPT) 91 (H) 02/10/2022 12:57 AM    AST (SGOT) 51 (H) 02/10/2022 12:57 AM    Alk.  phosphatase 65 02/10/2022 12:57 AM    Bilirubin, total 1.4 (H) 02/10/2022 12:57 AM        Thanks,  Pharmacist Aida Wisdom, PHARMD

## 2022-02-10 NOTE — PROGRESS NOTES
Progress Note  Date:2/10/2022       Room:[address] [place] [address]  Patient Name:Orion Byrne     YOB: 1940     Age:81 y.o. Subjective      CC: SOB    24 HOUR: remains on HF 40L, 100%. Objective         Vitals Last 24 Hours:  TEMPERATURE:  Temp  Av.9 °F (37.2 °C)  Min: 98.4 °F (36.9 °C)  Max: 99.4 °F (37.4 °C)  RESPIRATIONS RANGE: Resp  Av.6  Min: 13  Max: 28  PULSE OXIMETRY RANGE: SpO2  Av.8 %  Min: 83 %  Max: 100 %  PULSE RANGE: Pulse  Av.3  Min: 68  Max: 98  BLOOD PRESSURE RANGE: Systolic (33HIK), MV , Min:87 , DGQ:744   ; Diastolic (62HNJ), LRQ:01, Min:45, Max:91    I/O (24Hr): Intake/Output Summary (Last 24 hours) at 2/10/2022 1609  Last data filed at 2/10/2022 1400  Gross per 24 hour   Intake 1330.79 ml   Output 2125 ml   Net -794.21 ml     Objective   Gen: awake, alert, interactive  HEENT: NCAT  Chest: symmetrical chest rise  CV: r/r/r  Abd: non-distended  Ext: no c/c/e  Neuro: moves all ext. No focal deficits. Labs/Imaging/Diagnostics    Labs:  CBC:  Recent Labs     02/10/22  0057 02/09/22  0056 02/08/22  0555   WBC 5.3 5.8 8.9   RBC 3.81* 4.14 4.48   HGB 11.5* 12.4 13.6   HCT 33.4* 37.0 39.0   MCV 87.7 89.4 87.1   RDW 16.3* 15.9* 15.7*   PLT 57* 60* 75*     CHEMISTRIES:  Recent Labs     02/10/22  0057 02/09/22  0056 02/08/22  0555   * 137 135*   K 4.6 4.6 4.4   CL 99 100 100   CO2 32 29 27   BUN 23* 28* 25*   CA 8.2* 7.9* 8.2*   PT/INR:No results for input(s): INR, INREXT in the last 72 hours. No lab exists for component: PROTIME  APTT:No results for input(s): APTT in the last 72 hours. LIVER PROFILE:  Recent Labs     02/10/22  0057 22  0056 22  0555   AST 51* 28 20   ALT 91* 62 70     Lab Results   Component Value Date/Time    ALT (SGPT) 91 (H) 02/10/2022 12:57 AM    AST (SGOT) 51 (H) 02/10/2022 12:57 AM    Alk.  phosphatase 65 02/10/2022 12:57 AM    Bilirubin, direct 0.1 2010 12:10 PM    Bilirubin, total 1.4 (H) 02/10/2022 12:57 AM       Imaging Last 24 Hours:  No results found. Assessment//Plan       80 y/o with pmh HTN, DM, a-fib, CVA initially admitted 1/11 with COVID, with course complicated by transfer to ICU 2/9 for worsening resp failure. COVID:  -- s/p baricitinib  -- continue steroids  -- CT 2/7 neg for PE  -- currently on vanc/cefepime. Acute on Chronic Resp Failure:  -- hx COPD  -- now with fibrosis from COVID  -- continue supplemental O2 for goal sat >88    Bactermia:  -- 2/2 with GPC in clusters  -- continue vanc pending culture finalization  -- source not yet known    COPD:  -- continue nebs/steroids    DM:  -- continue SSI and NPH    Afib:  -- continue AC    HTN:  -- holding home meds. HLD: continue statin    CCM time 35 min. Pt at risk of life threatening deterioration from resp failure. Pt seen and evaluated via tele interaction. Audio and video used for this encounter.

## 2022-02-10 NOTE — PROGRESS NOTES
Carlos Eduardo [facility]  [address] [facility], [place], [address] [place] [address]  [phone]      Medical Progress Note      NAME: Martin Block   :  1940  MRM:  [id number]    Date of service: 2/10/2022  10:32 AM       Assessment and Plan:   1. AHRF secondary to  COVID-19 PNA. s/p baricitinib. On BiPAP. Pt has significant hx of tobacco abuse (100 pack-year hx, quit smoking in ). 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 On IV Solu-medrol on , continue duo neb, brovana, pulmicort. CT of the chest done  due to LT side chest pain, which is negative for PE. Shows extensive emphysema with fibrotic changes in the lung bases. Now in ICU on BiPAP. intensivist following     2. Hypotension. ? Sepsis. Started on roney and broad spectrum ABx. Hold lisinopril and metoprolol. Check BC.     3.  Bradycardia. Pacemaker interrogated and works well. Cardiology eval appreciated.      4. Aortic stenosis: mild on TTE . Monitor volume status     3. VT: hx of NSVT. Noted . Evaluated by cardiology. Monitor K and Mg. Cont BB      4. Type 2 diabetes mellitus without complication: O8S 0.5%. watch BG as pt is on steroid. On NPH and titrate PRN, SSI      5. A-fib/ Pacemaker: monitor on telemetry. On Xarelto     6. History of CVA (cerebrovascular accident): cont OAC, statin     7. Hypertension: Hold lisinopril and metoprolol due to hypotension. Code status: partial      Subjective:     Chief Complaint[de-identified] Patient was seen and examined as a follow up for AHRF. Chart was reviewed. c/o SOB, hemoptysis. ROS:  (bold if positive, if negative)    Tolerating PT  Tolerating Diet        Objective:     Last 24hrs VS reviewed since prior progress note.  Most recent are:    Visit Vitals  BP (!) 108/50   Pulse 95   Temp 98.9 °F (37.2 °C)   Resp 25   Ht 5' 8\" (1.727 m)   Wt 65.5 kg (144 lb 6.4 oz)   SpO2 90%   BMI 21.96 kg/m²     SpO2 Readings from Last 6 Encounters:   02/10/22 90% 11/26/21 95%   11/19/21 93%   05/28/21 95%   02/06/20 98%   02/02/20 93%    O2 Flow Rate (L/min): (S) 35 l/min       Intake/Output Summary (Last 24 hours) at 2/10/2022 1017  Last data filed at 2/10/2022 0800  Gross per 24 hour   Intake 801.09 ml   Output 2025 ml   Net -1223.91 ml        Physical Exam:    Gen:  Well-developed, well-nourished, in no acute distress  HEENT:  Pink conjunctivae, PERRL, hearing intact to voice, moist mucous membranes  Neck:  Supple, without masses, thyroid non-tender  Resp:  No accessory muscle use, rales BL  Card:  No murmurs, normal S1, S2 without thrills, bruits or peripheral edema  Abd:  Soft, non-tender, non-distended, normoactive bowel sounds are present, no palpable organomegaly and no detectable hernias  Lymph:  No cervical or inguinal adenopathy  Musc:  No cyanosis or clubbing  Skin:  No rashes or ulcers, skin turgor is good  Neuro:  Cranial nerves are grossly intact, no focal motor weakness, follows commands appropriately  Psych:  Good insight, oriented to person, place and time, alert  __________________________________________________________________  Medications Reviewed: (see below)  Medications:     Current Facility-Administered Medications   Medication Dose Route Frequency    cefepime (MAXIPIME) 2 g in 0.9% sodium chloride 10 mL IV syringe  2 g IntraVENous Q12H    PHENYLephrine (JOAN-SYNEPHRINE) 30 mg in 0.9% sodium chloride 250 mL infusion   mcg/min IntraVENous TITRATE    vancomycin (VANCOCIN) 1,000 mg in 0.9% sodium chloride 250 mL (VIAL-MATE)  1,000 mg IntraVENous Q24H    famotidine (PF) (PEPCID) 20 mg in 0.9% sodium chloride 10 mL injection  20 mg IntraVENous DAILY    insulin NPH (NOVOLIN N, HUMULIN N) injection 15 Units  15 Units SubCUTAneous ACB&D    enoxaparin (LOVENOX) injection 60 mg  60 mg SubCUTAneous Q12H    methylPREDNISolone (PF) (Solu-MEDROL) injection 40 mg  40 mg IntraVENous Q12H    dexmedeTOMidine in 0.9 % NaCl (PRECEDEX) 400 mcg/100 mL (4 mcg/mL) infusion soln  0.1-1.5 mcg/kg/hr IntraVENous TITRATE    albuterol-ipratropium (DUO-NEB) 2.5 MG-0.5 MG/3 ML  3 mL Nebulization Q4H RT    arformoteroL (BROVANA) neb solution 15 mcg  15 mcg Nebulization BID RT    budesonide (PULMICORT) 500 mcg/2 ml nebulizer suspension  500 mcg Nebulization BID RT    artificial tears (dextran-hypromellose-glycerin) (GENTEAL) ophthalmic solution 1 Drop  1 Drop Both Eyes PRN    sodium chloride (OCEAN) 0.65 % nasal squeeze bottle 2 Spray  2 Spray Both Nostrils Q2H PRN    [Held by provider] metoprolol tartrate (LOPRESSOR) tablet 12.5 mg  12.5 mg Oral BID    guaiFENesin-dextromethorphan (ROBITUSSIN DM) 100-10 mg/5 mL syrup 5 mL  5 mL Oral Q6H PRN    [Held by provider] lisinopriL (PRINIVIL, ZESTRIL) tablet 10 mg  10 mg Oral QHS    atorvastatin (LIPITOR) tablet 20 mg  20 mg Oral DAILY    glucose chewable tablet 16 g  4 Tablet Oral PRN    dextrose (D50W) injection syrg 12.5-25 g  25-50 mL IntraVENous PRN    glucagon (GLUCAGEN) injection 1 mg  1 mg IntraMUSCular PRN    insulin lispro (HUMALOG) injection   SubCUTAneous AC&HS    sodium chloride (NS) flush 5-40 mL  5-40 mL IntraVENous Q8H    sodium chloride (NS) flush 5-40 mL  5-40 mL IntraVENous PRN    acetaminophen (TYLENOL) tablet 650 mg  650 mg Oral Q6H PRN    Or    acetaminophen (TYLENOL) suppository 650 mg  650 mg Rectal Q6H PRN    polyethylene glycol (MIRALAX) packet 17 g  17 g Oral DAILY PRN    ondansetron (ZOFRAN ODT) tablet 4 mg  4 mg Oral Q8H PRN    Or    ondansetron (ZOFRAN) injection 4 mg  4 mg IntraVENous Q6H PRN        Lab Data Reviewed: (see below)  Lab Review:     Recent Labs     02/10/22  0057 02/09/22  0056 02/08/22  0555   WBC 5.3 5.8 8.9   HGB 11.5* 12.4 13.6   HCT 33.4* 37.0 39.0   PLT 57* 60* 75*     Recent Labs     02/10/22  0057 02/09/22  0056 02/08/22  0555   * 137 135*   K 4.6 4.6 4.4   CL 99 100 100   CO2 32 29 27   * 67 191*   BUN 23* 28* 25*   CREA 0.70 1.20 0.84   CA 8.2* 7.9* 8.2*   ALB 2.0* 2.3* 2.5*   TBILI 1.4* 0.9 1.1*   ALT 91* 62 70     Lab Results   Component Value Date/Time    Glucose (POC) 123 (H) 02/10/2022 03:07 AM    Glucose (POC) 106 02/10/2022 12:19 AM    Glucose (POC) 87 02/09/2022 08:46 PM    Glucose (POC) 88 02/09/2022 03:03 PM    Glucose (POC) 99 02/09/2022 11:16 AM     Recent Labs     02/08/22  2250   PH 7.37   PCO2 36   PO2 45*   HCO3 20*   FIO2 100     No results for input(s): INR, INREXT, INREXT in the last 72 hours. All Micro Results     Procedure Component Value Units Date/Time    COVID-19 RAPID TEST [[id number]]  (Abnormal) Collected: 02/10/22 0200    Order Status: Completed Specimen: Nasopharyngeal Updated: 02/10/22 0255     Specimen source Nasopharyngeal        COVID-19 rapid test Indeterminate        Comment: Rapid Abbott ID Now       The presence or absence of COVID-19 Viral RNAs cannot be determined. If clinically indicated, please collect a new specimen. This test has been authorized by the FDA under an Emergency Use Authorization (EUA) for use by authorized laboratories. Fact sheet for Healthcare Providers: ConventionUpdate.co.nz  Fact sheet for Patients: ConventionUpdate.co.nz       Methodology: Isothermal Nucleic Acid Amplification         CULTURE, BLOOD [[id number]]  (Abnormal) Collected: 02/09/22 0658    Order Status: Completed Specimen: Blood Updated: 02/09/22 2126     Special Requests: NO SPECIAL REQUESTS        Culture result:       GRAM POSITIVE COCCI IN CLUSTERS GROWING IN BOTH BOTTLES DRAWN R A SITE            (NOTE) PRELIMINARY REPORT OF GRAM POSITIVE COCCI IN CLUSTERS GROWING IN 2 OF 2 BOTTLES DRAWN. CALLED TO AND READ BACK BY Faviola Pace RN ON 2/9/22 AT 2125.  MS    FELICIA Ricci [[id number]] Collected: 02/09/22 0658    Order Status: Completed Updated: 02/09/22 1742    MICRO TRACKING [[id number]] Collected: 02/09/22 0658    Order Status: Completed Updated: 02/09/22 1713    CULTURE, MRSA [[id number]] Collected: 02/09/22 1320    Order Status: Completed Specimen: Nasal from Nares Updated: 02/09/22 1710    COVID-19 RAPID TEST [[id number]]  (Abnormal) Collected: 01/11/22 1834    Order Status: Completed Specimen: Nasopharyngeal Updated: 01/11/22 1943     Specimen source Nasopharyngeal        COVID-19 rapid test Detected        Comment: Rapid Abbott ID Now       The specimen is POSITIVE for SARS-CoV-2, the novel coronavirus associated with COVID-19. This test has been authorized by the FDA under an Emergency Use Authorization (EUA) for use by authorized laboratories. Fact sheet for Healthcare Providers: ConventionUpdate.co.nz  Fact sheet for Patients: ConventionUpdate.co.nz       Methodology: Isothermal Nucleic Acid Amplification  CALLED TO AND READ BACK BY  EMMA GRUBER @1942/YWG               I have reviewed notes of prior 24hr. Other pertinent lab: Total time spent with patient: 28 I personally reviewed chart, notes, data and current medications in the medical record. I have personally examined and treated the patient at bedside during this period.                  Care Plan discussed with: Patient, Nursing Staff and >50% of time spent in counseling and coordination of care    Discussed:  Care Plan    Prophylaxis:  xarelto     Disposition:  Home w/Family           ___________________________________________________    Attending Physician: Katlin Gomez MD

## 2022-02-10 NOTE — PROGRESS NOTES
1900 Bedside and Verbal shift change report given to isabella (oncoming nurse) by Brock Barthel RN (offgoing nurse). Report included the following information SBAR, Kardex, Procedure Summary, Intake/Output, Recent Results, Cardiac Rhythm Irregular and Alarm Parameters . Primary Nurse Janelle Gutierrez, RN and savage RN performed a dual skin assessment on this patient Impairment noted- see wound doc flow sheet. Pt currently asleep. Lethargic. Heart rate very irregular. Look like flutter at the moment. Son at bedside. Pt grunts when talked too.   2200 pt woke up asking for help. Alert and confused. Asked where he was and what happened. Pt reoriented. Call bell given. 65 Family with patient. Pt switched to Hi-flow by respiratory. Tolerating well. Bedside swallow study done. Thickened liquids successful. Small sips of thin liquids also tolerated. Pt kept on thickened liquids to minimize aspiration with hi-flow. 18 family left. 0000 patient anxious and worried about relapsing. Pt given complete bath and linen change. Pt reports feeling refreshed and better. Pt sat up in bed comfortably. 0300 pt complained about leg discomfort, given tylenol. 0330 reports leg discomfort is feeling better. Pt encouraged to sleep and rest. Reassured pt that nurse will be able to monitor him from outside his room. Pt very alert and oriented. Just has generalized weakness and requires supplemental oxygen. 0535 pt right foot cooler than left to touch. Pt denies any difference in sensation. Slightly more swelling noted on anterior of foot. Pulses palpable. Coolness starts at ankle down to foot. Will monitor. Pt excited to start weaning off oxygen if possible. Discussed plan of care with patient. Presser weaning started. Son at bedside. 0630 pt weaned down on high flow to 40l,80%. Pt doing well. Pt excited to be able to eat breakfast. Pressor also weaned down to 15mcg. Pt tolerating well, seems more and more alert and interactive.

## 2022-02-10 NOTE — PROGRESS NOTES
Palliative Medicine Consult  Sunil: [phone] ([phone])    Patient Name: Howie Olivares  YOB: 1940    Date of Initial Consult: 1/13/2022  Reason for Consult: Care Decisions  Requesting Provider: Dr. Dennis Warren  Primary Care Physician: Natty Heredia MD     SUMMARY:   Howie Olivares is a 79 y.o. 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 Patient presented to ER w/ cc of poor appetite, loss of taste, arthralgia and diarrhea x a few days. Patient also reported recent COVID exposure. He received  COVID 19 vaccine x 2 doses in February 2021, but had no booster. Course of hospitalization: O2 needs increased rapidly since admission, now on 50L HFNC. Renal function improved. Current medical issues leading to Palliative Medicine involvement include: GOC discussion with gentleman of advanced age currently hospitalized for hypoxic respiratory failure 2/2/ covid. Ginny Lazo: Born and raised in [place], he was adopted, only child, very loving parents. Served as a  in [employer] x 10+ years. 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 He is , has 1 child, son Elisha Rodgers, 2 grandchildren and a few great grandchildren. Patients grandson and  great grandson, who will be turning 12 yo on 1/15, live with him. Patient continues to play Golf, works on cars. Cognitive and Functional baseline: Alert and oriented, independent with ADLs, drives etc,  does have chronic slightly unsteady gait (etiology unknown). PALLIATIVE DIAGNOSES:   1. Palliative care encounter  2. COVID 19  3. Hypoxia  4. Fatigue  5. Weakness, generalized  6. DNR discussion  7.  Goals of care discussion PLAN:   1. Chart  reviewed. 2. Discussed with attending Dr. Ko Dwyer. 3. I met with Mr. Sandip Encarnacion, sleeping  Soundly on BIPAP. He had been on precedex earlier, but has since been stopped . Mr. Elma Monge son, Stefani Aguayo, was at bedside. 4. We discussed course of events since we spoke yesterday. Discussed HR for continued decline and death. 5. DNR Discussion- Discussed CPR and intubation in setting of prolonged hospitalization, COPD, pulmonary fibrosis, advanced age, his significant deconditioning and chronic mod to high O2 needs since admission. 6. Pilar Chopra verbalized understanding that at this time, CPR/Intubation would only serve to prolong his fathers suffering. · Pilar Chopra is agreeable to ACLS meds, but  No Intubation, No Chest Compressions and  No shock. · Partial Code order placed in EMR.      7. [address] discussion- Continue with restorative treatments and interventions, while honoring DNI. We had discussed possible discharge plans yesterday before events leading to transfer to ICU. · In the absence of an acute event or rapid decline, Pilar Chopra requested  that we revisit [address] discussion on Friday 2/11.      8. Please contact me via [employer] with any urgent needs, questions or concerns. 7. Poor nutrition-improved,  Patient reported eating fairly well, 75% of most meals    8. Initial consult note routed to primary continuity provider and/or primary health care team members  9. Communicated plan of care with: Palliative IDT, [address] Team, Dr. Connell Roles,  Meir RN RN, K     GOALS OF CARE / TREATMENT PREFERENCES:     GOALS OF CARE:  Patient/Health Care Proxy Stated Goals: Rehabilitation    TREATMENT PREFERENCES:   Code Status: Partial Code    Patient and family's personal goals include: Get well and return home    Important upcoming milestones or family events: Unknown.     The patient identifies the following as important for living well: unknown      Advance Care Planning:  [x] The Pall Med Interdisciplinary Team has updated the ACP Navigator with Health Care Decision Maker and Patient Capacity      Primary Decision Maker (Active): Manny Stock - [phone]    Secondary Decision Maker: Lindy Valdes - [phone]  Advance Care Planning 1/14/2022   Patient's Healthcare Decision Maker is: Patient declined (Legal Next of Kin remains as decision maker)   Confirm Advance Directive None   Patient Would Like to Complete Advance Directive No       Medical Interventions: Limited additional interventions       Other:    As far as possible, the palliative care team has discussed with patient / health care proxy about goals of care / treatment preferences for patient. HISTORY:     History obtained from: medical record/nurse/ED and family    CHIEF COMPLAINT: n/a    HPI/SUBJECTIVE:    The patient is:   [] Verbal and participatory  [x] Non-participatory due to:   Patient sedated, on BiPAP sleeping,, unresponsive to voice or touch      2/8-patients O2 needs continue to fluctuate, over the weekend had been down, patient hopeful to be discharged home this week, however O2 needs have increased significantly, back on 55 L  high flow nasal cannula.   Patient continues to have poor tolerance to PT 2/2 fatigue and prolonged desaturation of 80% on 50 L HFNC tachycardia    2/7-CT chest significant for extensive emphysema with fibrotic changes in the lung bases, and focal airspace opacity in the right upper lobe which may represent infection, also+ cardiomegaly and CAD    1/18-echo mildly decreased LV systolic function, EF 55-05%    Clinical Pain Assessment (nonverbal scale for severity on nonverbal patients):   Clinical Pain Assessment  Severity: 0     Activity (Movement): Lying quietly, normal position    Duration: for how long has pt been experiencing pain (e.g., 2 days, 1 month, years)  Frequency: how often pain is an issue (e.g., several times per day, once every few days, constant) FUNCTIONAL ASSESSMENT:     Palliative Performance Scale (PPS):  PPS: 10       PSYCHOSOCIAL/SPIRITUAL SCREENING:     Palliative IDT has assessed this patient for cultural preferences / practices and a referral made as appropriate to needs (Cultural Services, Patient Advocacy, Ethics, etc.)    Any spiritual / Buddhism concerns:  [] Yes /  [x] No   If \"Yes\" to discuss with pastoral care during IDT     Does caregiver feel burdened by caring for their loved one:   [] Yes /  [x] No /  [] No Caregiver Present    If \"Yes\" to discuss with social work during IDT    Anticipatory grief assessment:   [x] Normal  / [] Maladaptive     If \"Maladaptive\" to discuss with social work during IDT    ESAS Anxiety: Anxiety: 0    ESAS Depression: Depression: 0        REVIEW OF SYSTEMS:     Positive and pertinent negative findings in ROS are noted above in HPI. The following systems were [x] reviewed / [] unable to be reviewed as noted in HPI  Other findings are noted below. Systems: constitutional, ears/nose/mouth/throat, respiratory, gastrointestinal, genitourinary, musculoskeletal, integumentary, neurologic, psychiatric, endocrine. Positive findings noted below. Modified ESAS Completed by: provider   Fatigue: 3 Drowsiness: 0   Depression: 0 Pain: 0   Anxiety: 0 Nausea: 0   Anorexia: 5 Dyspnea: 0           Stool Occurrence(s): 1        PHYSICAL EXAM:     From RN flowsheet:  Wt Readings from Last 3 Encounters:   02/04/22 144 lb 6.4 oz (65.5 kg)   11/22/21 162 lb (73.5 kg)   11/19/21 162 lb (73.5 kg)     Blood pressure 132/65, pulse 87, temperature 99.3 °F (37.4 °C), resp. rate 24, height 5' 8\" (1.727 m), weight 144 lb 6.4 oz (65.5 kg), SpO2 93 %.     Pain Scale 1: Adult Nonverbal Pain Scale  Pain Intensity 1: 0  Pain Onset 1: acute  Pain Location 1: Chest  Pain Orientation 1: Anterior  Pain Description 1: Aching  Pain Intervention(s) 1: Rest  Last bowel movement, if known:     Constitutional: Appears stated age, awake, alert, conversant, NAD  Eyes: pupils equal, anicteric  ENMT: no nasal discharge, moist mucous membranes  Cardiovascular: regular rhythm, distal pulses intact  Respiratory: breathing  mild labored, symmetric, on HFNC  Gastrointestinal: soft non-tender, +bowel sounds  Musculoskeletal: no deformity, no tenderness to palpation  Skin: warm, dry  Neurologic: Alert and oriented, following commands, moving all extremities  Psychiatric: Depressed affect, no hallucinations  Other:       HISTORY:     Active Problems:    A-fib ([address] (2/4/2020)      History of CVA (cerebrovascular accident) (2/4/2020)      Type 2 diabetes mellitus without complication ([address] (3/5/7825)      Acute respiratory failure with hypoxia ([address] (1/11/2022)      Pneumonia due to COVID-19 virus (1/11/2022)      Hypertension ()      NSVT (nonsustained ventricular tachycardia) ([address] (1/22/2022)      Pacemaker (1/22/2022)      Past Medical History:   Diagnosis Date    Atrial fibrillation ([address]     Diabetes mellitus, type 2 ([address]     Hypertension     Internal carotid artery stenosis, right     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 Moderate aortic valve stenosis was present. LVEF 55-60% .  Stroke [facility])     asymptomatic; small chronic infarcts in the bilateral thalami per MRI of 2/5/2020.   Previous MRI of 9/21/2010 showed possible tiny old right thalamic infarct versus perivascular space       Past Surgical History:   Procedure Laterality Date    HX CHOLECYSTECTOMY      IR INSERT NON TUNL CVC OVER 5 YRS  2/9/2022    MS INS NEW/RPLCMT PRM PM W/TRANSV ELTRD ATRIAL&VENT N/A 2/6/2020    INSERT PPM DUAL performed by Chantell Schmitz MD at [facility] CATH LAB      Family History   Adopted: Yes   Family history unknown: Yes      History reviewed, no pertinent family history.   Social History     Tobacco Use    Smoking status: Former Smoker     Packs/day: 2.00     Years: 50.00     Pack years: 100.00     Quit date: 2004     Years since quittin.7    Smokeless tobacco: Never Used   Substance Use Topics    Alcohol use: Yes     Comment: 1 beer or 1 glaas of wine daily most of adult life     Allergies   Allergen Reactions    Ampicillin Nausea and Vomiting    Iodine Hives, Itching and Nausea and Vomiting      Current Facility-Administered Medications   Medication Dose Route Frequency    cefepime (MAXIPIME) 2 g in 0.9% sodium chloride 10 mL IV syringe  2 g IntraVENous Q12H    PHENYLephrine (JOAN-SYNEPHRINE) 30 mg in 0.9% sodium chloride 250 mL infusion   mcg/min IntraVENous TITRATE    [START ON 2/10/2022] vancomycin (VANCOCIN) 1,000 mg in 0.9% sodium chloride 250 mL (VIAL-MATE)  1,000 mg IntraVENous Q24H    famotidine (PF) (PEPCID) 20 mg in 0.9% sodium chloride 10 mL injection  20 mg IntraVENous DAILY    [START ON 2/10/2022] insulin NPH (NOVOLIN N, HUMULIN N) injection 15 Units  15 Units SubCUTAneous ACB&D    enoxaparin (LOVENOX) injection 60 mg  60 mg SubCUTAneous Q12H    methylPREDNISolone (PF) (Solu-MEDROL) injection 40 mg  40 mg IntraVENous Q12H    dexmedeTOMidine in 0.9 % NaCl (PRECEDEX) 400 mcg/100 mL (4 mcg/mL) infusion soln  0.1-1.5 mcg/kg/hr IntraVENous TITRATE    albuterol-ipratropium (DUO-NEB) 2.5 MG-0.5 MG/3 ML  3 mL Nebulization Q4H RT    arformoteroL (BROVANA) neb solution 15 mcg  15 mcg Nebulization BID RT    budesonide (PULMICORT) 500 mcg/2 ml nebulizer suspension  500 mcg Nebulization BID RT    artificial tears (dextran-hypromellose-glycerin) (GENTEAL) ophthalmic solution 1 Drop  1 Drop Both Eyes PRN    sodium chloride (OCEAN) 0.65 % nasal squeeze bottle 2 Spray  2 Spray Both Nostrils Q2H PRN    [Held by provider] metoprolol tartrate (LOPRESSOR) tablet 12.5 mg  12.5 mg Oral BID    guaiFENesin-dextromethorphan (ROBITUSSIN DM) 100-10 mg/5 mL syrup 5 mL  5 mL Oral Q6H PRN    [Held by provider] lisinopriL (PRINIVIL, ZESTRIL) tablet 10 mg  10 mg Oral QHS    atorvastatin (LIPITOR) tablet 20 mg  20 mg Oral DAILY    glucose chewable tablet 16 g  4 Tablet Oral PRN    dextrose (D50W) injection syrg 12.5-25 g  25-50 mL IntraVENous PRN    glucagon (GLUCAGEN) injection 1 mg  1 mg IntraMUSCular PRN    insulin lispro (HUMALOG) injection   SubCUTAneous AC&HS    sodium chloride (NS) flush 5-40 mL  5-40 mL IntraVENous Q8H    sodium chloride (NS) flush 5-40 mL  5-40 mL IntraVENous PRN    acetaminophen (TYLENOL) tablet 650 mg  650 mg Oral Q6H PRN    Or    acetaminophen (TYLENOL) suppository 650 mg  650 mg Rectal Q6H PRN    polyethylene glycol (MIRALAX) packet 17 g  17 g Oral DAILY PRN    ondansetron (ZOFRAN ODT) tablet 4 mg  4 mg Oral Q8H PRN    Or    ondansetron (ZOFRAN) injection 4 mg  4 mg IntraVENous Q6H PRN          LAB AND IMAGING FINDINGS:     Lab Results   Component Value Date/Time    WBC 5.8 02/09/2022 12:56 AM    HGB 12.4 02/09/2022 12:56 AM    PLATELET 60 (L) 18/52/8716 12:56 AM     Lab Results   Component Value Date/Time    Sodium 137 02/09/2022 12:56 AM    Potassium 4.6 02/09/2022 12:56 AM    Chloride 100 02/09/2022 12:56 AM    CO2 29 02/09/2022 12:56 AM    BUN 28 (H) 02/09/2022 12:56 AM    Creatinine 1.20 02/09/2022 12:56 AM    Calcium 7.9 (L) 02/09/2022 12:56 AM    Magnesium 1.8 02/05/2022 05:08 AM    Phosphorus 3.1 02/05/2022 05:08 AM      Lab Results   Component Value Date/Time    Alk.  phosphatase 60 02/09/2022 12:56 AM    Protein, total 4.5 (L) 02/09/2022 12:56 AM    Albumin 2.3 (L) 02/09/2022 12:56 AM    Globulin 2.2 02/09/2022 12:56 AM     Lab Results   Component Value Date/Time    INR 1.2 (H) 02/05/2022 05:08 AM    Prothrombin time 12.0 (H) 02/05/2022 05:08 AM    aPTT 26.4 02/05/2022 05:08 AM      Lab Results Component Value Date/Time    Ferritin 788 (H) 01/31/2022 02:00 AM      Lab Results   Component Value Date/Time    pH 7.37 02/08/2022 10:50 PM    PCO2 36 02/08/2022 10:50 PM    PO2 45 (LL) 02/08/2022 10:50 PM     No components found for: [place]   Lab Results   Component Value Date/Time    CK 79 09/22/2010 03:50 AM                Total time: 65 min  Counseling / coordination time, spent as noted above: 50 min   > 50% counseling / coordination?: yes    Prolonged service was provided for  [x]30 min   []75 min in face to face time in the presence of the patient, spent as noted above. Time Start: 1600 hrs. time End: 1710  Note: this can only be billed with  (initial) or 21 [phone] (follow up). If multiple start / stop times, list each separately.

## 2022-02-10 NOTE — PROGRESS NOTES
0700- Bedside and Verbal shift change report received from [id number] Julissa Gaming RN (offgoing nurse) by Sruthi Hill RN (oncoming nurse). Report included the following information SBAR, Kardex, ED Summary, Procedure Summary, Intake/Output, MAR, Recent Results, Cardiac Rhythm Paced  and Alarm Parameters . Primary Nurse Sruthi Hill RN and YASMANI Johnson performed a dual skin assessment on this patient Impairment noted- see wound doc flow sheet. All drips verified. 0800- Patient shift assessment performed. All IVs patent, flushes well, with blood return. All pump settings verified. VAP bundle performed. Patient turned and resting comfortably. Patient educated on call bell and patient safety measures. Call bell in reach, bed in low and locked position, and reminded patient to use call bell. Patient board updated and care plan discussed with patient. 0900:given due medication . Dr Jacob Jackson made tele rounds . 1000: VAP bundle performed. Patient turned and resting comfortably. 1200- Reassessment performed. No changes noted. VAP bundle performed. Patient turned and resting comfortably. 1400- VAP bundle performed. Patient turned and resting comfortably. 1600- Reassessment performed. No changes noted. VAP bundle performed. Patient turned and resting comfortably. .1800- VAP bundle performed. Patient turned and resting comfortably. 1900- Bedside and Verbal shift change report given to YASMANI Nunn(oncoming nurse) by Sruthi Hill RN (offgoing nurse). Report included the following information SBAR, Kardex, ED Summary, Procedure Summary, Intake/Output, MAR, Recent Results, Cardiac Rhythm Afib. Vpaced and Alarm Parameters .

## 2022-02-10 NOTE — PROGRESS NOTES
Case management follow-up:    RUR 19%(moderate readmission risk)    LOS 29 days,GLOS 5.4    Problems:    Covid pneumonia  acute respiratory failure with hypoxia  Poor appetite   History of afib,DM,carotid artery stenosis,bradycardia s/p pacemaker    Prior to admission:    Grand-son and great grand-son live with pt  Verneice Olmsted is Mikel Aase @ [phone]. DME @ home:has a rolling walker if needed  Prior to hospitalization,pt was independent wit his ADLs  Pt has [employer] medicare     Today:    BiPaP continuous  Gabriel gtt  Partial code  Palliative Medicine is following pt. I am following pt for potential home oxygen,home health and rehab needs.     Kevin Fuller  [employer]  [phone]

## 2022-02-11 PROBLEM — D69.6 THROMBOCYTOPENIA (HCC): Status: ACTIVE | Noted: 2022-01-01

## 2022-02-11 NOTE — PROGRESS NOTES
Problem: Mobility Impaired (Adult and Pediatric)  Goal: *Acute Goals and Plan of Care (Insert Text)  Description: FUNCTIONAL STATUS PRIOR TO ADMISSION: Patient was independent and active without use of DME.    HOME SUPPORT PRIOR TO ADMISSION: The patient lived with son and grandson but did not require assist.    7-day re-assessment 1/28/2022; reviewed 2/8/2022 and remain appropriate as below. 1.  Patient will move from supine to sit and sit to supine  in bed with modified independence within 7 day(s). 2.  Patient will transfer from bed to chair and chair to bed with supervision/set-up using the least restrictive device within 7 day(s). 3.  Patient will perform sit to stand with supervision/set-up within 7 day(s). 4.  Patient will ambulate with contact guard assistance for 50 feet with the least restrictive device within 7 day(s). Initiated 1/17/2022  1. Patient will move from supine to sit and sit to supine  in bed with modified independence within 7 day(s). 2.  Patient will transfer from bed to chair and chair to bed with modified independence using the least restrictive device within 7 day(s). 3.  Patient will perform sit to stand with modified independence within 7 day(s). 4.  Patient will ambulate with modified independence for 150 feet with the least restrictive device within 7 day(s). 5.  Patient will ascend/descend 6 stairs with 2 handrail(s) with modified independence within 7 day(s). Outcome: Progressing Towards Goal   PHYSICAL THERAPY TREATMENT  Patient: Deena Dorsey (78 y.o. male)  Date: 2/11/2022  Diagnosis: Acute respiratory failure with hypoxia ([facility] [J96.01]  Pneumonia due to COVID-19 virus [U07.1, J12.82] <principal problem not specified>       Precautions: Fall  Chart, physical therapy assessment, plan of care and goals were reviewed. ASSESSMENT  Patient continues with skilled PT services and is progressing towards goals. Communicated with  nurse cleared for therapy. Patient supine on bed and PCT cleaning patient up assisted with turning and rolling on bed to change the linen. Patient saturation dropping to low 80's while changing linen. Place bed to modified chair position and performed some active range of motion exercise on both LE all planes. Saturations went up to mid 90's when on chair position. Encouraged patient to do active range of motions on both LE all planes. Activated bed alarm and notified nurse who agreed to monitor . Current Level of Function Impacting Discharge (mobility/balance): min assist with bed mobility    Other factors to consider for discharge: fall         PLAN :  Patient continues to benefit from skilled intervention to address the above impairments. Continue treatment per established plan of care. to address goals. Recommendation for discharge: (in order for the patient to meet his/her long term goals)  rehab vs LTAC  based on current presentation    This discharge recommendation:  Has been made in collaboration with the attending provider and/or case management    IF patient discharges home will need the following DME: patient owns DME required for discharge       SUBJECTIVE:   Patient stated [address]     OBJECTIVE DATA SUMMARY:   Critical Behavior:  Neurologic State: Appropriate for age  Orientation Level: Oriented X4  Cognition: Appropriate for age attention/concentration,Appropriate safety awareness,Follows commands  Safety/Judgement: Awareness of environment  Functional Mobility Training:  Bed Mobility:  Rolling: Minimum assistance  Supine to Sit: Minimum assistance  Sit to Supine: Minimum assistance  Scooting: Minimum assistance        Transfers:                                   Balance:     Ambulation/Gait Training:                    Therapeutic Exercises:   Educate and instructed patient to continue active range of motion exercise on both legs while up on chair or on bed multiple times.  Recommend patient to be up on the chair at least 3 times a day every meal times as tolerated. Pain Ratin/10    Activity Tolerance:   Good    After treatment patient left in no apparent distress:   Supine in bed, Heels elevated for pressure relief, Call bell within reach, Bed / chair alarm activated, and Side rails x 3    COMMUNICATION/COLLABORATION:   The patients plan of care was discussed with: Occupational therapist, Registered nurse, and Case management. Joseline Guillaume, PT,WCC.    Time Calculation: 35 mins

## 2022-02-11 NOTE — PROGRESS NOTES
Comprehensive Nutrition Assessment    Type and Reason for Visit: Reassess    Nutrition Recommendations/Plan:   1. Continue regular, 4 Carb Choice diet  2. Provide Glucerna once daily to aid in meeting kcal/protein needs (220 kcal, 26 g carbs, 10 g protein)  3. Consider adjusting insulin choice or dose to reduce BG spikes    Nutrition Assessment:    2/11: Follow up. Patient now in ICU d/t increased RRT and O2 requirements. Patient on 60 L hiflow. PO intake slightly down, but per chart review patient's family has continued to bring in some snacks (candies, crackers) which patient has been enjoying. Daughter at bedside assisting patient with lunch during RD visit. Patient states appetite is 'not quite as good', but he continues to snack throughout the day in addition to meals. PO trend likely meeting >85% estimated kcal and protein needs. Enjoying Glucerna. BG trending up - last three fasting BG , 210, 136.    2/8: Follow up. No further meal or supplement intake documented. Patient endorses continued \"great\" PO intake, consuming 75% or more of all meals and supplements. Endorses decreased snacking throughout night d/t needing more O2 and feeling SOB getting out of bed. Currently on 35 L O2. Last three BG , 233, 246. Slight weight gain since last RD encounter, but overall weight is down 18# (11%) since admission x ~ 1 month ago, clinically significant for timeframe. Believed admission weight stated, so actual weight loss likely less than this. 2/1: Follow up. PO intake continues to average 75% or more of all meals. Patient endorses \"wonderful\" appetite, with no N/V/D. No chewing/swallowing problems. On heated hiflow @ 30 L O2. States he wakes up about three times a night and snacks - has multiple snack bag size chips in room. RD provided brief overview of carbohydrates and blood sugar spikes - patient voiced understanding. Last three BG , 258, 225. No complaints at this time.  Weight continues to downtrend slightly. Patient states -160#, but cannot remember when he last weighed this much.     1/26: follow up. Pt is eating very well at meals. BG will elevate after meals requiring 6-8 units of correction daily. Pt with normal BG prior to meals and corrects easily. Consider Lantus or increasing NPH slightly and/or administrating NPH at the same time as steroids. Pt is eating 100% of all meals. Wt loss of 4% since admission. Current diet order meets 100% of calorie & protein needs, yet steady wt loss. Will try to add HS Glucerna (220 cals, 26g carb, 10g pro) to halt further wt loss, however expect BG may go up slightly.        1/18: Pt is an 79year old male admitted with Acute respiratory failure with hypoxia; Pneumonia due to COVID-19 virus [U07.1, J12.82]. He  has a past medical history of Atrial fibrillation ([facility] [address] Diabetes mellitus, type 2 ([address] Hypertension, Internal carotid artery stenosis, right, Moderate aortic stenosis, and Stroke ([facility] [address] PO intake averaging 75% of all meals. Attempted to call into patient room - no answer. Currently on 55L O2. Per documentation, patient has lost 12# (7.4%) x ~3 days, unlikely. No noted edema or prior edema this admission. Admission weight of 162 was stated. No noted N/V/D. No hx of chewing/swallowing problems. NKFA. If current PO intake trend continues, patient likely meeting >/= 75% kcal needs and 100% protein needs. Last three BG , 169, 147    Malnutrition Assessment:  Malnutrition Status:   At risk for malnutrition (specify)  - prolonged hospitalization, long COVID-19  Context:  Acute illness     Findings of the 6 clinical characteristics of malnutrition:   Energy Intake:  No significant decrease in energy intake  Weight Loss:  No significant weight loss     Body Fat Loss:  No significant body fat loss,     Muscle Mass Loss:  No significant muscle mass loss,    Fluid Accumulation:  No significant fluid accumulation,     Strength:  Not performed Estimated Daily Nutrient Needs:  Energy (kcal): 1727 (MSJ x 1.3); Weight Used for Energy Requirements: Current  Protein (g): 65-78 (1.0-1.2); Weight Used for Protein Requirements: Current  Fluid (ml/day): 1727; Method Used for Fluid Requirements: 1 ml/kcal    Nutrition Related Findings:       ABD: Good appetite with active bowel sounds 2/10  Last BM: 2/10  Edema: Generalized: No Edema (2/10/2022  8:00 PM)  LLE: 2+; Pitting (2/10/2022  8:00 AM)  RLE: 2+; Pitting (2/10/2022  8:00 AM)  RUE: 1+; Pitting (2/10/2022  8:00 AM)    Nutr. Labs:  Lab Results   Component Value Date/Time    GFR est AA >60 02/11/2022 02:42 AM    GFR est non-AA >60 02/11/2022 02:42 AM    Creatinine (POC) 0.8 09/21/2010 12:56 PM    Creatinine 0.62 (L) 02/11/2022 02:42 AM    BUN 24 (H) 02/11/2022 02:42 AM    BUN (POC) 15 09/21/2010 12:56 PM    Sodium (POC) 138 09/21/2010 12:56 PM    Sodium 132 (L) 02/11/2022 02:42 AM    Potassium 4.5 02/11/2022 02:42 AM    Potassium (POC) 3.8 09/21/2010 12:56 PM    Chloride (POC) 103 09/21/2010 12:56 PM    Chloride 96 (L) 02/11/2022 02:42 AM    CO2 33 (H) 02/11/2022 02:42 AM     Lab Results   Component Value Date/Time    Glucose 193 (H) 02/11/2022 02:42 AM    Glucose (POC) 262 (H) 02/11/2022 11:11 AM     Lab Results   Component Value Date/Time    Hemoglobin A1c 7.2 (H) 01/13/2022 03:09 AM       Nutr. Meds:  Lipitor, Precedex, Lovenox, Pepcid, Humalog, NPH, Zofran PRN, Miralax PRN    Wounds:    None       Current Nutrition Therapies:  ADULT DIET Regular; 4 carb choices (60 gm/meal)  ADULT ORAL NUTRITION SUPPLEMENT HS Snack; Diabetic Supplement    Anthropometric Measures:  · Height:  5' 8\" (172.7 cm)  · Current Body Wt:  65.5 kg (144 lb 6.4 oz)   · Admission Body Wt:  144 lb 6.4 oz    · Usual Body Wt:  68 kg (150 lb)     · Ideal Body Wt:  154 lbs:  93.8 %   Body mass index is 21.96 kg/m².   · BMI Category:  Normal weight (BMI 22.0-24.9) age over 72       Nutrition Diagnosis:   · Increased nutrient needs related to catabolic illness,impaired respiratory function as evidenced by  (COVID-19, increased O2 needs)    Nutrition Interventions:   Food and/or Nutrient Delivery: Continue current diet,Continue oral nutrition supplement  Nutrition Education and Counseling: No recommendations at this time  Coordination of Nutrition Care: Continue to monitor while inpatient,Interdisciplinary rounds    Goals:  PO intake continues >/= 75 of all meals and supplements within 5 - 7 days      Nutrition Monitoring and Evaluation:   Behavioral-Environmental Outcomes: None identified  Food/Nutrient Intake Outcomes: Food and nutrient intake,Supplement intake  Physical Signs/Symptoms Outcomes: Biochemical data,Skin,Weight    Discharge Planning:    Continue oral nutrition supplement     Electronically signed by Ruben Uribe RD on 0/57/9458  Contact: [phone] or via [employer]

## 2022-02-11 NOTE — PROGRESS NOTES
Problem: Falls - Risk of  Goal: *Absence of Falls  Description: Document Gabriel Tran Fall Risk and appropriate interventions in the flowsheet. Outcome: Progressing Towards Goal  Note: Fall Risk Interventions:  Mobility Interventions: PT Consult for mobility concerns,PT Consult for assist device competence,Bed/chair exit alarm    Mentation Interventions: Adequate sleep, hydration, pain control,Bed/chair exit alarm,Toileting rounds,Update white board,Family/sitter at bedside    Medication Interventions: Bed/chair exit alarm,Patient to call before getting OOB    Elimination Interventions: Bed/chair exit alarm,Call light in reach,Patient to call for help with toileting needs,Toileting schedule/hourly rounds    History of Falls Interventions: Bed/chair exit alarm         Problem: Patient Education: Go to Patient Education Activity  Goal: Patient/Family Education  Outcome: Progressing Towards Goal     Problem: Airway Clearance - Ineffective  Goal: Achieve or maintain patent airway  Outcome: Progressing Towards Goal     Problem: Gas Exchange - Impaired  Goal: Absence of hypoxia  Outcome: Progressing Towards Goal  Goal: Promote optimal lung function  Outcome: Progressing Towards Goal     Problem: Breathing Pattern - Ineffective  Goal: Ability to achieve and maintain a regular respiratory rate  Outcome: Progressing Towards Goal     Problem:  Body Temperature -  Risk of, Imbalanced  Goal: Ability to maintain a body temperature within defined limits  Outcome: Progressing Towards Goal  Goal: Will regain or maintain usual level of consciousness  Outcome: Progressing Towards Goal  Goal: Complications related to the disease process, condition or treatment will be avoided or minimized  Outcome: Progressing Towards Goal     Problem: Isolation Precautions - Risk of Spread of Infection  Goal: Prevent transmission of infectious organism to others  Outcome: Progressing Towards Goal     Problem: Nutrition Deficits  Goal: Optimize nutrtional status  Outcome: Progressing Towards Goal     Problem: Risk for Fluid Volume Deficit  Goal: Maintain normal heart rhythm  Outcome: Progressing Towards Goal  Goal: Maintain absence of muscle cramping  Outcome: Progressing Towards Goal  Goal: Maintain normal serum potassium, sodium, calcium, phosphorus, and pH  Outcome: Progressing Towards Goal     Problem: Loneliness or Risk for Loneliness  Goal: Demonstrate positive use of time alone when socialization is not possible  Outcome: Progressing Towards Goal     Problem: Fatigue  Goal: Verbalize increase energy and improved vitality  Outcome: Progressing Towards Goal     Problem: Patient Education: Go to Patient Education Activity  Goal: Patient/Family Education  Outcome: Progressing Towards Goal     Problem: Diabetes Self-Management  Goal: *Disease process and treatment process  Description: Define diabetes and identify own type of diabetes; list 3 options for treating diabetes. Outcome: Progressing Towards Goal  Goal: *Incorporating nutritional management into lifestyle  Description: Describe effect of type, amount and timing of food on blood glucose; list 3 methods for planning meals. Outcome: Progressing Towards Goal  Goal: *Incorporating physical activity into lifestyle  Description: State effect of exercise on blood glucose levels. Outcome: Progressing Towards Goal  Goal: *Developing strategies to promote health/change behavior  Description: Define the ABC's of diabetes; identify appropriate screenings, schedule and personal plan for screenings. Outcome: Progressing Towards Goal  Goal: *Using medications safely  Description: State effect of diabetes medications on diabetes; name diabetes medication taking, action and side effects. Outcome: Progressing Towards Goal  Goal: *Monitoring blood glucose, interpreting and using results  Description: Identify recommended blood glucose targets  and personal targets.   Outcome: Progressing Towards Goal  Goal: *Prevention, detection, treatment of acute complications  Description: List symptoms of hyper- and hypoglycemia; describe how to treat low blood sugar and actions for lowering  high blood glucose level. Outcome: Progressing Towards Goal  Goal: *Prevention, detection and treatment of chronic complications  Description: Define the natural course of diabetes and describe the relationship of blood glucose levels to long term complications of diabetes. Outcome: Progressing Towards Goal  Goal: *Developing strategies to address psychosocial issues  Description: Describe feelings about living with diabetes; identify support needed and support network  Outcome: Progressing Towards Goal  Goal: *Insulin pump training  Outcome: Progressing Towards Goal  Goal: *Sick day guidelines  Outcome: Progressing Towards Goal  Goal: *Patient Specific Goal (EDIT GOAL, INSERT TEXT)  Outcome: Progressing Towards Goal     Problem: Patient Education: Go to Patient Education Activity  Goal: Patient/Family Education  Outcome: Progressing Towards Goal     Problem: Patient Education: Go to Patient Education Activity  Goal: Patient/Family Education  Outcome: Progressing Towards Goal     Problem: Patient Education: Go to Patient Education Activity  Goal: Patient/Family Education  Outcome: Progressing Towards Goal     Problem: Pain  Goal: *Control of Pain  Outcome: Progressing Towards Goal     Problem: Patient Education: Go to Patient Education Activity  Goal: Patient/Family Education  Outcome: Progressing Towards Goal     Problem: Pressure Injury - Risk of  Goal: *Prevention of pressure injury  Description: Document Alexy Scale and appropriate interventions in the flowsheet.   Outcome: Progressing Towards Goal     Problem: Patient Education: Go to Patient Education Activity  Goal: Patient/Family Education  Outcome: Progressing Towards Goal     Problem: Delirium Treatment  Goal: *Level of consciousness restored to baseline  Outcome: Progressing Towards Goal  Goal: *Level of environmental perceptions restored to baseline  Outcome: Progressing Towards Goal  Goal: *Sensory perception restored to baseline  Outcome: Progressing Towards Goal  Goal: *Emotional stability restored to baseline  Outcome: Progressing Towards Goal  Goal: *Functional assessment restored to baseline  Outcome: Progressing Towards Goal  Goal: *Absence of falls  Outcome: Progressing Towards Goal  Goal: *Will remain free of delirium, CAM Score negative  Outcome: Progressing Towards Goal  Goal: *Cognitive status will be restored to baseline  Outcome: Progressing Towards Goal  Goal: Interventions  Outcome: Progressing Towards Goal     Problem: Patient Education: Go to Patient Education Activity  Goal: Patient/Family Education  Outcome: Progressing Towards Goal

## 2022-02-11 NOTE — PROGRESS NOTES
1900- Report received from Shivam Leonard RN. Pt is aox3 denies any pain, is mildly anxious, high flow is in proper place. No Iv going at this time, pt is watching tv. Explained the plan of care for the night. 2000-assessment completed pt son at be side, pt talking no distress. Watching tv and eating candy. 2110-pt talking to son, explained medication to be given this evening. No questions or change in condition at this time. 2200- Medication given, pt in no distress, saturation is down, pt asked for sleeping aide. ... notifiing respiratory. 2214- paged respiratory therapy in regards to placing pt on bipap for the night due to the patient sating in the 80's. Pt currently has no distress, is watching tv with son at bed side. On call Dr José Miguel Kramer notified of sleeping aide request ordered 6 mg melatonin QHS. [facility] applied pt sat at 91%, melatonin was administered. Pt denies any distress, lights dimmed. Son left for the evening. 2345- covered patient up, he had a c/o of being cold. Provided abx ordered. No change in condition. 0000-reassessment completed. No change in condition. Resting assessed with pulling up in bed. 0117- pt resting with eyes closed. No distress, awaken when spoken to easily, bipap in place. 5726- son arrived back on unit. 5103- pt hit call edmondson states that he was tangled up. Pt not connected to IV only bipap. Re orientated pt to room and situation. Fixed blanket and turned down temp in room per pt request. Labs collected and pt left in room with son at bed side, eyes clothes no distress noted. 0500- pt awake watching tv, with son at bedside. Denies any c/o of chest pain, general pain, needs or desires. 333 \[facility]\"" with lab notified RN that PLT at 48,RN notified  [facility]- bed linen changed, gan emptied, pt watching tv no distress, son has left bed side. 0700- report given to [facility], discussed pt kardex, plan of care, skin and hx.

## 2022-02-11 NOTE — PROGRESS NOTES
Carlos Eduardo Kingston [facility] 79  [address] [facility], [place], [address] [place] [address]  [phone]      Medical Progress Note      NAME: Ambrosio Rocha   :  1940  MRM:  [id number]    Date of service: 2022  10:32 AM       Assessment and Plan:   1. AHRF secondary to  COVID-19 PNA. s/p baricitinib. On BiPAP. Pt has significant hx of tobacco abuse (100 pack-year hx, quit smoking in ). 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 On IV Solu-medrol on , continue duo neb, brovana, pulmicort. CT of the chest done  due to LT side chest pain, which is negative for PE. Shows extensive emphysema with fibrotic changes in the lung bases. Now in ICU on BiPAP alternating with hiflow O2. intensivist following. Evaluated by palliative care      2. Hypotension/ MSSA bacteremia/ Sepsis. On roney and ancef. Hold lisinopril and metoprolol. Repeat BC    3. Bradycardia. Pacemaker interrogated and works well. Cardiology eval appreciated.      4. Aortic stenosis: mild on TTE . Monitor volume status     3. VT: hx of NSVT. Noted . Evaluated by cardiology. Monitor K and Mg. Cont BB      4. Type 2 diabetes mellitus without complication: P8Z 3.4%. watch BG as pt is on steroid. On NPH and titrate PRN, SSI      5. A-fib/ Pacemaker: monitor on telemetry. On Xarelto     6. History of CVA (cerebrovascular accident): cont OAC, statin     7. Hypertension: Hold lisinopril and metoprolol due to hypotension. 8.  Thrombocytopenia, worsening. Likely due to acute events/ COVID/ sepsis. Will consult hematology      Code status: partial      Subjective:     Chief Complaint[de-identified] Patient was seen and examined as a follow up for AHRF. Chart was reviewed. still in SOB      ROS:  (bold if positive, if negative)    Tolerating PT  Tolerating Diet        Objective:     Last 24hrs VS reviewed since prior progress note.  Most recent are:    Visit Vitals  BP (!) 119/50   Pulse 80   Temp 98.1 °F (36.7 °C)   Resp 19   Ht 5' 8\" (1.727 m)   Wt 65.5 kg (144 lb 6.4 oz)   SpO2 (!) 88%   BMI 21.96 kg/m²     SpO2 Readings from Last 6 Encounters:   02/11/22 (!) 88%   11/26/21 95%   11/19/21 93%   05/28/21 95%   02/06/20 98%   02/02/20 93%    O2 Flow Rate (L/min): 60 l/min       Intake/Output Summary (Last 24 hours) at 2/11/2022 1022  Last data filed at 2/11/2022 0615  Gross per 24 hour   Intake 1226.08 ml   Output 1770 ml   Net -543.92 ml        Physical Exam:    Gen:  Well-developed, well-nourished, in no acute distress  HEENT:  Pink conjunctivae, PERRL, hearing intact to voice, moist mucous membranes  Neck:  Supple, without masses, thyroid non-tender  Resp:  No accessory muscle use, rales BL  Card:  No murmurs, normal S1, S2 without thrills, bruits or peripheral edema  Abd:  Soft, non-tender, non-distended, normoactive bowel sounds are present, no palpable organomegaly and no detectable hernias  Lymph:  No cervical or inguinal adenopathy  Musc:  No cyanosis or clubbing  Skin:  No rashes or ulcers, skin turgor is good  Neuro:  Cranial nerves are grossly intact, no focal motor weakness, follows commands appropriately  Psych:  Good insight, oriented to person, place and time, alert  __________________________________________________________________  Medications Reviewed: (see below)  Medications:     Current Facility-Administered Medications   Medication Dose Route Frequency    ceFAZolin (ANCEF) 1 g in 0.9% sodium chloride (MBP/ADV) 50 mL MBP  1 g IntraVENous Q8H    And    ceFAZolin (ANCEF) 1 g in 0.9% sodium chloride (MBP/ADV) 50 mL MBP  1 g IntraVENous Q8H    famotidine (PEPCID) tablet 20 mg  20 mg Oral BID    melatonin tablet 6 mg  6 mg Oral QHS PRN    albuterol-ipratropium (DUO-NEB) 2.5 MG-0.5 MG/3 ML  3 mL Nebulization Q6H RT    PHENYLephrine (JOAN-SYNEPHRINE) 30 mg in 0.9% sodium chloride 250 mL infusion   mcg/min IntraVENous TITRATE    insulin NPH (NOVOLIN N, HUMULIN N) injection 15 Units  15 Units SubCUTAneous ACB&D    enoxaparin (LOVENOX) injection 60 mg  60 mg SubCUTAneous Q12H    methylPREDNISolone (PF) (Solu-MEDROL) injection 40 mg  40 mg IntraVENous Q12H    dexmedeTOMidine in 0.9 % NaCl (PRECEDEX) 400 mcg/100 mL (4 mcg/mL) infusion soln  0.1-1.5 mcg/kg/hr IntraVENous TITRATE    arformoteroL (BROVANA) neb solution 15 mcg  15 mcg Nebulization BID RT    budesonide (PULMICORT) 500 mcg/2 ml nebulizer suspension  500 mcg Nebulization BID RT    artificial tears (dextran-hypromellose-glycerin) (GENTEAL) ophthalmic solution 1 Drop  1 Drop Both Eyes PRN    sodium chloride (OCEAN) 0.65 % nasal squeeze bottle 2 Spray  2 Spray Both Nostrils Q2H PRN    [Held by provider] metoprolol tartrate (LOPRESSOR) tablet 12.5 mg  12.5 mg Oral BID    guaiFENesin-dextromethorphan (ROBITUSSIN DM) 100-10 mg/5 mL syrup 5 mL  5 mL Oral Q6H PRN    [Held by provider] lisinopriL (PRINIVIL, ZESTRIL) tablet 10 mg  10 mg Oral QHS    atorvastatin (LIPITOR) tablet 20 mg  20 mg Oral DAILY    glucose chewable tablet 16 g  4 Tablet Oral PRN    dextrose (D50W) injection syrg 12.5-25 g  25-50 mL IntraVENous PRN    glucagon (GLUCAGEN) injection 1 mg  1 mg IntraMUSCular PRN    insulin lispro (HUMALOG) injection   SubCUTAneous AC&HS    sodium chloride (NS) flush 5-40 mL  5-40 mL IntraVENous Q8H    sodium chloride (NS) flush 5-40 mL  5-40 mL IntraVENous PRN    acetaminophen (TYLENOL) tablet 650 mg  650 mg Oral Q6H PRN    Or    acetaminophen (TYLENOL) suppository 650 mg  650 mg Rectal Q6H PRN    polyethylene glycol (MIRALAX) packet 17 g  17 g Oral DAILY PRN    ondansetron (ZOFRAN ODT) tablet 4 mg  4 mg Oral Q8H PRN    Or    ondansetron (ZOFRAN) injection 4 mg  4 mg IntraVENous Q6H PRN        Lab Data Reviewed: (see below)  Lab Review:     Recent Labs     02/11/22  0242 02/10/22  0057 02/09/22  0056   WBC 4.1 5.3 5.8   HGB 10.9* 11.5* 12.4   HCT 31.7* 33.4* 37.0   PLT 48* 57* 60*     Recent Labs     02/11/22  0242 02/10/22  0057 02/09/22  0056   * 135* 137   K 4.5 4.6 4.6   CL 96* 99 100   CO2 33* 32 29   * 119* 67   BUN 24* 23* 28*   CREA 0.62* 0.70 1.20   CA 8.0* 8.2* 7.9*   ALB 1.9* 2.0* 2.3*   TBILI 1.4* 1.4* 0.9   ALT 96* 91* 62     Lab Results   Component Value Date/Time    Glucose (POC) 210 (H) 02/11/2022 07:58 AM    Glucose (POC) 136 (H) 02/10/2022 09:51 PM    Glucose (POC) 207 (H) 02/10/2022 04:23 PM    Glucose (POC) 270 (H) 02/10/2022 11:11 AM    Glucose (POC) 123 (H) 02/10/2022 03:07 AM     Recent Labs     02/08/22  2250   PH 7.37   PCO2 36   PO2 45*   HCO3 20*   FIO2 100     No results for input(s): INR, INREXT, INREXT in the last 72 hours. All Micro Results     Procedure Component Value Units Date/Time    CULTURE, BLOOD [[id number]]  (Abnormal)  (Susceptibility) Collected: 02/09/22 0658    Order Status: Completed Specimen: Blood Updated: 02/11/22 0717     Special Requests: NO SPECIAL REQUESTS        Culture result:       STAPHYLOCOCCUS AUREUS GROWING IN BOTH BOTTLES DRAWN SITE = RA            (NOTE) PRELIMINARY REPORT OF GRAM POSITIVE COCCI IN CLUSTERS GROWING IN 2 OF 2 BOTTLES DRAWN. CALLED TO AND READ BACK BY Roberto Glass RN ON 2/9/22 AT 2125. MS    CULTURE, MRSA [[id number]] Collected: 02/09/22 1320    Order Status: Completed Specimen: Nasal from Nares Updated: 02/10/22 2230     Special Requests: NO SPECIAL REQUESTS        Culture result:       MRSA NOT PRESENT. Apparent Staphylococus aureus (not MRSA noted). Screening of patient nares for MRSA is for surveillance purposes and, if positive, to facilitate isolation considerations in high risk settings. It is not intended for automatic decolonization interventions per se as regimens are not sufficiently effective to warrant routine use.           COVID-19 RAPID TEST [[id number]]  (Abnormal) Collected: 02/10/22 0200    Order Status: Completed Specimen: Nasopharyngeal Updated: 02/10/22 0255     Specimen source Nasopharyngeal        COVID-19 rapid test Indeterminate        Comment: Rapid Abbott ID Now       The presence or absence of COVID-19 Viral RNAs cannot be determined. If clinically indicated, please collect a new specimen. This test has been authorized by the FDA under an Emergency Use Authorization (EUA) for use by authorized laboratories. Fact sheet for Healthcare Providers: Ramakrishna.  Fact sheet for Patients: Ramakrishna.       Methodology: Isothermal Nucleic Acid Amplification         MICRO TRACKING [[id number]] Collected: 02/09/22 0658    Order Status: Completed Updated: 02/09/22 1742    MICRO TRACKING [[id number]] Collected: 02/09/22 0658    Order Status: Completed Updated: 02/09/22 1713    COVID-19 RAPID TEST [[id number]]  (Abnormal) Collected: 01/11/22 1834    Order Status: Completed Specimen: Nasopharyngeal Updated: 01/11/22 1943     Specimen source Nasopharyngeal        COVID-19 rapid test Detected        Comment: Rapid Abbott ID Now       The specimen is POSITIVE for SARS-CoV-2, the novel coronavirus associated with COVID-19. This test has been authorized by the FDA under an Emergency Use Authorization (EUA) for use by authorized laboratories. Fact sheet for Healthcare Providers: [employer]  Fact sheet for Patients: [employer].       Methodology: Isothermal Nucleic Acid Amplification  CALLED TO AND READ BACK BY  EMMA GRUBER @194/YWG               I have reviewed notes of prior 24hr. Other pertinent lab: Total time spent with patient: 28 I personally reviewed chart, notes, data and current medications in the medical record. I have personally examined and treated the patient at bedside during this period.                  Care Plan discussed with: Patient, Nursing Staff and >50% of time spent in counseling and coordination of care    Discussed:  Care Plan    Prophylaxis:  xarelto     Disposition: Home w/Family           ___________________________________________________    Attending Physician: Bella Friedman MD

## 2022-02-11 NOTE — CONSULTS
[facility] [place] [facility] [place] [address] [facility]  Laila Osito: [phone]  F: [phone] Patient ID  Name: Sherie Vázquez  YOB: 1940  MRN: [id number]  Referring Provider:   No referring provider defined for this encounter. Primary Care Provider:   Hilda Roca MD       HEMATOLOGY/MEDICAL ONCOLOGY CONSULTATION NOTE   Date of Visit: 02/11/22  Reason for Visit:     Chief Complaint   Patient presents with    Shortness of Breath    Concern For COVID-19 (Coronavirus)       Subjective:   Sherie Vázquez is a 79 y.o. M who presents as an inpatient consultation for THROMBOCYTOPENIA. Patient reports having a good day today although remains critically ill while on BiPAP. NP Schoenweiss present at bedside. Televideo encounter since COVID test was intermediate. Had original positive on January 11,2022 reportedly. He remains in ICU after COVID 19 pneumonia. He is felt to have pulmonary fibrosis per review of EHR. He is currently receiving treatment for MSSA pneumonia. He was started on antibiotics in the past 24 hours. He denies any current pain. He denies any prior hx of thrombocytopenia. Onset has been in the past 11 days. He was recently on lovenox since he could not tolerate oral xarelto for his atrial fibrillation. However, this has recently been held. He denies any petechiae. He denies any new onset bruising but has had chronically.       Past Medical History:   Diagnosis Date    Atrial fibrillation ([address]     Diabetes mellitus, type 2 ([address]     Hypertension     Internal carotid artery stenosis, right     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 Moderate aortic valve stenosis was present. LVEF 55-60% .  Stroke [facility])     asymptomatic; small chronic infarcts in the bilateral thalami per MRI of 2020.   Previous MRI of 2010 showed possible tiny old right thalamic infarct versus perivascular space       Past Surgical History:   Procedure Laterality Date    HX CHOLECYSTECTOMY      IR INSERT NON TUNL CVC OVER 5 YRS  2022    MI INS NEW/RPLCMT PRM PM W/TRANSV ELTRD ATRIAL&VENT N/A 2020    INSERT PPM DUAL performed by Esther Renee MD at [address] [facility] CATH LAB      Social History     Tobacco Use    Smoking status: Former Smoker     Packs/day: 2.00     Years: 50.00     Pack years: 100.00     Quit date: 2004     Years since quittin.7    Smokeless tobacco: Never Used   Substance Use Topics    Alcohol use: Yes     Comment: 1 beer or 1 glaas of wine daily most of adult life      Family History   Adopted: Yes   Family history unknown: Yes     Current Facility-Administered Medications   Medication Dose Route Frequency    ceFAZolin (ANCEF) 1 g in 0.9% sodium chloride (MBP/ADV) 50 mL MBP  1 g IntraVENous Q8H    And    ceFAZolin (ANCEF) 1 g in 0.9% sodium chloride (MBP/ADV) 50 mL MBP  1 g IntraVENous Q8H    famotidine (PEPCID) tablet 20 mg  20 mg Oral BID    melatonin tablet 6 mg  6 mg Oral QHS PRN    albuterol-ipratropium (DUO-NEB) 2.5 MG-0.5 MG/3 ML  3 mL Nebulization Q6H RT    PHENYLephrine (JOAN-SYNEPHRINE) 30 mg in 0.9% sodium chloride 250 mL infusion   mcg/min IntraVENous TITRATE    insulin NPH (NOVOLIN N, HUMULIN N) injection 15 Units  15 Units SubCUTAneous ACB&D    [Held by provider] enoxaparin (LOVENOX) injection 60 mg  60 mg SubCUTAneous Q12H    methylPREDNISolone (PF) (Solu-MEDROL) injection 40 mg  40 mg IntraVENous Q12H    arformoteroL (BROVANA) neb solution 15 mcg  15 mcg Nebulization BID RT    budesonide (PULMICORT) 500 mcg/2 ml nebulizer suspension  500 mcg Nebulization BID RT    artificial tears (dextran-hypromellose-glycerin) (GENTEAL) ophthalmic solution 1 Drop  1 Drop Both Eyes PRN    sodium chloride (OCEAN) 0.65 % nasal squeeze bottle 2 Spray  2 Spray Both Nostrils Q2H PRN    [Held by provider] metoprolol tartrate (LOPRESSOR) tablet 12.5 mg  12.5 mg Oral BID    guaiFENesin-dextromethorphan (ROBITUSSIN DM) 100-10 mg/5 mL syrup 5 mL  5 mL Oral Q6H PRN    [Held by provider] lisinopriL (PRINIVIL, ZESTRIL) tablet 10 mg  10 mg Oral QHS    atorvastatin (LIPITOR) tablet 20 mg  20 mg Oral DAILY    glucose chewable tablet 16 g  4 Tablet Oral PRN    dextrose (D50W) injection syrg 12.5-25 g  25-50 mL IntraVENous PRN    glucagon (GLUCAGEN) injection 1 mg  1 mg IntraMUSCular PRN    insulin lispro (HUMALOG) injection   SubCUTAneous AC&HS    sodium chloride (NS) flush 5-40 mL  5-40 mL IntraVENous Q8H    sodium chloride (NS) flush 5-40 mL  5-40 mL IntraVENous PRN    acetaminophen (TYLENOL) tablet 650 mg  650 mg Oral Q6H PRN    Or    acetaminophen (TYLENOL) suppository 650 mg  650 mg Rectal Q6H PRN    polyethylene glycol (MIRALAX) packet 17 g  17 g Oral DAILY PRN    ondansetron (ZOFRAN ODT) tablet 4 mg  4 mg Oral Q8H PRN    Or    ondansetron (ZOFRAN) injection 4 mg  4 mg IntraVENous Q6H PRN      Allergies   Allergen Reactions    Ampicillin Nausea and Vomiting    Iodine Hives, Itching and Nausea and Vomiting      Review of Systems provided by:patient but limited verbal communication due to BiPAP and televideo consultation. General: reports feeling mostly well today. MSK: denies any arthralgias/or pain. Skin: denies any rash and denies any petechiae. Objective:     Visit Vitals  BP (!) 118/53   Pulse 92   Temp 98.2 °F (36.8 °C)   Resp 24   Ht 5' 8\" (1.727 m)   Wt 144 lb 6.4 oz (65.5 kg)   SpO2 (!) 87%   BMI 21.96 kg/m²     ECOG PS: 4- Completely disabled; cannot carry on any selfcare; totally confined to bed or chair.   Physical Exam  Constitutional: No acute distress. and critically ill remains on BiPAP. Eyes: Normal Conjunctivae. and Anicteric sclerae. Pulmonary: No use of accessory breathing muscles while on BiPAP. Skin: No jaundice. Neurological: Alert and oriented. and No tremor on inspection. Psychiatric: unable to assess. Due to this being a TeleHealth evaluation ([facility]), many elements of the physical examination are unable to be assessed. Evaluation of the following organ systems was limited: Vitals/Constitutional/EENT/Resp/CV/GI//MS/Neuro/Skin/Heme-Lymph-Imm. Results:     Lab Results   Component Value Date/Time    WBC 4.1 02/11/2022 02:42 AM    HGB 10.9 (L) 02/11/2022 02:42 AM    HCT 31.7 (L) 02/11/2022 02:42 AM    PLATELET 48 (LL) 03/02/7916 02:42 AM    MCV 87.3 02/11/2022 02:42 AM    ABS. NEUTROPHILS 3.8 02/11/2022 02:42 AM    Hemoglobin (POC) 13.3 09/21/2010 12:56 PM    Hematocrit (POC) 39 09/21/2010 12:56 PM     Lab Results   Component Value Date/Time    Sodium 132 (L) 02/11/2022 02:42 AM    Potassium 4.5 02/11/2022 02:42 AM    Chloride 96 (L) 02/11/2022 02:42 AM    CO2 33 (H) 02/11/2022 02:42 AM    Glucose 193 (H) 02/11/2022 02:42 AM    BUN 24 (H) 02/11/2022 02:42 AM    Creatinine 0.62 (L) 02/11/2022 02:42 AM    GFR est AA >60 02/11/2022 02:42 AM    GFR est non-AA >60 02/11/2022 02:42 AM    Calcium 8.0 (L) 02/11/2022 02:42 AM    Sodium (POC) 138 09/21/2010 12:56 PM    Potassium (POC) 3.8 09/21/2010 12:56 PM    Chloride (POC) 103 09/21/2010 12:56 PM    Glucose (POC) 262 (H) 02/11/2022 11:11 AM    BUN (POC) 15 09/21/2010 12:56 PM    Creatinine (POC) 0.8 09/21/2010 12:56 PM    Calcium, ionized (POC) 1.21 09/21/2010 12:56 PM     Lab Results   Component Value Date/Time    Bilirubin, total 1.4 (H) 02/11/2022 02:42 AM    ALT (SGPT) 96 (H) 02/11/2022 02:42 AM    Alk.  phosphatase 76 02/11/2022 02:42 AM    Protein, total 4.6 (L) 02/11/2022 02:42 AM    Albumin 1.9 (L) 02/11/2022 02:42 AM    Globulin 2.7 02/11/2022 02:42 AM       CT CHEST WO CONT    Result Date: 2/7/2022  INDICATION: Hemoptysis COMPARISON: Chest radiograph from the same day CONTRAST: None. TECHNIQUE:  5 mm axial images were obtained through the chest. Coronal and sagittal reformats were generated. CT dose reduction was achieved through use of a standardized protocol tailored for this examination and automatic exposure control for dose modulation. The absence of intravenous contrast reduces the sensitivity for evaluation of the mediastinum, yaya, vasculature, and upper abdominal organs. FINDINGS: CHEST WALL: No mass or axillary lymphadenopathy. THYROID: No nodule. MEDIASTINUM: No mass or lymphadenopathy. YAYA: No mass or lymphadenopathy. THORACIC AORTA: Atherosclerotic disease. No evidence of aneurysm MAIN PULMONARY ARTERY: Normal in caliber. TRACHEA/BRONCHI: Patent. ESOPHAGUS: No wall thickening or dilatation. HEART: Cardiomegaly. Coronary atherosclerotic disease. PLEURA: No effusion or pneumothorax. LUNGS: Extensive emphysema with fibrotic changes in lung bases. Focal airspace opacity in the right upper lobe laterally. INCIDENTALLY IMAGED UPPER ABDOMEN: Cholecystectomy changes BONES: No destructive bone lesion. 1.  Limited by motion. 2.  Extensive emphysema with fibrotic changes in the lung bases 3. Focal airspace opacity in the right upper lobe laterally may represent infection. Attention on follow-up imaging. Assessment and Recommendations: Thrombocytopenia ([address]  -likely multifactorial. Timing not consistent with HIT (I dont' see that he has received heparin or lovenox in the past 30 days.  -continue to monitor but suspect contribution of infection and antibiotic therapy. Both vanco and cefepime can cause thrombocytopenia. I perosnally reviewed smear and noted no shisctocytosis but manual plt count accurate at 45K.     Acute respiratory failure with hypoxia (HCC)   -on BiPAP; ?fibrosis vs. Active Gram Positive Cocci Bacteremia. Pneumonia due to COVID-19 virus   -Indeterminate testing today;will defer to primary team. Cannot exclude a post-COVID ITP but not fully clear. Will follow-up but please page with questions. Jonnathan Eli MD  Hematology/Medical Oncology Provider  Devendra Santos  P: [phone]    Signed By:   Carrol Fuentes MD     The patient was evaluated through a synchronous (real-time) audio-video encounter. The patient (or guardian if applicable) is aware that this is a billable service. Verbal consent to proceed has been obtained within the past 12 months. The visit was conducted pursuant to the emergency declaration under the [facility], [address] [place] [address] authority and the [employer] and [employer] General Act. Patient identification was verified, and a caregiver  was present when appropriate. The patient was located in a state where the provider was credentialed to provide care.

## 2022-02-11 NOTE — PROGRESS NOTES
..Bedside and Verbal shift change report given to Banner, RN (oncoming nurse) by Ross Eubanks RN (offgoing nurse). Report included the following information SBAR, Kardex, Intake/Output, MAR, Recent Results, Cardiac Rhythm V PACED, Quality Measures and Dual Neuro Assessment. 0800- ASSESSMENT AND ROUNDING WITH ICU TELE MD COMPLETED. PT IS ALERT AND ORIENTED, RESTING IN BED. DENIES PAIN. FAMILY AT BEDSIDE. LINES AND REY ASSESSED. VS WNL. PT OFF BIPAP FROM NIGHTTIME AND PLACED ON HI FLOW NASAL CANULA FOR DAYTIME SUPPORT. PT EATING BREAKFAST    1000 - PT AWAKE IN BED; VISITING WITH FAMILY AT BEDSIDE.      1200 - NO CHANGE IN ASSESSMENT. LINES AND LE ASSESSED. 1530 - PT DESATTING ON HI FLOW TO 78-82%. PT REPORTS NOT FEELING ANY DIFFERENTLY, DENIES FEELING SOB. ENCOURAGED PT TO RETURN TO BIPAP FOR SHORT PERIOD AND HE INITIALLY REFUSED, BUT AGREED A FEW MINS LATER WHEN SATS NOT GOING UP.    0274 - PT BACK ON HI FLOW PER REQUEST.

## 2022-02-11 NOTE — PROGRESS NOTES
[facility] Dosing Services: IV to PO Conversion    The pharmacist has determined that this patient meets P & T approved criteria for conversion from IV to oral therapy for the following medication:Famotidine    The pharmacist has written the following order for the patient: Famotidine 20 mg PO BID  The pharmacist will continue to monitor the patient's status and advise the physician if conversion back to IV therapy is recommended.     Signed DARLENE Luevano Contact information:  [phone]

## 2022-02-11 NOTE — PROGRESS NOTES
[facility]    ICU TEAM Progress Note    Name: Neymar Skelton   : 1940   MRN: [id number]   Date: 2022           ICU Assessment     1. Acute respiratory failure with hypoxia  2. ARDS  3. COVID 19  4. MSSA bacteremia  5. COPD  6. DM2  7. A fib  8. HTN  9. DLD  10. Thrombocytopenia         ICU Comprehensive Plan of Care:   -Blood cx growing MSSA. D/C vanco and cefepime and start ancef  -Echo  -Blood cx tomorrow  -Hold lovenox due to TCP  -Gabriel PRN to keep MAP>65  -Bipap/HFNC to keep SpO2>90%  -Solumedrol  -Precedex as needed for agitation    1. Discussed Care Plan with Bedside RN    2. Documentation of Current Medications    ICU Issues:  D- Delirium assessement CAM-ICU: Assessments ordered  E- Early Mobility/ PT: Will order when appropriate  F- Feeding: regular  Peptic Ulcer Disease Prophylaxis: Pepcid  DVT Prophylaxis: SCDs  Glycemic Control (140-180 mg/dL): SSI, NPH  Catheter Discontinuation (CVC, arterial, urinary, gastric, rectal): Keep all  Antibiotics: ancef  Steroids: solumedrol  MAR Review (pain, anxiety, constipation . Hermila Tim .): completed  Code Status: DNR/DNI    Subjective:   Progress Note: 2022      Reason for ICU Admission: acute respiratory failure with hypoxia     HPI:  79 y.o. male with a h/o PAF, s/p PPM, HTN, CVA, and DM2 who was admitted on 22 from home for acute hypoxemic respiratory failure secondary to COVID 19 PNA. He is s/p baricitinib. He has been on HFNC. Rapid response was called this evening for hypoxia with sats in the upper 80s despite being on 100% HFNC and NRB. ABG showed a PaO2 of 45. He was placed on bipap. He was given ativan and morphine. Upon arrival to the ICU, he was hypotensive and received an IVF bolus with some improvement in his BP but he remains hypotensive. He was started on a precedex drip for agitation. He is being admitted to the ICU for further management. Overnight Events:   2022  DAVID overnight. Wore bipap overnight.   Now on HFNC 100% 60L. PPM interrogated yesterday. Off roney. POD:  * No surgery found *    S/P:       Active Problem List:     Problem List  Date Reviewed: 1/22/2022          Codes Class    Palliative care encounter ICD-10-CM: Z51.5  ICD-9-CM: V66.7         Concern about end of life ICD-10-CM: Z71.1  ICD-9-CM: V49.89         Hypoxia ICD-10-CM: R09.02  ICD-9-CM: 799.02         DNR (do not resuscitate) discussion ICD-10-CM: Z71.89  ICD-9-CM: V65.49         Goals of care, counseling/discussion ICD-10-CM: Z71.89  ICD-9-CM: V65.49         NSVT (nonsustained ventricular tachycardia) (HCC) ICD-10-CM: I47.2  ICD-9-CM: 427.1         Pacemaker ICD-10-CM: Z95.0  ICD-9-CM: V45.01         Hypertension ICD-10-CM: I10  ICD-9-CM: 401.9         Acute respiratory failure with hypoxia ([facility] ICD-10-CM: J96.01  ICD-9-CM: 518.81         Pneumonia due to COVID-19 virus ICD-10-CM: U07.1, J12.82  ICD-9-CM: 480.8, 079.89         Cellulitis ICD-10-CM: L03.90  ICD-9-CM: 092. 9         Type 2 diabetes mellitus without complication (HCC) (Chronic) ICD-10-CM: E11.9  ICD-9-CM: 250.00         Blurry vision ICD-10-CM: H53.8  ICD-9-CM: 368.8         Unsteady gait ICD-10-CM: R26.81  ICD-9-CM: 781. 2         A-fib (HCC) ICD-10-CM: I48.91  ICD-9-CM: 427.31         History of CVA (cerebrovascular accident) (Chronic) ICD-10-CM: Z86.73  ICD-9-CM: V12.54         Second degree AV block ICD-10-CM: I44.1  ICD-9-CM: 426.13               Past Medical History:      has a past medical history of Atrial fibrillation ([facility] Diabetes mellitus, type 2 ([facility] Hypertension, Internal carotid artery stenosis, right, Moderate aortic stenosis, and Stroke ([facility] Past Surgical History:      has a past surgical history that includes pr ins new/rplcmt prm pm w/transv eltrd atrial&vent (N/A, 2/6/2020); hx cholecystectomy; and ir insert non tunl cvc over 5 yrs (2/9/2022). Home Medications:     Prior to Admission medications    Medication Sig Start Date End Date Taking? Authorizing Provider   clotrimazole-betamethasone (LOTRISONE) topical cream Apply  to affected area two (2) times a day. Yes Provider, Historical   rivaroxaban (Xarelto) 20 mg tab tablet Take 1 Tablet by mouth daily (with dinner). 21  Yes Rodolfo Norton MD   glipiZIDE (GLUCOTROL) 5 mg tablet Take 5 mg by mouth daily (with dinner). 21  Yes Provider, Historical   lisinopril (PRINIVIL, ZESTRIL) 10 mg tablet Take 10 mg by mouth nightly. Yes Provider, Historical   simvastatin (ZOCOR) 40 mg tablet Take 40 mg by mouth nightly. Yes Provider, Historical   predniSONE (DELTASONE) 20 mg tablet Please take 40mg starting 11/27 x 2 days then 20mg x 3 days then 10mg x 3 days then stop  Patient not taking: Reported on 21   Flavio Horn MD       Allergies/Social/Family History: Allergies   Allergen Reactions    Ampicillin Nausea and Vomiting    Iodine Hives, Itching and Nausea and Vomiting      Social History     Tobacco Use    Smoking status: Former Smoker     Packs/day: 2.00     Years: 50.00     Pack years: 100.00     Quit date: 2004     Years since quittin.7    Smokeless tobacco: Never Used   Substance Use Topics    Alcohol use: Yes     Comment: 1 beer or 1 glaas of wine daily most of adult life      Family History   Adopted: Yes   Family history unknown: Yes       Review of Systems:     5 point ROS is negative except per HPI.     Objective:   Vital Signs:  Visit Vitals  BP (!) 101/51   Pulse 73   Temp 98.2 °F (36.8 °C)   Resp 24   Ht 5' 8\" (1.727 m)   Wt 65.5 kg (144 lb 6.4 oz)   SpO2 93%   BMI 21.96 kg/m²    O2 Flow Rate (L/min): 60 l/min O2 Device: Heated,Hi flow nasal cannula Temp (24hrs), Av °F (36.7 °C), Min:97.4 °F (36.3 °C), Max:98.5 °F (36.9 °C)           Intake/Output:     Intake/Output Summary (Last 24 hours) at 2022 1347  Last data filed at 2022 1100  Gross per 24 hour   Intake 670 ml   Output 2220 ml   Net -1550 ml       Physical Exam:  Performed via video assessment. Gen: Patient is alert in NAD  HEENT: NC/AT, EOMI  Chest: Chest movement is equal bilaterally  Cardiac: Cardiac monitor reveals SR  Extremities: Extremities appear well perfused with no obvious edema  Neuro: Nonfocal    LABS AND  DATA: Personally reviewed  Recent Labs     02/11/22  0242 02/10/22  0057   WBC 4.1 5.3   HGB 10.9* 11.5*   HCT 31.7* 33.4*   PLT 48* 57*     Recent Labs     02/11/22  0242 02/10/22  0057   * 135*   K 4.5 4.6   CL 96* 99   CO2 33* 32   BUN 24* 23*   CREA 0.62* 0.70   * 119*   CA 8.0* 8.2*     Recent Labs     02/11/22  0242 02/10/22  0057   AP 76 65   TP 4.6* 5.2*   ALB 1.9* 2.0*   GLOB 2.7 3.2     No results for input(s): INR, PTP, APTT, INREXT in the last 72 hours. No results for input(s): PHI, PCO2I, PO2I, FIO2I in the last 72 hours. No results for input(s): CPK, CKMB, TROIQ, BNPP in the last 72 hours. Hemodynamics:   PAP:   CO:     Wedge:   CI:     CVP:    SVR:       PVR:       Ventilator Settings:  Mode Rate Tidal Volume Pressure FiO2 PEEP            100 %       Peak airway pressure:      Minute ventilation: 28.4 l/min        MEDS: Reviewed    Chest X-Ray:  CXR Results  (Last 48 hours)    None          Multidisciplinary Rounds Completed:  Yes    DISPOSITION  ICU    Critical Care Time  The patient is critically ill with acute respiratory failure with hypoxia. If I do not acutely intervene upon these illnesses, the patient's life is in danger. These illnesses have required me to: (1) perform high complexity decision making for assessment and support; (2) assess the patient via video; (3) personally review the medical record and laboratory and diagnostic imaging results; (4) actively titrate high-alert medications; (5) manage the ventilator and actively titrate oxygen; (6) discuss this patient's case management with other healthcare providers; and (7) apply and interpret advanced monitoring techniques.     As a result of this, I personally spent 35 minutes providing critical care services exclusively to this patient. This was in exclusion of the time spent performing procedures or teaching.     Cristal Baig DO, [facility]  2/11/2022

## 2022-02-11 NOTE — PROGRESS NOTES
Case Management:    RUR 21%    LOS 30 days,GLOS 5.4    Problems:     Covid pneumonia  acute respiratory failure with hypoxia  Poor appetite   History of afib,DM,carotid artery stenosis,bradycardia s/p pacemaker   Bipap./high flow oxygen  off of pressors  Heme/onc consultation  Off of gtts    Dick Dia

## 2022-02-12 NOTE — PROGRESS NOTES
Progress Note  Date:2022       Room:[address] [place] [address]  Patient Name:Orion Kirk     YOB: 1940     Age:81 y.o. Subjective    Subjective transitioned to 60L at 100% HFOT; afebrile; pain controlled  Review of Systems limited by resp status; no chest p[ain, nausea, vomiting, fever  Objective         Vitals Last 24 Hours:  TEMPERATURE:  Temp  Av.1 °F (36.7 °C)  Min: 97.6 °F (36.4 °C)  Max: 98.7 °F (37.1 °C)  RESPIRATIONS RANGE: Resp  Av.4  Min: 14  Max: 25  PULSE OXIMETRY RANGE: SpO2  Av.2 %  Min: 79 %  Max: 100 %  PULSE RANGE: Pulse  Av.2  Min: 70  Max: 94  BLOOD PRESSURE RANGE: Systolic (62EOO), RYM:777 , Min:95 , KLA:072   ; Diastolic (64YAT), BIH:72, Min:51, Max:103    I/O (24Hr):     Intake/Output Summary (Last 24 hours) at 2022 1024  Last data filed at 2022 1505  Gross per 24 hour   Intake 420 ml   Output 2650 ml   Net -2230 ml     Objective     Exam facilitated by use of telemedicine platform and with assistance from in house team  Gen  awake and interactive; no acute distress; short but complete sentences - becomes winded at end of conversation  Head  nc/at  Eyes  anicteric sclera; eomi  Ent  normal external anatomy  Cvs  sinus rhythm without external evidence of hypoperfusion  Pulm  mild increase in wob but adequate SaO2 on HFOT  GI-no evidence of tenderness with passive movement; no bruising or ecchymosis  Ext  no c/e  Msk  normal bulk  Neuro  oriented, no deficits in strength noted; no tremor; follows commands  Labs/Imaging/Diagnostics    Labs:  CBC:  Recent Labs     22  0422 22  0242 02/10/22  0057   WBC 5.2 4.1 5.3   RBC 3.63* 3.63* 3.81*   HGB 10.8* 10.9* 11.5*   HCT 31.7* 31.7* 33.4*   MCV 87.3 87.3 87.7   RDW 15.9* 16.0* 16.3*   PLT 56* 48* 57*     CHEMISTRIES:  Recent Labs     22  0422 22  0242 02/10/22  0057   * 132* 135*   K 4.1 4.5 4.6   CL 97 96* 99   CO2 34* 33* 32   BUN 20 24* 23* CA 7.8* 8.0* 8.2*   PHOS 3.3  --   --    PT/INR:No results for input(s): INR, INREXT in the last 72 hours. No lab exists for component: PROTIME  APTT:No results for input(s): APTT in the last 72 hours. LIVER PROFILE:  Recent Labs     02/12/22  0422 02/11/22  0242 02/10/22  0057   AST 31 38* 51*   ALT 88* 96* 91*     Lab Results   Component Value Date/Time    ALT (SGPT) 88 (H) 02/12/2022 04:22 AM    AST (SGOT) 31 02/12/2022 04:22 AM    Alk. phosphatase 103 02/12/2022 04:22 AM    Bilirubin, direct 0.3 (H) 02/12/2022 04:22 AM    Bilirubin, total 1.0 02/12/2022 04:22 AM       Imaging Last 24 Hours:  ECHO ADULT COMPLETE    Result Date: 2/11/2022    Left Ventricle: Left ventricle size is normal. Normal wall thickness. Mildly reduced left ventricular systolic function with a visually estimated EF of 45 - 50%. Septal bounce.   Right Ventricle: Right ventricle is mildly dilated. Normal wall thickness. Mildly reduced systolic function.   Aortic Valve: Moderately calcified cusps. Severe stenosis.   Mitral Valve: Mild mitral annular calcification.   Tricuspid Valve: Severely elevated RVSP.   Pulmonic Valve: The pulmonic valve was not well visualized. Velocity across pulmonic valve is moderately increased consistent with moderate pulmonic or suprapulmonic stenosis.      Assessment//Plan   Active Problems:    A-fib ([address] (2/4/2020)      History of CVA (cerebrovascular accident) (2/4/2020)      Type 2 diabetes mellitus without complication ([address] (6/0/5563)      Acute respiratory failure with hypoxia ([address] (1/11/2022)      Pneumonia due to COVID-19 virus (1/11/2022)      Hypertension ()      NSVT (nonsustained ventricular tachycardia) ([address] (1/22/2022)      Pacemaker (1/22/2022)      Palliative care encounter ()      Concern about end of life ()      Hypoxia ()      DNR (do not resuscitate) discussion ()      Goals of care, counseling/discussion ()      Thrombocytopenia ([facility] (2/11/2022)      Assessment & Plan 1. Acute respiratory failure with hypoxia  2. ARDS  3. COVID 19  4. MSSA bacteremia  5. COPD  6. DM2  7. A fib  8. HTN  9. DLD  10. Thrombocytopenia     Neuro  re jordan on precedex as means of improving tolerance of noninvasive oxygenation  Denied pain    Cvs  h/o PAF, NSVT, mild AoStenosis, s/p PPM, HTN   - has required vasopressors  but acceptable MAPs off this last 24hours  -notes indicate that PPm functional  - echo and repeat Cx pending    Pulm  ARF with hypoxia secondary to covid pna in setting of significant underlying lung disease (h/o tobacco abuse; CT with severe emphysema and fibrotic changes)    Remains on steroids/inh/nebs    GI - await BM and may need stronger regimen     - continued output from  Carvalho - may be able to d/c    Heme-monitor plts; lovenox stopped given the drop; SCDs for DVT proph without evidence of hemorrhage or VTE    ID-Blood cx growing MSSA. D/C vanco and cefepime and was started on ancef  -Echo read pending  -Blood cx to be repeated    Endo  h/o DM and on SSI/NPH; critical illness and steroids and inconsistent intake may complicate control  would err on side of mild hyperglycemia    cct 40 minutes excluding teaching and procedures    I performed all aspects of the physical examination via Telemedicine associated with two way audio and video communication and with the on-site assistance of the bedside nurse. I am located in [place] and the patient is located in [facility]   The patient is critically ill in the ICU. I  personally  reviewed the pertinent medical records, laboratory/ pathology data and radiographic images. The decision making regarding this patient is as documented above, which was generated  following  discussion  with the multidisciplinary ICU team and creation of a treatment plan for  the patient. We discussed the patient's interval history and future coordination of care and  plans.   The patient's medications were reviewed and changes made as stipulated above. Due to  critical illness impairing one or more vital organs of this patient resulting in life threatening clinical situation  I have provided direct, frequent personal  assessment and manipulation in management plan.     Electronically signed by Holli Leal MD on 2/12/2022 at 10:24 AM

## 2022-02-12 NOTE — PROGRESS NOTES
..Bedside and Verbal shift change report given to [facility], RN (oncoming nurse) by Efrem Bernal RN (offgoing nurse). Report included the following information SBAR, Kardex, Intake/Output, MAR, Recent Results, Cardiac Rhythm V PACED, Quality Measures and Dual Neuro Assessment. 0800 - ASSESSMENT AND ROUNDING WITH TELE ICU MD COMPLETED. PT A/O X4, AWAKE AND ALERT IN BED. PT DENIES PAIN. SAT 94% ON HI FLOW NC.  LINES AND LE ASSESSED. VS WNL. PT REPOSITIONS SELF.    1200 - NO CHANGES. LE AND LINES ASSESSED.    1600 - NO CHANGES. LE AND LINES ASSESSED.    1800 - PT COUGHED UP SMALL AMOUNT OF BLOOD INTO TISSUE. HE REPORTS THIS IS NOT NEW. Ginny Zapata .Bedside and Verbal shift change report given to YASMANI KEYES (oncoming nurse) by [facility], RN (offgoing nurse). Report included the following information SBAR, Kardex, Intake/Output, MAR, Recent Results, Cardiac Rhythm V PACED, Quality Measures and Dual Neuro Assessment.

## 2022-02-12 NOTE — ACP (ADVANCE CARE PLANNING)
Advance Care Planning     Advance Care Planning (ACP) Physician/NP/PA Conversation      Date of Conversation: 1/11/2022   Diagnosis: acute resp failure, COVID    Conducted with: Patient with Decision Making Capacity    Healthcare Decision Maker:     Primary Decision Maker (Active): Chloe Ravi - [phone]    Secondary Decision Maker: Edilberto Clancy - [phone]  Click here to complete [address] including selection of the Healthcare Decision Maker Relationship (ie \"Primary\")          Care Preferences:    Hospitalization: \"If your health worsens and it becomes clear that your chance of recovery is unlikely, what would be your preference regarding hospitalization? \"  Do everything for now    Ventilation: \"If you were unable to breathe on your own and your chance of recovery was unlikely, what would be your preference about the use of a ventilator (breathing machine) if it was available to you? \"   DNR    Resuscitation: \"In the event your heart stopped as a result of an underlying serious health condition, would you want attempts to be made to restart your heart, or would you prefer a natural death? \"   DNR    Conversation Outcomes / Follow-Up Plan:   Discussed with patient regarding current medical issues, prognosis, and treatment. We spoke about goals of care (including hospice) and code status. The patient wanted to be a DNR. He wanted to see if we can wean O2 low enough that he can go home. He understands that we can send him home with hospice, but does not want hospice at this time.   Cont all medical management for now     Length of Voluntary ACP Conversation in minutes:  20 minutes    Anne Marie Arriaga MD

## 2022-02-12 NOTE — PROGRESS NOTES
[facility]  [address] [facility], [address] [place] [address]  [phone]      Medical Progress Note      NAME: Bernadette Andres   :  1940  MRM:  [id number]    Date/Time of service: 2022  9:35 AM       Subjective:     Chief Complaint:  Patient was personally seen and examined by me during this time period. Chart reviewed. Still dyspneic, on hi flow. Granddaughter at bedside        Objective:       Vitals:       Last 24hrs VS reviewed since prior progress note.  Most recent are:    Visit Vitals  /73   Pulse 71   Temp 98.1 °F (36.7 °C)   Resp 16   Ht 5' 8\" (1.727 m)   Wt 65.5 kg (144 lb 6.4 oz)   SpO2 98%   BMI 21.96 kg/m²     SpO2 Readings from Last 6 Encounters:   22 98%   21 95%   21 93%   21 95%   20 98%   20 93%    O2 Flow Rate (L/min): 60 l/min       Intake/Output Summary (Last 24 hours) at 2022 0935  Last data filed at 2022 7892  Gross per 24 hour   Intake 420 ml   Output 2650 ml   Net -2230 ml        Exam:     Physical Exam:    Gen:  Disheveled, ill-appearing, mild distress  HEENT:  Pink conjunctivae, PERRL, hearing intact to voice, on hi flow  Neck:  Supple, without masses, thyroid non-tender  Resp:  ++ accessory muscle use, prolonged BS, +rhonchi   Card:  No murmurs, normal S1, S2 without thrills, bruits or peripheral edema  Abd:  Soft, non-tender, non-distended, normoactive bowel sounds are present  Musc:  No cyanosis or clubbing  Skin:  No rashes  Neuro:  Cranial nerves 3-12 are grossly intact, follows commands appropriately  Psych:  fair insight, oriented to person, place and time, alert    Medications Reviewed: (see below)    Lab Data Reviewed: (see below)    ______________________________________________________________________    Medications:     Current Facility-Administered Medications   Medication Dose Route Frequency    ceFAZolin (ANCEF) 1 g in 0.9% sodium chloride (MBP/ADV) 50 mL MBP  1 g IntraVENous Q8H And    ceFAZolin (ANCEF) 1 g in 0.9% sodium chloride (MBP/ADV) 50 mL MBP  1 g IntraVENous Q8H    famotidine (PEPCID) tablet 20 mg  20 mg Oral BID    melatonin tablet 6 mg  6 mg Oral QHS PRN    albuterol-ipratropium (DUO-NEB) 2.5 MG-0.5 MG/3 ML  3 mL Nebulization Q6H RT    PHENYLephrine (JOAN-SYNEPHRINE) 30 mg in 0.9% sodium chloride 250 mL infusion   mcg/min IntraVENous TITRATE    insulin NPH (NOVOLIN N, HUMULIN N) injection 15 Units  15 Units SubCUTAneous ACB&D    [Held by provider] enoxaparin (LOVENOX) injection 60 mg  60 mg SubCUTAneous Q12H    methylPREDNISolone (PF) (Solu-MEDROL) injection 40 mg  40 mg IntraVENous Q12H    arformoteroL (BROVANA) neb solution 15 mcg  15 mcg Nebulization BID RT    budesonide (PULMICORT) 500 mcg/2 ml nebulizer suspension  500 mcg Nebulization BID RT    artificial tears (dextran-hypromellose-glycerin) (GENTEAL) ophthalmic solution 1 Drop  1 Drop Both Eyes PRN    sodium chloride (OCEAN) 0.65 % nasal squeeze bottle 2 Spray  2 Spray Both Nostrils Q2H PRN    [Held by provider] metoprolol tartrate (LOPRESSOR) tablet 12.5 mg  12.5 mg Oral BID    guaiFENesin-dextromethorphan (ROBITUSSIN DM) 100-10 mg/5 mL syrup 5 mL  5 mL Oral Q6H PRN    [Held by provider] lisinopriL (PRINIVIL, ZESTRIL) tablet 10 mg  10 mg Oral QHS    atorvastatin (LIPITOR) tablet 20 mg  20 mg Oral DAILY    glucose chewable tablet 16 g  4 Tablet Oral PRN    dextrose (D50W) injection syrg 12.5-25 g  25-50 mL IntraVENous PRN    glucagon (GLUCAGEN) injection 1 mg  1 mg IntraMUSCular PRN    insulin lispro (HUMALOG) injection   SubCUTAneous AC&HS    sodium chloride (NS) flush 5-40 mL  5-40 mL IntraVENous Q8H    sodium chloride (NS) flush 5-40 mL  5-40 mL IntraVENous PRN    acetaminophen (TYLENOL) tablet 650 mg  650 mg Oral Q6H PRN    Or    acetaminophen (TYLENOL) suppository 650 mg  650 mg Rectal Q6H PRN    polyethylene glycol (MIRALAX) packet 17 g  17 g Oral DAILY PRN    ondansetron (Adelina Darrian ODT) tablet 4 mg  4 mg Oral Q8H PRN    Or    ondansetron (ZOFRAN) injection 4 mg  4 mg IntraVENous Q6H PRN          Lab Review:     Recent Labs     02/12/22  0422 02/11/22  0242 02/10/22  0057   WBC 5.2 4.1 5.3   HGB 10.8* 10.9* 11.5*   HCT 31.7* 31.7* 33.4*   PLT 56* 48* 57*     Recent Labs     02/12/22  0422 02/11/22  0242 02/10/22  0057   * 132* 135*   K 4.1 4.5 4.6   CL 97 96* 99   CO2 34* 33* 32   * 193* 119*   BUN 20 24* 23*   CREA 0.65* 0.62* 0.70   CA 7.8* 8.0* 8.2*   PHOS 3.3  --   --    ALB 2.0* 1.9* 2.0*   TBILI 1.0 1.4* 1.4*   ALT 88* 96* 91*     Lab Results   Component Value Date/Time    Glucose (POC) 224 (H) 02/12/2022 08:03 AM    Glucose (POC) 287 (H) 02/11/2022 10:10 PM    Glucose (POC) 256 (H) 02/11/2022 04:31 PM    Glucose (POC) 262 (H) 02/11/2022 11:11 AM    Glucose (POC) 210 (H) 02/11/2022 07:58 AM          Assessment / Plan:     80 yo hx of HTN, DM, Pafib s/p pacer, COPD, CVA, presented w/ resp failure, COVID pneumonia. Hospital course complicated by MSSA bacteremia, thrombocytopenia, pulm fibrosis    1) Acute resp failure/hypoxia: no improvement. Due to COVID, advanced COPD, pulm fibrosis. Poor prognosis. Patient does not want hospice for now. Cont hi-flow, bipap. Intensivist following    2) Pneumonia due to COVID-19/COPD exacerbation: s/p baricitinib. Cont IV steroids, nebs, LABA/ICS    3) MSSA bacteremia/septic shock: unclear source. Will repeat blood Cx. Cont pressors gtt, IV ancef. Consult ID    4) Pafib/HTN: s/p pacer. Holding BP meds due to hypotension. Hold Xarelto due to thrombocytopenia    5) Hx of CVA: cont statin    6) Thrombocytopenia: due to sepsis. Will monitor     7) DM type 2: A1C 7.2%. Hyperglycemia from steroids.   Will cont NPH, SSI    Code: Partial     Total time spent with patient: 39 Minutes **I personally saw and examined the patient during this time period**                 Care Plan discussed with: Patient, nursing, family     Discussed:  Care Plan    Prophylaxis:  SCD's    Disposition:  SNF/LTC           ___________________________________________________    Attending Physician: Tere Crump MD

## 2022-02-12 NOTE — PROGRESS NOTES
Problem: Falls - Risk of  Goal: *Absence of Falls  Description: Document Sonia Archer Fall Risk and appropriate interventions in the flowsheet. Outcome: Progressing Towards Goal  Note: Fall Risk Interventions:  Mobility Interventions: Bed/chair exit alarm    Mentation Interventions: Adequate sleep, hydration, pain control,Family/sitter at bedside,Toileting rounds,Update white board    Medication Interventions: Bed/chair exit alarm    Elimination Interventions: Bed/chair exit alarm,Call light in reach,Toileting schedule/hourly rounds    History of Falls Interventions: Bed/chair exit alarm         Problem: Patient Education: Go to Patient Education Activity  Goal: Patient/Family Education  Outcome: Progressing Towards Goal     Problem: Airway Clearance - Ineffective  Goal: Achieve or maintain patent airway  Outcome: Progressing Towards Goal     Problem: Gas Exchange - Impaired  Goal: Absence of hypoxia  Outcome: Progressing Towards Goal  Goal: Promote optimal lung function  Outcome: Progressing Towards Goal     Problem: Breathing Pattern - Ineffective  Goal: Ability to achieve and maintain a regular respiratory rate  Outcome: Progressing Towards Goal     Problem:  Body Temperature -  Risk of, Imbalanced  Goal: Ability to maintain a body temperature within defined limits  Outcome: Progressing Towards Goal  Goal: Will regain or maintain usual level of consciousness  Outcome: Progressing Towards Goal  Goal: Complications related to the disease process, condition or treatment will be avoided or minimized  Outcome: Progressing Towards Goal     Problem: Isolation Precautions - Risk of Spread of Infection  Goal: Prevent transmission of infectious organism to others  Outcome: Progressing Towards Goal     Problem: Nutrition Deficits  Goal: Optimize nutrtional status  Outcome: Progressing Towards Goal     Problem: Risk for Fluid Volume Deficit  Goal: Maintain normal heart rhythm  Outcome: Progressing Towards Goal  Goal: Maintain absence of muscle cramping  Outcome: Progressing Towards Goal  Goal: Maintain normal serum potassium, sodium, calcium, phosphorus, and pH  Outcome: Progressing Towards Goal     Problem: Loneliness or Risk for Loneliness  Goal: Demonstrate positive use of time alone when socialization is not possible  Outcome: Progressing Towards Goal     Problem: Fatigue  Goal: Verbalize increase energy and improved vitality  Outcome: Progressing Towards Goal     Problem: Patient Education: Go to Patient Education Activity  Goal: Patient/Family Education  Outcome: Progressing Towards Goal     Problem: Diabetes Self-Management  Goal: *Disease process and treatment process  Description: Define diabetes and identify own type of diabetes; list 3 options for treating diabetes. Outcome: Progressing Towards Goal  Goal: *Incorporating nutritional management into lifestyle  Description: Describe effect of type, amount and timing of food on blood glucose; list 3 methods for planning meals. Outcome: Progressing Towards Goal  Goal: *Incorporating physical activity into lifestyle  Description: State effect of exercise on blood glucose levels. Outcome: Progressing Towards Goal  Goal: *Developing strategies to promote health/change behavior  Description: Define the ABC's of diabetes; identify appropriate screenings, schedule and personal plan for screenings. Outcome: Progressing Towards Goal  Goal: *Using medications safely  Description: State effect of diabetes medications on diabetes; name diabetes medication taking, action and side effects. Outcome: Progressing Towards Goal  Goal: *Monitoring blood glucose, interpreting and using results  Description: Identify recommended blood glucose targets  and personal targets.   Outcome: Progressing Towards Goal  Goal: *Prevention, detection, treatment of acute complications  Description: List symptoms of hyper- and hypoglycemia; describe how to treat low blood sugar and actions for lowering  high blood glucose level. Outcome: Progressing Towards Goal  Goal: *Prevention, detection and treatment of chronic complications  Description: Define the natural course of diabetes and describe the relationship of blood glucose levels to long term complications of diabetes. Outcome: Progressing Towards Goal  Goal: *Developing strategies to address psychosocial issues  Description: Describe feelings about living with diabetes; identify support needed and support network  Outcome: Progressing Towards Goal  Goal: *Insulin pump training  Outcome: Progressing Towards Goal  Goal: *Sick day guidelines  Outcome: Progressing Towards Goal  Goal: *Patient Specific Goal (EDIT GOAL, INSERT TEXT)  Outcome: Progressing Towards Goal     Problem: Patient Education: Go to Patient Education Activity  Goal: Patient/Family Education  Outcome: Progressing Towards Goal     Problem: Patient Education: Go to Patient Education Activity  Goal: Patient/Family Education  Outcome: Progressing Towards Goal     Problem: Patient Education: Go to Patient Education Activity  Goal: Patient/Family Education  Outcome: Progressing Towards Goal     Problem: Pain  Goal: *Control of Pain  Outcome: Progressing Towards Goal     Problem: Patient Education: Go to Patient Education Activity  Goal: Patient/Family Education  Outcome: Progressing Towards Goal     Problem: Pressure Injury - Risk of  Goal: *Prevention of pressure injury  Description: Document Alexy Scale and appropriate interventions in the flowsheet.   Outcome: Progressing Towards Goal     Problem: Patient Education: Go to Patient Education Activity  Goal: Patient/Family Education  Outcome: Progressing Towards Goal     Problem: Delirium Treatment  Goal: *Level of consciousness restored to baseline  Outcome: Progressing Towards Goal  Goal: *Level of environmental perceptions restored to baseline  Outcome: Progressing Towards Goal  Goal: *Sensory perception restored to baseline  Outcome: Progressing Towards Goal  Goal: *Emotional stability restored to baseline  Outcome: Progressing Towards Goal  Goal: *Functional assessment restored to baseline  Outcome: Progressing Towards Goal  Goal: *Absence of falls  Outcome: Progressing Towards Goal  Goal: *Will remain free of delirium, CAM Score negative  Outcome: Progressing Towards Goal  Goal: *Cognitive status will be restored to baseline  Outcome: Progressing Towards Goal  Goal: Interventions  Outcome: Progressing Towards Goal     Problem: Patient Education: Go to Patient Education Activity  Goal: Patient/Family Education  Outcome: Progressing Towards Goal

## 2022-02-13 NOTE — PROGRESS NOTES
Progress Note  Date:2022       Room:[address] [place] [address]  Patient Name:Orion Cote     YOB: 1940     Age:81 y.o. Subjective    Subjective:  Symptoms:  He reports shortness of breath. No cough or chest pain. remains on HFOT  Review of Systems   Constitutional: Positive for activity change. Negative for appetite change, chills, fatigue and fever. Respiratory: Positive for shortness of breath. Negative for cough. Cardiovascular: Negative for chest pain and leg swelling. Gastrointestinal: Negative for abdominal distention and abdominal pain. Neurological: Negative for light-headedness and numbness. remainder of ros negative  Objective         Vitals Last 24 Hours:  TEMPERATURE:  Temp  Av °F (36.7 °C)  Min: 97.8 °F (36.6 °C)  Max: 98.3 °F (36.8 °C)  RESPIRATIONS RANGE: Resp  Av.9  Min: 14  Max: 30  PULSE OXIMETRY RANGE: SpO2  Av.1 %  Min: 80 %  Max: 100 %  PULSE RANGE: Pulse  Av.9  Min: 69  Max: 98  BLOOD PRESSURE RANGE: Systolic (45XMQ), YL , Min:109 , CSD:044   ; Diastolic (07XQD), VGY:77, Min:51, Max:80    I/O (24Hr): Intake/Output Summary (Last 24 hours) at 2022 1126  Last data filed at 2022 0809  Gross per 24 hour   Intake 370 ml   Output 1150 ml   Net -780 ml     Objective:  Vital signs: (most recent): Blood pressure 118/72, pulse 81, temperature 97.8 °F (36.6 °C), resp. rate 27, height 5' 8\" (1.727 m), weight 63.8 kg (140 lb 10.5 oz), SpO2 92 %. No fever.     Exam facilitated by use of telemedicine platform and with assistance from in house team  Gen  awake and interactive; no acute distress; short but complete sentences - becomes winded at end of conversation  Head  nc/at  Eyes  anicteric sclera; eomi  Ent  normal external anatomy  Cvs  sinus rhythm without external evidence of hypoperfusion  Pulm  mild increase in wob but adequate SaO2 on HFOT  GI-no evidence of tenderness with passive movement; no bruising or ecchymosis  Ext  no c/e  Msk  normal bulk  Neuro  oriented, no deficits in strength noted; no tremor; follows commands  Labs/Imaging/Diagnostics    Labs:  CBC:  Recent Labs     02/13/22 0314 02/12/22 0422 02/11/22 0242   WBC 5.8 5.2 4.1   RBC 3.56* 3.63* 3.63*   HGB 10.7* 10.8* 10.9*   HCT 31.6* 31.7* 31.7*   MCV 88.8 87.3 87.3   RDW 15.9* 15.9* 16.0*   PLT 67* 56* 48*     CHEMISTRIES:  Recent Labs     02/13/22 0314 02/12/22 0422 02/11/22 0242   * 135* 132*   K 4.4 4.1 4.5   CL 97 97 96*   CO2 34* 34* 33*   BUN 21* 20 24*   CA 7.9* 7.8* 8.0*   PHOS 2.8 3.3  --    MG 1.8 1.8  --    PT/INR:No results for input(s): INR, INREXT in the last 72 hours. No lab exists for component: PROTIME  APTT:No results for input(s): APTT in the last 72 hours. LIVER PROFILE:  Recent Labs     02/12/22 0422 02/11/22 0242   AST 31 38*   ALT 88* 96*     Lab Results   Component Value Date/Time    ALT (SGPT) 88 (H) 02/12/2022 04:22 AM    AST (SGOT) 31 02/12/2022 04:22 AM    Alk. phosphatase 103 02/12/2022 04:22 AM    Bilirubin, direct 0.3 (H) 02/12/2022 04:22 AM    Bilirubin, total 1.0 02/12/2022 04:22 AM       Imaging Last 24 Hours:  No results found. Assessment//Plan   Active Problems:    A-fib ([facility] [address] (2/4/2020)      History of CVA (cerebrovascular accident) (2/4/2020)      Type 2 diabetes mellitus without complication ([facility] (0/0/8418)      Acute respiratory failure with hypoxia ([facility] (1/11/2022)      Pneumonia due to COVID-19 virus (1/11/2022)      Hypertension ()      NSVT (nonsustained ventricular tachycardia) ([facility] (1/22/2022)      Pacemaker (1/22/2022)      Palliative care encounter ()      Concern about end of life ()      Hypoxia ()      DNR (do not resuscitate) discussion ()      Goals of care, counseling/discussion ()      Thrombocytopenia ([facility] [address] (2/11/2022)      Assessment & Plan  1. Acute respiratory failure with hypoxia  2. ARDS  3. COVID 19  4. MSSA bacteremia  5. COPD  6. DM2  7.  A fib  8. HTN  9. DLD  10. Thrombocytopenia     Neuro  mild anxiety but improved (in error I stated he was on precedex)  Denied pain     Cvs  h/o PAF, NSVT, mild AoStenosis, s/p PPM, HTN   - has required vasopressors previously  but acceptable MAPs without support  - notes indicate that PPm functional  - repeat BCx NGTD  - echo 2/11 revealed:    Left Ventricle: Left ventricle size is normal. Normal wall thickness. Mildly reduced left ventricular systolic function with a visually estimated EF of 45 - 50%. Septal bounce.   Right Ventricle: Right ventricle is mildly dilated. Normal wall thickness. Mildly reduced systolic function.   Aortic Valve: Moderately calcified cusps. Severe stenosis.   Mitral Valve: Mild mitral annular calcification.   Tricuspid Valve: Severely elevated RVSP.   Pulmonic Valve: The pulmonic valve was not well visualized.   Velocity across pulmonic valve is moderately increased consistent with moderate pulmonic or suprapulmonic stenosis.     Pulm  ARF with hypoxia secondary to covid pna in setting of significant underlying lung disease (h/o tobacco abuse; CT with severe emphysema and fibrotic changes)  -Remains on steroids/inh/nebs  - he noted that BiPAP more comfortable when he wear his dentures but declined to do so last night; will try to remember for tonight     GI - had BM and unintentional incontinence, if the latter continues will adjut regimen      - monitor indices of renal function     Heme-monitor plts; lovenox stopped given the drop; SCDs for DVT proph without evidence of hemorrhage or VTE     ID-Blood cx growing MSSA.  D/C vanco and cefepime and was started on ancef  -Echo without obvious vegetations and repeat Bcx negative     Endo  h/o DM and on SSI/NPH; critical illness and steroids and inconsistent intake may complicate control  would err on side of mild hyperglycemia     cct 35 minutes excluding teaching and procedures     I performed all aspects of the physical examination via Telemedicine associated with two way audio and video communication and with the on-site assistance of the bedside nurse. Onofre Méndez jesse located in [place] and the patient is located in [facility]   The patient is critically ill in the ICU. Nathan Lebron  reviewed the pertinent medical records, laboratory/ pathology data and radiographic images.  The decision making regarding this patient is as documented above, which was generated  following  discussion  with the multidisciplinary ICU team and creation of a treatment plan for  the patient. We discussed the patient's interval history and future coordination of care and Nakul Paz patient's medications  were reviewed and changes made as stipulated above.  Due to  critical illness impairing one or more vital organs of this patient resulting in life threatening clinical situation  I have provided direct, frequent personal  assessment and manipulation in management plan.     Electronically signed by Benjamin Hudson MD on 2/13/2022 at 11:26 AM

## 2022-02-13 NOTE — PROGRESS NOTES
ID Note:    Patient seen and examined this afternoon. Obtained nursing updates. Patient afebrile now, and off pressors. Reported feeling great today and had no acute complaints. Blood cultures from 2/9/22 growing MSSA in 2/2 bottles. Repeat sent 2/12 negative so far. TTE 2/11 no mention of vegetations. If persistent bacteremia is seen, then WAYNE is indicated given patient has a pacemaker. Continue cefazolin. Duration to be decided pending clinical course.          Ankush Mao MD  Infectious Diseases

## 2022-02-13 NOTE — PROGRESS NOTES
Neuro: A&Ox4, follows command, moves all extremities  Cardiac: Afib on monitor with V paced. Normotensive  Respiratory: On heated high flow at 60L 100% all day. O2 >90%, desats with movement  GI: Regular diet. 3 bm today  : gan intact with adequate output  Progress Notes:  Afebrile  Will continue to monitor  Problem: Falls - Risk of  Goal: *Absence of Falls  Description: Document [facility] Fall Risk and appropriate interventions in the flowsheet. Outcome: Progressing Towards Goal  Note: Fall Risk Interventions:  Mobility Interventions: Communicate number of staff needed for ambulation/transfer,Bed/chair exit alarm    Mentation Interventions: Evaluate medications/consider consulting pharmacy,Door open when patient unattended,Bed/chair exit alarm    Medication Interventions: Bed/chair exit alarm,Evaluate medications/consider consulting pharmacy,Patient to call before getting OOB    Elimination Interventions: Patient to call for help with toileting needs,Call light in reach,Bed/chair exit alarm,Toileting schedule/hourly rounds    History of Falls Interventions: Bed/chair exit alarm,Door open when patient unattended,Evaluate medications/consider consulting pharmacy,Room close to nurse's station         Problem: Airway Clearance - Ineffective  Goal: Achieve or maintain patent airway  Outcome: Progressing Towards Goal     Problem: Gas Exchange - Impaired  Goal: Absence of hypoxia  Outcome: Progressing Towards Goal     Problem: Body Temperature -  Risk of, Imbalanced  Goal: Ability to maintain a body temperature within defined limits  Outcome: Progressing Towards Goal     Problem: Risk for Fluid Volume Deficit  Goal: Maintain normal heart rhythm  Outcome: Progressing Towards Goal     Problem: Diabetes Self-Management  Goal: *Disease process and treatment process  Description: Define diabetes and identify own type of diabetes; list 3 options for treating diabetes.   Outcome: Progressing Towards Goal

## 2022-02-13 NOTE — PROGRESS NOTES
[facility] [place] [address]  [address]  [phone]      Medical Progress Note      NAME: Deena Dorsey   :  1940  MRM:  [id number]    Date/Time of service: 2022  9:04 AM       Subjective:     Chief Complaint:  Patient was personally seen and examined by me during this time period. Chart reviewed. Dyspnea about the same. No chest pain       Objective:       Vitals:       Last 24hrs VS reviewed since prior progress note.  Most recent are:    Visit Vitals  /72 (BP 1 Location: Right arm, BP Patient Position: At rest)   Pulse 81   Temp 98.2 °F (36.8 °C)   Resp 27   Ht 5' 8\" (1.727 m)   Wt 63.8 kg (140 lb 10.5 oz)   SpO2 92%   BMI 21.39 kg/m²     SpO2 Readings from Last 6 Encounters:   22 92%   21 95%   21 93%   21 95%   20 98%   20 93%    O2 Flow Rate (L/min): 60 l/min       Intake/Output Summary (Last 24 hours) at 2022 6625  Last data filed at 2022 0809  Gross per 24 hour   Intake 490 ml   Output 1575 ml   Net -1085 ml        Exam:     Physical Exam:    Gen:  Disheveled, ill-appearing, NAD but dyspneic with movements   HEENT:  Pink conjunctivae, PERRL, hearing intact to voice, on hi flow  Neck:  Supple, without masses, thyroid non-tender  Resp:  ++ accessory muscle use, prolonged BS, +rhonchi   Card:  No murmurs, normal S1, S2 without thrills, bruits or peripheral edema  Abd:  Soft, non-tender, non-distended, normoactive bowel sounds are present  Musc:  No cyanosis or clubbing  Skin:  No rashes  Neuro:  Cranial nerves 3-12 are grossly intact, follows commands appropriately  Psych:  fair insight, oriented to person, place and time, alert    Medications Reviewed: (see below)    Lab Data Reviewed: (see below)    ______________________________________________________________________    Medications:     Current Facility-Administered Medications   Medication Dose Route Frequency    insulin NPH (NOVOLIN N, HUMULIN N) injection 16 Units  16 Units SubCUTAneous ACB&D    ceFAZolin (ANCEF) 1 g in 0.9% sodium chloride (MBP/ADV) 50 mL MBP  1 g IntraVENous Q8H    And    ceFAZolin (ANCEF) 1 g in 0.9% sodium chloride (MBP/ADV) 50 mL MBP  1 g IntraVENous Q8H    famotidine (PEPCID) tablet 20 mg  20 mg Oral BID    melatonin tablet 6 mg  6 mg Oral QHS PRN    albuterol-ipratropium (DUO-NEB) 2.5 MG-0.5 MG/3 ML  3 mL Nebulization Q6H RT    PHENYLephrine (JOAN-SYNEPHRINE) 30 mg in 0.9% sodium chloride 250 mL infusion   mcg/min IntraVENous TITRATE    [Held by provider] enoxaparin (LOVENOX) injection 60 mg  60 mg SubCUTAneous Q12H    methylPREDNISolone (PF) (Solu-MEDROL) injection 40 mg  40 mg IntraVENous Q12H    arformoteroL (BROVANA) neb solution 15 mcg  15 mcg Nebulization BID RT    budesonide (PULMICORT) 500 mcg/2 ml nebulizer suspension  500 mcg Nebulization BID RT    artificial tears (dextran-hypromellose-glycerin) (GENTEAL) ophthalmic solution 1 Drop  1 Drop Both Eyes PRN    sodium chloride (OCEAN) 0.65 % nasal squeeze bottle 2 Spray  2 Spray Both Nostrils Q2H PRN    [Held by provider] metoprolol tartrate (LOPRESSOR) tablet 12.5 mg  12.5 mg Oral BID    guaiFENesin-dextromethorphan (ROBITUSSIN DM) 100-10 mg/5 mL syrup 5 mL  5 mL Oral Q6H PRN    [Held by provider] lisinopriL (PRINIVIL, ZESTRIL) tablet 10 mg  10 mg Oral QHS    atorvastatin (LIPITOR) tablet 20 mg  20 mg Oral DAILY    glucose chewable tablet 16 g  4 Tablet Oral PRN    dextrose (D50W) injection syrg 12.5-25 g  25-50 mL IntraVENous PRN    glucagon (GLUCAGEN) injection 1 mg  1 mg IntraMUSCular PRN    insulin lispro (HUMALOG) injection   SubCUTAneous AC&HS    sodium chloride (NS) flush 5-40 mL  5-40 mL IntraVENous Q8H    sodium chloride (NS) flush 5-40 mL  5-40 mL IntraVENous PRN    acetaminophen (TYLENOL) tablet 650 mg  650 mg Oral Q6H PRN    Or    acetaminophen (TYLENOL) suppository 650 mg  650 mg Rectal Q6H PRN    polyethylene glycol (MIRALAX) packet 17 g  17 g Oral DAILY PRN    ondansetron (ZOFRAN ODT) tablet 4 mg  4 mg Oral Q8H PRN    Or    ondansetron (ZOFRAN) injection 4 mg  4 mg IntraVENous Q6H PRN          Lab Review:     Recent Labs     02/13/22 0314 02/12/22 0422 02/11/22 0242   WBC 5.8 5.2 4.1   HGB 10.7* 10.8* 10.9*   HCT 31.6* 31.7* 31.7*   PLT 67* 56* 48*     Recent Labs     02/13/22 0314 02/12/22 0422 02/11/22 0242   * 135* 132*   K 4.4 4.1 4.5   CL 97 97 96*   CO2 34* 34* 33*   * 199* 193*   BUN 21* 20 24*   CREA 0.67* 0.65* 0.62*   CA 7.9* 7.8* 8.0*   MG  --  1.8  --    PHOS 2.8 3.3  --    ALB  --  2.0* 1.9*   TBILI  --  1.0 1.4*   ALT  --  88* 96*     Lab Results   Component Value Date/Time    Glucose (POC) 164 (H) 02/13/2022 07:50 AM    Glucose (POC) 258 (H) 02/12/2022 09:47 PM    Glucose (POC) 253 (H) 02/12/2022 04:26 PM    Glucose (POC) 242 (H) 02/12/2022 11:59 AM    Glucose (POC) 224 (H) 02/12/2022 08:03 AM          Assessment / Plan:     80 yo hx of HTN, DM, Pafib s/p pacer, COPD, CVA, presented w/ resp failure, COVID pneumonia. Hospital course complicated by MSSA bacteremia, thrombocytopenia, pulm fibrosis    1) Acute resp failure/hypoxia: slow to improve. Due to COVID, advanced COPD, pulm fibrosis. Poor prognosis. Patient does not want hospice for now. Cont hi-flow, bipap. Intensivist following    2) Pneumonia due to COVID-19/COPD exacerbation: s/p baricitinib. Cont IV steroids, nebs, LABA/ICS    3) MSSA bacteremia/septic shock: shock resolved. Unclear source. Repeat blood Cx neg. TTE neg vegetations. Cont IV ancef. ID following    4) Pafib/HTN: s/p pacer. Restart low dose metoprolol. Hold Xarelto due to thrombocytopenia    5) Hx of CVA: cont statin    6) Thrombocytopenia: slowly improving. Due to sepsis. Holding lovenox. Will monitor     7) DM type 2: A1C 7.2%. Hyperglycemia from steroids.   Will cont NPH, SSI    Code: Partial     Total time spent with patient: 35 Minutes **I personally saw and examined the patient during this time period**                 Care Plan discussed with: Patient, nursing    Discussed:  Care Plan    Prophylaxis:  SCD's, restart lovenox if plts better     Disposition:  SNF/LTC           ___________________________________________________    Attending Physician: Yoselin Reese MD

## 2022-02-13 NOTE — CONSULTS
Infectious Disease Consult Note    Reason for Consult: MSSA bacteremia  Date of Consultation: February 13, 2022  Date of Admission: 1/11/2022  Referring Physician: Dr. Pat Asencio      HPI:  Rachelle Haddad is a 79y.o. year old male with history of pacemaker, who has been in the hospital since Jan 2022. Initially admitted for Covid pneumonia. On 2/8/22 admitted to the ICU for AHRF. CT Chest 2/7 had shown extensive emphysema, and focal airspace opacity in the right upper lobe laterally may represent infection. Patient also had a fever on 2/8. Blood cultures 2/9 grew MSSA in 2/2 bottles from right arm. Was transferred to ICU for HFNC. Vancomycin, cefepime started on 2/9, and then deescalated to cefazolin on 2/11/22. Patient not on pressors. On exam on 2/12, reported feeling well. Past Medical History:  Past Medical History:   Diagnosis Date    Atrial fibrillation ([address]     Diabetes mellitus, type 2 ([address]     Hypertension     Internal carotid artery stenosis, right     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 Moderate aortic valve stenosis was present. LVEF 55-60% .  Stroke [facility])     asymptomatic; small chronic infarcts in the bilateral thalami per MRI of 2/5/2020.   Previous MRI of 9/21/2010 showed possible tiny old right thalamic infarct versus perivascular space          Surgical History:  Past Surgical History:   Procedure Laterality Date    HX CHOLECYSTECTOMY      IR INSERT NON TUNL CVC OVER 5 YRS  2/9/2022    VA INS NEW/RPLCMT PRM PM W/TRANSV ELTRD ATRIAL&VENT N/A 2/6/2020    INSERT PPM DUAL performed by Raul Rhoades MD at [facility] CATH LAB         Family History: Family History   Adopted: Yes   Family history unknown: Yes         Social History:     · Not pertinent at this time. Allergies: Allergies   Allergen Reactions    Ampicillin Nausea and Vomiting    Iodine Hives, Itching and Nausea and Vomiting         Review of Systems:     Negative except as in HPI    Medications:  No current facility-administered medications on file prior to encounter. Current Outpatient Medications on File Prior to Encounter   Medication Sig Dispense Refill    clotrimazole-betamethasone (LOTRISONE) topical cream Apply  to affected area two (2) times a day.  rivaroxaban (Xarelto) 20 mg tab tablet Take 1 Tablet by mouth daily (with dinner). 28 Tablet 0    glipiZIDE (GLUCOTROL) 5 mg tablet Take 5 mg by mouth daily (with dinner).  lisinopril (PRINIVIL, ZESTRIL) 10 mg tablet Take 10 mg by mouth nightly.  simvastatin (ZOCOR) 40 mg tablet Take 40 mg by mouth nightly.       predniSONE (DELTASONE) 20 mg tablet Please take 40mg starting 11/27 x 2 days then 20mg x 3 days then 10mg x 3 days then stop (Patient not taking: Reported on 1/18/2022) 9 Tablet 0           Physical Exam:    Vitals:   Patient Vitals for the past 24 hrs:   Temp Pulse Resp BP SpO2   02/13/22 1700  82 22 127/67 90 %   02/13/22 1600 97.7 °F (36.5 °C) 81 22 (!) 128/57 92 %   02/13/22 1501  73      02/13/22 1500  71 17 (!) 117/54 (!) 89 %   02/13/22 1400  80 21 (!) 110/54 90 %   02/13/22 1300  95 (!) 33 (!) 116/54 (!) 85 %   02/13/22 1200 97.8 °F (36.6 °C) (!) 101 28 (!) 145/65 (!) 88 %   02/13/22 1100  85 30 (!) 107/47 91 %   02/13/22 1000  92 24 114/64 90 %   02/13/22 0900  (!) 102 26 (!) 129/54 95 %   02/13/22 0800 98.3 °F (36.8 °C) 81 27 118/72 92 %   02/13/22 0756     93 %   02/13/22 0701  69      02/13/22 0700  70 20 118/64 97 %   02/13/22 0600  79 17 (!) 141/68 93 %   02/13/22 0517     98 %   02/13/22 0500  77 28 128/80 100 %   02/13/22 0400 98.2 °F (36.8 °C) 86 30 (!) 151/71 97 %   02/13/22 0300  77 24 133/75 97 %   02/13/22 0200  72 14 125/62 95 %   02/13/22 0100  79 29 (!) 148/79 94 %   02/13/22 0000 97.9 °F (36.6 °C) 89 26 (!) 170/78 100 %   02/12/22 2309  79      02/12/22 2300  86 24 (!) 122/53 (!) 82 %   02/12/22 2200  86 23 122/69 (!) 83 %   02/12/22 2100  90 30 (!) 126/56 (!) 80 %   02/12/22 2000 97.9 °F (36.6 °C) 78 26 (!) 123/57 (!) 84 %   02/12/22 1900  80 24 (!) 109/59 90 %   02/12/22 1800  98 27 (!) 118/56 90 %   ·   · GEN: NAD  · HEENT: Normocephalic, atraumatic, PERRL, no scleral icterus,  no cervical, no lymphadenopathy, no sinus tenderness, no thrush  · CV: S1, S2 heard regularly, no murmurs, thrills or rubs heard   · Lungs: Clear to auscultation bilaterally  · Abdomen: soft, non distended, non tender,  · Genitourinary:  no gan  · Extremities: no edema  · Neuro: Alert, oriented to time, place and situation, moves all extremities to commands, verbal   · Skin: no rash  · Psych: good affect, good eye contact, non tearful   · Lines:       Labs:   Recent Results (from the past 24 hour(s))   GLUCOSE, POC    Collection Time: 02/12/22  9:47 PM   Result Value Ref Range    Glucose (POC) 258 (H) 65 - 117 mg/dL    Performed by [employer]    METABOLIC PANEL, BASIC    Collection Time: 02/13/22  3:14 AM   Result Value Ref Range    Sodium 135 (L) 136 - 145 mmol/L    Potassium 4.4 3.5 - 5.1 mmol/L    Chloride 97 97 - 108 mmol/L    CO2 34 (H) 21 - 32 mmol/L    Anion gap 4 (L) 5 - 15 mmol/L    Glucose 225 (H) 65 - 100 mg/dL    BUN 21 (H) 6 - 20 MG/DL    Creatinine 0.67 (L) 0.70 - 1.30 MG/DL    BUN/Creatinine ratio 31 (H) 12 - 20      GFR est AA >60 >60 ml/min/1.73m2    GFR est non-AA >60 >60 ml/min/1.73m2    Calcium 7.9 (L) 8.5 - 10.1 MG/DL   MAGNESIUM    Collection Time: 02/13/22  3:14 AM   Result Value Ref Range    Magnesium 1.8 1.6 - 2.4 mg/dL   PHOSPHORUS    Collection Time: 02/13/22  3:14 AM   Result Value Ref Range    Phosphorus 2.8 2.6 - 4.7 MG/DL   CBC W/O DIFF Collection Time: 02/13/22  3:14 AM   Result Value Ref Range    WBC 5.8 4.1 - 11.1 K/uL    RBC 3.56 (L) 4.10 - 5.70 M/uL    HGB 10.7 (L) 12.1 - 17.0 g/dL    HCT 31.6 (L) 36.6 - 50.3 %    MCV 88.8 80.0 - 99.0 FL    MCH 30.1 26.0 - 34.0 PG    MCHC 33.9 30.0 - 36.5 g/dL    RDW 15.9 (H) 11.5 - 14.5 %    PLATELET 67 (L) 207 - 400 K/uL    MPV 11.3 8.9 - 12.9 FL    NRBC 0.7 (H) 0  WBC    ABSOLUTE NRBC 0.04 (H) 0.00 - 0.01 K/uL   C REACTIVE PROTEIN, QT    Collection Time: 02/13/22  3:14 AM   Result Value Ref Range    C-Reactive protein 5.40 (H) 0.00 - 0.60 mg/dL   D DIMER    Collection Time: 02/13/22  3:14 AM   Result Value Ref Range    D-dimer 1.71 (H) 0.00 - 0.65 mg/L FEU   CULTURE, BLOOD    Collection Time: 02/13/22  3:14 AM    Specimen: Blood   Result Value Ref Range    Special Requests: NO SPECIAL REQUESTS      Culture result: NO GROWTH AFTER 2 HOURS     GLUCOSE, POC    Collection Time: 02/13/22  7:50 AM   Result Value Ref Range    Glucose (POC) 164 (H) 65 - 117 mg/dL    Performed by [employer]    GLUCOSE, POC    Collection Time: 02/13/22 10:43 AM   Result Value Ref Range    Glucose (POC) 211 (H) 65 - 117 mg/dL    Performed by [employer]    GLUCOSE, POC    Collection Time: 02/13/22  4:30 PM   Result Value Ref Range    Glucose (POC) 306 (H) 65 - 117 mg/dL    Performed by [employer]        Microbiology Data: In HPI        Assessment/Recommendations:  Patient seen and examined 2/12/22. Obtained nursing updates. Patient afebrile now, and off pressors. Reported feeling great today and had no acute complaints.      Blood cultures from 2/9/22 growing MSSA in 2/2 bottles. Repeat sent 2/12 negative so far. TTE 2/11 no mention of vegetations. Also with likely superimposed MSSA pneumonia in the setting of Covid-19 pneumonia in Jan 2022.     If persistent bacteremia is seen, then WAYNE is indicated given patient has a pacemaker.      Continue cefazolin.    Duration to be decided pending clinical course. Dr Nura Marcelo will resume care from 2/14/22. Thank for the opportunity to participate in the care of this patient. Please contact with questions or concerns.            Jo Byrd MD  Infectious Diseases

## 2022-02-14 NOTE — PROGRESS NOTES
Physical Therapy  2/14/2022    Chart reviewed and spoke with RN. Cleared pt for PT though states he has been sleeping all day. Approached pt and offered PT services though pt politely declining at this time d/t desire to continue resting. Will follow up next treatment day.     Thank Cristy Bass, PT, DPT

## 2022-02-14 NOTE — PROGRESS NOTES
[facility] Progress Note           Brain Alonzo DO    [phone] Office  [phone]  Fax    2022      Assessment & Plan:   1. MSSA bacteremia. Suspect due to pneumonia. Repeat blood cultures sterile to date. TTE negative for vegetation however with history of pacemaker patient would benefit from WAYNE. Hold off for now due to patient's respiratory status. 2. Acute hypoxic respiratory failure. Multifactorial including recent Covid, COPD, and pulmonary fibrosis  3. Diabetes mellitus. Hemoglobin A1c 7.2  4. Paroxysmal atrial fibrillation with history of pacemaker  5. Recent COVID-19 pneumonia. Received baricitinib. Remains on steroids  6. History of ampicillin allergy. This caused nausea and vomiting          Subjective:     No new complaints    Objective:     Vitals:   Visit Vitals  BP (!) 110/59   Pulse 74   Temp 98 °F (36.7 °C)   Resp 20   Ht 5' 8\" (1.727 m)   Wt 140 lb 10.5 oz (63.8 kg)   SpO2 98%   BMI 21.39 kg/m²        Tmax:  Temp (24hrs), Av.7 °F (36.5 °C), Min:97 °F (36.1 °C), Max:98 °F (36.7 °C)      Exam:   Patient is intubated:  no    Physical Examination:   General:   NAD. On BiPAP   Head:  Normocephalic, atraumatic. Eyes:  Conjunctivae clear   Neck: Supple       Lungs:   No distress. Clear to auscultation bilaterally. Chest wall:     Heart:  Regular rate and rhythm,    Abdomen:    non-distended   Extremities: Moves all. Skin:  No rash   Neurologic:  Unable to assess     Labs:        No lab exists for component: ITNL   No results for input(s): CPK, CKMB, TROIQ in the last 72 hours.   Recent Labs     22  0248 22  0314 22  0422   * 135* 135*   K 4.5 4.4 4.1   CL 95* 97 97   CO2 33* 34* 34*   BUN 20 21* 20   CREA 0.56* 0.67* 0.65*   * 225* 199*   PHOS 3.1 2.8 3.3   MG 1.7 1.8 1.8   ALB  --   --  2.0*   WBC 5.9 5.8 5.2   HGB 10.8* 10.7* 10.8*   HCT 32.3* 31.6* 31.7*   PLT 73* 67* 56*     No results for input(s): INR, PTP, APTT, INREXT in the last 72 hours. Needs: urine analysis, urine sodium, protein and creatinine  No results found for: DIYA, CREAU      Cultures:     No results found for: SDES  Lab Results   Component Value Date/Time    Culture result: NO GROWTH 1 DAY 02/13/2022 03:14 AM    Culture result: NO GROWTH 2 DAYS 02/12/2022 04:22 AM    Culture result:  02/09/2022 01:20 PM     MRSA NOT PRESENT. Apparent Staphylococus aureus (not MRSA noted). Culture result:  02/09/2022 01:20 PM     Screening of patient nares for MRSA is for surveillance purposes and, if positive, to facilitate isolation considerations in high risk settings. It is not intended for automatic decolonization interventions per se as regimens are not sufficiently effective to warrant routine use.        Radiology:     Medications       Current Facility-Administered Medications   Medication Dose Route Frequency Last Admin    rivaroxaban (XARELTO) tablet 20 mg  20 mg Oral DAILY 20 mg at 02/14/22 0931    QUEtiapine (SEROquel) tablet 100 mg  100 mg Oral QHS      [START ON 2/15/2022] methylPREDNISolone (PF) (SOLU-MEDROL) injection 40 mg  40 mg IntraVENous Q24H      insulin NPH (NOVOLIN N, HUMULIN N) injection 18 Units  18 Units SubCUTAneous ACB&D 18 Units at 02/14/22 0928    ceFAZolin (ANCEF) 1 g in 0.9% sodium chloride (MBP/ADV) 50 mL MBP  1 g IntraVENous Q8H 1 g at 02/14/22 0931    And    ceFAZolin (ANCEF) 1 g in 0.9% sodium chloride (MBP/ADV) 50 mL MBP  1 g IntraVENous Q8H 1 g at 02/14/22 0833    famotidine (PEPCID) tablet 20 mg  20 mg Oral BID 20 mg at 02/14/22 0834    melatonin tablet 6 mg  6 mg Oral QHS PRN 6 mg at 02/13/22 2225    albuterol-ipratropium (DUO-NEB) 2.5 MG-0.5 MG/3 ML  3 mL Nebulization Q6H RT 3 mL at 02/14/22 0917    arformoteroL (BROVANA) neb solution 15 mcg  15 mcg Nebulization BID RT 15 mcg at 02/14/22 0925    budesonide (PULMICORT) 500 mcg/2 ml nebulizer suspension  500 mcg Nebulization BID  mcg at 02/14/22 0925    artificial tears (dextran-hypromellose-glycerin) (GENTEAL) ophthalmic solution 1 Drop  1 Drop Both Eyes PRN      sodium chloride (OCEAN) 0.65 % nasal squeeze bottle 2 Spray  2 Spray Both Nostrils Q2H PRN      metoprolol tartrate (LOPRESSOR) tablet 12.5 mg  12.5 mg Oral BID 12.5 mg at 02/14/22 0834    guaiFENesin-dextromethorphan (ROBITUSSIN DM) 100-10 mg/5 mL syrup 5 mL  5 mL Oral Q6H PRN 5 mL at 01/16/22 1137    [Held by provider] lisinopriL (PRINIVIL, ZESTRIL) tablet 10 mg  10 mg Oral QHS 10 mg at 02/08/22 2146    atorvastatin (LIPITOR) tablet 20 mg  20 mg Oral DAILY 20 mg at 02/14/22 0834    glucose chewable tablet 16 g  4 Tablet Oral PRN      dextrose (D50W) injection syrg 12.5-25 g  25-50 mL IntraVENous PRN      glucagon (GLUCAGEN) injection 1 mg  1 mg IntraMUSCular PRN      insulin lispro (HUMALOG) injection   SubCUTAneous AC&HS 2 Units at 02/14/22 0928    sodium chloride (NS) flush 5-40 mL  5-40 mL IntraVENous Q8H 10 mL at 02/14/22 0607    sodium chloride (NS) flush 5-40 mL  5-40 mL IntraVENous PRN      acetaminophen (TYLENOL) tablet 650 mg  650 mg Oral Q6H  mg at 02/12/22 7022    Or    acetaminophen (TYLENOL) suppository 650 mg  650 mg Rectal Q6H  mg at 02/09/22 0235    polyethylene glycol (MIRALAX) packet 17 g  17 g Oral DAILY PRN      ondansetron (ZOFRAN ODT) tablet 4 mg  4 mg Oral Q8H PRN      Or    ondansetron (ZOFRAN) injection 4 mg  4 mg IntraVENous Q6H PRN             Case discussed with:      Eleni Virk DO

## 2022-02-14 NOTE — PROGRESS NOTES
0700- Bedside and Verbal shift change report given to Stacey (oncoming nurse) by Yasmeen Marshall (offgoing nurse).  Report included the following information SBAR, ED Summary, Intake/Output, MAR, Med Rec Status, Cardiac Rhythm afib, Alarm Parameters  and Pre Procedure Checklist.

## 2022-02-14 NOTE — PROGRESS NOTES
Physician Progress Note      PATIENT:               Brittanie Murphy  CSN #:                  [id number]  :                       1940  ADMIT DATE:       2022 6:13 PM  [address] [place] DATE:  RESPONDING  PROVIDER #:        Danny Herron DO, MD          QUERY TEXT:    Good Morning    This patient admitted for COVID Pneumonia. On , the patient noted with fever up to 100.9 hr in the --and cont. worsening resp. status, (up to 33)- 3/4 SIRS)Systolic bp in the 97T and transferred to ICU. Blood cx on - +++ MSSA    If possible, please document in the progress notes and discharge summary if you are evaluating and /or treating any of the following: The medical record reflects the following:  Risk Factors: COVID Pneumonia, Resp. Failure requiring cont. BIPAP  Clinical Indicators: PT with temp @ 100.9 , -, resp  on --BC growing MSSA in 2/2 bottles. Treatment: ID consulted, BC, Transferred to ICU, CXR, cont. on Cefazolin,    Thank you for clarifying,  Sruthi Granda, [address] [place] [address], [facility]  [phone]  Options provided:  -- Sepsis, present on admission  -- Sepsis, present on admission now resolved  -- Sepsis, not present on admission  -- Sepsis was ruled out. MSSA Bacteremia only without sepsis  -- Other - I will add my own diagnosis  -- Disagree - Not applicable / Not valid  -- Disagree - Clinically unable to determine / Unknown  -- Refer to Clinical Documentation Reviewer    PROVIDER RESPONSE TEXT:    This patient has sepsis which was present on admission.     Query created by: Isaias Yeboah on 2022 10:14 AM      Electronically signed by:  Cuong Vázquez MD 2022 10:44 AM

## 2022-02-14 NOTE — PROGRESS NOTES
1625- Patient pulling off BIPAP, very agitated, oxygen <80%. Dr. Chandler Gallegos made aware despite therapeutic communication/calming patient. Unable to help. Placing in precedex.

## 2022-02-14 NOTE — PROGRESS NOTES
Progress Note  Date:2022       Room:[address] [place] [address]  Patient Name:Orion Montgomery Mai     YOB: 1940     Age:81 y.o. Subjective    Ongoing high requirements of high flow nasal cannula, difficulty tolerating BiPAP at night  Objective         Vitals Last 24 Hours:  TEMPERATURE:  Temp  Av.7 °F (36.5 °C)  Min: 97 °F (36.1 °C)  Max: 98 °F (36.7 °C)  RESPIRATIONS RANGE: Resp  Av.3  Min: 17  Max: 26  PULSE OXIMETRY RANGE: SpO2  Av.6 %  Min: 81 %  Max: 100 %  PULSE RANGE: Pulse  Av.5  Min: 67  Max: 96  BLOOD PRESSURE RANGE: Systolic (38WVL), ZJC:384 , Min:101 , URI:163   ; Diastolic (82TAO), IQO:95, Min:43, Max:78    I/O (24Hr): Intake/Output Summary (Last 24 hours) at 2022 1455  Last data filed at 2022 1200  Gross per 24 hour   Intake 710 ml   Output 2750 ml   Net -2040 ml     Objective:  Vital signs: (most recent): Blood pressure 113/71, pulse 96, temperature 97.8 °F (36.6 °C), resp. rate 25, height 5' 8\" (1.727 m), weight 63.8 kg (140 lb 10.5 oz), SpO2 (!) 89 %. No fever.     Exam facilitated by use of telemedicine platform and with assistance from in house team  Gen  awake and interactive; no acute distress; short but complete sentences   Head  nc/at  Eyes  anicteric sclera; eomi  Ent  normal external anatomy  Cvs  sinus rhythm without external evidence of hypoperfusion  Pulm  mild increase in wob but adequate SaO2 on HFOT  GI-no evidence of tenderness with passive movement; no bruising or ecchymosis  Ext  no c/e  Msk  normal bulk  Neuro  oriented, no deficits in strength noted; no tremor; follows commands  Labs/Imaging/Diagnostics    Labs:  CBC:  Recent Labs     22  0248 22  0314 22  0422   WBC 5.9 5.8 5.2   RBC 3.66* 3.56* 3.63*   HGB 10.8* 10.7* 10.8*   HCT 32.3* 31.6* 31.7*   MCV 88.3 88.8 87.3   RDW 16.1* 15.9* 15.9*   PLT 73* 67* 56*     CHEMISTRIES:  Recent Labs     22  0248 22  0314 22  0422 * 135* 135*   K 4.5 4.4 4.1   CL 95* 97 97   CO2 33* 34* 34*   BUN 20 21* 20   CA 7.9* 7.9* 7.8*   PHOS 3.1 2.8 3.3   MG 1.7 1.8 1.8   PT/INR:No results for input(s): INR, INREXT, INREXT in the last 72 hours. No lab exists for component: PROTIME  APTT:No results for input(s): APTT in the last 72 hours. LIVER PROFILE:  Recent Labs     02/12/22 0422   AST 31   ALT 88*     Lab Results   Component Value Date/Time    ALT (SGPT) 88 (H) 02/12/2022 04:22 AM    AST (SGOT) 31 02/12/2022 04:22 AM    Alk. phosphatase 103 02/12/2022 04:22 AM    Bilirubin, direct 0.3 (H) 02/12/2022 04:22 AM    Bilirubin, total 1.0 02/12/2022 04:22 AM       Imaging Last 24 Hours:  No results found. Assessment//Plan   Active Problems:    A-fib ([facility] (2/4/2020)      History of CVA (cerebrovascular accident) (2/4/2020)      Type 2 diabetes mellitus without complication ([address] (5/0/7300)      Acute respiratory failure with hypoxia ([address] (1/11/2022)      Pneumonia due to COVID-19 virus (1/11/2022)      Hypertension ()      NSVT (nonsustained ventricular tachycardia) ([address] (1/22/2022)      Pacemaker (1/22/2022)      Palliative care encounter ()      Concern about end of life ()      Hypoxia ()      DNR (do not resuscitate) discussion ()      Goals of care, counseling/discussion ()      Thrombocytopenia ([facility] (2/11/2022)      Assessment & Plan  1. Acute respiratory failure with hypoxia  2. ARDS  3. COVID 19  4. MSSA bacteremia  5. COPD  6. DM2  7. A fib  8. HTN  9. DLD  10. Thrombocytopenia     Neuro  start seroquel for bipap tolerance at night  Denied pain     Cvs  h/o PAF, NSVT, mild AoStenosis, s/p PPM, HTN   - vasopressors as required  - notes indicate that PPm functional  - repeat BCx NGTD  - echo 2/11 revealed:    Left Ventricle: Left ventricle size is normal. Normal wall thickness. Mildly reduced left ventricular systolic function with a visually estimated EF of 45 - 50%. Septal bounce.     Right Ventricle: Right ventricle is mildly dilated. Normal wall thickness. Mildly reduced systolic function.   Aortic Valve: Moderately calcified cusps. Severe stenosis.   Mitral Valve: Mild mitral annular calcification.   Tricuspid Valve: Severely elevated RVSP.   Pulmonic Valve: The pulmonic valve was not well visualized. Velocity across pulmonic valve is moderately increased consistent with moderate pulmonic or suprapulmonic stenosis.     Pulm  ARF with hypoxia secondary to covid pna in setting of significant underlying lung disease (h/o tobacco abuse; CT with severe emphysema and fibrotic changes)  -Remains on steroids/inh/nebs - reduce steroids   - use seroquel to facilitate tolerating bipap at night      GI - had BM and unintentional incontinence, if the latter continues will adjut regimen      - monitor indices of renal function     Heme-monitor plts; lovenox stopped given the drop; SCDs for DVT proph without evidence of hemorrhage or VTE     ID-Blood cx growing MSSA.  D/C vanco and cefepime and was started on ancef  -Echo without obvious vegetations and repeat Bcx negative     Endo  h/o DM and on SSI/NPH; steroid weaning - will monitor sugars accordingly     Ongoing palliative consultation       I performed all aspects of the physical examination via Telemedicine associated with two way audio and video communication and with the on-site assistance of Nurse. I am located in [place] and the patient is located in [facility]. Patient is critically ill in the ICU. I  personally  reviewed the pertinent medical records, laboratory/ pathology data and radiographic images. The decision making regarding this patient is as documented above, which was generated  following  discussion  with the multidisciplinary team and creation of a treatment plan for  the patient. We discussed the patient's interval history and future coordination of care and  plans.   The patient's medications  were reviewed and changes made as stipulated above. Due to  critical illness impairing one or more vital organs of this patient resulting in life threatening clinical situation  I have provided direct, frequent personal  assessment and manipulation in management plan and spent 35  minutes  of  critical care time excluding the time spent on procedures and teaching. Greater than 50% of this time  in patient's care was  employed  in counseling and coordination of care and engaged in face to face discussion of case management issues, addressing questions, and outlining a plan of  therapy.       Electronically signed by Shell Graham MD on 2/14/2022

## 2022-02-14 NOTE — PROGRESS NOTES
1900 Bedside and Verbal shift change report given to YASMANI Quiñones (oncoming nurse) by Nghia Jimenez RN (offgoing nurse). Report included the following information SBAR, Recent Results, Cardiac Rhythm Afib, Vpace and Alarm Parameters .    Patient Vitals for the past 4 hrs:   Temp Pulse Resp BP SpO2   02/13/22 1900  67 26 106/65 (!) 89 %   02/13/22 1800  85 20 (!) 109/49 (!) 86 %   02/13/22 1700  82 22 127/67 90 %   02/13/22 1600 97.7 °F (36.5 °C) 81 22 (!) 128/57 92 %           Lab Results 24Hours     Procedure Component Value Units Date/Time    MAGNESIUM [[id number]] Collected: 02/13/22 0314    Order Status: Completed Specimen: Serum or Plasma Updated: 02/13/22 1111     Magnesium 1.8 mg/dL     D DIMER [[id number]]  (Abnormal) Collected: 02/13/22 0314    Order Status: Completed Specimen: Whole Blood from Plasma Updated: 02/13/22 0428     D-dimer 1.71 mg/L FEU     PHOSPHORUS [[id number]] Collected: 02/13/22 0314    Order Status: Completed Specimen: Serum or Plasma Updated: 02/13/22 0419     Phosphorus 2.8 MG/DL     METABOLIC PANEL, BASIC [[id number]]  (Abnormal) Collected: 02/13/22 0314    Order Status: Completed Specimen: Serum or Plasma Updated: 02/13/22 0419     Sodium 135 mmol/L      Potassium 4.4 mmol/L      Chloride 97 mmol/L      CO2 34 mmol/L      Anion gap 4 mmol/L      Glucose 225 mg/dL      BUN 21 MG/DL      Creatinine 0.67 MG/DL      BUN/Creatinine ratio 31        GFR est AA >60 ml/min/1.73m2      GFR est non-AA >60 ml/min/1.73m2      Calcium 7.9 MG/DL     C REACTIVE PROTEIN, QT [[id number]]  (Abnormal) Collected: 02/13/22 0314    Order Status: Completed Specimen: Serum from Plasma Updated: 02/13/22 0416     C-Reactive protein 5.40 mg/dL     CBC W/O DIFF [[id number]]  (Abnormal) Collected: 02/13/22 0314    Order Status: Completed Specimen: Whole Blood Updated: 02/13/22 0359     WBC 5.8 K/uL      RBC 3.56 M/uL      HGB 10.7 g/dL      HCT 31.6 %      MCV 88.8 FL      MCH 30.1 PG      MCHC 33.9 g/dL      RDW 15.9 % PLATELET 67 K/uL      MPV 11.3 FL      NRBC 0.7  WBC      ABSOLUTE NRBC 0.04 K/uL         0045 he requests anti-anxiety medication so he can tolerate bipap mask and sleep; notified MD. Per Dr. Blanca Correa, RN to give ativan 0.5mg IV push x1 now. Order received via telephone order read back and clarified.      See flowsheet for assessment  See [facility] for medication administration    0600   Lab Results 24Hours     Procedure Component Value Units Date/Time    PHOSPHORUS [[id number]] Collected: 02/14/22 0248    Order Status: Completed Specimen: Serum or Plasma Updated: 02/14/22 0350     Phosphorus 3.1 MG/DL     METABOLIC PANEL, BASIC [[id number]]  (Abnormal) Collected: 02/14/22 0248    Order Status: Completed Specimen: Serum or Plasma Updated: 02/14/22 0350     Sodium 134 mmol/L      Potassium 4.5 mmol/L      Chloride 95 mmol/L      CO2 33 mmol/L      Anion gap 6 mmol/L      Glucose 184 mg/dL      BUN 20 MG/DL      Creatinine 0.56 MG/DL      BUN/Creatinine ratio 36        GFR est AA >60 ml/min/1.73m2      GFR est non-AA >60 ml/min/1.73m2      Calcium 7.9 MG/DL     CBC W/O DIFF [[id number]]  (Abnormal) Collected: 02/14/22 0248    Order Status: Completed Specimen: Whole Blood Updated: 02/14/22 0317     WBC 5.9 K/uL      RBC 3.66 M/uL      HGB 10.8 g/dL      HCT 32.3 %      MCV 88.3 FL      MCH 29.5 PG      MCHC 33.4 g/dL      RDW 16.1 %      PLATELET 73 K/uL      MPV 11.9 FL      NRBC 1.2  WBC      ABSOLUTE NRBC 0.07 K/uL     MAGNESIUM [[id number]] Collected: 02/14/22 0248    Order Status: Completed Specimen: Serum Updated: 02/14/22 0307    MAGNESIUM [[id number]] Collected: 02/13/22 0314    Order Status: Completed Specimen: Serum or Plasma Updated: 02/13/22 1111     Magnesium 1.8 mg/dL         Patient Vitals for the past 4 hrs:   Temp Pulse Resp BP SpO2   02/14/22 0500  71 21 114/64 96 %   02/14/22 0440     96 %   02/14/22 0422     97 %   02/14/22 0400 97 °F (36.1 °C) 83 20 132/77 93 %   02/14/22 0300  78 22 (!) 148/78 94 %           0700 Bedside and Verbal shift change report given to Renan Barnett RN (oncoming nurse) by Heidy Beatty RN (offgoing nurse). Report included the following information SBAR, Intake/Output, Recent Results, Cardiac Rhythm Afib, Vpace and Alarm Parameters .

## 2022-02-14 NOTE — PROGRESS NOTES
Palliative Medicine Consult  Sunil: [phone] ([phone])    Patient Name: Cristal Sexton  YOB: 1940    Date of Initial Consult: 1/13/2022  Reason for Consult: Care Decisions  Requesting Provider: Dr. Rivera Ruiz  Primary Care Physician: Kaycee Monaco MD     SUMMARY:   Cristal Sexton is a 79 y.o. 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 Patient presented to ER w/ cc of poor appetite, loss of taste, arthralgia and diarrhea x a few days. Patient also reported recent COVID exposure. He received  COVID 19 vaccine x 2 doses in February 2021, but had no booster. Course of hospitalization: O2 needs increased rapidly since admission, now on 50L HFNC. Renal function improved. Current medical issues leading to Palliative Medicine involvement include: GOC discussion with gentleman of advanced age currently hospitalized for hypoxic respiratory failure 2/2/ covid. Lisa Gonzales Psychosocial: Born and raised in [place], he was adopted, only child, very loving parents. Served as a  in [employer] x 10+ years. 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 He is , has 1 child, son Coy Bejarano, 2 grandchildren and a few great grandchildren. Patients grandson and  great grandson, who will be turning 14 yo on 1/15, live with him. Patient continues to play Golf, works on cars. Cognitive and Functional baseline: Alert and oriented, independent with ADLs, drives etc,  does have chronic slightly unsteady gait (etiology unknown). PALLIATIVE DIAGNOSES:   1. Palliative care encounter  2. Pulmonary Fibrosis  3. Hypoxia  4. Pulmonary firbosis  5. Weakness, generalized  6.  Goals of care discussion       PLAN:   1. Chart  reviewed. 2. Mr. Luis Post continues to have significant supplemental O2 needs not sustainable at home or SNF, requires combination BIPAP or up to 60 L HFNC. 3. Weakness, generalized- Worsening, activity and progress limited by O2 sats dropping into 80s, with bed activity only. Prior to hospitalization, patient did not require supplemental O2 and was  still golfing several times per week. 4. Mr. Luis Post on BIPAP at time of visit. Though he is oriented, discussion difficult 2/2  BiPAP and fatigue. · Spoke with his son, Margo Augustin, re medical teams concerns that in setting of  COPD, Pulmonary fibrosis, weakness and advanced age, his O2 needs may not ever be able to be weaned down to a level where he would be able to be discharged home or to SNF. · Briefly discussed current option for comfort care. · He is also aware that Dr. Diogenes Logan agreed to order LTAC evaluation.       4. [address] discussion- Son would like to know if LTAC is an option before we discuss [address] with patient. · Plan to meet with son to revisit [address] once we know if LTAC is even an option.     5. Please contact me via Fon with any urgent needs, questions or concerns. 5. Initial consult note routed to primary continuity provider and/or primary health care team members  6. Communicated plan of care with: Palliative [facility] Team, Dr. Diogenes Logan, Renny Isaac CM     GOALS OF CARE / TREATMENT PREFERENCES:     GOALS OF CARE:  Patient/Health Care Proxy Stated Goals: Rehabilitation    TREATMENT PREFERENCES:   Code Status: DNR    Patient and family's personal goals include: Get well and return home    Important upcoming milestones or family events: Unknown.     The patient identifies the following as important for living well: unknown      Advance Care Planning:  [x] The [facility] Interdisciplinary Team has updated the ACP Navigator with [address] and Patient Capacity      Primary Decision Maker (Active): Devante Gama - [phone]    Secondary Decision Maker: Marilynn Fish [phone]  Advance Care Planning 1/14/2022   Patient's Healthcare Decision Maker is: Patient declined (Legal Next of Kin remains as decision maker)   Confirm Advance Directive None   Patient Would Like to Complete Advance Directive No       Medical Interventions: Limited additional interventions       Other:    As far as possible, the palliative care team has discussed with patient / health care proxy about goals of care / treatment preferences for patient. HISTORY:     History obtained from: medical record/nurse/ED and family    CHIEF COMPLAINT: n/a    HPI/SUBJECTIVE:    The patient is:   [] Verbal and participatory  [x] Non-participatory due to:   Patient on on BiPAP, fatigued, difficulty communicating clearly      2/14- BIPAP  2/13- 60L HFNC/BIPAP    2/11- WAYNE No vegetation + AV stenosis, Mod PV stenosis. Suspect bacteremia superimposed MSSA on PNA  2/9- Blood CX + MSSA bacteremia, ID managing abx  2/8-Fever, patients O2 needs continue to fluctuate, over the weekend had been down, patient hopeful to be discharged home this week, however O2 needs have increased significantly, back on 55 L  high flow nasal cannula.   Patient continues to have poor tolerance to PT 2/2 fatigue and prolonged desaturation of 80% on 50 L HFNC tachycardia    2/7-CT chest significant for extensive emphysema with fibrotic changes in the lung bases, and focal airspace opacity in the right upper lobe which may represent infection, also+ cardiomegaly and CAD    1/18-echo mildly decreased LV systolic function, EF 28-97%    Clinical Pain Assessment (nonverbal scale for severity on nonverbal patients):   Clinical Pain Assessment  Severity: 0     Activity (Movement): Lying quietly, normal position    Duration: for how long has pt been experiencing pain (e.g., 2 days, 1 month, years)  Frequency: how often pain is an issue (e.g., several times per day, once every few days, constant)     FUNCTIONAL ASSESSMENT:     Palliative Performance Scale (PPS):  PPS: 10       PSYCHOSOCIAL/SPIRITUAL SCREENING:     Palliative IDT has assessed this patient for cultural preferences / practices and a referral made as appropriate to needs (Cultural Services, Patient Advocacy, Ethics, etc.)    Any spiritual / Rastafari concerns:  [] Yes /  [x] No   If \"Yes\" to discuss with pastoral care during IDT     Does caregiver feel burdened by caring for their loved one:   [] Yes /  [x] No /  [] No Caregiver Present    If \"Yes\" to discuss with social work during IDT    Anticipatory grief assessment:   [x] Normal  / [] Maladaptive     If \"Maladaptive\" to discuss with social work during IDT    ESAS Anxiety: Anxiety: 0    ESAS Depression: Depression: 0        REVIEW OF SYSTEMS:     Positive and pertinent negative findings in ROS are noted above in HPI. The following systems were [x] reviewed / [] unable to be reviewed as noted in HPI  Other findings are noted below. Systems: constitutional, ears/nose/mouth/throat, respiratory, gastrointestinal, genitourinary, musculoskeletal, integumentary, neurologic, psychiatric, endocrine. Positive findings noted below. Modified ESAS Completed by: provider   Fatigue: 3 Drowsiness: 0   Depression: 0 Pain: 0   Anxiety: 0 Nausea: 0   Anorexia: 5 Dyspnea: 0           Stool Occurrence(s): 2        PHYSICAL EXAM:     From RN flowsheet:  Wt Readings from Last 3 Encounters:   02/12/22 140 lb 10.5 oz (63.8 kg)   11/22/21 162 lb (73.5 kg)   11/19/21 162 lb (73.5 kg)     Blood pressure (!) 123/59, pulse 73, temperature 97.8 °F (36.6 °C), resp. rate 21, height 5' 8\" (1.727 m), weight 140 lb 10.5 oz (63.8 kg), SpO2 95 %.     Pain Scale 1: Numeric (0 - 10)  Pain Intensity 1: 0  Pain Onset 1: acute  Pain Location 1: Chest  Pain Orientation 1: Anterior  Pain Description 1: Aching  Pain Intervention(s) 1: Rest  Last bowel movement, if known: Constitutional: Fatigued, weak, NAD  Eyes: pupils equal, anicteric  ENMT: no nasal discharge, moist mucous membranes  Cardiovascular: regular rhythm, distal pulses intact  Respiratory: BIPAP  Gastrointestinal: soft non-tender, +bowel sounds  Musculoskeletal: no deformity, no tenderness to palpation  Skin: warm, dry  Neurologic: Fatigued, oriented, weak, following commands, moving all extremities  Psychiatric: Depressed affect, no hallucinations  Other:       HISTORY:     Active Problems:    A-fib ([address] (2/4/2020)      History of CVA (cerebrovascular accident) (2/4/2020)      Type 2 diabetes mellitus without complication ([address] (6/8/1297)      Acute respiratory failure with hypoxia (HCC) (1/11/2022)      Pneumonia due to COVID-19 virus (1/11/2022)      Hypertension ()      NSVT (nonsustained ventricular tachycardia) ([address] (1/22/2022)      Pacemaker (1/22/2022)      Palliative care encounter ()      Concern about end of life ()      Hypoxia ()      DNR (do not resuscitate) discussion ()      Goals of care, counseling/discussion ()      Thrombocytopenia ([address] (2/11/2022)      Past Medical History:   Diagnosis Date    Atrial fibrillation ([address]     Diabetes mellitus, type 2 ([address]     Hypertension     Internal carotid artery stenosis, right     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 Moderate aortic valve stenosis was present. LVEF 55-60% .  Stroke [facility])     asymptomatic; small chronic infarcts in the bilateral thalami per MRI of 2/5/2020.   Previous MRI of 9/21/2010 showed possible tiny old right thalamic infarct versus perivascular space       Past Surgical History:   Procedure Laterality Date    HX CHOLECYSTECTOMY      IR INSERT NON TUNL CVC OVER 5 YRS  2022    [place] INS NEW/RPLCMT PRM PM W/TRANSV ELTRD ATRIAL&VENT N/A 2020    INSERT PPM DUAL performed by Phillip Su MD at [facility]      Family History   Adopted: Yes   Family history unknown: Yes      History reviewed, no pertinent family history.   Social History     Tobacco Use    Smoking status: Former Smoker     Packs/day: 2.00     Years: 50.00     Pack years: 100.00     Quit date: 2004     Years since quittin.7    Smokeless tobacco: Never Used   Substance Use Topics    Alcohol use: Yes     Comment: 1 beer or 1 glaas of wine daily most of adult life     Allergies   Allergen Reactions    Ampicillin Nausea and Vomiting    Iodine Hives, Itching and Nausea and Vomiting      Current Facility-Administered Medications   Medication Dose Route Frequency    rivaroxaban (XARELTO) tablet 20 mg  20 mg Oral DAILY    QUEtiapine (SEROquel) tablet 100 mg  100 mg Oral QHS    [START ON 2/15/2022] methylPREDNISolone (PF) (SOLU-MEDROL) injection 40 mg  40 mg IntraVENous Q24H    insulin NPH (NOVOLIN N, HUMULIN N) injection 18 Units  18 Units SubCUTAneous ACB&D    ceFAZolin (ANCEF) 1 g in 0.9% sodium chloride (MBP/ADV) 50 mL MBP  1 g IntraVENous Q8H    And    ceFAZolin (ANCEF) 1 g in 0.9% sodium chloride (MBP/ADV) 50 mL MBP  1 g IntraVENous Q8H    famotidine (PEPCID) tablet 20 mg  20 mg Oral BID    melatonin tablet 6 mg  6 mg Oral QHS PRN    albuterol-ipratropium (DUO-NEB) 2.5 MG-0.5 MG/3 ML  3 mL Nebulization Q6H RT    arformoteroL (BROVANA) neb solution 15 mcg  15 mcg Nebulization BID RT    budesonide (PULMICORT) 500 mcg/2 ml nebulizer suspension  500 mcg Nebulization BID RT    artificial tears (dextran-hypromellose-glycerin) (GENTEAL) ophthalmic solution 1 Drop  1 Drop Both Eyes PRN    sodium chloride (OCEAN) 0.65 % nasal squeeze bottle 2 Spray  2 Spray Both Nostrils Q2H PRN    metoprolol tartrate (LOPRESSOR) tablet 12.5 mg  12.5 mg Oral BID    guaiFENesin-dextromethorphan (ROBITUSSIN DM) 100-10 mg/5 mL syrup 5 mL  5 mL Oral Q6H PRN    [Held by provider] lisinopriL (PRINIVIL, ZESTRIL) tablet 10 mg  10 mg Oral QHS    atorvastatin (LIPITOR) tablet 20 mg  20 mg Oral DAILY    glucose chewable tablet 16 g  4 Tablet Oral PRN    dextrose (D50W) injection syrg 12.5-25 g  25-50 mL IntraVENous PRN    glucagon (GLUCAGEN) injection 1 mg  1 mg IntraMUSCular PRN    insulin lispro (HUMALOG) injection   SubCUTAneous AC&HS    sodium chloride (NS) flush 5-40 mL  5-40 mL IntraVENous Q8H    sodium chloride (NS) flush 5-40 mL  5-40 mL IntraVENous PRN    acetaminophen (TYLENOL) tablet 650 mg  650 mg Oral Q6H PRN    Or    acetaminophen (TYLENOL) suppository 650 mg  650 mg Rectal Q6H PRN    polyethylene glycol (MIRALAX) packet 17 g  17 g Oral DAILY PRN    ondansetron (ZOFRAN ODT) tablet 4 mg  4 mg Oral Q8H PRN    Or    ondansetron (ZOFRAN) injection 4 mg  4 mg IntraVENous Q6H PRN          LAB AND IMAGING FINDINGS:     Lab Results   Component Value Date/Time    WBC 5.9 02/14/2022 02:48 AM    HGB 10.8 (L) 02/14/2022 02:48 AM    PLATELET 73 (L) 18/20/0620 02:48 AM     Lab Results   Component Value Date/Time    Sodium 134 (L) 02/14/2022 02:48 AM    Potassium 4.5 02/14/2022 02:48 AM    Chloride 95 (L) 02/14/2022 02:48 AM    CO2 33 (H) 02/14/2022 02:48 AM    BUN 20 02/14/2022 02:48 AM    Creatinine 0.56 (L) 02/14/2022 02:48 AM    Calcium 7.9 (L) 02/14/2022 02:48 AM    Magnesium 1.7 02/14/2022 02:48 AM    Phosphorus 3.1 02/14/2022 02:48 AM      Lab Results   Component Value Date/Time    Alk.  phosphatase 103 02/12/2022 04:22 AM    Protein, total 4.9 (L) 02/12/2022 04:22 AM    Albumin 2.0 (L) 02/12/2022 04:22 AM    Globulin 2.9 02/12/2022 04:22 AM     Lab Results   Component Value Date/Time    INR 1.2 (H) 02/05/2022 05:08 AM    Prothrombin time 12.0 (H) 02/05/2022 05:08 AM    aPTT 26.4 02/05/2022 05:08 AM      Lab Results Component Value Date/Time    Ferritin 788 (H) 01/31/2022 02:00 AM      Lab Results   Component Value Date/Time    pH 7.37 02/08/2022 10:50 PM    PCO2 36 02/08/2022 10:50 PM    PO2 45 (LL) 02/08/2022 10:50 PM     No components found for: [place]   Lab Results   Component Value Date/Time    CK 79 09/22/2010 03:50 AM                Total time: 25min  Counseling / coordination time, spent as noted above: 20 min   > 50% counseling / coordination?: yes    Prolonged service was provided for  []30 min   []75 min in face to face time in the presence of the patient, spent as noted above. Time Start:    time End:   Note: this can only be billed with 05280 (initial) or 56319 (follow up). If multiple start / stop times, list each separately.

## 2022-02-14 NOTE — PROGRESS NOTES
Carlos Eduardo [facility] [address] [place] [address]  [address]  [phone]      Medical Progress Note      NAME: Johnson Marshall   :  1940  MRM:  [id number]    Date/Time of service: 2022  8:46 AM       Subjective:     Chief Complaint:  Patient was personally seen and examined by me during this time period. Chart reviewed. On bipap. Resting, comfortable       Objective:       Vitals:       Last 24hrs VS reviewed since prior progress note. Most recent are:    Visit Vitals  BP (!) 110/59   Pulse 74   Temp 97 °F (36.1 °C)   Resp 20   Ht 5' 8\" (1.727 m)   Wt 63.8 kg (140 lb 10.5 oz)   SpO2 100%   BMI 21.39 kg/m²     SpO2 Readings from Last 6 Encounters:   22 100%   21 95%   21 93%   21 95%   20 98%   20 93%    O2 Flow Rate (L/min): 15 l/min (60L high flow.  15L non rebreather)       Intake/Output Summary (Last 24 hours) at 2022 0846  Last data filed at 2022 0416  Gross per 24 hour   Intake 720 ml   Output 2200 ml   Net -1480 ml        Exam:     Physical Exam:    Gen:  Disheveled, ill-appearing, NAD but dyspneic with movements   HEENT:  Pink conjunctivae, PERRL, hearing intact to voice, on hi flow  Neck:  Supple, without masses, thyroid non-tender  Resp:  mild accessory muscle use, prolonged BS, +rhonchi   Card:  No murmurs, normal S1, S2 without thrills, bruits or peripheral edema  Abd:  Soft, non-tender, non-distended, normoactive bowel sounds are present  Musc:  No cyanosis or clubbing  Skin:  No rashes  Neuro:  Cranial nerves 3-12 are grossly intact, follows commands appropriately  Psych:  fair insight, oriented to person, place and time, alert    Medications Reviewed: (see below)    Lab Data Reviewed: (see below)    ______________________________________________________________________    Medications:     Current Facility-Administered Medications   Medication Dose Route Frequency    insulin NPH (NOVOLIN N, HUMULIN N) injection 18 Units  18 Units SubCUTAneous ACB&D    ceFAZolin (ANCEF) 1 g in 0.9% sodium chloride (MBP/ADV) 50 mL MBP  1 g IntraVENous Q8H    And    ceFAZolin (ANCEF) 1 g in 0.9% sodium chloride (MBP/ADV) 50 mL MBP  1 g IntraVENous Q8H    famotidine (PEPCID) tablet 20 mg  20 mg Oral BID    melatonin tablet 6 mg  6 mg Oral QHS PRN    albuterol-ipratropium (DUO-NEB) 2.5 MG-0.5 MG/3 ML  3 mL Nebulization Q6H RT    [Held by provider] enoxaparin (LOVENOX) injection 60 mg  60 mg SubCUTAneous Q12H    methylPREDNISolone (PF) (Solu-MEDROL) injection 40 mg  40 mg IntraVENous Q12H    arformoteroL (BROVANA) neb solution 15 mcg  15 mcg Nebulization BID RT    budesonide (PULMICORT) 500 mcg/2 ml nebulizer suspension  500 mcg Nebulization BID RT    artificial tears (dextran-hypromellose-glycerin) (GENTEAL) ophthalmic solution 1 Drop  1 Drop Both Eyes PRN    sodium chloride (OCEAN) 0.65 % nasal squeeze bottle 2 Spray  2 Spray Both Nostrils Q2H PRN    metoprolol tartrate (LOPRESSOR) tablet 12.5 mg  12.5 mg Oral BID    guaiFENesin-dextromethorphan (ROBITUSSIN DM) 100-10 mg/5 mL syrup 5 mL  5 mL Oral Q6H PRN    [Held by provider] lisinopriL (PRINIVIL, ZESTRIL) tablet 10 mg  10 mg Oral QHS    atorvastatin (LIPITOR) tablet 20 mg  20 mg Oral DAILY    glucose chewable tablet 16 g  4 Tablet Oral PRN    dextrose (D50W) injection syrg 12.5-25 g  25-50 mL IntraVENous PRN    glucagon (GLUCAGEN) injection 1 mg  1 mg IntraMUSCular PRN    insulin lispro (HUMALOG) injection   SubCUTAneous AC&HS    sodium chloride (NS) flush 5-40 mL  5-40 mL IntraVENous Q8H    sodium chloride (NS) flush 5-40 mL  5-40 mL IntraVENous PRN    acetaminophen (TYLENOL) tablet 650 mg  650 mg Oral Q6H PRN    Or    acetaminophen (TYLENOL) suppository 650 mg  650 mg Rectal Q6H PRN    polyethylene glycol (MIRALAX) packet 17 g  17 g Oral DAILY PRN    ondansetron (ZOFRAN ODT) tablet 4 mg  4 mg Oral Q8H PRN    Or    ondansetron (ZOFRAN) injection 4 mg  4 mg IntraVENous Q6H PRN          Lab Review:     Recent Labs     02/14/22 0248 02/13/22 0314 02/12/22 0422   WBC 5.9 5.8 5.2   HGB 10.8* 10.7* 10.8*   HCT 32.3* 31.6* 31.7*   PLT 73* 67* 56*     Recent Labs     02/14/22 0248 02/13/22 0314 02/12/22 0422   * 135* 135*   K 4.5 4.4 4.1   CL 95* 97 97   CO2 33* 34* 34*   * 225* 199*   BUN 20 21* 20   CREA 0.56* 0.67* 0.65*   CA 7.9* 7.9* 7.8*   MG 1.7 1.8 1.8   PHOS 3.1 2.8 3.3   ALB  --   --  2.0*   TBILI  --   --  1.0   ALT  --   --  88*     Lab Results   Component Value Date/Time    Glucose (POC) 280 (H) 02/13/2022 10:18 PM    Glucose (POC) 306 (H) 02/13/2022 04:30 PM    Glucose (POC) 211 (H) 02/13/2022 10:43 AM    Glucose (POC) 164 (H) 02/13/2022 07:50 AM    Glucose (POC) 258 (H) 02/12/2022 09:47 PM          Assessment / Plan:     80 yo hx of HTN, DM, Pafib s/p pacer, COPD, CVA, presented w/ resp failure, COVID pneumonia. Hospital course complicated by MSSA bacteremia, thrombocytopenia, pulm fibrosis    1) Acute resp failure/hypoxia: slow to improve. Due to COVID, advanced COPD, pulm fibrosis. Poor prognosis. Patient is a DNR, but does not want hospice for now. Cont hi-flow, bipap. Intensivist following    2) Pneumonia due to COVID-19/COPD exacerbation: s/p baricitinib. Cont IV steroids, nebs, LABA/ICS    3) MSSA bacteremia/septic shock: shock resolved. Unclear source. Last positive blood Cx on 02/09. Repeat blood Cx neg. TTE neg vegetations. Cont IV ancef. ID following    4) Pafib/HTN: s/p pacer. Restart low dose metoprolol. Restart Xarelto since thrombocytopenia is improving    5) Hx of CVA: cont statin    6) Thrombocytopenia: slowly improving. Due to sepsis. Will monitor     7) DM type 2: A1C 7.2%. Hyperglycemia from steroids.   Will cont NPH, SSI    Code: Partial     Total time spent with patient: 28 Minutes **I personally saw and examined the patient during this time period**                 Care Plan discussed with: Patient, nursing    Discussed:  Care Plan    Prophylaxis:  SCD's, restart lovenox if plts better     Disposition:  SNF/LTC           ___________________________________________________    Attending Physician: Viv Harden MD

## 2022-02-14 NOTE — PROGRESS NOTES
Case management:    RUR 20%(high risk score)    LOS 33 days,GLOS 5.4    Problems:     Covid pneumonia  acute respiratory failure with hypoxia  Poor appetite   History of afib,DM,carotid artery stenosis,bradycardia s/p pacemaker    Today:    BiPaP continuous 100%  Palliative followed up today with Roge goldman. ([phone])  Per my discussion with Palliative NP who discussed LTAC with attending,a referral has been sent to LTAC to see if pt is a candidate for LTAC placement.     Kevin Fuller

## 2022-02-14 NOTE — PROGRESS NOTES
Problem: Falls - Risk of  Goal: *Absence of Falls  Description: Document Jacob Davis Fall Risk and appropriate interventions in the flowsheet. Outcome: Progressing Towards Goal  Note: Fall Risk Interventions:  Mobility Interventions: Bed/chair exit alarm,Communicate number of staff needed for ambulation/transfer    Mentation Interventions: Evaluate medications/consider consulting pharmacy,Bed/chair exit alarm,Door open when patient unattended    Medication Interventions: Evaluate medications/consider consulting pharmacy,Bed/chair exit alarm,Patient to call before getting OOB    Elimination Interventions: Bed/chair exit alarm,Call light in reach,Patient to call for help with toileting needs,Toileting schedule/hourly rounds    History of Falls Interventions: Door open when patient unattended,Bed/chair exit alarm,Room close to nurse's station         Problem: Patient Education: Go to Patient Education Activity  Goal: Patient/Family Education  Outcome: Progressing Towards Goal     Problem: Airway Clearance - Ineffective  Goal: Achieve or maintain patent airway  Outcome: Progressing Towards Goal     Problem: Gas Exchange - Impaired  Goal: Absence of hypoxia  Outcome: Progressing Towards Goal  Goal: Promote optimal lung function  Outcome: Progressing Towards Goal     Problem: Breathing Pattern - Ineffective  Goal: Ability to achieve and maintain a regular respiratory rate  Outcome: Progressing Towards Goal     Problem:  Body Temperature -  Risk of, Imbalanced  Goal: Ability to maintain a body temperature within defined limits  Outcome: Progressing Towards Goal  Goal: Will regain or maintain usual level of consciousness  Outcome: Progressing Towards Goal  Goal: Complications related to the disease process, condition or treatment will be avoided or minimized  Outcome: Progressing Towards Goal     Problem: Isolation Precautions - Risk of Spread of Infection  Goal: Prevent transmission of infectious organism to others  Outcome: Progressing Towards Goal     Problem: Nutrition Deficits  Goal: Optimize nutrtional status  Outcome: Progressing Towards Goal     Problem: Risk for Fluid Volume Deficit  Goal: Maintain normal heart rhythm  Outcome: Progressing Towards Goal  Goal: Maintain absence of muscle cramping  Outcome: Progressing Towards Goal  Goal: Maintain normal serum potassium, sodium, calcium, phosphorus, and pH  Outcome: Progressing Towards Goal     Problem: Loneliness or Risk for Loneliness  Goal: Demonstrate positive use of time alone when socialization is not possible  Outcome: Progressing Towards Goal     Problem: Fatigue  Goal: Verbalize increase energy and improved vitality  Outcome: Progressing Towards Goal     Problem: Patient Education: Go to Patient Education Activity  Goal: Patient/Family Education  Outcome: Progressing Towards Goal     Problem: Diabetes Self-Management  Goal: *Disease process and treatment process  Description: Define diabetes and identify own type of diabetes; list 3 options for treating diabetes. Outcome: Progressing Towards Goal  Goal: *Incorporating nutritional management into lifestyle  Description: Describe effect of type, amount and timing of food on blood glucose; list 3 methods for planning meals. Outcome: Progressing Towards Goal  Goal: *Incorporating physical activity into lifestyle  Description: State effect of exercise on blood glucose levels. Outcome: Progressing Towards Goal  Goal: *Developing strategies to promote health/change behavior  Description: Define the ABC's of diabetes; identify appropriate screenings, schedule and personal plan for screenings. Outcome: Progressing Towards Goal  Goal: *Using medications safely  Description: State effect of diabetes medications on diabetes; name diabetes medication taking, action and side effects.   Outcome: Progressing Towards Goal  Goal: *Monitoring blood glucose, interpreting and using results  Description: Identify recommended blood glucose targets  and personal targets. Outcome: Progressing Towards Goal  Goal: *Prevention, detection, treatment of acute complications  Description: List symptoms of hyper- and hypoglycemia; describe how to treat low blood sugar and actions for lowering  high blood glucose level. Outcome: Progressing Towards Goal  Goal: *Prevention, detection and treatment of chronic complications  Description: Define the natural course of diabetes and describe the relationship of blood glucose levels to long term complications of diabetes. Outcome: Progressing Towards Goal  Goal: *Developing strategies to address psychosocial issues  Description: Describe feelings about living with diabetes; identify support needed and support network  Outcome: Progressing Towards Goal  Goal: *Insulin pump training  Outcome: Progressing Towards Goal  Goal: *Sick day guidelines  Outcome: Progressing Towards Goal  Goal: *Patient Specific Goal (EDIT GOAL, INSERT TEXT)  Outcome: Progressing Towards Goal     Problem: Patient Education: Go to Patient Education Activity  Goal: Patient/Family Education  Outcome: Progressing Towards Goal     Problem: Patient Education: Go to Patient Education Activity  Goal: Patient/Family Education  Outcome: Progressing Towards Goal     Problem: Patient Education: Go to Patient Education Activity  Goal: Patient/Family Education  Outcome: Progressing Towards Goal     Problem: Pain  Goal: *Control of Pain  Outcome: Progressing Towards Goal     Problem: Patient Education: Go to Patient Education Activity  Goal: Patient/Family Education  Outcome: Progressing Towards Goal     Problem: Pressure Injury - Risk of  Goal: *Prevention of pressure injury  Description: Document Alexy Scale and appropriate interventions in the flowsheet.   Outcome: Progressing Towards Goal     Problem: Patient Education: Go to Patient Education Activity  Goal: Patient/Family Education  Outcome: Progressing Towards Goal     Problem: Delirium Treatment  Goal: *Level of consciousness restored to baseline  Outcome: Progressing Towards Goal  Goal: *Level of environmental perceptions restored to baseline  Outcome: Progressing Towards Goal  Goal: *Sensory perception restored to baseline  Outcome: Progressing Towards Goal  Goal: *Emotional stability restored to baseline  Outcome: Progressing Towards Goal  Goal: *Functional assessment restored to baseline  Outcome: Progressing Towards Goal  Goal: *Absence of falls  Outcome: Progressing Towards Goal  Goal: *Will remain free of delirium, CAM Score negative  Outcome: Progressing Towards Goal  Goal: *Cognitive status will be restored to baseline  Outcome: Progressing Towards Goal  Goal: Interventions  Outcome: Progressing Towards Goal     Problem: Patient Education: Go to Patient Education Activity  Goal: Patient/Family Education  Outcome: Progressing Towards Goal

## 2022-02-15 NOTE — PROGRESS NOTES
Update:    Per Palliative Medicine's note,the plan is for palliative medicine to meet again with family @ 4 pm with the plan to compassionately transition pt into comfort measures .     Noris Page

## 2022-02-15 NOTE — PROGRESS NOTES
Occupational Therapy:  02/15/22    Chart reviewed in prep for OT intervention, spoke with CM. Noted events overnight regarding respiratory status and requiring BiPAP. Palliative to see today and discuss with family/patient regarding goals of care, with potential for comfort measures vs. Hospice. Will defer and follow up as appropriate. Addendum : CM notified therapy team of plan to transition to comfort measures this afternoon after family visit. Will complete OT orders.      Thank you,  Parvin Cabrera, OTR/L

## 2022-02-15 NOTE — PROGRESS NOTES
Progress Note  Date:2/15/2022       Room:[address] [place] [address]  Patient Name:Orion Ibarra     YOB: 1940     Age:81 y.o. Subjective    Ongoing req of bipap, patient more confused, + fevers, hypoglycemic overnight and started on D5NS and levophed  Objective         Vitals Last 24 Hours:  TEMPERATURE:  Temp  Av.5 °F (37.5 °C)  Min: 97.9 °F (36.6 °C)  Max: 101 °F (38.3 °C)  RESPIRATIONS RANGE: Resp  Av.1  Min: 17  Max: 26  PULSE OXIMETRY RANGE: SpO2  Av.7 %  Min: 76 %  Max: 100 %  PULSE RANGE: Pulse  Av.7  Min: 61  Max: 117  BLOOD PRESSURE RANGE: Systolic (96TEL), TG , Min:60 , KCU:579   ; Diastolic (05OOD), BEP:37, Min:38, Max:99    I/O (24Hr): Intake/Output Summary (Last 24 hours) at 2/15/2022 1234  Last data filed at 2/15/2022 1015  Gross per 24 hour   Intake 2381.59 ml   Output 1825 ml   Net 556.59 ml     Objective:  Vital signs: (most recent): Blood pressure (!) 112/54, pulse 87, temperature (!) 100.9 °F (38.3 °C), resp. rate 26, height 5' 8\" (1.727 m), weight 63.8 kg (140 lb 10.5 oz), SpO2 96 %. No fever.     Exam facilitated by use of telemedicine platform and with assistance from in house team  Gen  ill appearing; short but complete sentences   Head  nc/at  Eyes  anicteric sclera; eomi  Ent  normal external anatomy  Cvs  sinus rhythm without external evidence of hypoperfusion  Pulm  incr wob on bipap   GI-no evidence of tenderness with passive movement; no bruising or ecchymosis  Ext  no c/e  Msk  normal bulk  Neuro  lethargic  Labs/Imaging/Diagnostics    Labs:  CBC:  Recent Labs     02/15/22  0351 22  0248 22  0314   WBC 6.8 5.9 5.8   RBC 3.50* 3.66* 3.56*   HGB 10.4* 10.8* 10.7*   HCT 30.8* 32.3* 31.6*   MCV 88.0 88.3 88.8   RDW 16.6* 16.1* 15.9*   PLT 83* 73* 67*     CHEMISTRIES:  Recent Labs     02/15/22  0351 22  0248 22  0314   * 134* 135*   K 3.9 4.5 4.4   CL 98 95* 97   CO2 31 33* 34*   BUN 18 20 21*   CA 7.4* 7.9* 7.9*   PHOS 3.0 3.1 2.8   MG 1.6 1.7 1.8   PT/INR:No results for input(s): INR, INREXT, INREXT in the last 72 hours. No lab exists for component: PROTIME  APTT:No results for input(s): APTT in the last 72 hours. LIVER PROFILE:  No results for input(s): AST, ALT in the last 72 hours. No lab exists for component: Clista Kitten, ALKPHOS  Lab Results   Component Value Date/Time    ALT (SGPT) 88 (H) 02/12/2022 04:22 AM    AST (SGOT) 31 02/12/2022 04:22 AM    Alk. phosphatase 103 02/12/2022 04:22 AM    Bilirubin, direct 0.3 (H) 02/12/2022 04:22 AM    Bilirubin, total 1.0 02/12/2022 04:22 AM       Imaging Last 24 Hours:  No results found. Assessment//Plan   Active Problems:    A-fib ([address] (2/4/2020)      History of CVA (cerebrovascular accident) (2/4/2020)      Type 2 diabetes mellitus without complication ([address] (4/4/9550)      Acute respiratory failure with hypoxia ([address] (1/11/2022)      Pneumonia due to COVID-19 virus (1/11/2022)      Hypertension ()      NSVT (nonsustained ventricular tachycardia) ([address] (1/22/2022)      Pacemaker (1/22/2022)      Palliative care encounter ()      Concern about end of life ()      Hypoxia ()      DNR (do not resuscitate) discussion ()      Goals of care, counseling/discussion ()      Thrombocytopenia ([address] (2/11/2022)      Assessment & Plan  1. Acute respiratory failure with hypoxia  2. ARDS  3. COVID 19  4. MSSA bacteremia  5. COPD  6. DM2  7. A fib  8. HTN  9. DLD  10. Thrombocytopenia     Neuro  on precedex, seroquel for bipap tolerance at night  Denied pain     Cvs  h/o PAF, NSVT, mild AoStenosis, s/p PPM, HTN   - vasopressors as required, currently on levophed, giving volume   - notes indicate that PPm functional  - repeat BCx NGTD  - echo 2/11 revealed:    Left Ventricle: Left ventricle size is normal. Normal wall thickness. Mildly reduced left ventricular systolic function with a visually estimated EF of 45 - 50%. Septal bounce.    Right Ventricle: Right ventricle is mildly dilated. Normal wall thickness. Mildly reduced systolic function.   Aortic Valve: Moderately calcified cusps. Severe stenosis.   Mitral Valve: Mild mitral annular calcification.   Tricuspid Valve: Severely elevated RVSP.   Pulmonic Valve: The pulmonic valve was not well visualized. Velocity across pulmonic valve is moderately increased consistent with moderate pulmonic or suprapulmonic stenosis.     Pulm  ARF with hypoxia secondary to covid pna in setting of significant underlying lung disease (h/o tobacco abuse; CT with severe emphysema and fibrotic changes)  -Remains on steroids/inh/nebs - reduce steroids further  - use seroquel to facilitate tolerating bipap at night         - monitor indices of renal function     Heme-monitor plts; lovenox stopped given the drop; SCDs for DVT proph without evidence of hemorrhage or VTE     ID-Blood cx growing MSSA.  D/C vanco and cefepime and was   on ancef  -Echo without obvious vegetations and repeat Bcx negative  - abx broadened given infection concern     Endo  h/o DM and on SSI/NPH; steroid weaning - will monitor sugars accordingly, currently on d5     Ongoing palliative consultation - with plans to transition to comfort care this afternoon once family members arrive.        I performed all aspects of the physical examination via Telemedicine associated with two way audio and video communication and with the on-site assistance of Nurse. I am located in [place] and the patient is located in [address] [place] [address]. Patient is critically ill in the ICU. I  personally  reviewed the pertinent medical records, laboratory/ pathology data and radiographic images. The decision making regarding this patient is as documented above, which was generated  following  discussion  with the multidisciplinary team and creation of a treatment plan for  the patient.  We discussed the patient's interval history and future coordination of care and  plans. The patient's medications  were reviewed and changes made as stipulated above. Due to  critical illness impairing one or more vital organs of this patient resulting in life threatening clinical situation  I have provided direct, frequent personal  assessment and manipulation in management plan and spent 35  minutes  of  critical care time excluding the time spent on procedures and teaching. Greater than 50% of this time  in patient's care was  employed  in counseling and coordination of care and engaged in face to face discussion of case management issues, addressing questions, and outlining a plan of  therapy.       Electronically signed by Quinton Estevez MD on 2/15/2022

## 2022-02-15 NOTE — PROGRESS NOTES
Carlos [facility]ardo [facility]on[facility] [address]  [address]  [phone]      Medical Progress Note      NAME: Yasmine Mike   :  1940  MRM:  [id number]    Date/Time of service: 2/15/2022  9:42 AM       Subjective:     Chief Complaint:  Patient was personally seen and examined by me during this time period. Chart reviewed. Worsening resp failure overnight, requiring continuous bipap. Spoke to family again at bedside regarding hospice and comfort care        Objective:       Vitals:       Last 24hrs VS reviewed since prior progress note. Most recent are:    Visit Vitals  BP (!) 142/70   Pulse 74   Temp 100.2 °F (37.9 °C)   Resp 18   Ht 5' 8\" (1.727 m)   Wt 63.8 kg (140 lb 10.5 oz)   SpO2 92%   BMI 21.39 kg/m²     SpO2 Readings from Last 6 Encounters:   02/15/22 92%   21 95%   21 93%   21 95%   20 98%   20 93%    O2 Flow Rate (L/min): 55 l/min       Intake/Output Summary (Last 24 hours) at 2/15/2022 8496  Last data filed at 2/15/2022 0482  Gross per 24 hour   Intake 2381.59 ml   Output 1575 ml   Net 806.59 ml        Exam:     Physical Exam:    Gen:  Disheveled, ill-appearing, mod distress  HEENT:  Pink conjunctivae, PERRL, hearing intact to voice, on bipap  Neck:  Supple, without masses, thyroid non-tender  Resp:  ++ accessory muscle use, prolonged BS, +rhonchi   Card:  No murmurs, normal S1, S2 without thrills, bruits or peripheral edema  Abd:  Soft, non-tender, non-distended, normoactive bowel sounds are present  Musc:  No cyanosis or clubbing  Skin:  No rashes  Neuro:   Moves all ext  Psych:  No insight    Medications Reviewed: (see below)    Lab Data Reviewed: (see below)    ______________________________________________________________________    Medications:     Current Facility-Administered Medications   Medication Dose Route Frequency    methylPREDNISolone (PF) (SOLU-MEDROL) injection 30 mg  30 mg IntraVENous Q24H    magnesium sulfate 2 g/50 ml IVPB (premix or compounded)  2 g IntraVENous ONCE    rivaroxaban (XARELTO) tablet 20 mg  20 mg Oral DAILY    QUEtiapine (SEROquel) tablet 100 mg  100 mg Oral QHS    dexmedeTOMidine in 0.9 % NaCl (PRECEDEX) 400 mcg/100 mL (4 mcg/mL) infusion soln  0.1-1.5 mcg/kg/hr IntraVENous TITRATE    morphine injection 1 mg  1 mg IntraVENous Q2H PRN    dextrose 5% and 0.9% NaCl infusion  75 mL/hr IntraVENous CONTINUOUS    LORazepam (ATIVAN) injection 1 mg  1 mg IntraVENous Q4H PRN    NOREPINephrine (LEVOPHED) 8 mg in 5% dextrose 250mL (32 mcg/mL) infusion  0.5-16 mcg/min IntraVENous TITRATE    [Held by provider] insulin NPH (NOVOLIN N, HUMULIN N) injection 18 Units  18 Units SubCUTAneous ACB&D    ceFAZolin (ANCEF) 1 g in 0.9% sodium chloride (MBP/ADV) 50 mL MBP  1 g IntraVENous Q8H    And    ceFAZolin (ANCEF) 1 g in 0.9% sodium chloride (MBP/ADV) 50 mL MBP  1 g IntraVENous Q8H    famotidine (PEPCID) tablet 20 mg  20 mg Oral BID    melatonin tablet 6 mg  6 mg Oral QHS PRN    albuterol-ipratropium (DUO-NEB) 2.5 MG-0.5 MG/3 ML  3 mL Nebulization Q6H RT    arformoteroL (BROVANA) neb solution 15 mcg  15 mcg Nebulization BID RT    budesonide (PULMICORT) 500 mcg/2 ml nebulizer suspension  500 mcg Nebulization BID RT    artificial tears (dextran-hypromellose-glycerin) (GENTEAL) ophthalmic solution 1 Drop  1 Drop Both Eyes PRN    sodium chloride (OCEAN) 0.65 % nasal squeeze bottle 2 Spray  2 Spray Both Nostrils Q2H PRN    metoprolol tartrate (LOPRESSOR) tablet 12.5 mg  12.5 mg Oral BID    guaiFENesin-dextromethorphan (ROBITUSSIN DM) 100-10 mg/5 mL syrup 5 mL  5 mL Oral Q6H PRN    [Held by provider] lisinopriL (PRINIVIL, ZESTRIL) tablet 10 mg  10 mg Oral QHS    atorvastatin (LIPITOR) tablet 20 mg  20 mg Oral DAILY    glucose chewable tablet 16 g  4 Tablet Oral PRN    dextrose (D50W) injection syrg 12.5-25 g  25-50 mL IntraVENous PRN    glucagon (GLUCAGEN) injection 1 mg  1 mg IntraMUSCular PRN    insulin lispro (HUMALOG) injection   SubCUTAneous AC&HS    sodium chloride (NS) flush 5-40 mL  5-40 mL IntraVENous Q8H    sodium chloride (NS) flush 5-40 mL  5-40 mL IntraVENous PRN    acetaminophen (TYLENOL) tablet 650 mg  650 mg Oral Q6H PRN    Or    acetaminophen (TYLENOL) suppository 650 mg  650 mg Rectal Q6H PRN    polyethylene glycol (MIRALAX) packet 17 g  17 g Oral DAILY PRN    ondansetron (ZOFRAN ODT) tablet 4 mg  4 mg Oral Q8H PRN    Or    ondansetron (ZOFRAN) injection 4 mg  4 mg IntraVENous Q6H PRN          Lab Review:     Recent Labs     02/15/22  0351 02/14/22  0248 02/13/22 0314   WBC 6.8 5.9 5.8   HGB 10.4* 10.8* 10.7*   HCT 30.8* 32.3* 31.6*   PLT 83* 73* 67*     Recent Labs     02/15/22  0351 02/14/22  0248 02/13/22  0314   * 134* 135*   K 3.9 4.5 4.4   CL 98 95* 97   CO2 31 33* 34*   * 184* 225*   BUN 18 20 21*   CREA 0.59* 0.56* 0.67*   CA 7.4* 7.9* 7.9*   MG 1.6 1.7 1.8   PHOS 3.0 3.1 2.8     Lab Results   Component Value Date/Time    Glucose (POC) 189 (H) 02/15/2022 07:18 AM    Glucose (POC) 223 (H) 02/15/2022 05:13 AM    Glucose (POC) 121 (H) 02/14/2022 11:07 PM    Glucose (POC) 119 (H) 02/14/2022 07:54 PM    Glucose (POC) 65 02/14/2022 07:16 PM          Assessment / Plan:     78 yo hx of HTN, DM, Pafib s/p pacer, COPD, CVA, presented w/ resp failure, COVID pneumonia. Hospital course complicated by MSSA bacteremia, thrombocytopenia, pulm fibrosis    1) Acute resp failure/hypoxia: Worsening, poor prognosis. Due to COVID, advanced COPD, pulm fibrosis. Patient is a DNR. I had a long discussing with patient's son and daughter again regarding comfort care and hospice. They will think about it. Cont bipap. Intensivist following    2) Pneumonia due to COVID-19/COPD exacerbation: s/p baricitinib. Cont IV steroids, nebs, LABA/ICS    3) MSSA bacteremia/septic shock: shock still active. Unclear source. Last positive blood Cx on 02/09. Repeat blood Cx neg. TTE neg vegetations. Cont IV ancef, pressors gtt. ID following    4) Pafib/HTN: s/p pacer. Cont metoprolol and Xarelto    5) Hx of CVA: cont statin    6) Thrombocytopenia: slowly improving. Due to sepsis. Will monitor     7) DM type 2: A1C 7.2%. Issues w/ hypoglycemia due to poor intake. Will hold NPH.   Cont SSI    Code: Partial     Total time spent with patient: 45 Minutes (critical care time) **I personally saw and examined the patient during this time period**                 Care Plan discussed with: Patient, nursing, family    Discussed:  Care Plan    Prophylaxis:  SCD's, xarelto     Disposition:  SNF/LTC vs comfort care            ___________________________________________________    Attending Physician: Peggy Chamberlain MD

## 2022-02-15 NOTE — PROGRESS NOTES
Palliative Medicine Consult  Sunil: [phone] ([phone]    Patient Name: Keanu Lezama  YOB: 1940    Date of Initial Consult: 1/13/2022  Reason for Consult: Care Decisions  Requesting Provider: Dr. Leonor Catalan  Primary Care Physician: Lawanda Lima MD     SUMMARY:   Keanu Lezama is a 79 y.o. 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 Patient presented to ER w/ cc of poor appetite, loss of taste, arthralgia and diarrhea x a few days. Patient also reported recent COVID exposure. He received  COVID 19 vaccine x 2 doses in February 2021, but had no booster. Course of hospitalization: O2 needs increased rapidly since admission, now on 50L HFNC. Renal function improved. Unfortunately patient has developed pulmonary fibrosis 2/2 COVID  Current medical issues leading to Palliative Medicine involvement include: GOC discussion with gentleman of advanced age prolonged hospitalization unable to be weaned off HF O2 or BIPAP. Psychosocial: Born [place] raised in [place], he was adopted, only child, very loving parents. Served as a  in [employer] x 10+ years. 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 He is , has 1 child, son Mary Hernandez, 2 grandchildren and a few great grandchildren. Patients grandson and  great grandson, who will be turning 14 yo on 1/15, live with him. Patient continues to play Golf, works on cars. Cognitive and Functional baseline: Alert and oriented, independent with ADLs, drives etc,  does have chronic slightly unsteady gait (etiology unknown). PALLIATIVE DIAGNOSES:   1. Palliative care encounter  2. Pulmonary Fibrosis  3. Hypoxia  4.  Pulmonary firbosis  5. Weakness, generalized  6. Goals of care discussion       PLAN:   1. Chart  reviewed. 2. Mr. Maday Wisdom continues to have significant supplemental O2 needs not sustainable at home or SNF, has been requiring BIPAP over past 24 hours. He is also having intermittent agitation, requiring precedex or prn lorazepam.   3. Received message from ICU CM Gabriela, patient not doing well, family at bedside. 4. Met with son alba at bedside, patient sleeping, on bipap, not responding to voice or touch. At that time, Son hopeful patient might wake up enough to communicate his wishes. 5. Later in day I received call from son, stated that after family and friends were going to come in to pay their respects, he wants to transition patient to comfort care at approx 4 PM. I Answered kalee questions regarding process for transitioning off bipap. 6. I notified vega Holland RN, unit [facility] and attending Dr. Benita Estrada of the plan  7. Returned to the unit at 4 PM, son and grandson at bedside. With sons permission, comfort order placed, including medications to treat signs of symptoms of pain, SOB, Anxiety, Agitation and Secretions. 8. Requested that Yennifer Escoto RN administer IV Morphine and IV lorazepam prior to taking off BIPAP. Approx 5 minutes after  bipap removed, patient with increased WOB and moaning out, warranting increase to prn lorazepam and morphine orders.       5.  present in ICU for support. 9. Initial consult note routed to primary continuity provider and/or primary health care team members  10.  Communicated plan of care with: Palliative IDT, [facility] [address] Team, Nayely Olson , mariah GRUBER, [facility]     GOALS OF CARE / TREATMENT PREFERENCES:     GOALS OF CARE:  Patient/Health Care Proxy Stated Goals: Rehabilitation    TREATMENT PREFERENCES:   Code Status: DNR    Patient and family's personal goals include: Get well and return home    Important upcoming milestones or family events: Unknown. The patient identifies the following as important for living well: unknown      Advance Care Planning:  [x] The [facility] Interdisciplinary Team has updated the ACP Navigator with [address] and Patient Capacity      Primary Decision Maker (Active): Sandip Ravi - [phone]    Secondary Decision Maker: Cecilia Monaco - [phone]  Advance Care Planning 1/14/2022   Patient's Healthcare Decision Maker is: Patient declined (Legal Next of Kin remains as decision maker)   Confirm Advance Directive None   Patient Would Like to Complete Advance Directive No       Medical Interventions: Limited additional interventions       Other:    As far as possible, the palliative care team has discussed with patient / health care proxy about goals of care / treatment preferences for patient. HISTORY:     History obtained from: medical record/nurse/ED and family    CHIEF COMPLAINT: n/a    HPI/SUBJECTIVE:    The patient is:   [] Verbal and participatory  [x] Non-participatory due to:   Patient on on BiPAP, unresponsive to voice or touch    2/14- agitation overnight, required precedex and prn lorazepam, still on bipap, family electing comfort care  2/14- BIPAP  2/13- 60L HFNC/BIPAP    2/11- WAYNE No vegetation + AV stenosis, Mod PV stenosis. Suspect bacteremia superimposed MSSA on PNA  2/9- Blood CX + MSSA bacteremia, ID managing abx  2/8-Fever, patients O2 needs continue to fluctuate, over the weekend had been down, patient hopeful to be discharged home this week, however O2 needs have increased significantly, back on 55 L  high flow nasal cannula.   Patient continues to have poor tolerance to PT 2/2 fatigue and prolonged desaturation of 80% on 50 L HFNC tachycardia    2/7-CT chest significant for extensive emphysema with fibrotic changes in the lung bases, and focal airspace opacity in the right upper lobe which may represent infection, also+ cardiomegaly and CAD    1/18-echo mildly decreased LV systolic function, EF 56-64%    Clinical Pain Assessment (nonverbal scale for severity on nonverbal patients):   Clinical Pain Assessment  Severity: 0     Activity (Movement): Lying quietly, normal position    Duration: for how long has pt been experiencing pain (e.g., 2 days, 1 month, years)  Frequency: how often pain is an issue (e.g., several times per day, once every few days, constant)     FUNCTIONAL ASSESSMENT:     Palliative Performance Scale (PPS):  PPS: 10       PSYCHOSOCIAL/SPIRITUAL SCREENING:     Palliative IDT has assessed this patient for cultural preferences / practices and a referral made as appropriate to needs (Cultural Services, Patient Advocacy, Ethics, etc.)    Any spiritual / Religion concerns:  [] Yes /  [x] No   If \"Yes\" to discuss with pastoral care during IDT     Does caregiver feel burdened by caring for their loved one:   [] Yes /  [x] No /  [] No Caregiver Present    If \"Yes\" to discuss with social work during IDT    Anticipatory grief assessment:   [x] Normal  / [] Maladaptive     If \"Maladaptive\" to discuss with social work during IDT    ESAS Anxiety: Anxiety: 0    ESAS Depression: Depression: 0        REVIEW OF SYSTEMS:     Positive and pertinent negative findings in ROS are noted above in HPI. The following systems were [] reviewed / [x] unable to be reviewed as noted in HPI  Other findings are noted below. Systems: constitutional, ears/nose/mouth/throat, respiratory, gastrointestinal, genitourinary, musculoskeletal, integumentary, neurologic, psychiatric, endocrine. Positive findings noted below.   Modified ESAS Completed by: provider   Fatigue: 3 Drowsiness: 0   Depression: 0 Pain: 0   Anxiety: 0 Nausea: 0   Anorexia: 5 Dyspnea: 0           Stool Occurrence(s): 2        PHYSICAL EXAM:     From RN flowsheet:  Wt Readings from Last 3 Encounters:   02/12/22 140 lb 10.5 oz (63.8 kg)   11/22/21 162 lb (73.5 kg)   11/19/21 162 lb (73.5 kg)     Blood pressure (!) 111/57, pulse 90, temperature 100.2 °F (37.9 °C), resp. rate 23, height 5' 8\" (1.727 m), weight 140 lb 10.5 oz (63.8 kg), SpO2 94 %.     Pain Scale 1: Adult Nonverbal Pain Scale  Pain Intensity 1: 0  Pain Onset 1: acute  Pain Location 1: Chest  Pain Orientation 1: Anterior  Pain Description 1: Aching  Pain Intervention(s) 1: Rest  Last bowel movement, if known:     Constitutional: on bipap, ill appearing, NAD  Eyes: pupils equal, anicteric  ENMT: no nasal discharge, dry  mucous membranes  Cardiovascular: regular rhythm, distal pulses intact  Respiratory: BIPAP  Gastrointestinal: soft non-tender, +bowel sounds  Musculoskeletal: no deformity, no tenderness to palpation  Skin: warm, dry  Neurologic: unresponsive to  voice or touch,  No movement in body observed  Psychiatric: unable to assess  Other:       HISTORY:     Active Problems:    A-fib (HCC) (2/4/2020)      History of CVA (cerebrovascular accident) (2/4/2020)      Type 2 diabetes mellitus without complication ([address] (3/3/7334)      Acute respiratory failure with hypoxia ([address] (1/11/2022)      Pneumonia due to COVID-19 virus (1/11/2022)      Hypertension ()      NSVT (nonsustained ventricular tachycardia) ([address] (1/22/2022)      Pacemaker (1/22/2022)      Palliative care encounter ()      Concern about end of life ()      Hypoxia ()      DNR (do not resuscitate) discussion ()      Goals of care, counseling/discussion ()      Thrombocytopenia ([address] (2/11/2022)      Past Medical History:   Diagnosis Date    Atrial fibrillation ([address]     Diabetes mellitus, type 2 ([address]     Hypertension     Internal carotid artery stenosis, right     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 Moderate aortic valve stenosis was present. LVEF 55-60% .  Stroke [facility])     asymptomatic; small chronic infarcts in the bilateral thalami per MRI of 2020. Previous MRI of 2010 showed possible tiny old right thalamic infarct versus perivascular space       Past Surgical History:   Procedure Laterality Date    HX CHOLECYSTECTOMY      IR INSERT NON TUNL CVC OVER 5 YRS  2022    MS INS NEW/RPLCMT PRM PM W/TRANSV ELTRD ATRIAL&VENT N/A 2020    INSERT PPM DUAL performed by Raul Rhoades MD at [facility] CATH LAB      Family History   Adopted: Yes   Family history unknown: Yes      History reviewed, no pertinent family history.   Social History     Tobacco Use    Smoking status: Former Smoker     Packs/day: 2.00     Years: 50.00     Pack years: 100.00     Quit date: 2004     Years since quittin.7    Smokeless tobacco: Never Used   Substance Use Topics    Alcohol use: Yes     Comment: 1 beer or 1 glaas of wine daily most of adult life     Allergies   Allergen Reactions    Ampicillin Nausea and Vomiting    Iodine Hives, Itching and Nausea and Vomiting      Current Facility-Administered Medications   Medication Dose Route Frequency    methylPREDNISolone (PF) (SOLU-MEDROL) injection 30 mg  30 mg IntraVENous Q24H    magnesium sulfate 2 g/50 ml IVPB (premix or compounded)  2 g IntraVENous ONCE    rivaroxaban (XARELTO) tablet 20 mg  20 mg Oral DAILY    QUEtiapine (SEROquel) tablet 100 mg  100 mg Oral QHS    dexmedeTOMidine in 0.9 % NaCl (PRECEDEX) 400 mcg/100 mL (4 mcg/mL) infusion soln  0.1-1.5 mcg/kg/hr IntraVENous TITRATE    morphine injection 1 mg  1 mg IntraVENous Q2H PRN    dextrose 5% and 0.9% NaCl infusion  75 mL/hr IntraVENous CONTINUOUS    LORazepam (ATIVAN) injection 1 mg  1 mg IntraVENous Q4H PRN    NOREPINephrine (LEVOPHED) 8 mg in 5% dextrose 250mL (32 mcg/mL) infusion  0.5-16 mcg/min IntraVENous TITRATE    [Held by provider] insulin NPH (NOVOLIN N, HUMULIN N) injection 18 Units  18 Units SubCUTAneous ACB&D    ceFAZolin (ANCEF) 1 g in 0.9% sodium chloride (MBP/ADV) 50 mL MBP  1 g IntraVENous Q8H    And    ceFAZolin (ANCEF) 1 g in 0.9% sodium chloride (MBP/ADV) 50 mL MBP  1 g IntraVENous Q8H    famotidine (PEPCID) tablet 20 mg  20 mg Oral BID    melatonin tablet 6 mg  6 mg Oral QHS PRN    albuterol-ipratropium (DUO-NEB) 2.5 MG-0.5 MG/3 ML  3 mL Nebulization Q6H RT    arformoteroL (BROVANA) neb solution 15 mcg  15 mcg Nebulization BID RT    budesonide (PULMICORT) 500 mcg/2 ml nebulizer suspension  500 mcg Nebulization BID RT    artificial tears (dextran-hypromellose-glycerin) (GENTEAL) ophthalmic solution 1 Drop  1 Drop Both Eyes PRN    sodium chloride (OCEAN) 0.65 % nasal squeeze bottle 2 Spray  2 Spray Both Nostrils Q2H PRN    metoprolol tartrate (LOPRESSOR) tablet 12.5 mg  12.5 mg Oral BID    guaiFENesin-dextromethorphan (ROBITUSSIN DM) 100-10 mg/5 mL syrup 5 mL  5 mL Oral Q6H PRN    [Held by provider] lisinopriL (PRINIVIL, ZESTRIL) tablet 10 mg  10 mg Oral QHS    atorvastatin (LIPITOR) tablet 20 mg  20 mg Oral DAILY    glucose chewable tablet 16 g  4 Tablet Oral PRN    dextrose (D50W) injection syrg 12.5-25 g  25-50 mL IntraVENous PRN    glucagon (GLUCAGEN) injection 1 mg  1 mg IntraMUSCular PRN    insulin lispro (HUMALOG) injection   SubCUTAneous AC&HS    sodium chloride (NS) flush 5-40 mL  5-40 mL IntraVENous Q8H    sodium chloride (NS) flush 5-40 mL  5-40 mL IntraVENous PRN    acetaminophen (TYLENOL) tablet 650 mg  650 mg Oral Q6H PRN    Or    acetaminophen (TYLENOL) suppository 650 mg  650 mg Rectal Q6H PRN    polyethylene glycol (MIRALAX) packet 17 g  17 g Oral DAILY PRN    ondansetron (ZOFRAN ODT) tablet 4 mg  4 mg Oral Q8H PRN    Or    ondansetron (ZOFRAN) injection 4 mg  4 mg IntraVENous Q6H PRN          LAB AND IMAGING FINDINGS:     Lab Results   Component Value Date/Time    WBC 6.8 02/15/2022 03:51 AM    HGB 10.4 (L) 02/15/2022 03:51 AM    PLATELET 83 (L) 59/88/4828 03:51 AM     Lab Results   Component Value Date/Time    Sodium 135 (L) 02/15/2022 03:51 AM    Potassium 3.9 02/15/2022 03:51 AM    Chloride 98 02/15/2022 03:51 AM    CO2 31 02/15/2022 03:51 AM    BUN 18 02/15/2022 03:51 AM    Creatinine 0.59 (L) 02/15/2022 03:51 AM    Calcium 7.4 (L) 02/15/2022 03:51 AM    Magnesium 1.6 02/15/2022 03:51 AM    Phosphorus 3.0 02/15/2022 03:51 AM      Lab Results   Component Value Date/Time    Alk. phosphatase 103 02/12/2022 04:22 AM    Protein, total 4.9 (L) 02/12/2022 04:22 AM    Albumin 2.0 (L) 02/12/2022 04:22 AM    Globulin 2.9 02/12/2022 04:22 AM     Lab Results   Component Value Date/Time    INR 1.2 (H) 02/05/2022 05:08 AM    Prothrombin time 12.0 (H) 02/05/2022 05:08 AM    aPTT 26.4 02/05/2022 05:08 AM      Lab Results   Component Value Date/Time    Ferritin 788 (H) 01/31/2022 02:00 AM      Lab Results   Component Value Date/Time    pH 7.37 02/08/2022 10:50 PM    PCO2 36 02/08/2022 10:50 PM    PO2 45 (LL) 02/08/2022 10:50 PM     No components found for: [place]   Lab Results   Component Value Date/Time    CK 79 09/22/2010 03:50 AM                Total time: 65min  Counseling / coordination time, spent as noted above: 50 min   > 50% counseling / coordination?: yes    Prolonged service was provided for  [x]30 min   []75 min in face to face time in the presence of the patient, spent as noted above. Time Start/end:  [phone]  Time start/End: [phone]   Note: this can only be billed with  (initial) or 21 [phone] (follow up). If multiple start / stop times, list each separately.

## 2022-02-15 NOTE — PROGRESS NOTES
Visited with patient who was on bipap and not alert during the course of the visit. Patient's son Anabelle Ann and granddaughter were with the patient. Anabelle Ann spoke of his father's life. He was a [employer]  and lived a full life post [employer] group home. Anabelle Ann realizes that his father is near the end of life and wants to honor his father's wishes. He is hopeful that he can come off bipap to high flow long enough for a conversation around his wishes about continuing aggressive treatment. He also acknowledged that he, in consolation with his son and daughter, may have to move to comfort care without such a conversation. Offered presence and listened as Anabelle Ann gave voice to his love for his father and agony over the pending decisions.      Chaplain Darby, MDiv, MS, [facility]

## 2022-02-15 NOTE — PROGRESS NOTES
Prescription (Lisinopril-hydrochlorothiazide) was sent 12/28/2020 for #180 with 1 refill.  Pharmacy notified via E-Prescribe refusal.     JESIKA AvelarN, RN  [facility]     Visited and offered support to patient's family as he was transitioned to comfort care and subsequently  peacefully with his son and grandson at bedside. During the day family members and friends came to the patient's room and were able to pay their respects and offer their good byes. He was loved and respected by many. His grand son spoke of how many lives he has impacted and how much he meant to him and his son.    Kandyce Pallas, Chaplain, MDiv, MS, [facility]

## 2022-02-15 NOTE — PROGRESS NOTES
Case Management :    RUR 18%(moderate readmission risk score)    LOS 34 days,GLOS 5.4        Problems:     Covid pneumonia  acute respiratory failure with hypoxia  Poor appetite  Pulmonary fibrosis  MSSA bacteremia  Septic shock  Thrombocytopenia    History of afib,DM,carotid artery stenosis,bradycardia s/p pacemaker    Today:    Attending discussed comfort care and hospice with family this am .  [address] is here with family. I notified Veronica Hamm NP that family is here @ bedside. Palliative NP will be here soon to meet with family. Pt is a DNR. Support provided.     Morales Wallace

## 2022-02-15 NOTE — PROGRESS NOTES
Problem: Falls - Risk of  Goal: *Absence of Falls  Description: Document Jacob Davis Fall Risk and appropriate interventions in the flowsheet. Outcome: Progressing Towards Goal  Note: Fall Risk Interventions:  Mobility Interventions: Bed/chair exit alarm,Communicate number of staff needed for ambulation/transfer,Patient to call before getting OOB,PT Consult for mobility concerns    Mentation Interventions: Adequate sleep, hydration, pain control,Door open when patient unattended,Evaluate medications/consider consulting pharmacy,More frequent rounding,Reorient patient,Room close to nurse's station,Toileting rounds,Update white board    Medication Interventions: Assess postural VS orthostatic hypotension,Bed/chair exit alarm,Evaluate medications/consider consulting pharmacy,Patient to call before getting OOB,Teach patient to arise slowly,Utilize gait belt for transfers/ambulation    Elimination Interventions: Bed/chair exit alarm,Call light in reach,Elevated toilet seat,Patient to call for help with toileting needs,Stay With Me (per policy),Toilet paper/wipes in reach,Toileting schedule/hourly rounds    History of Falls Interventions: Bed/chair exit alarm,Investigate reason for fall,Utilize gait belt for transfer/ambulation         Problem: Patient Education: Go to Patient Education Activity  Goal: Patient/Family Education  Outcome: Progressing Towards Goal     Problem: Airway Clearance - Ineffective  Goal: Achieve or maintain patent airway  Outcome: Progressing Towards Goal     Problem: Gas Exchange - Impaired  Goal: Absence of hypoxia  Outcome: Progressing Towards Goal  Goal: Promote optimal lung function  Outcome: Progressing Towards Goal     Problem: Breathing Pattern - Ineffective  Goal: Ability to achieve and maintain a regular respiratory rate  Outcome: Progressing Towards Goal     Problem:  Body Temperature -  Risk of, Imbalanced  Goal: Ability to maintain a body temperature within defined limits  Outcome: Progressing Towards Goal  Goal: Will regain or maintain usual level of consciousness  Outcome: Progressing Towards Goal  Goal: Complications related to the disease process, condition or treatment will be avoided or minimized  Outcome: Progressing Towards Goal     Problem: Isolation Precautions - Risk of Spread of Infection  Goal: Prevent transmission of infectious organism to others  Outcome: Progressing Towards Goal     Problem: Nutrition Deficits  Goal: Optimize nutrtional status  Outcome: Progressing Towards Goal     Problem: Risk for Fluid Volume Deficit  Goal: Maintain normal heart rhythm  Outcome: Progressing Towards Goal  Goal: Maintain absence of muscle cramping  Outcome: Progressing Towards Goal  Goal: Maintain normal serum potassium, sodium, calcium, phosphorus, and pH  Outcome: Progressing Towards Goal     Problem: Loneliness or Risk for Loneliness  Goal: Demonstrate positive use of time alone when socialization is not possible  Outcome: Progressing Towards Goal     Problem: Fatigue  Goal: Verbalize increase energy and improved vitality  Outcome: Progressing Towards Goal     Problem: Patient Education: Go to Patient Education Activity  Goal: Patient/Family Education  Outcome: Progressing Towards Goal     Problem: Diabetes Self-Management  Goal: *Disease process and treatment process  Description: Define diabetes and identify own type of diabetes; list 3 options for treating diabetes. Outcome: Progressing Towards Goal  Goal: *Incorporating nutritional management into lifestyle  Description: Describe effect of type, amount and timing of food on blood glucose; list 3 methods for planning meals. Outcome: Progressing Towards Goal  Goal: *Incorporating physical activity into lifestyle  Description: State effect of exercise on blood glucose levels.   Outcome: Progressing Towards Goal  Goal: *Developing strategies to promote health/change behavior  Description: Define the ABC's of diabetes; identify appropriate screenings, schedule and personal plan for screenings. Outcome: Progressing Towards Goal  Goal: *Using medications safely  Description: State effect of diabetes medications on diabetes; name diabetes medication taking, action and side effects. Outcome: Progressing Towards Goal  Goal: *Monitoring blood glucose, interpreting and using results  Description: Identify recommended blood glucose targets  and personal targets. Outcome: Progressing Towards Goal  Goal: *Prevention, detection, treatment of acute complications  Description: List symptoms of hyper- and hypoglycemia; describe how to treat low blood sugar and actions for lowering  high blood glucose level. Outcome: Progressing Towards Goal  Goal: *Prevention, detection and treatment of chronic complications  Description: Define the natural course of diabetes and describe the relationship of blood glucose levels to long term complications of diabetes.   Outcome: Progressing Towards Goal  Goal: *Developing strategies to address psychosocial issues  Description: Describe feelings about living with diabetes; identify support needed and support network  Outcome: Progressing Towards Goal  Goal: *Insulin pump training  Outcome: Progressing Towards Goal  Goal: *Sick day guidelines  Outcome: Progressing Towards Goal  Goal: *Patient Specific Goal (EDIT GOAL, INSERT TEXT)  Outcome: Progressing Towards Goal     Problem: Patient Education: Go to Patient Education Activity  Goal: Patient/Family Education  Outcome: Progressing Towards Goal     Problem: Patient Education: Go to Patient Education Activity  Goal: Patient/Family Education  Outcome: Progressing Towards Goal     Problem: Patient Education: Go to Patient Education Activity  Goal: Patient/Family Education  Outcome: Progressing Towards Goal     Problem: Pain  Goal: *Control of Pain  Outcome: Progressing Towards Goal     Problem: Patient Education: Go to Patient Education Activity  Goal: Patient/Family Education  Outcome: Progressing Towards Goal     Problem: Pressure Injury - Risk of  Goal: *Prevention of pressure injury  Description: Document Alexy Scale and appropriate interventions in the flowsheet.   Outcome: Progressing Towards Goal     Problem: Patient Education: Go to Patient Education Activity  Goal: Patient/Family Education  Outcome: Progressing Towards Goal     Problem: Delirium Treatment  Goal: *Level of consciousness restored to baseline  Outcome: Progressing Towards Goal  Goal: *Level of environmental perceptions restored to baseline  Outcome: Progressing Towards Goal  Goal: *Sensory perception restored to baseline  Outcome: Progressing Towards Goal  Goal: *Emotional stability restored to baseline  Outcome: Progressing Towards Goal  Goal: *Functional assessment restored to baseline  Outcome: Progressing Towards Goal  Goal: *Absence of falls  Outcome: Progressing Towards Goal  Goal: *Will remain free of delirium, CAM Score negative  Outcome: Progressing Towards Goal  Goal: *Cognitive status will be restored to baseline  Outcome: Progressing Towards Goal  Goal: Interventions  Outcome: Progressing Towards Goal     Problem: Patient Education: Go to Patient Education Activity  Goal: Patient/Family Education  Outcome: Progressing Towards Goal

## 2022-02-15 NOTE — PROGRESS NOTES
1900 Bedside and Verbal shift change report given to YASMANI Quiñones (oncoming nurse) by Esperanza Dumont (offgoing nurse). Report included the following information SBAR, Intake/Output, Recent Results, Cardiac Rhythm Vpaced and Alarm Parameters .      Patient Vitals for the past 8 hrs:   Temp Pulse Resp BP SpO2   02/14/22 2215  80 19 (!) 107/50 99 %   02/14/22 2200  82 20 (!) 107/52 99 %   02/14/22 2145  73 21 (!) 101/51 99 %   02/14/22 2130  71 21 (!) 106/53 100 %   02/14/22 2115  62 21 (!) 82/45 100 %   02/14/22 2110     100 %   02/14/22 2100  70 21 (!) 110/54 100 %   02/14/22 2045  77 19 (!) 79/44 100 %   02/14/22 2030  76 20 (!) 60/43 100 %   02/14/22 2015  84 20 (!) 62/38 99 %   02/14/22 2000  80 24 124/76 100 %   02/14/22 1945  (!) 102 26 (!) 122/99 (!) 76 %   02/14/22 1930  83 23 (!) 83/47 99 %   02/14/22 1915  87 24 (!) 78/44 98 %   02/14/22 1900  84 26 (!) 81/52 98 %   02/14/22 1845  80 23  100 %   02/14/22 1830  79 26  99 %   02/14/22 1815  79 24  98 %   02/14/22 1800  85 22 (!) 81/48 100 %   02/14/22 1700  (!) 107 22 119/64 (!) 86 %   02/14/22 1651     (!) 88 %   02/14/22 1600 97.9 °F (36.6 °C) 91 24 (!) 110/93 (!) 86 %       Lab Results 24Hours     Procedure Component Value Units Date/Time    MAGNESIUM [[id number]] Collected: 02/14/22 0248    Order Status: Completed Specimen: Serum or Plasma Updated: 02/14/22 0759     Magnesium 1.7 mg/dL     PHOSPHORUS [[id number]] Collected: 02/14/22 0248    Order Status: Completed Specimen: Serum or Plasma Updated: 02/14/22 0350     Phosphorus 3.1 MG/DL     METABOLIC PANEL, BASIC [[id number]]  (Abnormal) Collected: 02/14/22 0248    Order Status: Completed Specimen: Serum or Plasma Updated: 02/14/22 0350     Sodium 134 mmol/L      Potassium 4.5 mmol/L      Chloride 95 mmol/L      CO2 33 mmol/L      Anion gap 6 mmol/L      Glucose 184 mg/dL      BUN 20 MG/DL      Creatinine 0.56 MG/DL      BUN/Creatinine ratio 36        GFR est AA >60 ml/min/1.73m2      GFR est non-AA >60 ml/min/1.73m2      Calcium 7.9 MG/DL     CBC W/O DIFF [[id number]]  (Abnormal) Collected: 02/14/22 0248    Order Status: Completed Specimen: Whole Blood Updated: 02/14/22 0317     WBC 5.9 K/uL      RBC 3.66 M/uL      HGB 10.8 g/dL      HCT 32.3 %      MCV 88.3 FL      MCH 29.5 PG      MCHC 33.4 g/dL      RDW 16.1 %      PLATELET 73 K/uL      MPV 11.9 FL      NRBC 1.2  WBC      ABSOLUTE NRBC 0.07 K/uL         Recent Results (from the past 12 hour(s))   GLUCOSE, POC    Collection Time: 02/14/22 12:12 PM   Result Value Ref Range    Glucose (POC) 98 65 - 117 mg/dL    Performed by Elizabeth Andrews, POC    Collection Time: 02/14/22  5:17 PM   Result Value Ref Range    Glucose (POC) 78 65 - 117 mg/dL    Performed by Elizabeth Andrews, POC    Collection Time: 02/14/22  7:16 PM   Result Value Ref Range    Glucose (POC) 65 65 - 117 mg/dL    Performed by MY Bonilla (RICHARD)    GLUCOSE, POC    Collection Time: 02/14/22  7:54 PM   Result Value Ref Range    Glucose (POC) 119 (H) 65 - 117 mg/dL    Performed by MY Bonilla (RICHARD)    GLUCOSE, POC    Collection Time: 02/14/22 11:07 PM   Result Value Ref Range    Glucose (POC) 121 (H) 65 - 117 mg/dL    Performed by [address] blood glucose 65 gave x1 D50 12.5 gram. BP 83/47 (56) HR 73. fever 100.3 gave him Tylenol supp. He's on precedex 0.6 mcg at that time; mentation drowsy but arousable. held precedex for 30 mins. Rechecked blood glucose 25 min post D50 result 119. then he woke up with panic attack attempted to pull out bipap; desat to 79%. Notified Hospitalist; Hospitalist (dr. Carli Weinstein) ordered 1bolus of NS 1L and start him on D5NS at 75ml/hr. I put precedex back on at 0.4mcg, he is calmer with precedex; his o2sat % on bipap; however, the precedex drop his bp to BP 60/43; discussed with hospitalist and Hospitalist ordered levophed drip and Ativan 1mg iv q4 prn and to hold precedex drip.    All orders received via telephone order/readback/clarified. 2059 Updated intensivist per hospitalist's request. Margret Goldmann, Dr. Juan Yen said all interventions were appropiated and no additional orders needed at this time. 02/14/22 2215  80 19 (!) 107/50 99 %   02/14/22 2200  82 20 (!) 107/52 99 %   02/14/22 2145  73 21 (!) 101/51 99 %   02/14/22 2130  71 21 (!) 106/53 100 %   02/14/22 2115  62 21 (!) 82/45 100 %   02/14/22 2110     100 %   02/14/22 2100  70 21 (!) 110/54 100 %     See flowsheet for assessment  See MAR for medication administration and drip titrations. 0520 blood glucose 223; notified intensivist; dr. Erika Allan. Per MD, RN to decrease IVF rate to 50 mL/hr and recheck blood glucose in 2 hours, and if it remains in this range we may hold the IVF.    Lab Results 24Hours     Procedure Component Value Units Date/Time    PHOSPHORUS [[id number]] Collected: 02/15/22 0351    Order Status: Completed Specimen: Serum or Plasma Updated: 02/15/22 0449     Phosphorus 3.0 MG/DL     METABOLIC PANEL, BASIC [[id number]]  (Abnormal) Collected: 02/15/22 0351    Order Status: Completed Specimen: Serum or Plasma Updated: 02/15/22 0449     Sodium 135 mmol/L      Potassium 3.9 mmol/L      Chloride 98 mmol/L      CO2 31 mmol/L      Anion gap 6 mmol/L      Glucose 228 mg/dL      BUN 18 MG/DL      Creatinine 0.59 MG/DL      BUN/Creatinine ratio 31        GFR est AA >60 ml/min/1.73m2      GFR est non-AA >60 ml/min/1.73m2      Calcium 7.4 MG/DL     CBC W/O DIFF [[id number]]  (Abnormal) Collected: 02/15/22 0351    Order Status: Completed Specimen: Whole Blood Updated: 02/15/22 0424     WBC 6.8 K/uL      RBC 3.50 M/uL      HGB 10.4 g/dL      HCT 30.8 %      MCV 88.0 FL      MCH 29.7 PG      MCHC 33.8 g/dL      RDW 16.6 %      PLATELET 83 K/uL      MPV 10.7 FL      NRBC 0.7  WBC      ABSOLUTE NRBC 0.05 K/uL     MAGNESIUM [[id number]] Collected: 02/15/22 0351    Order Status: Completed Specimen: Serum Updated: 02/15/22 5841 MAGNESIUM [[id number]] Collected: 02/14/22 0248    Order Status: Completed Specimen: Serum or Plasma Updated: 02/14/22 0759     Magnesium 1.7 mg/dL           0700  Patient Vitals for the past 4 hrs:   Temp Pulse Resp BP SpO2   02/15/22 0645  80 19 123/62 96 %   02/15/22 0630  64 20 (!) 146/62 97 %   02/15/22 0615  76 19 118/63 99 %   02/15/22 0600  75 20 101/62 98 %   02/15/22 0545  80 19 (!) 111/55 98 %   02/15/22 0530  77 19 (!) 107/55 97 %   02/15/22 0515  73 22 (!) 136/58 98 %   02/15/22 0500  75 20 (!) 142/48 96 %   02/15/22 0445  73 18 (!) 125/59 95 %   02/15/22 0435     92 %   02/15/22 0430  74 18 (!) 156/58 95 %   02/15/22 0415  78 20 (!) 141/61 94 %   02/15/22 0400  78 18 (!) 142/59 95 %   02/15/22 0348 98.3 °F (36.8 °C)       02/15/22 0345  70 18 125/67 96 %   02/15/22 0330  66 18 118/61 97 %   02/15/22 0315  61 18 121/61 97 %       Lab Results 24Hours     Procedure Component Value Units Date/Time    PHOSPHORUS [[id number]] Collected: 02/15/22 0351    Order Status: Completed Specimen: Serum or Plasma Updated: 02/15/22 0449     Phosphorus 3.0 MG/DL     METABOLIC PANEL, BASIC [[id number]]  (Abnormal) Collected: 02/15/22 0351    Order Status: Completed Specimen: Serum or Plasma Updated: 02/15/22 0449     Sodium 135 mmol/L      Potassium 3.9 mmol/L      Chloride 98 mmol/L      CO2 31 mmol/L      Anion gap 6 mmol/L      Glucose 228 mg/dL      BUN 18 MG/DL      Creatinine 0.59 MG/DL      BUN/Creatinine ratio 31        GFR est AA >60 ml/min/1.73m2      GFR est non-AA >60 ml/min/1.73m2      Calcium 7.4 MG/DL     CBC W/O DIFF [[id number]]  (Abnormal) Collected: 02/15/22 0351    Order Status: Completed Specimen: Whole Blood Updated: 02/15/22 0424     WBC 6.8 K/uL      RBC 3.50 M/uL      HGB 10.4 g/dL      HCT 30.8 %      MCV 88.0 FL      MCH 29.7 PG      MCHC 33.8 g/dL      RDW 16.6 %      PLATELET 83 K/uL      MPV 10.7 FL      NRBC 0.7  WBC      ABSOLUTE NRBC 0.05 K/uL     MAGNESIUM [[id number]] Collected: 02/15/22 0351    Order Status: Completed Specimen: Serum Updated: 02/15/22 0405    MAGNESIUM [[id number]] Collected: 02/14/22 0248    Order Status: Completed Specimen: Serum or Plasma Updated: 02/14/22 0759     Magnesium 1.7 mg/dL         0705 Bedside and Verbal shift change report given to YASMANI Ibarra (oncoming nurse) by Elias Noonan RN (offgoing nurse). Report included the following information SBAR, Intake/Output, Cardiac Rhythm A-flutter, Vpaced and Alarm Parameters .

## 2022-02-15 NOTE — CONSULTS
Palliative medicine brief note-      Received a call from patients son, Shelby Saab. Plan- family will be coming into see patient around 4 PM today, after they have had time to see him, plan will be to transition him to comfort care. · I will return to unit this afternoon, place comfort orders. Please contact me via [facility] with any urgent needs or questions. Thank you. Complex Repair And Single Advancement Flap Text: The defect edges were debeveled with a #15 scalpel blade.  The primary defect was closed partially with a complex linear closure.  Given the location of the remaining defect, shape of the defect and the proximity to free margins a single advancement flap was deemed most appropriate for complete closure of the defect.  Using a sterile surgical marker, an appropriate advancement flap was drawn incorporating the defect and placing the expected incisions within the relaxed skin tension lines where possible.    The area thus outlined was incised deep to adipose tissue with a #15 scalpel blade.  The skin margins were undermined to an appropriate distance in all directions utilizing iris scissors.

## 2022-02-15 NOTE — PROGRESS NOTES
Physical Therapy note    Chart reviewed in preparation for physical therapy treatment. Spoke with treating OT. Noted events overnight regarding respiratory status and requiring BiPAP. Palliative to see today and discuss with family/patient regarding goals of care, with potential for comfort measures vs. Hospice. Will defer and follow up as appropriate. CM alerting therapy team patient with plans to transition to comfort measures this afternoon. Will complete PT orders.      Thank you,  Kim Glass, PT, DPT

## 2022-02-15 NOTE — WOUND CARE
Wound Consult: Follow Up Visit. Chart reviewed. Spoke with patients nurse,  Rodrigo Bonilla at bedside and we turned/assessed and provided care; patient's granddaughter at bedside. Patient is resting on a [place] [facility] bed with [employer] Heels off loaded with boots. Patient is alert; has continuous BiPap overnight; requires two person assist  to move side to side in bed. Alexy score 11, elvia. Assessment:  Lower right buttock - resurfaced pink blanching intact skin. Sacrum without redness, left buttock and upper right no redness - sacral foam in place to protect and Rodrigo Bonilla reseated it over area. Heels - blanching mottled discoloration on both heels. Readjusted boots. Has edema and some weeping in left upper arm. No redness to spine noted. Bridge of nose - red blanching 1 cm area noted; recommended RT check and decide on using full face mask or under nose BiPap - Rodrigo Bonilla will f/u. Wound Recommendations:  Preventative sacral foam dressing on going and would continue. Skin Care / PI Prevention Recommendations:  1. Minimize friction/shear: minimize layers of linen/pads under patient. 2. Off load pressure/reposition:  turn and reposition approximately every 2 hours; float heels with pillows or use off loading heel boots; position wedges. 3. Manage Moisture - keep skin folds dry; incontinence skin care with incontinence wipes; currently with elvia. 4. Continue to monitor nutrition, pain, and skin risk scale, and skin assessment. Plan: We will continue to reassess routinely and as needed. Please re-consult should concerns arise despite continued skin/PI prevention measures.     John Ocampo, MSN, RN, [facility], Wound / [address] [facility] Office [phone]

## 2022-02-15 NOTE — PROGRESS NOTES
0700- Bedside and Verbal shift change report given to Stacey (oncoming nurse) by Heidy Beatty (offgoing nurse). Report included the following information Kardex, ED Summary, OR Summary, MAR, Accordion, Med Rec Status and Cardiac Rhythm afib.  Verified drips with off going nurse   Levophed 7  D5 NS @ 50 ml/hr

## 2022-02-15 NOTE — DISCHARGE SUMMARY
Death note:    Admission date: 22  Discharge date (date of death): 02/15/22, 5:15 PM    Admission diagnosis: acute resp failure, COVID pneumonia  Discharge diagnosis: acute resp failure, COVID, MSSA bacteremia, pulm fibrosis    78 yo hx of HTN, DM, Pafib s/p pacer, COPD, CVA, presented w/ resp failure, COVID pneumonia. Hospital course complicated by MSSA bacteremia, thrombocytopenia, pulm fibrosis. Despite full medical support, patient's respiratory status continued to decline. He wanted to be a DNR.   His family decided on comfort care on 02/15 and patient  at 11:15 PM . Cause of death: COVID pneumonia, pulmonary fibrosis, MSSA bacteremia

## 2022-02-15 NOTE — CONSULTS
Called to bedside with request to pronounce Mr. Samaniego Irena OVIEDO. Exam revealed he was without pulse, no heart sounds, respirations absent and  pupils fixed.      IVELISSE pronounced 1715

## 2022-02-18 LAB
BACTERIA SPEC CULT: NORMAL
SERVICE CMNT-IMP: NORMAL

## 2022-02-19 LAB
BACTERIA SPEC CULT: NORMAL
SERVICE CMNT-IMP: NORMAL

## 2023-06-02 NOTE — TELEPHONE ENCOUNTER
Patient is requesting to speak with Janet Peralta NP regarding information he received when he spoke with his insurance company.     Phone: [phone] Inflammation Suggestive Of Cancer Camouflage Histology Text: There was a dense lymphocytic infiltrate which prevented adequate histologic evaluation of adjacent structures.

## 2024-11-24 NOTE — PROGRESS NOTES
1900- Report received from [facility]. 1910- Spoke with pt and son regarding pt day and night time plan of care. 1958- Assessed pt , pt o2 was decreasing in to 80's, pt refused to have the BIPAP placed, wants to wait until he goes to bed at 2200. Educated on using IS and the need to have the BIPAP placed on. 2015- Pt resting in room, on labtop playing a game no distress. 2210- sleep aide given with medication , pt asymptomatic, BIPAP placed on by RT. Pt resting no c/o.     0000-pt resting, denies any c/o. Tele is showing afib, pt is v paced, pt denies any chest pain or any complaints. 2325- pt had a good shift. Asked for tylenol , that was given for back pain. Pt now in bed resting no complaints. 0700- report given to [facility]. Reviewed pt kardex changes during shift. Pt pleasant no distress. sleeping content/relaxed/resting quietly

## (undated) DEVICE — SYRINGE IRRIG 60ML SFT PLIABLE BLB EZ TO GRP 1 HND USE W/

## (undated) DEVICE — REM POLYHESIVE ADULT PATIENT RETURN ELECTRODE: Brand: VALLEYLAB

## (undated) DEVICE — ZIP 8I SURGICAL SKIN CLOSURE DEVICE: Brand: ZIP 8I SURGICAL SKIN CLOSURE DEVICE

## (undated) DEVICE — MEDI-TRACE CADENCE ADULT, DEFIBRILLATION ELECTRODE -RTS  (10 PR/PK) - PHYSIO-CONTROL: Brand: MEDI-TRACE CADENCE

## (undated) DEVICE — 3M™ IOBAN™ 2 ANTIMICROBIAL INCISE DRAPE 6640EZ: Brand: IOBAN™ 2

## (undated) DEVICE — SUTURE STRATAFIX SPRL PDS + SZ 2-0 L9IN ABSRB VLT CT-1 SXPP1B456

## (undated) DEVICE — INTRO SHTH 7FR 13X20CM -- TEARAWAY

## (undated) DEVICE — SLING ORTHOPEDIC PCH UNIV 19.5X9 IN 2-39 IN ARM W/ FOAM STRP

## (undated) DEVICE — INTRO SHTH 6FR 13X20CM -- TEARAWAY

## (undated) DEVICE — SUTURE STRATAFIX SPRL MCRYL + SZ 3-0 L24IN ABSRB UD PS-2 SXMP1B108

## (undated) DEVICE — PACEMAKER PACK: Brand: MEDLINE INDUSTRIES, INC.